# Patient Record
Sex: MALE | Race: WHITE | NOT HISPANIC OR LATINO | Employment: OTHER | URBAN - METROPOLITAN AREA
[De-identification: names, ages, dates, MRNs, and addresses within clinical notes are randomized per-mention and may not be internally consistent; named-entity substitution may affect disease eponyms.]

---

## 2017-04-06 ENCOUNTER — OFFICE VISIT - RIVER FALLS (OUTPATIENT)
Dept: FAMILY MEDICINE | Facility: CLINIC | Age: 67
End: 2017-04-06
Payer: COMMERCIAL

## 2017-04-12 ENCOUNTER — OFFICE VISIT - RIVER FALLS (OUTPATIENT)
Dept: FAMILY MEDICINE | Facility: CLINIC | Age: 67
End: 2017-04-12
Payer: COMMERCIAL

## 2017-04-12 ASSESSMENT — MIFFLIN-ST. JEOR: SCORE: 1705.19

## 2017-05-23 ENCOUNTER — OFFICE VISIT - RIVER FALLS (OUTPATIENT)
Dept: FAMILY MEDICINE | Facility: CLINIC | Age: 67
End: 2017-05-23
Payer: COMMERCIAL

## 2017-06-02 ENCOUNTER — TELEPHONE (OUTPATIENT)
Dept: SURGERY | Facility: CLINIC | Age: 67
End: 2017-06-02

## 2017-06-02 NOTE — TELEPHONE ENCOUNTER
Per task, called and spoke with patient.  Patient is scheduled 7/18/17 at 11:00 am.  Patient knows to check in at clinic 4K.

## 2017-06-02 NOTE — TELEPHONE ENCOUNTER
----- Message from Geetha Zaman RN sent at 7/7/2016  9:54 AM CDT -----  Regarding: RECALL  Needs flex sig  July 2017

## 2017-06-23 ENCOUNTER — AMBULATORY - RIVER FALLS (OUTPATIENT)
Dept: FAMILY MEDICINE | Facility: CLINIC | Age: 67
End: 2017-06-23
Payer: COMMERCIAL

## 2017-06-27 ENCOUNTER — OFFICE VISIT (OUTPATIENT)
Dept: PULMONOLOGY | Facility: CLINIC | Age: 67
End: 2017-06-27
Attending: INTERNAL MEDICINE
Payer: MEDICARE

## 2017-06-27 VITALS
OXYGEN SATURATION: 18 % | TEMPERATURE: 97.7 F | WEIGHT: 198.41 LBS | BODY MASS INDEX: 26.18 KG/M2 | DIASTOLIC BLOOD PRESSURE: 72 MMHG | HEART RATE: 67 BPM | SYSTOLIC BLOOD PRESSURE: 131 MMHG | RESPIRATION RATE: 18 BRPM

## 2017-06-27 DIAGNOSIS — Z87.891 HISTORY OF TOBACCO USE: Primary | ICD-10-CM

## 2017-06-27 PROCEDURE — 99212 OFFICE O/P EST SF 10 MIN: CPT | Mod: ZF

## 2017-06-27 ASSESSMENT — PAIN SCALES - GENERAL: PAINLEVEL: MODERATE PAIN (4)

## 2017-06-27 NOTE — PROGRESS NOTES
Pulmonary Nodule Clinic - follow-up    We have been asked by Dr. Soliz to evaluate this patient in regards to pulmonary nodules        HPI:     This is a 67-year-old male who was last seen in June/July 2016 secondary to multiple pulmonary nodules which at that time had been stable for about 1-1/2 years.  There is also an area of wall thickening in the right upper lobe.  It was recommended at that time that no further follow-up is needed.  It was recommended that he continue with lung cancer screening CT and he is here one year from his previous visit for lung cancer screening CT.  . He has at least a 30 pk year history. Family hx of lung cancer.        Review of Systems:   8 point ROS performed with pertinent +/- noted in the HPI.  The remainder of the ROS was otherwise negative.        Pertinent Medications     Current Outpatient Prescriptions   Medication Sig     atorvastatin (LIPITOR) 10 MG tablet Take 10 mg by mouth daily     aspirin 81 MG tablet Take 81 mg by mouth daily     WARFARIN SODIUM By mouth alternating 12.5mg and 10mg or as directed by coumadin clinic     No current facility-administered medications for this visit.           Allergies:    No Known Allergies       Past Medical Hx:       Arterial occlusive disease      PVD (peripheral vascular disease)      Colon polyps requiring colectomy      EtOH use with associated liver disease      Mild airflow obstruction      Pulmonary nodules - Noted June 2014       Family Hx: Reviewed, updated in EPIC reflect updates as needed.       Mother - Breast Cancer  Paternal Uncle - Stomach Cancer  Paternal Uncle - colon cancer  Paternal Aunt - lung cancer       Social Hx: Reviewed, updated in EPIC reflect updates as needed.       Social History     Social History Narrative    The patient has a 50 pk yr tobacco hx.  He has ongoing active use.  Alcohol use is 21 alcoholic drinks per week.  He has marijuana use.         He previously worked as .          The  "patient is .  Has 3 children.              Objective   Vitals:  /72 (BP Location: Left arm, Patient Position: Chair, Cuff Size: Adult Regular)  Pulse 67  Temp 97.7  F (36.5  C) (Oral)  Resp 18  Wt 90 kg (198 lb 6.6 oz)  SpO2 (!) 18%  BMI 26.18 kg/m2    General:  Adult male;appears stated age; no acute distress; the patient is a good historian  HEENT:  NCAT; EOMI; No icterus; no injection; MMM  Pulm: CTAB, normal to percussion  CV: RRR, no murmurs, rubs or gallops  Neuro: Alert and oriented x 3, moves all extremities no obvious weakness        Labs / Imaging/Studies     Imaging:   CT chest:   Impression:   1. ACR Assessment Category:  Lung-RADS Category 2. Benign appearance  or behavior. .      Recommendation:  Lung-RADS Category 2. Benign appearance or behavior.  Recommendation:  continue annual screening. .            2. Significant Incidental Finding(s):  Category S: Yes.  a.  coronary artery calcium moderate  b. Nonobstructing 4 mm right renal stone.  c. other interstitial lung disease (respiratory  bronchiolitis-interstitial lung disease)     3. Prior history of Lung cancer:  Category C: no.  4. Mild upper lobe predominant centrilobular emphysema.  5. Avoidance of tobacco smoke is strongly advised. Please consider  referral for smoking cessation to [facility] Medication Therapy Management  (MTM) if clinically appropriate.        Download the \"LungRADS Assessment Categories\" table at this site:   http://www.acr.org/Quality-Safety/Resources/LungRADS            Assessment and Plan:   Assessment: 67-year-old male with significant tobacco use history who is here with a 1 year follow-up low-dose screening CT scan of the chest demonstrate multiple pulmonary nodules which appear to be benign in nature.   I would recommend at this time continue yearly surveillance with a low-dose CT scan next year.    1. History of tobacco use  See above  - Prof fee: Shared Decisionmaking for Lung Cancer Screening  - CT " Chest Lung Cancer Scrn Low Dose wo; Future    I spent 25 minutes with direct face to face interaction with this patient and provided at least 50% of this time counseling and coordinating care lung cancer screening  as noted above in the assessment and plan.      Yemi Marino MD  Pulmonary and Critical Care  [facility]  Pager:  [phone]

## 2017-06-27 NOTE — MR AVS SNAPSHOT
After Visit Summary   6/27/2017    Kannan Wolf    MRN: [id number]           Patient Information     Date Of Birth          1950        Visit Information        Provider Department      6/27/2017 11:30 AM Yemi Marino MD [facility]        Today's Diagnoses     History of tobacco use    -  1      Care Instructions      Lung Cancer Screening   Frequently Asked Questions  If you are at high-risk for lung cancer, getting screened with low-dose computed tomography (LDCT) every year can help save your life. This handout offers answers to some of the most common questions about lung cancer screening. If you have other questions, please call [phone] ([phone]).     What is it?  Lung cancer screening uses special X-ray technology to create an image of your lung tissue. The exam is quick and easy and takes less than 10 seconds. We don t give you any medicine or use any needles. You can eat before and after the exam. You don t need to change your clothes as long as the clothing on your chest doesn t contain metal. But, you do need to be able to hold your breath for at least 6 seconds during the exam.    What is the goal of lung cancer screening?  The goal of lung cancer screening is to save lives. Many times, lung cancer is not found until a person starts having physical symptoms. Lung cancer screening can help detect lung cancer in the earliest stages when it may be easier to treat.    Who should be screened for lung cancer?  We suggest lung cancer screening for anyone who is at high-risk for lung cancer. You are in the high-risk group if you:      are between the ages of 55 and 79, and    have smoked at least 1 pack of cigarettes a day for 30 or more years, and    still smoke or have quit within the past 15 years.    However, if you have a new cough or shortness of breath, you should talk to your doctor before being screened.    Some national lung health  advocacy groups also recommend screening for people ages 50 to 79 who have smoked an average of 1 pack of cigarettes a day for 20 years. They must also have at least 1 other risk factor for lung cancer, not including exposure to secondhand smoke. Other risk factors are having had cancer in the past, emphysema, pulmonary fibrosis, COPD, a family history of lung cancer, or exposure to certain materials such as arsenic, asbestos, beryllium, cadmium, chromium, diesel fumes, nickel, radon or silica. Your care team can help you know if you have one of these risk factors.     Why does it matter if I have symptoms?  Certain symptoms can be a sign that you have a condition in your lungs that should be checked and treated by your doctor. These symptoms include fever, chest pain, a new or changing cough, shortness of breath that you have never felt before, coughing up blood or unexplained weight loss. Having any of these symptoms can greatly affect the results of lung cancer screening.       Should all smokers get an LDCT lung cancer screening exam?  It depends. Lung cancer screening is for a very specific group of men and women who have a history of heavy smoking over a long period of time (see  Who should be screened for lung cancer  above).  I am in the high-risk group, but have been diagnosed with cancer in the past. Is LDCT lung cancer screening right for me?  In some cases, you should not have LDCT lung screening, such as when your doctor is already following your cancer with CT scan studies. Your doctor will help you decide if LDCT lung screening is right for you.  Do I need to have a screening exam every year?  Yes. If you are in the high-risk group described earlier, you should get an LDCT lung cancer screening exam every year until you are 79, or are no longer willing or able to undergo screening and possible procedures to diagnose and treat lung cancer.  How effective is LDCT at preventing death from lung  cancer?  Studies have shown that LDCT lung cancer screening can lower the risk of death from lung cancer by 20 percent in people who are at high-risk.  What are the risks?  There are some risks and limitations of LDCT lung cancer screening. We want to make sure you understand the risks and benefits, so please let us know if you have any questions. Your doctor may want to talk with you more about these risks.    Radiation exposure: As with any exam that uses radiation, there is a very small increased risk of cancer. The amount of radiation in LDCT is small--about the same amount a person would get from a mammogram. Your doctor orders the exam when he or she feels the potential benefits outweigh the risks.    False negatives: No test is perfect, including LDCT. It is possible that you may have a medical condition, including lung cancer, that is not found during your exam. This is called a false negative result.    False positives and more testing: LDCT very often finds something in the lung that could be cancer, but in fact is not. This is called a false positive result. False positive tests often cause anxiety. To make sure these findings are not cancer, you may need to have more tests. These tests will be done only if you give us permission. Sometimes patients need a treatment that can have side effects, such as a biopsy. For more information on false positives, see  What can I expect from the results?     Findings not related to lung cancer: Your LDCT exam also takes pictures of areas of your body next to your lungs. In a very small number of cases, the CT scan will show an abnormal finding in one of these areas, such as your kidneys, adrenal glands, liver or thyroid. This finding may not be serious, but you may need more tests. Your doctor can help you decide what other tests you may need, if any.  What can I expect from the results?  About 1 out of 4 LDCT exams will find something that may need more tests. Most  of the time, these findings are lung nodules. Lung nodules are very small collections of tissue in the lung. These nodules are very common, and the vast majority--more than 97 percent--are not cancer (benign). Most are normal lymph nodes or small areas of scarring from past infections.  But, if a small lung nodule is found to be cancer, the cancer can be cured more than 90 percent of the time. To know if the nodule is cancer, we may need to get more images before your next yearly screening exam. If the nodule has suspicious features (for example, it is large, has an odd shape or grows over time), we will refer you to a specialist for further testing.  Will my doctor also get the results?  Yes. Your doctor will get a copy of your results.  Is it okay to keep smoking now that there s a cancer screening exam?  No. Tobacco is one of the strongest cancer-causing agents. It causes not only lung cancer, but other cancers and cardiovascular (heart) diseases as well. The damage caused by smoking builds over time. This means that the longer you smoke, the higher your risk of disease. While it is never too late to quit, the sooner you quit, the better.  Where can I find help to quit smoking?  The best way to prevent lung cancer is to stop smoking. If you have already quit smoking, congratulations and keep it up! For help on quitting smoking, please call [employer] at 5-832-816-TSZW (9484) or the American Cancer Society at 1-158.479.6196 to find local resources near you.  One-on-one health coaching:  If you d prefer to work individually with a health care provider on tobacco cessation, we offer:      Medication Therapy Management:  Our specially trained pharmacists work closely with you and your doctor to help you quit smoking.  Call [phone] or [phone] (toll free).     Can Do: Health coaching offered by [facility].  [email or web address]            Follow-ups after your visit        Follow-up notes  from your care team     Return in about 1 year (around 6/27/2018).      Your next 10 appointments already scheduled     Sep 01, 2017 12:00 PM CDT   (Arrive by 11:45 AM)   NEW WITH ROOM with Sana Ervin GC,  2 116 CONSULT RM   [facility] ([facility])    [address] [place] [address]  2nd [facility] [id number] [phone]            Sep 01, 2017  1:15 PM CDT   [facility] Lab Draw with [facility] LAB DRAW   [facility] Lab Draw ([facility])    [address] [id number] [phone]            Jun 26, 2018 12:00 PM CDT   (Arrive by 11:45 AM)   CT LUNG RESEARCH FLORES with CT2   [facility] CT ([facility])    [address] [id number] [phone]           Please bring any scans or X-rays taken at other hospitals, if similar tests were done. Also bring a list of your medicines, including vitamins, minerals and over-the-counter drugs. It is safest to leave personal items at home.  Be sure to tell your doctor:   If you have any allergies.   If there s any chance you are pregnant.   If you are breastfeeding.   If you have any special needs.  You do not need to do anything special to prepare.  Please wear loose clothing, such as a sweat suit or jogging clothes. Avoid snaps, zippers and other metal. We may ask you to undress and put on a hospital gown.            Jun 26, 2018  1:00 PM CDT   (Arrive by 12:45 PM)   Return Visit with Yemi Marino MD   [facility] ([facility])    [address] [address] [phone]              Who to contact     If you have questions or need follow up information about today's clinic visit or your schedule please contact [facility] directly at [phone].  Normal or non-critical lab and imaging  results will be communicated to you by Magtonhart, letter or phone within 4 business days after the clinic has received the results. If you do not hear from us within 7 days, please contact the clinic through GetFeedback or phone. If you have a critical or abnormal lab result, we will notify you by phone as soon as possible.  Submit refill requests through GetFeedback or call your pharmacy and they will forward the refill request to us. Please allow 3 business days for your refill to be completed.          Additional Information About Your Visit        GetFeedback Information     GetFeedback gives you secure access to your electronic health record. If you see a primary care provider, you can also send messages to your care team and make appointments. If you have questions, please call your primary care clinic.  If you do not have a primary care provider, please call [phone] and they will assist you.        Care EveryWhere ID     This is your Care EveryWhere ID. This could be used by other organizations to access your [facility] medical records  [id number]        Your Vitals Were     Pulse Temperature Respirations Pulse Oximetry BMI (Body Mass Index)       67 97.7  F (36.5  C) (Oral) 18 18% 26.18 kg/m2        Blood Pressure from Last 3 Encounters:   07/18/17 143/88   06/27/17 131/72   12/12/16 134/77    Weight from Last 3 Encounters:   07/18/17 88.5 kg (195 lb)   06/27/17 90 kg (198 lb 6.6 oz)   12/12/16 88 kg (194 lb)              We Performed the Following     Prof fee: Shared Decisionmaking for Lung Cancer Screening          Today's Medication Changes          These changes are accurate as of: 6/27/17 11:59 PM.  If you have any questions, ask your nurse or doctor.               Stop taking these medicines if you haven't already. Please contact your care team if you have questions.     pimecrolimus 1 % cream   Commonly known as:  ELIDEL   Stopped by:  Yemi Marino MD                    Primary Care Provider Office Phone  # Fax Tyrel Jenkins [phone]       [facility] [address] [facility] [address]        Equal Access to Services     ALONA STRICKLAND : Hadii aad ku hadlochely Ivan, waandida luphilchristinaha, qagirishta kaemily milner, moises kennedydeandra jettmarily johannystephenfranck muñiz. So [facility] [phone].    ATENCIÓN: Si habla español, tiene a puente disposición servicios gratuitos de asistencia lingüística. Llame al [phone].    We comply with applicable federal civil rights laws and [place] laws. We do not discriminate on the basis of race, color, national origin, age, disability sex, sexual orientation or gender identity.            Thank you!     Thank you for choosing [facility]  for your care. Our goal is always to provide you with excellent care. Hearing back from our patients is one way we can continue to improve our services. Please take a few minutes to complete the written survey that you may receive in the mail after your visit with us. Thank you!             Your Updated Medication List - Protect others around you: Learn how to safely use, store and throw away your medicines at www.disposemymeds.org.          This list is accurate as of: 6/27/17 11:59 PM.  Always use your most recent med list.                   Brand Name Dispense Instructions for use Diagnosis    aspirin 81 MG tablet      Take 81 mg by mouth daily        atorvastatin 10 MG tablet    LIPITOR     Take 10 mg by mouth daily    NAFLD (nonalcoholic fatty liver disease), Lung nodule       WARFARIN SODIUM      By mouth alternating 12.5mg and 10mg or as directed by coumadin clinic

## 2017-06-27 NOTE — LETTER
6/27/2017       RE: Kannan Wolf  [address] [place] [address]     Dear Colleague,    Thank you for referring your patient, Kannan Wolf, to the [facility] at [facility]. Please see a copy of my visit note below.    Pulmonary Nodule Clinic - follow-up    We have been asked by Dr. Soliz to evaluate this patient in regards to pulmonary nodules        HPI:     This is a 67-year-old male who was last seen in June/July 2016 secondary to multiple pulmonary nodules which at that time had been stable for about 1-1/2 years.  There is also an area of wall thickening in the right upper lobe.  It was recommended at that time that no further follow-up is needed.  It was recommended that he continue with lung cancer screening CT and he is here one year from his previous visit for lung cancer screening CT.  . He has at least a 30 pk year history. Family hx of lung cancer.        Review of Systems:   8 point ROS performed with pertinent +/- noted in the HPI.  The remainder of the ROS was otherwise negative.        Pertinent Medications     Current Outpatient Prescriptions   Medication Sig     atorvastatin (LIPITOR) 10 MG tablet Take 10 mg by mouth daily     aspirin 81 MG tablet Take 81 mg by mouth daily     WARFARIN SODIUM By mouth alternating 12.5mg and 10mg or as directed by coumadin clinic     No current facility-administered medications for this visit.           Allergies:    No Known Allergies       Past Medical Hx:       Arterial occlusive disease      PVD (peripheral vascular disease)      Colon polyps requiring colectomy      EtOH use with associated liver disease      Mild airflow obstruction      Pulmonary nodules - Noted June 2014       Family Hx: Reviewed, updated in EPIC reflect updates as needed.       Mother - Breast Cancer  Paternal Uncle - Stomach Cancer  Paternal Uncle - colon cancer  Paternal Aunt - lung cancer       Social  "Hx: Reviewed, updated in EPIC reflect updates as needed.       Social History     Social History Narrative    The patient has a 50 pk yr tobacco hx.  He has ongoing active use.  Alcohol use is 21 alcoholic drinks per week.  He has marijuana use.         He previously worked as .          The patient is .  Has 3 children.              Objective   Vitals:  /72 (BP Location: Left arm, Patient Position: Chair, Cuff Size: Adult Regular)  Pulse 67  Temp 97.7  F (36.5  C) (Oral)  Resp 18  Wt 90 kg (198 lb 6.6 oz)  SpO2 (!) 18%  BMI 26.18 kg/m2    General:  Adult male;appears stated age; no acute distress; the patient is a good historian  HEENT:  NCAT; EOMI; No icterus; no injection; MMM  Pulm: CTAB, normal to percussion  CV: RRR, no murmurs, rubs or gallops  Neuro: Alert and oriented x 3, moves all extremities no obvious weakness        Labs / Imaging/Studies     Imaging:   CT chest:   Impression:   1. ACR Assessment Category:  Lung-RADS Category 2. Benign appearance  or behavior. .      Recommendation:  Lung-RADS Category 2. Benign appearance or behavior.  Recommendation:  continue annual screening. .            2. Significant Incidental Finding(s):  Category S: Yes.  a.  coronary artery calcium moderate  b. Nonobstructing 4 mm right renal stone.  c. other interstitial lung disease (respiratory  bronchiolitis-interstitial lung disease)     3. Prior history of Lung cancer:  Category C: no.  4. Mild upper lobe predominant centrilobular emphysema.  5. Avoidance of tobacco smoke is strongly advised. Please consider  referral for smoking cessation to [facility] Medication Therapy Management  (MTM) if clinically appropriate.        Download the \"LungRADS Assessment Categories\" table at this site:   http://www.acr.org/Quality-Safety/Resources/LungRADS            Assessment and Plan:   Assessment: 67-year-old male with significant tobacco use history who is here with a 1 year follow-up low-dose screening " CT scan of the chest demonstrate multiple pulmonary nodules which appear to be benign in nature.   I would recommend at this time continue yearly surveillance with a low-dose CT scan next year.    1. History of tobacco use  See above  - Prof fee: Shared Decisionmaking for Lung Cancer Screening  - CT Chest Lung Cancer Scrn Low Dose wo; Future    I spent 25 minutes with direct face to face interaction with this patient and provided at least 50% of this time counseling and coordinating care lung cancer screening  as noted above in the assessment and plan.      Yemi Marino MD  Pulmonary and Critical Care  [facility]  Pager:  [phone]

## 2017-06-27 NOTE — PATIENT INSTRUCTIONS

## 2017-06-27 NOTE — NURSING NOTE
"Oncology Rooming Note    June 27, 2017 11:46 AM   Kannan Wolf is a 67 year old male who presents for:    Chief Complaint   Patient presents with     Oncology Clinic Visit     CT results , Lung Nodgules      Initial Vitals: /72 (BP Location: Left arm, Patient Position: Chair, Cuff Size: Adult Regular)  Pulse 67  Temp 97.7  F (36.5  C) (Oral)  Resp 18  Wt 90 kg (198 lb 6.6 oz)  SpO2 (!) 18%  BMI 26.18 kg/m2 Estimated body mass index is 26.18 kg/(m^2) as calculated from the following:    Height as of 12/12/16: 1.854 m (6' 0.99\").    Weight as of this encounter: 90 kg (198 lb 6.6 oz). Body surface area is 2.15 meters squared.  Moderate Pain (4) Comment: Data Unavailable   No LMP for male patient.  Allergies reviewed: Yes  Medications reviewed: Yes    Medications: MEDICATION REFILLS NEEDED TODAY. Provider was notified.  [facility] into [facility] [address] [place] [address] [address]  AT [facility] [address]    Clinical concerns: no concerns  Ned was notified.    7 minutes for nursing intake (face to face time)     Lo Acuña MA              "

## 2017-07-10 ENCOUNTER — AMBULATORY - RIVER FALLS (OUTPATIENT)
Dept: FAMILY MEDICINE | Facility: CLINIC | Age: 67
End: 2017-07-10
Payer: COMMERCIAL

## 2017-07-18 ENCOUNTER — OFFICE VISIT (OUTPATIENT)
Dept: SURGERY | Facility: CLINIC | Age: 67
End: 2017-07-18

## 2017-07-18 VITALS
HEART RATE: 72 BPM | BODY MASS INDEX: 25.84 KG/M2 | WEIGHT: 195 LBS | DIASTOLIC BLOOD PRESSURE: 88 MMHG | TEMPERATURE: 97.7 F | HEIGHT: 73 IN | SYSTOLIC BLOOD PRESSURE: 143 MMHG | OXYGEN SATURATION: 96 %

## 2017-07-18 DIAGNOSIS — D12.6 BENIGN NEOPLASM OF COLON, UNSPECIFIED PART OF COLON: Primary | ICD-10-CM

## 2017-07-18 ASSESSMENT — PAIN SCALES - GENERAL: PAINLEVEL: MILD PAIN (3)

## 2017-07-18 NOTE — PROGRESS NOTES
Colon and Rectal Surgery Clinic Note      RE: Kannan Cisneros Cudd  : 1950  CRISTHIAN: 2017      Иван returns to clinic in follow-up of his colonic polyposis, for which I performed a total abdominal colectomy in May 2012. Final pathology showed multiple tubular and sessile serrated adenomas. Genetic testing in 2012 was negative for APC and MutYH age mutations. Jerod bowel function has been stable, with roughly 10 loose bowel movements per 24 hours, 3 occurring at night.    Иван is enjoying his summer [place] his [place] on the [place]. We again discussed his friend at [employer], Patrick Serrano. And these son Royal is, I believe, a friend of my nephew Shade.    I obtained written informed consent and performed flexible sigmoidoscopy. There is a healthy-appearing ileorectal anastomosis. The terminal ileum is normal. There is a single 3 mm polyp in the distal rectum, which I removed with cold biopsy forceps. Retroflexion was normal.    I asked Иван to follow-up with me in 1 year for repeat surveillance sigmoidoscopy. I also advised a visit to the Cancer Risk Management Program, as Иван would probably benefit from gene panel testing. With respect to Иван's bowel function, I advised use of a fiber supplement such as Benefiber with a gradually escalating dosage to see if this is helpful. However, the Иван is not having any real limitations with his current bowel function.    Total time 15 minutes, plus 5-10 minutes for flexible sigmoidoscopy. Greater than half the 15 minutes was spent counseling.      For details of past medical history, surgical history, family history, medications, allergies, and review of systems, please see details below.    Medical history:  Past Medical History:   Diagnosis Date     ACL tear      Arthritis     osteoarthritis     Blood clot in the legs     +PE   also on vein from intestine     Blood clotting disorder (H)      Chronic diarrhea     result of total colectomy      Head injury 1960    multiple     Hearing problem 2000     IBS (irritable bowel syndrome)      Liver disease 2013     Migraines 1989    none for a few years     Personal history of colonic polyps 2009     Polyps, colonic      Pulmonary emboli (H) 2009     Skin disease 1970    eczema     Sleep apnea        Surgical history:  Past Surgical History:   Procedure Laterality Date     C REPAIR CRUCIATE LIGAMENT,KNEE       bilateral knees     LAPAROSCOPIC ASSISTED COLECTOMY  5/9/2012    Procedure:LAPAROSCOPIC ASSISTED COLECTOMY; Hand Assisted Laparoscopic Total Abdominal Colectomy Ileorectal anastomosis. ; Surgeon:COLLINS JAVIER; Location:[facility]     SIGMOIDOSCOPY FLEXIBLE  5/21/2012    Procedure:SIGMOIDOSCOPY FLEXIBLE; Surgeon:EMMANUEL LEDBETTER; [facility]     VASCULAR SURGERY      lower ext angiogram       Family history:  Family History   Problem Relation Age of Onset     Anesthesia Reaction       No history     Colon Polyps       no hx     Crohn Disease       no hx     Ulcerative Colitis       no hx     Cancer - colorectal       no hx     CANCER       no hx     CEREBROVASCULAR DISEASE Mother      multiple     Colon Cancer Other      uncle     Substance Abuse Sister      Substance Abuse Brother      Substance Abuse Sister        Medications:  Current Outpatient Prescriptions   Medication Sig Dispense Refill     atorvastatin (LIPITOR) 10 MG tablet Take 10 mg by mouth daily       WARFARIN SODIUM By mouth alternating 12.5mg and 10mg or as directed by coumadin clinic       aspirin 81 MG tablet Take 81 mg by mouth daily       Allergies:  The patienthas No Known Allergies.    Social history:  Social History   Substance Use Topics     Smoking status: Current Every Day Smoker     Packs/day: 1.00     Years: 50.00     Types: Cigarettes     Smokeless tobacco: Never Used     Alcohol use 0.0 oz/week     0 Standard drinks or equivalent per week      Comment: 3-4 light beers per day     Marital status: .    Review of  "Systems:  Nursing Notes:   Sherice Brambila LPN  7/18/2017 11:04 AM  Signed  No chief complaint on file.      Vitals:    07/18/17 1100   BP: 143/88   Pulse: 72   Temp: 97.7  F (36.5  C)   TempSrc: Oral   SpO2: 96%   Weight: 195 lb   Height: 6' 1\"       Body mass index is 25.73 kg/(m^2).    ANDRZEJ Chapman MD   Professor and Chief  Division of Colon and Rectal Surgery  [facility]      Referring Provider:  David Jenkins  [facility]  [address] [place] [address]     Primary Care Provider:  David Jenkins  "

## 2017-07-18 NOTE — NURSING NOTE
"No chief complaint on file.      Vitals:    07/18/17 1100   BP: 143/88   Pulse: 72   Temp: 97.7  F (36.5  C)   TempSrc: Oral   SpO2: 96%   Weight: 195 lb   Height: 6' 1\"       Body mass index is 25.73 kg/(m^2).    Sherice KAY LPN                          "

## 2017-07-18 NOTE — LETTER
2017       RE: Kannan Wolf  [address] [address]     Dear Colleague,    Thank you for referring your patient, Kannan Wolf, to the [facility] COLON AND RECTAL SURGERY at [facility]. Please see a copy of my visit note below.    Colon and Rectal Surgery Clinic Note      RE: Kannan Wolf  : 1950  CRISTHIAN: 2017      Иван returns to clinic in follow-up of his colonic polyposis, for which I performed a total abdominal colectomy in May 2012. Final pathology showed multiple tubular and sessile serrated adenomas. Genetic testing in 2012 was negative for APC and MutYH age mutations. Jerod bowel function has been stable, with roughly 10 loose bowel movements per 24 hours, 3 occurring at night.    Иван is enjoying his summer [place] his [place] on the [facility]. We again discussed his friend at [employer], Patrick Serrano. And these son Royal is, I believe, a friend of my nephew Shade.    I obtained written informed consent and performed flexible sigmoidoscopy. There is a healthy-appearing ileorectal anastomosis. The terminal ileum is normal. There is a single 3 mm polyp in the distal rectum, which I removed with cold biopsy forceps. Retroflexion was normal.    I asked Иван to follow-up with me in 1 year for repeat surveillance sigmoidoscopy. I also advised a visit to the Cancer Risk Management Program, as Иван would probably benefit from gene panel testing. With respect to Иван's bowel function, I advised use of a fiber supplement such as Benefiber with a gradually escalating dosage to see if this is helpful. However, the Иван is not having any real limitations with his current bowel function.    Total time 15 minutes, plus 5-10 minutes for flexible sigmoidoscopy. Greater than half the 15 minutes was spent counseling.      For details of past medical history, surgical history, family history, medications, allergies, and  review of systems, please see details below.    Medical history:  Past Medical History:   Diagnosis Date     ACL tear      Arthritis     osteoarthritis     Blood clot in the legs     +PE 2009  also on vein from intestine     Blood clotting disorder (H) 2009     Chronic diarrhea     result of total colectomy     Head injury 1960    multiple     Hearing problem 2000     IBS (irritable bowel syndrome)      Liver disease 2013     Migraines 1989    none for a few years     Personal history of colonic polyps 2009     Polyps, colonic      Pulmonary emboli (H) 2009     Skin disease 1970    eczema     Sleep apnea        Surgical history:  Past Surgical History:   Procedure Laterality Date     C REPAIR CRUCIATE LIGAMENT,KNEE       bilateral knees     LAPAROSCOPIC ASSISTED COLECTOMY  5/9/2012    Procedure:LAPAROSCOPIC ASSISTED COLECTOMY; Hand Assisted Laparoscopic Total Abdominal Colectomy Ileorectal anastomosis. ; Surgeon:COLLINS JAVIER; Location[facility]     SIGMOIDOSCOPY FLEXIBLE  5/21/2012    Procedure:SIGMOIDOSCOPY FLEXIBLE; Surgeon:EMMANUEL LEDBETTER; Location[facility]     VASCULAR SURGERY      lower ext angiogram       Family history:  Family History   Problem Relation Age of Onset     Anesthesia Reaction       No history     Colon Polyps       no hx     Crohn Disease       no hx     Ulcerative Colitis       no hx     Cancer - colorectal       no hx     CANCER       no hx     CEREBROVASCULAR DISEASE Mother      multiple     Colon Cancer Other      uncle     Substance Abuse Sister      Substance Abuse Brother      Substance Abuse Sister        Medications:  Current Outpatient Prescriptions   Medication Sig Dispense Refill     atorvastatin (LIPITOR) 10 MG tablet Take 10 mg by mouth daily       WARFARIN SODIUM By mouth alternating 12.5mg and 10mg or as directed by coumadin clinic       aspirin 81 MG tablet Take 81 mg by mouth daily       Allergies:  The patienthas No Known Allergies.    Social history:  Social History  "  Substance Use Topics     Smoking status: Current Every Day Smoker     Packs/day: 1.00     Years: 50.00     Types: Cigarettes     Smokeless tobacco: Never Used     Alcohol use 0.0 oz/week     0 Standard drinks or equivalent per week      Comment: 3-4 light beers per day     Marital status: .    Review of Systems:  Nursing Notes:   Sherice Brambila LPN  7/18/2017 11:04 AM  Signed  No chief complaint on file.      Vitals:    07/18/17 1100   BP: 143/88   Pulse: 72   Temp: 97.7  F (36.5  C)   TempSrc: Oral   SpO2: 96%   Weight: 195 lb   Height: 6' 1\"       Body mass index is 25.73 kg/(m^2).    Sherice KAY LPN    Referring Provider:  David Jenkins  [facility]  [address] [place] [address]     Primary Care Provider:  David Jenkins    Again, thank you for allowing me to participate in the care of your patient.      Sincerely,    Agapito Watson MD      "

## 2017-07-18 NOTE — MR AVS SNAPSHOT
After Visit Summary   7/18/2017    Kannan Wolf    MRN: [id number]           Patient Information     Date Of Birth          1950        Visit Information        Provider Department      7/18/2017 11:00 AM Agapito Watson MD [facility] Colon and Rectal Surgery        Today's Diagnoses     Benign neoplasm of colon, unspecified part of colon    -  1       Follow-ups after your visit        Additional Services     CANCER RISK MGMT/CANCER GENETIC COUNSELING REFERRAL       Your provider has referred you to the Cancer Risk Management Program - Cancer Genetic Counseling.    Reason for Referral: polyposis.  APC and MutYH (-) 2012.   ?gene panel testing?    We have a sent a notice to a staff member of the Cancer Risk Management Program to give you a call to assist with scheduling your appointment.  You may also call  [phone] ([phone]) to initiate scheduling.    Please be aware that coverage of these services is subject to the terms and limitations of your health insurance plan.  Call member services at your health plan with any benefit or coverage questions.      Please bring the completed family history sheet to your appointment in addition to any available outside medical records documenting your cancer diagnosis.                  Your next 10 appointments already scheduled     Sep 01, 2017 12:00 PM CDT   (Arrive by 11:45 AM)   NEW WITH ROOM with Sana Ervin GC,  2 116 CONSULT [facility] ([facility] [place])    [address] [place] [id number] [phone]            Sep 01, 2017  1:15 PM CDT   [facility]   [facility] Lab Draw ([facility] [place])    [address] [place] [address]   [phone]            Jun 26, 2018 12:00 PM CDT   (Arrive by 11:45 AM)   CT LUNG RESEARCH FLORES with [facility]   [facility] CT ([facility]  [facility] [place])    [address] [place] [id number] [phone]           Please bring any scans or X-rays taken at other hospitals, if similar tests were done. Also bring a list of your medicines, including vitamins, minerals and over-the-counter drugs. It is safest to leave personal items at home.  Be sure to tell your doctor:   If you have any allergies.   If there s any chance you are pregnant.   If you are breastfeeding.   If you have any special needs.  You do not need to do anything special to prepare.  Please wear loose clothing, such as a sweat suit or jogging clothes. Avoid snaps, zippers and other metal. We may ask you to undress and put on a hospital gown.            Jun 26, 2018  1:00 PM CDT   (Arrive by 12:45 PM)   Return Visit with Yemi Marino MD   [facility] ([facility] [place])    [address] [place] [address] [phone]              Who to contact     Please call your clinic at [phone] to:    Ask questions about your health    Make or cancel appointments    Discuss your medicines    Learn about your test results    Speak to your doctor   If you have compliments or concerns about an experience at your clinic, or if you wish to file a complaint, please contact [facility] Patient Relations at [phone] or email us at [email or web address]         Additional Information About Your Visit        [employer] Information     [facility] gives you secure access to your electronic health record. If you see a primary care provider, you can also send messages to your care team and make appointments. If you have questions, please call your primary care clinic.  If you do not have a primary care provider, please call [phone] and they will assist you.      [facility] is an electronic gateway that provides easy, online access to your medical records. With  "MyChart, you can request a clinic appointment, read your test results, renew a prescription or communicate with your care team.     To access your existing account, please contact your [facility] or call [phone] for assistance.        Care EveryWhere ID     This is your Care EveryWhere ID. This could be used by other organizations to access your [facility] medical records  [id number]        Your Vitals Were     Pulse Temperature Height Pulse Oximetry BMI (Body Mass Index)       72 97.7  F (36.5  C) (Oral) 1.854 m (6' 1\") 96% 25.73 kg/m2        Blood Pressure from Last 3 Encounters:   No data found for BP    Weight from Last 3 Encounters:   No data found for Wt              Today, you had the following     No orders found for display       Primary Care Provider Office Phone # Fax #    David Jenkins [phone]       [facility] [address] [facility] [address]        Equal Access to Services     ALONA STRICKLAND : Hadii eugenia ku hadasho Soomaali, waaxda luqadaha, qaybta kaalmada adeegyada, waxay joannein hayshayy nicholson . So [facility] [phone].    ATENCIÓN: Si habla español, tiene a puente disposición servicios gratuitos de asistencia lingüística. Anjelame al [phone].    We comply with applicable federal civil rights laws and [place] laws. We do not discriminate on the basis of race, color, national origin, age, disability sex, sexual orientation or gender identity.            Thank you!     Thank you for choosing [facility] COLON AND RECTAL SURGERY  for your care. Our goal is always to provide you with excellent care. Hearing back from our patients is one way we can continue to improve our services. Please take a few minutes to complete the written survey that you may receive in the mail after your visit with us. Thank you!             Your Updated Medication List - Protect others around you: Learn how to safely use, store and throw away " your medicines at www.disposemymeds.org.          This list is accurate as of: 7/18/17 11:59 PM.  Always use your most recent med list.                   Brand Name Dispense Instructions for use Diagnosis    aspirin 81 MG tablet      Take 81 mg by mouth daily        atorvastatin 10 MG tablet    LIPITOR     Take 10 mg by mouth daily    NAFLD (nonalcoholic fatty liver disease), Lung nodule       WARFARIN SODIUM      By mouth alternating 12.5mg and 10mg or as directed by coumadin clinic

## 2017-07-19 LAB — COPATH REPORT: NORMAL

## 2017-07-25 ENCOUNTER — AMBULATORY - RIVER FALLS (OUTPATIENT)
Dept: FAMILY MEDICINE | Facility: CLINIC | Age: 67
End: 2017-07-25
Payer: COMMERCIAL

## 2017-08-23 ENCOUNTER — AMBULATORY - RIVER FALLS (OUTPATIENT)
Dept: FAMILY MEDICINE | Facility: CLINIC | Age: 67
End: 2017-08-23
Payer: COMMERCIAL

## 2017-09-01 ENCOUNTER — OFFICE VISIT (OUTPATIENT)
Dept: ONCOLOGY | Facility: CLINIC | Age: 67
End: 2017-09-01
Attending: GENETIC COUNSELOR, MS
Payer: MEDICARE

## 2017-09-01 DIAGNOSIS — Z80.0 FAMILY HISTORY OF COLON CANCER: ICD-10-CM

## 2017-09-01 DIAGNOSIS — Z80.3 FAMILY HISTORY OF BREAST CANCER: ICD-10-CM

## 2017-09-01 DIAGNOSIS — D12.6 BENIGN NEOPLASM OF COLON, UNSPECIFIED PART OF COLON: ICD-10-CM

## 2017-09-01 DIAGNOSIS — D12.6 BENIGN NEOPLASM OF COLON, UNSPECIFIED PART OF COLON: Primary | ICD-10-CM

## 2017-09-01 PROCEDURE — 96040 ZZH GENETIC COUNSELING, EACH 30 MINUTES: CPT | Mod: ZF | Performed by: GENETIC COUNSELOR, MS

## 2017-09-01 PROCEDURE — 36415 COLL VENOUS BLD VENIPUNCTURE: CPT

## 2017-09-01 NOTE — PATIENT INSTRUCTIONS
Assessing Cancer Risk  Only about 5-10% of cancers are thought to be due to an inherited cancer susceptibility gene.    These families often have:  ? Several people with the same or related types of cancer  ? Cancers diagnosed at a young age (before age 50)  ? Individuals with more than one primary cancer  ? Multiple generations of the family affected with cancer    Comprehensive Colon Cancer Panel  We each inherit two copies of every gene in our bodies: one from our mother, and one from our father.  Each gene has a specific job to do.  When a gene has a mistake or  mutation  in it, it does not work like it should.      This handout will review common hereditary colon cancer syndromes, and other genes related to an increased risk for colon cancer.  The genes that will be discussed in this handout are: APC, BMPR1A, CDH1, CHEK2, EPCAM, GREM1, MLH1, MSH2, MSH6, MUTYH, PMS2, POLD1, POLE, PTEN, SMAD4, STK11, and TP53.  These genes are clinically actionable, meaning there are published guidelines for cancer screening and management for individuals who are found to carry mutations in these genes. Inheriting a mutation does not mean a person will develop cancer, but it does significantly increase his or her risk above the general population risk.      Familial Adenomatous Polyposis (FAP)  FAP is a hereditary cancer syndrome caused by mutations in the APC gene. The condition is known to cause hundreds to thousands of adenomatous polyps in the colon, creating a  carpet  of polyps. Some individuals have what is called attenuated FAP (AFAP), a milder form of FAP with fewer polyps and typically later onset. Individuals with an APC gene mutation are at an increased risk for colon, thyroid, and duodenal cancers, as well as several other types of cancer1.  Other features of this condition may include: osteomas, dental anomalies, benign skin lesions, CHRPE ( freckle  on the inside of the eye), and desmoid tumors.      Lifetime  Cancer Risks     Cancer Type General Population FAP   Colon  5% near 100%   Thyroid (papillary) 1% 1-12%   Duodenal <1% 5%   Liver  <1% 1-2% before age 5   Pancreas <1% 1%   Stomach <1% 1%       Juvenile Polyposis Syndrome (JPS)  JPS is characterized by hamartomatous polyps, called juvenile polyps, in the gastrointestinal tract.  Juvenile  refers to the type of polyps seen in this hereditary cancer condition, not the age of onset. Currently, mutations in two genes are known to cause JPS: BMPR1A and SMAD4. Of individuals clinically diagnosed with JPS, only 40% have an identifiable mutation in one of these genes, suggesting there are other genes that cause JPS that have not been discovered yet. Individuals with JPS are at an increased risk for colon cancer and stomach cancer 2,3,4. Pancreatic and small bowel cancers have also been reported in JPS, but the actual risks are unknown.         Lifetime Cancer Risks    Cancer Type General Population JPS   Colon 5% 40-50%   Gastric/Duodenal <1% 10-21%     Some individuals with SMAD4 mutations have a condition called JPS/HHT (Juvenile Polyposis/Hereditary Hemorrhagic Telangiectasia) where in addition to JPS, individuals may have nose bleeds and clotting issues.     Hereditary Diffuse Gastric Cancer (HDGC)  Currently, mutations in one gene are known to cause Hereditary Diffuse Gastric Cancer: CDH1.  Individuals with HDGC are at increased risk for diffuse gastric cancer and lobular breast cancer. Of people diagnosed with HDGC, 30-50% have a mutation in the CDH1 gene.  This suggests there are likely mutations in other genes that may cause HDGC that have not been identified yet. Individuals with HDGC may also be at increased risk for colon cancer.      Lifetime Cancer Risks    Cancer Type General Population HDGC   Diffuse Gastric <1% 67-83%   Breast 12% 39-52%     Kohler syndrome  Mutations in five different genes are known to cause Kohler syndrome: MLH1, MSH2, MSH6, PMS2, and  EPCAM. Individuals with Kohler syndrome have an increased risk for colon, uterine, ovarian, small bowel, stomach, urinary tract, and brain cancer, as well as several other types of cancer. The exact lifetime cancer risks are dependent upon the gene in which the mutation was identified.      Lifetime Cancer Risks    Cancer Type General Population Kohler syndrome   Colon 5% 10-80%   Uterine 2-3% 15-60%   Stomach <1% 6-13%   Ovarian 2% 4-24%   Urinary tract <1% 1-7%   Hepatobiliary tract <1% 1-4%   Small bowel <1% 3-6%   Brain/CNS <1% 1-3%   Pancreas <1% 1-6%       MUTYH-Associated Polyposis (MAP)  MAP is a hereditary cancer syndrome caused by mutations in the MUTYH gene. Unlike the other hereditary cancer genes discussed in this handout, two mutations in the MUTYH gene cause MAP and increase cancer risk. Those affected with MAP typically have between  adenomatous polyps. This syndrome also increases the risk for colon and duodenal cancer. Current research suggests that other cancers may be associated with MUTYH mutations, as well. The table below includes the risk that someone with two MUTYH gene mutations would develop cancer in their lifetime; of note, there is also an increased colon cancer risk for individuals who carry only one MUTYH gene mutation5,6,7.       Lifetime Cancer Risks    Cancer Type General Population MAP   Colon 5% %   Duodenal  <1% 5%       Cowden syndrome  Cowden syndrome is a hereditary condition that increases the risk for breast, thyroid, endometrial, colon, and kidney cancer.  A single mutation in the PTEN gene causes Cowden syndrome and increases cancer risk.  The table below shows the chance that someone with a PTEN mutation would develop cancer in their lifetime8,9.  Other benign features seen in some individuals with Cowden syndrome include benign skin lesions (facial papules, keratoses, lipomas), learning disabilities, autism, thyroid nodules, hamartomatous colon polyps, and  larger head size.      Lifetime Cancer Risks   Cancer Type General Population Cowden Syndrome   Breast 12% 25-50%*   Thyroid 1% 35%   Renal 1-2% 35%   Endometrial 2-3% 28%   Colon 5% 9%   Melanoma 2-3% >5%     *One recent study found breast cancer risk to be increased to 85%    Peutz-Jeghers syndrome (PJS)  PJS is a hereditary cancer syndrome caused by mutations in the STK11 gene. This condition can be distinguished from other hereditary syndromes by the presence of hamartomatous polyps in the gastrointestinal tract and freckles present in unusual places such as the hands, feet, neck, and lips. Individuals with Peutz-Jeghers syndrome have an increased risk for colon, breast, pancreatic, and other cancers3.  Men are at risk for testicular tumors which can affect hormones in the body. Women are at risk for sex cord tumors of the ovaries and a rare aggressive type of cervical cancer.     Lifetime Cancer Risks    Cancer Type General Population PJS   Breast 12% 45-50%   Colon 5% 39%   Stomach <1% 29%   Pancreas 1.5% 11-36%   Small Intestine <1% 13%     Ovarian  2%  18%   Lung 6-7% 15-17%     Additional Genes Associated with Increased Colon Cancer Risk  CHEK2  CHEK2 is a moderate-risk breast cancer gene.  Women who have a mutation in CHEK2 have around a 2-fold increased risk for breast cancer compared to the general population, and this risk may be higher depending upon family history.12,13,14.  Mutations in CHEK2 have also been shown to increase the risk of a number of other cancers, including colon and prostate, however these cancer risks are currently not well understood.     GREM1  GREM1 is a moderate-risk colon polyposis gene. Duplications of this gene are more commonly found in individuals with [employer] [id number]. Mutations in GREM1 are associated with colon polyps and therefore an increased risk of colon cancer; however the estimated cancer risk is not well [id number].     POLD1 and POLE  POLD1 and POLE  are moderate-risk colon cancer genes. Carriers of a mutation in one of these genes increases the lifetime risk of colorectal [id number],18,19,20. Mutations in these genes may also be associated with increased risk for other cancers including: endometrial cancer, duodenal adenomas and carcinomas, and brain tumors.    TP53  Li Fraumeni syndrome (LFS) is a hereditary cancer predisposition syndrome. LFS is caused by a mutation in the TP53 gene. A single mutation in one of the copies of TP53 increases the risk for multiple cancers. Individuals with LFS are at an increased risk for developing cancer at a young age. The general lifetime risk for development of cancer is 50% by age 30 and 90% by age 60.      Core Cancers: Sarcomas, Breast, Brain, Lung, Leukemias/Lymphomas, Adrenocortical carcinomas  Other Cancers: Gastrointestinal, Thyroid, Skin, Genitourinary    Genetic Testing  Genetic testing involves a simple blood test and will look at the genetic information in genes associated with an increased risk of colon cancer. The tests look for any harmful mutations that are associated with increased cancer risk.  If possible, it is recommended that the person(s) who has had cancer be tested before other family members.  That person will give us the most useful information about whether or not a specific gene mutation is associated with the cancer in the family.     Results  There are three possible results from genetic testing:  ? Positive--a harmful mutation was identified  ? Negative--no mutation was identified  ? Variant of unknown significance--a variation in one of the genes was identified, but it is unclear how this impacts cancer risk in the family  Advantages and Disadvantages  There are advantages and disadvantages to genetic testing of these genes.    Advantages  ? May clarify your cancer risk  ? Can help you make medical decisions  ? May explain the cancers in your family  ? May give useful information to your family  members (if you share your results)    Disadvantages  ? Possible negative emotional impact of learning about inherited cancer risk  ? Uncertainty in interpreting a negative test result in some situations  ? Possible genetic discrimination concerns (see below)    Inheritance   Most mutations in the genes outlined above are inherited in an autosomal dominant pattern.  This means that if a parent has a mutation, each of his or her children will have a 50% chance of inheriting that same mutation.  Therefore, each child--male or female--would have a 50% chance of being at increased risk for developing cancer.                                            Image obtained from [employer], 2013     In the case of MUTYH-Associated Polyposis (MAP) this hereditary cancer syndrome is inherited in an autosomal recessive pattern. This means that each parent of an individual with MAP is a carrier of MAP, meaning that they have only one mutation in MUTYH. They still have one functioning copy of their gene.  Carriers are at a slightly higher risk for colon cancer than the general population. If each parent is a carrier for MAP, they have a 25% of having a child who is affected with MAP, meaning the child inherited both gene mutations - one from each parent.       Image obtained from [employer], 2016    Genetic Information Nondiscrimination Act (SWETHA)  SWETHA is a federal law that protects individuals from health insurance or employment discrimination based on a genetic test result alone.  Although rare, there are currently no legal protections in terms of life insurance, long term care, or disability insurances.  Visit the National Human Genome Research [place] at Genome.gov/44624692 to learn more.    Reducing Cancer Risk  Each of the genes listed within this handout have nationally recognized cancer screening guidelines that would be recommended for individuals who test positive.  In addition to increased  cancer screening, surgeries may be offered or recommended to reduce cancer risk in certain cases.  Recommendations are based upon an individual s genetic test result as well as their personal and family history of cancer.    Questions to Think About Regarding Genetic Testing  ? What effect will the test result have on me and my relationship with my family members if I have an inherited gene mutation?  If I don t have a gene mutation?  ? Should I share my test results, and how will my family react to this news, which may also affect them?  ? Are my children ready to learn new information that may one day affect their own health?    Resources    PTEN World PTENworld.[email or web address]   No Stomach for Cancer, Inc. nostomachforcancer.[email or web address]   Stomach Cancer Relief Network scrnet.org   Collaborative Group of the Americas on Inherited Colorectal Cancer (CGA) cgaicc.com   Cancer Care cancercare.[email or web address]   American Cancer Society (ACS) cancer.org   National Cancer [place] (NCI) cancer.org   Kohler Syndrome International [email or web address]       Please call us if you have any questions or concerns.     Cancer Risk Management Program [phone]-CANCER ([phone])  ? Sherice Jayant, MS, [facility] [place]  [phone]  ? Breann Raf, MS, [facility] [place]  [phone]  ? Sana Ervin, MS, [facility] [place]  [phone]  ? Meghan España, MS, [facility] [place]  [phone]  ? Carleenon Evans, MS, [facility] [place]  [phone]    References    1. Mikael PHILIPPE, Eric J, Chavez G, Joe-Toni E, Ly J, et al. The Prevalence of thyroid cancer and benign thyroid disease in patients with familial adenomatous polyposis may be higher than previously recognized. Clin Colorectal Cancer. 2012;11:304-308.  2. Claudias L, Van Gonzalo A, Barrera L, Giselle C, Talisha K, et al. Risk of colorectal cancer in juvenile polyposis. Gut. 2007;56:965-967.  3. Eren FG, Laurence MN, Cj CA. Colorectal cancer risk in hamartomatous polyposis syndromes. World Journal of Gastrointestinal Surgery. 2015;27:25-32  4. Viraj SINGH.  Guidance on gastrointestinal surveillance for hereditary non-polyposis colorectal cancer, familial adenomatous polypolis, juvenile polyposis, and Peutz-Jeghers syndrome. Gut. 2002;51:21-27.  5. Javier AK, Milad MOUNA, Cierra JG et al. Risk of extracolonic cancers for people with biallelic and monoallelic mutations in MUTYH. Int J of Cancer. 2016;139:8083-7593.  6. Deepti S, Ynes S, Chelsea H, Bernard K, Wilson M, et al. MUTYH-associated polyposis: 70 of 71 patients with biallelic mutations present with an attenuated or atypical phenotype. Int J of Cancer. 2006;119:807-814.  7. Gia G, Caitie F, Erwin I, Matthew M, Gia H, et al. MUTYH mutation carriers have increased breast cancer risk. Cancer. 2012;2376-7835.  8. Ramirez MH, Jeff J, Ada J, Marie LA, Lorraine MS, Eng C. Lifetime cancer risks in individuals with germline PTEN mutations. Clin Cancer Res. 2012;18:400-7.  9. Ida R. Cowden Syndrome: A Critical Review of the Clinical Literature. J Yady . 2009:18:13-27.  10. National Comprehensive Cancer Network. Clinical practice guidelines in oncology, colorectal cancer screening. Available online (registration required). 2013.  11. National Cancer [place]. SEER Cancer Stat Fact Sheets.  December 2013.  12. CHEK2 Breast Cancer Case-Control Consortium. CHEK2*1100delC and susceptibility to breast cancer: A collaborative analysis involving 10,860 breast cancer cases and 9,065 controls from 10 studies. Am J Hum Yady, 74 (2004), pp. 2761-1427  13. Virginia T, Santiago S, Rich K, et al. Spectrum of Mutations in BRCA1, BRCA2, CHEK2, and TP53 in Families at High Risk of Breast Cancer. BRUNO. 2006;295(12):8818-0086.  14. Lulú NAPOLES, Liban D, Mark A, et al. Risk of breast cancer in women with a CHEK2 mutation with and without a family history of breast cancer. J Clin Oncol. 2011;29:3644-9178.  15. Alvarez ESTES, Tracie PHILIPPE, Jose Rafale J, Howard N, Abdullahi SALINAS et al. Defining the polyposis/colorectal cancer phenotype  associated with the GREM1 duplication: counseling and management guidelines. Yady .Res. 2016;98:1-5.  16. Sierra PHILIPPE, Chelo S, Kelby A, Anya Cortes, et al. Hereditary mixed polyposis syndrome is caused by a 40kb upstream duplication that leads to increased and ectopic expression of the BMP antagonist GREM1. Yeny Yady. 2015;44:699-703.  17. YESIKA Meyer. et al. Germline mutations affecting the proofreading domains of POLE and POLD1 predispose to colorectal adenomas and carcinomas. Yeny. Yady. 45, 136-44 (2013).  18. GEOFFREY Navarro. et al. POLE and POLD1 mutations in 529 emily with familial colorectal cancer and/or polyposis: review of reported cases and recommendations for genetic testing and surveillance. Yady. Med. (2015). doi:10.1038/gim.2015.75  19. SPRING Lange et al. New insights into POLE and POLD1 germline mutations in familial colorectal cancer and polyposis. Hum. Mol. Yady. 23, 1566-12 (2014).  20. KIMMY Hidalgo. et al. Frequency and phenotypic spectrum of germline mutations in POLE and seven other polymerase genes in 266 patients with colorectal adenomas and carcinomas. Int. J. Cancer 137, 320-31 (2015).     Assessing Cancer Risk  Only about 5-10% of cancers are thought to be due to an inherited cancer susceptibility gene.    These families often have:    Several people with the same or related types of cancer    Cancers diagnosed at a young age (before age 50)    Individuals with more than one primary cancer    Multiple generations of the family affected with cancer    Some people may be candidates for genetic testing of more than one gene.  For these families, genetic testing using a multi-gene cancer panel may be offered.  These panels may test genes known to increase the risk for breast (and other) cancers: CARLITO, BRCA1, BRCA2, CDH1, CHEK2, PALB2, PTEN, and TP53.  The purpose of this handout is to serve as a brief summary of the high/moderate-risk breast cancer genes which have published clinical  management guidelines for individuals who are found to carry a mutation.    Hereditary Breast and Ovarian Cancer Syndrome (HBOC)  A single mutation in one of the copies of BRCA1 or BRCA2 increases the risk for breast and ovarian cancer, among others.  The risk for pancreatic cancer and melanoma may also be slightly increased in some families.  The tables below list the chance that someone with a BRCA mutation would develop cancer in his or her lifetime1,2,3,4.          Women   Men     General Population BRCA+    General Population BRCA+   Breast  12% 40-80%  Breast <1% ~7%   Ovarian  1-2% 10-40%  Prostate 16% 20%     A person s ethnic background is also important to consider, as individuals of Ashkenazi [employer]ec[employer]ism ancestry have a higher chance of having a BRCA gene mutation.  There are three BRCA mutations that occur more frequently in this population.    Li-Fraumeni Syndrome (LFS)  LFS is a cancer predisposition syndrome. Individuals with LFS are at an increased risk for developing cancer at a young age. The general lifetime risk for development of cancer is 50% by age 30 and 90% by age 60.  LFS is caused by a mutation in the TP53 gene.  A single mutation in one of the copies of TP53 increases the risk for multiple cancers.     Core Cancers: Sarcomas, Breast, Brain, Lung, Leukemias/Lymphomas, Adrenocortical carcinomas  Other Cancers: Gastrointestinal, Thyroid, Skin, Genitourinary        Cowden Syndrome  Cowden syndrome is a hereditary condition that increases the risk for breast, thyroid, endometrial, and kidney cancer.  Cowden syndrome is caused by a mutation in the PTEN gene.  A single mutation in one of the copies of PTEN causes Cowden syndrome and increases cancer risk.  The table below shows the chance that someone with a PTEN mutation would develop cancer in their lifetime5,6.  Other benign features seen in some individuals with Cowden syndrome include benign skin lesions (facial papules, keratoses, lipomas),  learning disability, autism, thyroid nodules, colon polyps, and larger head size.      Lifetime Cancer Risk   Cancer Type General Population Cowden Syndrome   Breast  12% 25-50%*   Thyroid  1% 35%   Renal 1-2% 35%   Endometrial  2-3% 28%   Colon 5% 9%   Melanoma 2-3% >5%     *One recent study found breast cancer risk to be increased to 85%    Hereditary Diffuse Gastric Cancer (HDGC)  Currently, one gene is known to cause hereditary diffuse gastric cancer: CDH1.  Individuals with HDGC are at increased risk for diffuse gastric cancer and lobular breast cancer. Of people diagnosed with HDGC, 30-50% have a mutation in the CDH1 gene.  This suggests there are likely other genes that may cause HDGC that have not been identified yet.      Lifetime Cancer Risks    General Pop HDGC    Diffuse Gastric  <1% ~80%   Breast 12% 39-52%     Additional Genes Associated with Increased Breast Cancer Risk  PALB2  Mutations in PALB2 have been shown to increase the risk of breast cancer up to 33-58% in some families; where individuals fall within this risk range is dependent upon family history.9 PALB2 mutations have also been associated with increased risk for pancreatic cancer, although this risk has not been quantified yet.  Individuals who inherit two PALB2 mutations--one from their mother and one from their father--have a condition called Fanconi Anemia.  This rare autosomal recessive condition is associated with short stature, developmental delay, bone marrow failure, and increased risk for childhood cancers.    CARLITO  CARLITO is a moderate-risk breast cancer gene. Women who have a mutation in CARLITO can have between a 2-4 fold increased risk for breast cancer compared to the general population.10  CARLITO mutations have also been associated with increased risk for pancreatic cancer, however an estimate of this cancer risk is not well understood.11  Individuals who inherit two CARLITO mutations have a condition called ataxia-telangiectasia (AT).   This rare autosomal recessive condition affects the nervous system and immune system, and is associated with progressive cerebellar ataxia beginning in childhood.  Individuals with ataxia-telangiectasia often have a weakened immune system and have an increased risk for childhood cancers.           CHEK2   CHEK2 is a moderate-risk breast cancer gene.  Women who have a mutation in CHEK2 have around a 2-fold increased risk for breast cancer compared to the general population, and this risk may be higher depending upon family history.12,13,14  Mutations in CHEK2 have also been shown to increase the risk of a number of other cancers, including colon and prostate, however these cancer risks are currently not well understood.         Inheritance   All of the genes reviewed above are inherited in an autosomal dominant pattern.  This means that if a parent has a mutation, each of his or her children will have a 50% chance of inheriting that same mutation.  Therefore, each child--male or female--would have a 50% chance of being at increased risk for developing cancer.      Image obtained from [employer] [facility] [employer], 2013     Mutations in some genes can occur de tyrone, which means that a person s mutation occurred for the first time in them and was not inherited from a parent.  Now that they have the mutation, however, it can be passed on to future generations.    Genetic Testing  Genetic testing involves a blood test and will look for any harmful mutations within genes that are associated with increased cancer risk.  If possible, it is recommended that the person(s) who has had cancer be tested before other family members.  That person will give us the most useful information about whether or not a specific gene is associated with the cancer in the family.    Results  There are three possible results of genetic testing:    Positive--a harmful mutation was identified in one or more of the genes    Negative--no mutation was  identified in any of the genes on this panel    Variant of unknown significance--a variation in one of the genes was identified, but it is unclear how this impacts cancer risk in the family    Advantages and Disadvantages  There are advantages and disadvantages to genetic testing.  Advantages    May clarify your cancer risk    Can help you make medical decisions    May explain the cancers in your family    May give useful information to your family members (if you share your results)    Disadvantages    Possible negative emotional impact of learning about inherited cancer risk    Uncertainty in interpreting a negative test result in some situations    Possible genetic discrimination concerns (see below)    Genetic Information Nondiscrimination Act (SWETHA)  SWETHA is a federal law that protects individuals from health insurance or employment discrimination based on a genetic test result alone.  Although rare, there are currently no legal discrimination protections in terms of life insurance, long term care, or disability insurances.  Visit the National Human Genome Research [place] genome.gov/18294591 to learn more.    Reducing Cancer Risk  Each of the genes listed within this handout have nationally recognized cancer screening guidelines that would be recommended for individuals who test positive.  In addition to increased cancer screening, surgeries may be offered or recommended to reduce cancer risk.  Recommendations are based upon an individual s genetic test result as well as their personal and family history of cancer.    Questions to Think About Regarding Genetic Testing    What effect will the test result have on me and my relationship with my family members if I have an inherited gene mutation?  If I don t have a gene mutation?    Should I share my test results, and how will my family react to this news, which may also affect them?    Are my children ready to learn new information that may one day affect  their own health?      Resources  FORCE: Facing Our Risk of Cancer Empowered facingourrisk.org   Bright Pink bebrightpink.org   Li-Fraumeni Syndrome Association lfsassociation.org   PTEN World PTENworld.com   No stomach for cancer, Inc. nostomachforcancer.org   Stomach cancer relief network scrnet.org   Collaborative Group of the Americas on Inherited Colorectal Cancer (CGA) cgaicc.com    Cancer Care cancercare.org   American Cancer Society (ACS) cancer.org   National Cancer [place] (NCI) cancer.gov     Cancer Risk Management Program [phone]-[facility] ([phone])  ? Sherice Saba, MS, [facility]  [phone]  ? Breann Raf, MS, [facility]  [phone]  ? Sana Ervin, MS, [facility]  [phone]  ? Meghan España, MS, [facility]  [phone]   ? Carleenon Evans, MS, [facility]  [phone]    References  1. Nikhil Espinoza PDP, Scott S, Mckayla DRAKE, Peyton JE, Anisha JL, Dianna N, Flo H, Stella O, Lonny A, Basilioini B, Radishaquille P, Manoukicassie S, Yoli DM, Ye N, Darrick E, Dewey H, Brandon E, Theo J, Groclive J, Stef B, Tulinius H, Thorlacius S, Eerola H, Nevcassielinna H, Gaetano K, Tricia OP. Average risks of breast and ovarian cancer associated with BRCA1 or BRCA2 mutations detected in case series unselected for family history: a combined analysis of 222 studies. Am J Hum Yady. 2003;72:1117-30.  2. Asa N, Eva M, Ragland G.  BRCA1 and BRCA2 Hereditary Breast and Ovarian Cancer. Gene Reviews online. 2013.  3. Cornell YC, Aida S, Liza G, Jeffries S. Breast cancer risk among male BRCA1 and BRCA2 mutation carriers. J Natl Cancer Inst. 2007;99:1811-4.  4. Reagan MAY, Omari I, Dany J, Yasmine E, Mal ER, Nena F. Risk of breast cancer in male BRCA2 carriers. J Med Yady. 2010;47:710-1.  5. James MH, Jeff J, Ada J, Marie LA, Lorraine SIMS, Carlota C. Lifetime cancer risks in individuals with germline PTEN mutations. Clin Cancer Res. 2012;18:400-7.  6. Ida HERNANDES. Cowden Syndrome: A Critical Review of the Clinical Literature.  J Yady . 2009:18:13-27.  7. National Comprehensive Cancer Network. Clinical practice guidelines in oncology, colorectal cancer screening. Available online (registration required). 2013.  8. National Cancer [place]. SEER Cancer Stat Fact Sheets.  December 2013.  9. Nathaniel BERRY, et al. Breast-Cancer Risk in Families with Mutations in PALB2. NEJM. 2014; 371(6):497-506.  10. Rylie CORDERO, Ed D, Adam S, Yasmin P, Dru T, Alex M, Librado B, Anne Marie H, Mitch R, Chava K, Mario L, Reagan DG, Yoli D, Foreign DF, Jesus MR, The Breast Cancer Susceptibility Collaboration (UK) & De JOSEPH. CARLITO mutations that cause ataxia-telangiectasia are breast cancer susceptibility alleles. Nature Genetics. 2006;38:873-875  11. Rolando N , Ramu Y, Lulú J, Adeline L, Rosario GM , Terence ML, Sergio S, Abdul AG, Syngal S, Ayana ML, Rome J , Scott R, Kirti SZ, Te JR, Roseanne VE, Abe M, Vogelstein B, Grace N, Mary RH, Faisal KW, and Brody AP. CARLITO mutations in patients with hereditary pancreatic cancer. Cancer Discover. 2012;2:41-46  12. CHEK2 Breast Cancer Case-Control Consortium. CHEK2*1100delC and susceptibility to breast cancer: A collaborative analysis involving 10,860 breast cancer cases and 9,065 controls from 10 studies. Am J Hum Yady, 74 (2004), pp. 8646-0480  13. Virginia T, Santiago S, Rich K, et al. Spectrum of Mutations in BRCA1, BRCA2, CHEK2, and TP53 in Families at High Risk of Breast Cancer. BRUNO. 2006;295(12):5112-2423.   14. Lulú NAPOLES, Liban HERNANDEZ, Mark CORDERO, et al. Risk of breast cancer in women with a CHEK2 mutation with and without a family history of breast cancer. J Clin Oncol. 2011;29:5991-8581

## 2017-09-01 NOTE — LETTER
"9/1/2017       RE: Kannan Wolf  [address] [address]     Dear Colleague,    Thank you for referring your patient, Kannan Wolf, to the [facility]. Please see a copy of my visit note below.    9/1/2017    Referring Provider: Agapito Watson MD    Presenting Information:   I met with Kannan Wolf today for genetic counseling at the Cancer Risk Management Program at the [facility] to discuss his personal history of colon polyps and family history of colon and breast cancer. He is here today to review this history, cancer screening recommendations, and available genetic testing options.    Personal History:  Kannan is a 67 year old male. He does not have any personal history of cancer. Based on available colonoscopy records, he has had greater than 14 adenomatous polyps removed since 2005. Due to this history, he pursued a colectomy in May 2012 that identified \"multiple\" additional tubular and sessile serrated adenomatous polyps. His most recent flexible sigmoidoscopy in July 2017 identified a rectal tubulovillous adenoma and follow-up was recommended in one year.      Kannan reports having annual lung CT exams due to his personal and family history, which has identified stable lung nodules. He does not regularly do any other cancer screening at this time. Kannan reported current tobacco use and regular alcohol use.    Of note, Kannan met with Sherice Saba MS, [facility] on 7/25/2012, at which time APC sequencing and deletion/duplication analysis and MUTYH 2-site testing (Y179C and G396D) was ordered from [facility]. This testing was normal/negative and the report was available for review today.    Updated Family History: (Please see scanned pedigree for detailed family history information)    Kannan's mother is 86 and was diagnosed with breast cancer in her 30's; treatment included a mastectomy.    One maternal aunt was diagnosed with liver cancer " at age 41 and passed away at age 43; her smoking and alcohol history are unknown.     Kannan's maternal grandmother was diagnosed with bladder cancer at an unknown age and passed away at age 57; she did not have a history of smoking.    One paternal uncle was diagnosed with colon cancer in his 30's and passed away in his 60's.    Kannan's previously reported having a paternal uncle diagnosed with stomach cancer and a paternal aunt diagnosed with lung cancer, but now believes these relatives did not have a cancer diagnosis.    His maternal ethnicity is Thai, Z[place]ian, and Israeli. His paternal ethnicity is Latvian and Egyptian. There is no known Ashkenazi Mosque ancestry on either side of his family. There is no reported consanguinity.    Discussion:    Kannan's personal history of colon polyps and family history of early-onset colon and breast cancer is suggestive of a hereditary cancer syndrome.    We reviewed the features of sporadic, familial, and hereditary cancers. In looking at Kannan's family history, it is possible that a cancer susceptibility gene is present as Kannan has had more than 20 adenomatous colon polyps removed and he has two relatives diagnosed with potentially related early-onset cancers (breast and colon). That being said, Kannan also has several relatives that have never been diagnosed with cancer/polyps, including his siblings, father, and multiple aunt and uncles.    We discussed the natural history and genetics of several hereditary cancer syndromes, including Familial Adenomatous Polyposis (FAP), MUTYH-Associated Polyposis (MAP), and Hereditary Breast and Ovarian Cancer (HBOC) syndrome. A detailed handout regarding these syndromes and the information we discussed was provided to Kannan at the end of our appointment today and can be found in the after visit summary. Topics included: inheritance pattern, cancer risks, cancer screening recommendations, and also risks, benefits and  limitations of testing.    We briefly reviewed Kannan's prior APC and MUTYH genetic testing results. This testing addressed the majority of mutations in these genes that cause FAP and MAP. New technology is now available, though, that can identify mutations in the APC and MUTYH genes that were not identifiable in 2012 (i.e. APC promoter deletions/duplications, full sequencing and deletion/duplication analysis of MUTYH). As such, it would be reasonable to consider repeat genetic testing of the APC and MUTYH genes.    Based on his personal and family history, Kannan meets current National Comprehensive Cancer Network (NCCN) criteria for genetic testing of APC and MUTYH.    We then reviewed that his mother's breast cancer history may have a separate hereditary explanation. The most common cause of hereditary breast cancer is HBOC syndrome, which is caused by mutations in the BRCA1 and BRCA2 genes. Individuals with HBOC syndrome are at increased risk for several different cancers, including breast, ovarian, male breast, prostate, melanoma, and pancreatic cancer.      Based on his personal and family history, Kannan meets current National Comprehensive Cancer Network (NCCN) criteria for genetic testing of BRCA1 and BRCA2    We discussed that there are also other additional genes that could cause increased risk for colon polyps, colon cancer, and breast cancer. For example, mutations in the POLD1 and POLE genes are associated with colon polyps and significantly increased risk for colon cancer. Mutations in the CHEK2, CARLITO, and PALB2 genes are associated with moderate/high risk for breast cancer. As many of these genes present with overlapping features in a family, it would be reasonable for Kannan to consider panel genetic testing to analyze multiple genes at once.    Kannan explained that he would be interested in gathering as much information as possible from genetic testing. As such, we reviewed a 31 gene cancer  panel from [facility].    Genetic testing is available for 31 genes associated with hereditary breast and colon cancer: [facility] Breast and Colorectal Cancer Panel (APC, CARLITO, AXIN2, BARD1, BRCA1, BRCA2, BRIP1, BMPR1A, CDH1, CHEK2, EPCAM, GREM1, MLH1, MLH3, MRE11A, MSH2, MSH6, MUTYH, NBN, NF1, PALB2, PMS2, POLD1, POLE, PTEN, RAD50, RAD51C, RAD51D, SMAD4, STK11, and TP53).    We discussed that many of the genes in this panel are associated with specific syndromes and published management guidelines: HBOC syndrome (BRCA1, BRCA2), Kohler syndrome (MLH1, MSH2, MSH6, PMS2, EPCAM), Familial Adenomatous Polyposis (APC), Hereditary Diffuse Gastric Cancer (CDH1), Juvenile Polyposis syndrome (BMPR1A, SMAD4), Cowden syndrome (PTEN), Li Fraumeni syndrome (TP53), Peutz-Jeghers syndrome (STK11), MUTYH Associated Polyposis (MUTYH), and Neurofibromatosis type 1 (NF1).     The CARLITO, AXIN2, BRIP1, CHEK2, GREM1, NBN , PALB2, POLD1, POLE, RAD51C, and RAD51D genes are associated with increased cancer risk and have published management guidelines for certain cancers.      The remaining genes (BARD1, MLH3, MRE11A, RAD50) are associated with increased cancer risk and may allow us to make medical recommendations when mutations are identified.      Consent was obtained and genetic testing for the [facility] Breast and Colorectal Cancer Panels was sent to [facility]. Turn around time: 4-5 weeks.      Medical Management: The information from genetic testing may determine:     additional cancer screening recommendations (i.e. more frequent sigmoidoscopy and/or upper endoscopy, annual dermatologic exams, annual PSA, etc.),    options for risk reducing surgeries for his relatives (i.e. bilateral mastectomy, surgery to remove the ovaries and/or uterus, etc.),      and targeted chemotherapies for patients who have certain cancers now, or who develop certain cancers in the future.     These recommendations and possible targeted  chemotherapies will be discussed in detail once genetic testing is completed.     Plan:  1) Today Kannan elected to proceed with genetic testing using a 31 gene [facility] Breast and Colorectal Cancer Panel.  2) This information should be available in 4-5 weeks.  3) Kannan will return to clinic to discuss the results.    Face to face time: 40 minutes    Sana Ervin MS, [facility]  Certified Genetic Counselor  Office: [phone]  Pager: [phone]

## 2017-09-01 NOTE — MR AVS SNAPSHOT
After Visit Summary   9/1/2017    Kannan Wolf    MRN: [id number]           Patient Information     Date Of Birth          1950        Visit Information        Provider Department      9/1/2017 12:00 PM Sana Ervin GC;  [address] [facility]        Today's Diagnoses     Benign neoplasm of colon, unspecified part of colon    -  1    Family history of colon cancer        Family history of breast cancer          Care Instructions      Assessing Cancer Risk  Only about 5-10% of cancers are thought to be due to an inherited cancer susceptibility gene.    These families often have:  ? Several people with the same or related types of cancer  ? Cancers diagnosed at a young age (before age 50)  ? Individuals with more than one primary cancer  ? Multiple generations of the family affected with cancer    Comprehensive Colon Cancer Panel  We each inherit two copies of every gene in our bodies: one from our mother, and one from our father.  Each gene has a specific job to do.  When a gene has a mistake or  mutation  in it, it does not work like it should.      This handout will review common hereditary colon cancer syndromes, and other genes related to an increased risk for colon cancer.  The genes that will be discussed in this handout are: APC, BMPR1A, CDH1, CHEK2, EPCAM, GREM1, MLH1, MSH2, MSH6, MUTYH, PMS2, POLD1, POLE, PTEN, SMAD4, STK11, and TP53.  These genes are clinically actionable, meaning there are published guidelines for cancer screening and management for individuals who are found to carry mutations in these genes. Inheriting a mutation does not mean a person will develop cancer, but it does significantly increase his or her risk above the general population risk.      Familial Adenomatous Polyposis (FAP)  FAP is a hereditary cancer syndrome caused by mutations in the APC gene. The condition is known to cause hundreds to thousands of adenomatous polyps in the  colon, creating a  carpet  of polyps. Some individuals have what is called attenuated FAP (AFAP), a milder form of FAP with fewer polyps and typically later onset. Individuals with an APC gene mutation are at an increased risk for colon, thyroid, and duodenal cancers, as well as several other types of cancer1.  Other features of this condition may include: osteomas, dental anomalies, benign skin lesions, CHRPE ( freckle  on the inside of the eye), and desmoid tumors.      Lifetime Cancer Risks     Cancer Type General Population FAP   Colon  5% near 100%   Thyroid (papillary) 1% 1-12%   Duodenal <1% 5%   Liver  <1% 1-2% before age 5   Pancreas <1% 1%   Stomach <1% 1%       Juvenile Polyposis Syndrome (JPS)  JPS is characterized by hamartomatous polyps, called juvenile polyps, in the gastrointestinal tract.  Juvenile  refers to the type of polyps seen in this hereditary cancer condition, not the age of onset. Currently, mutations in two genes are known to cause JPS: BMPR1A and SMAD4. Of individuals clinically diagnosed with JPS, only 40% have an identifiable mutation in one of these genes, suggesting there are other genes that cause JPS that have not been discovered yet. Individuals with JPS are at an increased risk for colon cancer and stomach cancer 2,3,4. Pancreatic and small bowel cancers have also been reported in JPS, but the actual risks are unknown.         Lifetime Cancer Risks    Cancer Type General Population JPS   Colon 5% 40-50%   Gastric/Duodenal <1% 10-21%     Some individuals with SMAD4 mutations have a condition called JPS/HHT (Juvenile Polyposis/Hereditary Hemorrhagic Telangiectasia) where in addition to JPS, individuals may have nose bleeds and clotting issues.     Hereditary Diffuse Gastric Cancer (HDGC)  Currently, mutations in one gene are known to cause Hereditary Diffuse Gastric Cancer: CDH1.  Individuals with HDGC are at increased risk for diffuse gastric cancer and lobular breast cancer. Of  people diagnosed with HDGC, 30-50% have a mutation in the CDH1 gene.  This suggests there are likely mutations in other genes that may cause HDGC that have not been identified yet. Individuals with HDGC may also be at increased risk for colon cancer.      Lifetime Cancer Risks    Cancer Type General Population HDGC   Diffuse Gastric <1% 67-83%   Breast 12% 39-52%     Kohler syndrome  Mutations in five different genes are known to cause Kohler syndrome: MLH1, MSH2, MSH6, PMS2, and EPCAM. Individuals with Kohler syndrome have an increased risk for colon, uterine, ovarian, small bowel, stomach, urinary tract, and brain cancer, as well as several other types of cancer. The exact lifetime cancer risks are dependent upon the gene in which the mutation was identified.      Lifetime Cancer Risks    Cancer Type General Population Kohler syndrome   Colon 5% 10-80%   Uterine 2-3% 15-60%   Stomach <1% 6-13%   Ovarian 2% 4-24%   Urinary tract <1% 1-7%   Hepatobiliary tract <1% 1-4%   Small bowel <1% 3-6%   Brain/CNS <1% 1-3%   Pancreas <1% 1-6%       MUTYH-Associated Polyposis (MAP)  MAP is a hereditary cancer syndrome caused by mutations in the MUTYH gene. Unlike the other hereditary cancer genes discussed in this handout, two mutations in the MUTYH gene cause MAP and increase cancer risk. Those affected with MAP typically have between  adenomatous polyps. This syndrome also increases the risk for colon and duodenal cancer. Current research suggests that other cancers may be associated with MUTYH mutations, as well. The table below includes the risk that someone with two MUTYH gene mutations would develop cancer in their lifetime; of note, there is also an increased colon cancer risk for individuals who carry only one MUTYH gene mutation5,6,7.       Lifetime Cancer Risks    Cancer Type General Population MAP   Colon 5% %   Duodenal  <1% 5%       Cowden syndrome  Cowden syndrome is a hereditary condition that increases  the risk for breast, thyroid, endometrial, colon, and kidney cancer.  A single mutation in the PTEN gene causes Cowden syndrome and increases cancer risk.  The table below shows the chance that someone with a PTEN mutation would develop cancer in their lifetime8,9.  Other benign features seen in some individuals with Cowden syndrome include benign skin lesions (facial papules, keratoses, lipomas), learning disabilities, autism, thyroid nodules, hamartomatous colon polyps, and larger head size.      Lifetime Cancer Risks   Cancer Type General Population Cowden Syndrome   Breast 12% 25-50%*   Thyroid 1% 35%   Renal 1-2% 35%   Endometrial 2-3% 28%   Colon 5% 9%   Melanoma 2-3% >5%     *One recent study found breast cancer risk to be increased to 85%    Peutz-Jeghers syndrome (PJS)  PJS is a hereditary cancer syndrome caused by mutations in the STK11 gene. This condition can be distinguished from other hereditary syndromes by the presence of hamartomatous polyps in the gastrointestinal tract and freckles present in unusual places such as the hands, feet, neck, and lips. Individuals with Peutz-Jeghers syndrome have an increased risk for colon, breast, pancreatic, and other cancers3.  Men are at risk for testicular tumors which can affect hormones in the body. Women are at risk for sex cord tumors of the ovaries and a rare aggressive type of cervical cancer.     Lifetime Cancer Risks    Cancer Type General Population PJS   Breast 12% 45-50%   Colon 5% 39%   Stomach <1% 29%   Pancreas 1.5% 11-36%   Small Intestine <1% 13%     Ovarian  2%  18%   Lung 6-7% 15-17%     Additional Genes Associated with Increased Colon Cancer Risk  CHEK2  CHEK2 is a moderate-risk breast cancer gene.  Women who have a mutation in CHEK2 have around a 2-fold increased risk for breast cancer compared to the general population, and this risk may be higher depending upon family history.12,13,14.  Mutations in CHEK2 have also been shown to increase the  risk of a number of other cancers, including colon and prostate, however these cancer risks are currently not well understood.     GREM1  GREM1 is a moderate-risk colon polyposis gene. Duplications of this gene are more commonly found in individuals with Ashkenazi Restorationist [id number]. Mutations in GREM1 are associated with colon polyps and therefore an increased risk of colon cancer; however the estimated cancer risk is not well [id number].     POLD1 and POLE  POLD1 and POLE are moderate-risk colon cancer genes. Carriers of a mutation in one of these genes increases the lifetime risk of colorectal staotj18,18,19,20. Mutations in these genes may also be associated with increased risk for other cancers including: endometrial cancer, duodenal adenomas and carcinomas, and brain tumors.    TP53  Li Fraumeni syndrome (LFS) is a hereditary cancer predisposition syndrome. LFS is caused by a mutation in the TP53 gene. A single mutation in one of the copies of TP53 increases the risk for multiple cancers. Individuals with LFS are at an increased risk for developing cancer at a young age. The general lifetime risk for development of cancer is 50% by age 30 and 90% by age 60.      Core Cancers: Sarcomas, Breast, Brain, Lung, Leukemias/Lymphomas, Adrenocortical carcinomas  Other Cancers: Gastrointestinal, Thyroid, Skin, Genitourinary    Genetic Testing  Genetic testing involves a simple blood test and will look at the genetic information in genes associated with an increased risk of colon cancer. The tests look for any harmful mutations that are associated with increased cancer risk.  If possible, it is recommended that the person(s) who has had cancer be tested before other family members.  That person will give us the most useful information about whether or not a specific gene mutation is associated with the cancer in the family.     Results  There are three possible results from genetic testing:  ? Positive--a harmful  mutation was identified  ? Negative--no mutation was identified  ? Variant of unknown significance--a variation in one of the genes was identified, but it is unclear how this impacts cancer risk in the family  Advantages and Disadvantages  There are advantages and disadvantages to genetic testing of these genes.    Advantages  ? May clarify your cancer risk  ? Can help you make medical decisions  ? May explain the cancers in your family  ? May give useful information to your family members (if you share your results)    Disadvantages  ? Possible negative emotional impact of learning about inherited cancer risk  ? Uncertainty in interpreting a negative test result in some situations  ? Possible genetic discrimination concerns (see below)    Inheritance   Most mutations in the genes outlined above are inherited in an autosomal dominant pattern.  This means that if a parent has a mutation, each of his or her children will have a 50% chance of inheriting that same mutation.  Therefore, each child--male or female--would have a 50% chance of being at increased risk for developing cancer.                                            Image obtained from Note Reference, 2013     In the case of MUTYH-Associated Polyposis (MAP) this hereditary cancer syndrome is inherited in an autosomal recessive pattern. This means that each parent of an individual with MAP is a carrier of MAP, meaning that they have only one mutation in MUTYH. They still have one functioning copy of their gene.  Carriers are at a slightly higher risk for colon cancer than the general population. If each parent is a carrier for MAP, they have a 25% of having a child who is affected with MAP, meaning the child inherited both gene mutations - one from each parent.       Image obtained from Note Reference, 2016    Genetic Information Nondiscrimination Act (SWETHA)  SWETHA is a federal law that protects individuals from health insurance or employment  discrimination based on a genetic test result alone.  Although rare, there are currently no legal protections in terms of life insurance, long term care, or disability insurances.  Visit the National Human Genome Research [place] at Genome.gov/25868496 to learn more.    Reducing Cancer Risk  Each of the genes listed within this handout have nationally recognized cancer screening guidelines that would be recommended for individuals who test positive.  In addition to increased cancer screening, surgeries may be offered or recommended to reduce cancer risk in certain cases.  Recommendations are based upon an individual s genetic test result as well as their personal and family history of cancer.    Questions to Think About Regarding Genetic Testing  ? What effect will the test result have on me and my relationship with my family members if I have an inherited gene mutation?  If I don t have a gene mutation?  ? Should I share my test results, and how will my family react to this news, which may also affect them?  ? Are my children ready to learn new information that may one day affect their own health?    Resources    PTEN World PTENworld.Covermate Products   No Stomach for Cancer, Inc. nostomachforcancer.org   Stomach Cancer Relief Network scrnet.org   Collaborative Group of the Americas on Inherited Colorectal Cancer (CGA) cgaicc.com   Cancer Care cancercare.org   American Cancer Society (ACS) cancer.org   National Cancer [place] (NCI) cancer.org   Kohler Syndrome International [email or web address].[email or web address]       Please call us if you have any questions or concerns.     Cancer Risk Management Program [phone] ([phone])  ? Sherice Saba, MS, [facility] [place]  [phone]  ? Breann Carbone, MS, [facility] [place]  [phone]  ? Sana Ervin, MS, [facility] [place]  [phone]  ? Meghan España, MS, [facility] [place]  [phone]  ? Williams Krueger, MS, [facility] [place]  [phone]    References    1. Mikael PHILIPPE, Eric J, Scott G, Cecy E, Ly J, et al. The Prevalence of thyroid cancer and  benign thyroid disease in patients with familial adenomatous polyposis may be higher than previously recognized. Clin Colorectal Cancer. 2012;11:304-308.  2. Pa L, Ignacio Sánchez A, Barrera L, Giselle NAPOLES, Talisha ALFARO, et al. Risk of colorectal cancer in juvenile polyposis. Gut. 2007;56:965-967.  3. Eren FG, Laurence MN, Cj CA. Colorectal cancer risk in hamartomatous polyposis syndromes. World Journal of Gastrointestinal Surgery. 2015;27:25-32  4. Viraj MG. Guidance on gastrointestinal surveillance for hereditary non-polyposis colorectal cancer, familial adenomatous polypolis, juvenile polyposis, and Peutz-Jeghers syndrome. Gut. 2002;51:21-27.  5. Javier AK, Milad MOUNA, Cierra ESTESG et al. Risk of extracolonic cancers for people with biallelic and monoallelic mutations in MUTYH. Int J of Cancer. 2016;139:2247-2507.  6. Deepti S, Garrettlchepe S, Goiker H, Bernard K, Wilson M, et al. MUTYH-associated polyposis: 70 of 71 patients with biallelic mutations present with an attenuated or atypical phenotype. Int J of Cancer. 2006;119:807-814.  7. Gia G, Caitie F, Erwin I, Pingabrielle M, Gia H, et al. MUTYH mutation carriers have increased breast cancer risk. Cancer. 2012;3468-8734.  8. Ramirez MH, Jeff J, Ada J, Marie LA, Ordolores MS, Eng C. Lifetime cancer risks in individuals with germline PTEN mutations. Clin Cancer Res. 2012;18:400-7.  9. Zoltanki R. Cowden Syndrome: A Critical Review of the Clinical Literature. J Yady . 2009:18:13-27.  10. National Comprehensive Cancer Network. Clinical practice guidelines in oncology, colorectal cancer screening. Available online (registration required). 2013.  11. National Cancer [place]. SEER Cancer Stat Fact Sheets.  December 2013.  12. CHEK2 Breast Cancer Case-Control Consortium. CHEK2*1100delC and susceptibility to breast cancer: A collaborative analysis involving 10,860 breast cancer cases and 9,065 controls from 10 studies. Am J Hum Yady, 74 (2004), pp.  5815-3564  13. Virginia T, Santiago S, Rich K, et al. Spectrum of Mutations in BRCA1, BRCA2, CHEK2, and TP53 in Families at High Risk of Breast Cancer. BRUNO. 2006;295(12):5175-4861.  14. Lulú NAPOLES, Liban HERNANDEZ, Mark CORDERO, et al. Risk of breast cancer in women with a CHEK2 mutation with and without a family history of breast cancer. J Clin Oncol. 2011;29:0533-2895.  15. Alvarez ESTES, Tracie PHILIPPE, Jose Rafael J, Howard N, Abdullahi SALINAS et al. Defining the polyposis/colorectal cancer phenotype associated with the GREM1 duplication: counseling and management guidelines. Yady .Res. 2016;98:1-5.  16. Sierra PHILIPPE, Chelo S, Kelby CORDERO, Anya Cortes, et al. Hereditary mixed polyposis syndrome is caused by a 40kb upstream duplication that leads to increased and ectopic expression of the BMP antagonist GREM1. Yeny Yady. 2015;44:699-703.  17. YESIKA Meyer. et al. Germline mutations affecting the proofreading domains of POLE and POLD1 predispose to colorectal adenomas and carcinomas. Yeny. Yady. 45, 136-44 (2013).  18. GEOFFREY Navarro. et al. POLE and POLD1 mutations in 529 emily with familial colorectal cancer and/or polyposis: review of reported cases and recommendations for genetic testing and surveillance. Yady. Med. (2015). doi:10.1038/gim.2015.75  19. ROSA Lange. et al. New insights into POLE and POLD1 germline mutations in familial colorectal cancer and polyposis. Hum. Mol. Yady. 23, 6231-94 (2014).  20. Rocky I. et al. Frequency and phenotypic spectrum of germline mutations in POLE and seven other polymerase genes in 266 patients with colorectal adenomas and carcinomas. Int. J. Cancer 137, 320-31 (2015).     Assessing Cancer Risk  Only about 5-10% of cancers are thought to be due to an inherited cancer susceptibility gene.    These families often have:    Several people with the same or related types of cancer    Cancers diagnosed at a young age (before age 50)    Individuals with more than one primary cancer    Multiple generations of  the family affected with cancer    Some people may be candidates for genetic testing of more than one gene.  For these families, genetic testing using a multi-gene cancer panel may be offered.  These panels may test genes known to increase the risk for breast (and other) cancers: CARLITO, BRCA1, BRCA2, CDH1, CHEK2, PALB2, PTEN, and TP53.  The purpose of this handout is to serve as a brief summary of the high/moderate-risk breast cancer genes which have published clinical management guidelines for individuals who are found to carry a mutation.    Hereditary Breast and Ovarian Cancer Syndrome (HBOC)  A single mutation in one of the copies of BRCA1 or BRCA2 increases the risk for breast and ovarian cancer, among others.  The risk for pancreatic cancer and melanoma may also be slightly increased in some families.  The tables below list the chance that someone with a BRCA mutation would develop cancer in his or her lifetime1,2,3,4.          Women   Men     General Population BRCA+    General Population BRCA+   Breast  12% 40-80%  Breast <1% ~7%   Ovarian  1-2% 10-40%  Prostate 16% 20%     A person s ethnic background is also important to consider, as individuals of Ashkenazi Baptist ancestry have a higher chance of having a BRCA gene mutation.  There are three BRCA mutations that occur more frequently in this population.    Li-Fraumeni Syndrome (LFS)  LFS is a cancer predisposition syndrome. Individuals with LFS are at an increased risk for developing cancer at a young age. The general lifetime risk for development of cancer is 50% by age 30 and 90% by age 60.  LFS is caused by a mutation in the TP53 gene.  A single mutation in one of the copies of TP53 increases the risk for multiple cancers.     Core Cancers: Sarcomas, Breast, Brain, Lung, Leukemias/Lymphomas, Adrenocortical carcinomas  Other Cancers: Gastrointestinal, Thyroid, Skin, Genitourinary        Cowden Syndrome  Cowden syndrome is a hereditary condition that  increases the risk for breast, thyroid, endometrial, and kidney cancer.  Cowden syndrome is caused by a mutation in the PTEN gene.  A single mutation in one of the copies of PTEN causes Cowden syndrome and increases cancer risk.  The table below shows the chance that someone with a PTEN mutation would develop cancer in their lifetime5,6.  Other benign features seen in some individuals with Cowden syndrome include benign skin lesions (facial papules, keratoses, lipomas), learning disability, autism, thyroid nodules, colon polyps, and larger head size.      Lifetime Cancer Risk   Cancer Type General Population Cowden Syndrome   Breast  12% 25-50%*   Thyroid  1% 35%   Renal 1-2% 35%   Endometrial  2-3% 28%   Colon 5% 9%   Melanoma 2-3% >5%     *One recent study found breast cancer risk to be increased to 85%    Hereditary Diffuse Gastric Cancer (HDGC)  Currently, one gene is known to cause hereditary diffuse gastric cancer: CDH1.  Individuals with HDGC are at increased risk for diffuse gastric cancer and lobular breast cancer. Of people diagnosed with HDGC, 30-50% have a mutation in the CDH1 gene.  This suggests there are likely other genes that may cause HDGC that have not been identified yet.      Lifetime Cancer Risks    General Pop HDGC    Diffuse Gastric  <1% ~80%   Breast 12% 39-52%     Additional Genes Associated with Increased Breast Cancer Risk  PALB2  Mutations in PALB2 have been shown to increase the risk of breast cancer up to 33-58% in some families; where individuals fall within this risk range is dependent upon family history.9 PALB2 mutations have also been associated with increased risk for pancreatic cancer, although this risk has not been quantified yet.  Individuals who inherit two PALB2 mutations--one from their mother and one from their father--have a condition called Fanconi Anemia.  This rare autosomal recessive condition is associated with short stature, developmental delay, bone marrow  failure, and increased risk for childhood cancers.    CARLITO  CARLITO is a moderate-risk breast cancer gene. Women who have a mutation in CARLITO can have between a 2-4 fold increased risk for breast cancer compared to the general population.10  CARLITO mutations have also been associated with increased risk for pancreatic cancer, however an estimate of this cancer risk is not well understood.11  Individuals who inherit two CARLITO mutations have a condition called ataxia-telangiectasia (AT).  This rare autosomal recessive condition affects the nervous system and immune system, and is associated with progressive cerebellar ataxia beginning in childhood.  Individuals with ataxia-telangiectasia often have a weakened immune system and have an increased risk for childhood cancers.           CHEK2   CHEK2 is a moderate-risk breast cancer gene.  Women who have a mutation in CHEK2 have around a 2-fold increased risk for breast cancer compared to the general population, and this risk may be higher depending upon family history.12,13,14  Mutations in CHEK2 have also been shown to increase the risk of a number of other cancers, including colon and prostate, however these cancer risks are currently not well understood.         Inheritance   All of the genes reviewed above are inherited in an autosomal dominant pattern.  This means that if a parent has a mutation, each of his or her children will have a 50% chance of inheriting that same mutation.  Therefore, each child--male or female--would have a 50% chance of being at increased risk for developing cancer.      Image obtained from Genetics Home Reference, 2013     Mutations in some genes can occur de tyrone, which means that a person s mutation occurred for the first time in them and was not inherited from a parent.  Now that they have the mutation, however, it can be passed on to future generations.    Genetic Testing  Genetic testing involves a blood test and will look for any harmful  mutations within genes that are associated with increased cancer risk.  If possible, it is recommended that the person(s) who has had cancer be tested before other family members.  That person will give us the most useful information about whether or not a specific gene is associated with the cancer in the family.    Results  There are three possible results of genetic testing:    Positive--a harmful mutation was identified in one or more of the genes    Negative--no mutation was identified in any of the genes on this panel    Variant of unknown significance--a variation in one of the genes was identified, but it is unclear how this impacts cancer risk in the family    Advantages and Disadvantages  There are advantages and disadvantages to genetic testing.  Advantages    May clarify your cancer risk    Can help you make medical decisions    May explain the cancers in your family    May give useful information to your family members (if you share your results)    Disadvantages    Possible negative emotional impact of learning about inherited cancer risk    Uncertainty in interpreting a negative test result in some situations    Possible genetic discrimination concerns (see below)    Genetic Information Nondiscrimination Act (SWETHA)  SWETHA is a federal law that protects individuals from health insurance or employment discrimination based on a genetic test result alone.  Although rare, there are currently no legal discrimination protections in terms of life insurance, long term care, or disability insurances.  Visit the National Human Genome Research [place] genome.gov/76927624 to learn more.    Reducing Cancer Risk  Each of the genes listed within this handout have nationally recognized cancer screening guidelines that would be recommended for individuals who test positive.  In addition to increased cancer screening, surgeries may be offered or recommended to reduce cancer risk.  Recommendations are based upon an  individual s genetic test result as well as their personal and family history of cancer.    Questions to Think About Regarding Genetic Testing    What effect will the test result have on me and my relationship with my family members if I have an inherited gene mutation?  If I don t have a gene mutation?    Should I share my test results, and how will my family react to this news, which may also affect them?    Are my children ready to learn new information that may one day affect their own health?      Resources  FORCE: Facing Our Risk of Cancer Empowered facingourrisk.org   Bright Pink bebrightpink.org   Li-Fraumeni Syndrome Association lfsassociation.org   PTEN World PTENworld.com   No stomach for cancer, Inc. nostomachforcancer.org   Stomach cancer relief network scrnet.org   Collaborative Group of the Americas on Inherited Colorectal Cancer (CGA) cgaicc.com    Cancer Care cancercare.org   American Cancer Society (ACS) cancer.org   National Cancer State Line (NCI) cancer.gov     Cancer Risk Management Program [phone] ([phone])  ? Sherice Jayant, MS, [facility]  [phone]  ? Breann Raf, MS, [facility]  [phone]  ? Sana Ervin, MS, [facility]  [phone]  ? Meghan España, MS, [facility]  [phone]   ? Williams Krueger, MS, [facility]  [phone]    References  1. Nathaniel CORDERO, Nikhil PDP, Scott S, Mckayla DRAKE, Peyton JE, Anisha JL, Dianna N, Flo H, Stella O, Lonny A, Maya B, Merritt P, Manmackenzie S, Yoli DM, Ye N, Darrick E, Dewey H, Brandon E, Rohaninski J, Gronwald J, Stef B, Tanya H, Thorlaci S, Eerola H, Ren H, Gaetano K, Tricia OP. Average risks of breast and ovarian cancer associated with BRCA1 or BRCA2 mutations detected in case series unselected for family history: a combined analysis of 222 studies. Am J Hum Yady. 2003;72:1117-30.  2. Asa JOSEPH, Eva SALINAS, Obie MATTHEW.  BRCA1 and BRCA2 Hereditary Breast and Ovarian Cancer. Gene Reviews online. 2013.  3. Cornell DAVE, Aida S, Liza G, Jeffries  S. Breast cancer risk among male BRCA1 and BRCA2 mutation carriers. J Natl Cancer Inst. 2007;99:1811-4.  4. Reagan MAY, Omari I, Dany J, Yasmine E, Mal ER, Nena F. Risk of breast cancer in male BRCA2 carriers. J Med Yady. 2010;47:710-1.  5. James MH, Jeff J, Ada J, Marie LA, Lorraine MS, Eng C. Lifetime cancer risks in individuals with germline PTEN mutations. Clin Cancer Res. 2012;18:400-7.  6. Ida HERNANDES. Cowden Syndrome: A Critical Review of the Clinical Literature. J Yady . 2009:18:13-27.  7. National Comprehensive Cancer Network. Clinical practice guidelines in oncology, colorectal cancer screening. Available online (registration required). 2013.  8. National Cancer [place]. SEER Cancer Stat Fact Sheets.  December 2013.  9. Nathaniel BERRY, et al. Breast-Cancer Risk in Families with Mutations in PALB2. NEJM. 2014; 371(6):497-506.  10. Rylie A, Ed D, Adam S, Yasmin P, Dru T, Alex M, Librado B, Anne Marie H, Mitch R, Chava K, Mario L, Reagan MAY, Yoli D, Foreign DF, Jesus MR, The Breast Cancer Susceptibility Collaboration (UK) & De JOSEPH. CARLITO mutations that cause ataxia-telangiectasia are breast cancer susceptibility alleles. Nature Genetics. 2006;38:873-875  11. Rolando N , Ramu Y, Lulú J, Adeline L, Rosario GM , Terence ML, Gallinger S, Abdul AG, Syngal S, Ayana ML, Rome J , Scott R, Kirti SZ, Te JR, Roseanne VE, Abe M, Vogelstein B, Grace N, Mary RH, Faisal KW, and Mary Grace AP. CARLITO mutations in patients with hereditary pancreatic cancer. Cancer Discover. 2012;2:41-46  12. CHEK2 Breast Cancer Case-Control Consortium. CHEK2*1100delC and susceptibility to breast cancer: A collaborative analysis involving 10,860 breast cancer cases and 9,065 controls from 10 studies. Am J Hum Yady, 74 (2004), pp. 9076-7922  13. Virginia T, Santiago S, Rich K, et al. Spectrum of Mutations in BRCA1, BRCA2, CHEK2, and TP53 in Families at High Risk of Breast Cancer. BRUNO.  2006;295(12):5787-7502.   14. Lulú NAPOLES, Liban D, Mark A, et al. Risk of breast cancer in women with a CHEK2 mutation with and without a family history of breast cancer. J Clin Oncol. 2011;29:8024-1506          Follow-ups after your visit        Follow-up notes from your care team     Return in about 4 weeks (around 9/29/2017).      Your next 10 appointments already scheduled     Sep 01, 2017  1:15 PM CDT   [facility] Lab Draw with  Vapotherm LAB DRAW   [facility])    [address] [id number] [phone]            Jun 26, 2018 12:00 PM CDT   (Arrive by 11:45 AM)   CT LUNG RESEARCH FLORES with UCCT2   [facility] [place] [facility] ([facility])    [address] [place] [id number] [phone]           Please bring any scans or X-rays taken at other hospitals, if similar tests were done. Also bring a list of your medicines, including vitamins, minerals and over-the-counter drugs. It is safest to leave personal items at home.  Be sure to tell your doctor:   If you have any allergies.   If there s any chance you are pregnant.   If you are breastfeeding.   If you have any special needs.  You do not need to do anything special to prepare.  Please wear loose clothing, such as a sweat suit or jogging clothes. Avoid snaps, zippers and other metal. We may ask you to undress and put on a hospital gown.            Jun 26, 2018  1:00 PM CDT   (Arrive by 12:45 PM)   Return Visit with Yemi Marino MD   [facility] ([facility])    [address] [place] [id number] [phone]              Future tests that were ordered for you today     Open Future Orders        Priority Expected Expires Ordered    [facility] Colon and Breast Cancer Panel, [facility]: Laboratory Miscellaneous Order Routine  9/1/2018 9/1/2017             Who to contact     If you have questions or need follow up information about today's clinic visit or your schedule please contact [facility] directly at [phone].  Normal or non-critical lab and imaging results will be communicated to you by MyChart, letter or phone within 4 business days after the clinic has received the results. If you do not hear from us within 7 days, please contact the clinic through MyChart or phone. If you have a critical or abnormal lab result, we will notify you by phone as soon as possible.  Submit refill requests through [employer] or call your pharmacy and they will forward the refill request to us. Please allow 3 business days for your refill to be completed.          Additional Information About Your Visit        [employer] Information     [employer] gives you secure access to your electronic health record. If you see a primary care provider, you can also send messages to your care team and make appointments. If you have questions, please call your primary care clinic.  If you do not have a primary care provider, please call [phone] and they will assist you.        Care EveryWhere ID     This is your Care EveryWhere ID. This could be used by other organizations to access your [place] medical records  [id number]         Blood Pressure from Last 3 Encounters:   07/18/17 143/88   06/27/17 131/72   12/12/16 134/77    Weight from Last 3 Encounters:   07/18/17 88.5 kg (195 lb)   06/27/17 90 kg (198 lb 6.6 oz)   12/12/16 88 kg (194 lb)               Primary Care Provider Office Phone # Fax Tyrel Jenkins [phone]       [facility] [address] [facility] [address]        Equal Access to Services     ALONA STRICKLAND : sofie Woodard, moises whitlock. So [facility] [phone].    ATENCIÓN: Si habla español, tiene a puente disposición  servicios gratuitos de asistencia lingüística. Mamie payan [phone].    We comply with applicable federal civil rights laws and [place] laws. We do not discriminate on the basis of race, color, national origin, age, disability sex, sexual orientation or gender identity.            Thank you!     Thank you for choosing [facility]  for your care. Our goal is always to provide you with excellent care. Hearing back from our patients is one way we can continue to improve our services. Please take a few minutes to complete the written survey that you may receive in the mail after your visit with us. Thank you!             Your Updated Medication List - Protect others around you: Learn how to safely use, store and throw away your medicines at www.disposemymeds.org.          This list is accurate as of: 9/1/17 12:54 PM.  Always use your most recent med list.                   Brand Name Dispense Instructions for use Diagnosis    aspirin 81 MG tablet      Take 81 mg by mouth daily        atorvastatin 10 MG tablet    LIPITOR     Take 10 mg by mouth daily    NAFLD (nonalcoholic fatty liver disease), Lung nodule       WARFARIN SODIUM      By mouth alternating 12.5mg and 10mg or as directed by coumadin clinic

## 2017-09-01 NOTE — NURSING NOTE
Chief Complaint   Patient presents with     Blood Draw     VENIPUNCTURE RIGHT ARM      Lizette Yuan CMA

## 2017-09-01 NOTE — LETTER
"    Cancer Risk Management  Program Locations    [facility]  [facility]  Mailing Address  Cancer Risk Management Program  [facility]  [address]  [place] [address]    New patient appointments  [phone]  September 6, 2017    Kannan Cisneros Maryann  [address] [id number]      Dear Kannan,    It was a pleasure meeting with you at the [facility] on September 1, 2017. Here is a copy of the progress note from your recent genetic counseling visit to the Cancer Risk Management Program. If you have any additional questions, please feel free to call.    9/1/2017    Referring Provider: Agapito Watson MD    Presenting Information:   I met with Kannan Wolf today for genetic counseling at the Cancer Risk Management Program at the [facility] to discuss his personal history of colon polyps and family history of colon and breast cancer. He is here today to review this history, cancer screening recommendations, and available genetic testing options.    Personal History:  Kannan is a 67 year old male. He does not have any personal history of cancer. Based on available colonoscopy records, he has had greater than 14 adenomatous polyps removed since 2005. Due to this history, he pursued a colectomy in May 2012 that identified \"multiple\" additional tubular and sessile serrated adenomatous polyps. His most recent flexible sigmoidoscopy in July 2017 identified a rectal tubulovillous adenoma and follow-up was recommended in one year.      Kannan reports having annual lung CT exams due to his personal and family history, which has identified stable lung nodules. He does not regularly do any other cancer screening at this time. Kannan reported current tobacco use and regular alcohol " use.    Of note, Kannan met with Sherice Saba MS, [facility] on 7/25/2012, at which time APC sequencing and deletion/duplication analysis and MUTYH 2-site testing (Y179C and G396D) was ordered from [facility]. This testing was normal/negative and the report was available for review today.    Updated Family History: (Please see scanned pedigree for detailed family history information)    Kannan's mother is 86 and was diagnosed with breast cancer in her 30's; treatment included a mastectomy.    One maternal aunt was diagnosed with liver cancer at age 41 and passed away at age 43; her smoking and alcohol history are unknown.     Kannan's maternal grandmother was diagnosed with bladder cancer at an unknown age and passed away at age 57; she did not have a history of smoking.    One paternal uncle was diagnosed with colon cancer in his 30's and passed away in his 60's.    Jls previously reported having a paternal uncle diagnosed with stomach cancer and a paternal aunt diagnosed with lung cancer, but now believes these relatives did not have a cancer diagnosis.    His maternal ethnicity is Luxembourger, Thai, and [place]i. His paternal ethnicity is Kazakh and Austrian. There is no known Ashkenazi Hindu ancestry on either side of his family. There is no reported consanguinity.    Discussion:    Kannan's personal history of colon polyps and family history of early-onset colon and breast cancer is suggestive of a hereditary cancer syndrome.    We reviewed the features of sporadic, familial, and hereditary cancers. In looking at Kannan's family history, it is possible that a cancer susceptibility gene is present as Kannan has had more than 20 adenomatous colon polyps removed and he has two relatives diagnosed with potentially related early-onset cancers (breast and colon). That being said, Kannan also has several relatives that have never been diagnosed with cancer/polyps, including his siblings, father, and multiple  aunt and uncles.    We discussed the natural history and genetics of several hereditary cancer syndromes, including Familial Adenomatous Polyposis (FAP), MUTYH-Associated Polyposis (MAP), and Hereditary Breast and Ovarian Cancer (HBOC) syndrome. A detailed handout regarding these syndromes and the information we discussed was provided to Kannan at the end of our appointment today and can be found in the after visit summary. Topics included: inheritance pattern, cancer risks, cancer screening recommendations, and also risks, benefits and limitations of testing.    We briefly reviewed Kannan's prior APC and MUTYH genetic testing results. This testing addressed the majority of mutations in these genes that cause FAP and MAP. New technology is now available, though, that can identify mutations in the APC and MUTYH genes that were not identifiable in 2012 (i.e. APC promoter deletions/duplications, full sequencing and deletion/duplication analysis of MUTYH). As such, it would be reasonable to consider repeat genetic testing of the APC and MUTYH genes.    Based on his personal and family history, Kannan meets current National Comprehensive Cancer Network (NCCN) criteria for genetic testing of APC and MUTYH.    We then reviewed that his mother's breast cancer history may have a separate hereditary explanation. The most common cause of hereditary breast cancer is HBOC syndrome, which is caused by mutations in the BRCA1 and BRCA2 genes. Individuals with HBOC syndrome are at increased risk for several different cancers, including breast, ovarian, male breast, prostate, melanoma, and pancreatic cancer.      Based on his personal and family history, Kannan meets current National Comprehensive Cancer Network (NCCN) criteria for genetic testing of BRCA1 and BRCA2    We discussed that there are also other additional genes that could cause increased risk for colon polyps, colon cancer, and breast cancer. For example, mutations  in the POLD1 and POLE genes are associated with colon polyps and significantly increased risk for colon cancer. Mutations in the CHEK2, CARLITO, and PALB2 genes are associated with moderate/high risk for breast cancer. As many of these genes present with overlapping features in a family, it would be reasonable for Kannan to consider panel genetic testing to analyze multiple genes at once.    Kannan explained that he would be interested in gathering as much information as possible from genetic testing. As such, we reviewed a 31 gene cancer panel from [facility].    Genetic testing is available for 31 genes associated with hereditary breast and colon cancer: [facility] Breast and Colorectal Cancer Panel (APC, CARLITO, AXIN2, BARD1, BRCA1, BRCA2, BRIP1, BMPR1A, CDH1, CHEK2, EPCAM, GREM1, MLH1, MLH3, MRE11A, MSH2, MSH6, MUTYH, NBN, NF1, PALB2, PMS2, POLD1, POLE, PTEN, RAD50, RAD51C, RAD51D, SMAD4, STK11, and TP53).    We discussed that many of the genes in this panel are associated with specific syndromes and published management guidelines: HBOC syndrome (BRCA1, BRCA2), Kohler syndrome (MLH1, MSH2, MSH6, PMS2, EPCAM), Familial Adenomatous Polyposis (APC), Hereditary Diffuse Gastric Cancer (CDH1), Juvenile Polyposis syndrome (BMPR1A, SMAD4), Cowden syndrome (PTEN), Li Fraumeni syndrome (TP53), Peutz-Jeghers syndrome (STK11), MUTYH Associated Polyposis (MUTYH), and Neurofibromatosis type 1 (NF1).     The CARLITO, AXIN2, BRIP1, CHEK2, GREM1, NBN , PALB2, POLD1, POLE, RAD51C, and RAD51D genes are associated with increased cancer risk and have published management guidelines for certain cancers.      The remaining genes (BARD1, MLH3, MRE11A, RAD50) are associated with increased cancer risk and may allow us to make medical recommendations when mutations are identified.      Consent was obtained and genetic testing for the [facility] Breast and Colorectal Cancer Panels was sent to [facility]. Turn around time: 4-5 weeks.       Medical Management: The information from genetic testing may determine:     additional cancer screening recommendations (i.e. more frequent sigmoidoscopy and/or upper endoscopy, annual dermatologic exams, annual PSA, etc.),    options for risk reducing surgeries for his relatives (i.e. bilateral mastectomy, surgery to remove the ovaries and/or uterus, etc.),      and targeted chemotherapies for patients who have certain cancers now, or who develop certain cancers in the future.     These recommendations and possible targeted chemotherapies will be discussed in detail once genetic testing is completed.     Plan:  1) Today Kannan elected to proceed with genetic testing using a 31 gene [facility] Breast and Colorectal Cancer Panel.  2) This information should be available in 4-5 weeks.  3) Kannan will return to clinic to discuss the results.    Face to face time: 40 minutes    Sana Ervin MS, [facility]  Certified Genetic Counselor  Office: [phone]  Pager: [phone]

## 2017-09-01 NOTE — PROGRESS NOTES
"9/1/2017    Referring Provider: Agapito Watson MD    Presenting Information:   I met with Kannan Wolf today for genetic counseling at the Cancer Risk Management Program at the [facility] to discuss his personal history of colon polyps and family history of colon and breast cancer. He is here today to review this history, cancer screening recommendations, and available genetic testing options.    Personal History:  Kannan is a 67 year old male. He does not have any personal history of cancer. Based on available colonoscopy records, he has had greater than 14 adenomatous polyps removed since 2005. Due to this history, he pursued a colectomy in May 2012 that identified \"multiple\" additional tubular and sessile serrated adenomatous polyps. His most recent flexible sigmoidoscopy in July 2017 identified a rectal tubulovillous adenoma and follow-up was recommended in one year.      Kannan reports having annual lung CT exams due to his personal and family history, which has identified stable lung nodules. He does not regularly do any other cancer screening at this time. Kannan reported current tobacco use and regular alcohol use.    Of note, Kannan met with Sherice Saba MS, [facility] on 7/25/2012, at which time APC sequencing and deletion/duplication analysis and MUTYH 2-site testing (Y179C and G396D) was ordered from [facility]. This testing was normal/negative and the report was available for review today.    Updated Family History: (Please see scanned pedigree for detailed family history information)    Kannan's mother is 86 and was diagnosed with breast cancer in her 30's; treatment included a mastectomy.    One maternal aunt was diagnosed with liver cancer at age 41 and passed away at age 43; her smoking and alcohol history are unknown.     Kannan's maternal grandmother was diagnosed with bladder cancer at an unknown age and passed away at age 57; she did not have a history of smoking.    One paternal " uncle was diagnosed with colon cancer in his 30's and passed away in his 60's.    Kannan's previously reported having a paternal uncle diagnosed with stomach cancer and a paternal aunt diagnosed with lung cancer, but now believes these relatives did not have a cancer diagnosis.    His maternal ethnicity is [place], [place], and Syrian. His paternal ethnicity is [place] and [place]. There is no known Ashkenazi Congregation ancestry on either side of his family. There is no reported consanguinity.    Discussion:    Kannan's personal history of colon polyps and family history of early-onset colon and breast cancer is suggestive of a hereditary cancer syndrome.    We reviewed the features of sporadic, familial, and hereditary cancers. In looking at Kannan's family history, it is possible that a cancer susceptibility gene is present as Kannan has had more than 20 adenomatous colon polyps removed and he has two relatives diagnosed with potentially related early-onset cancers (breast and colon). That being said, Kannan also has several relatives that have never been diagnosed with cancer/polyps, including his siblings, father, and multiple aunt and uncles.    We discussed the natural history and genetics of several hereditary cancer syndromes, including Familial Adenomatous Polyposis (FAP), MUTYH-Associated Polyposis (MAP), and Hereditary Breast and Ovarian Cancer (HBOC) syndrome. A detailed handout regarding these syndromes and the information we discussed was provided to Kannan at the end of our appointment today and can be found in the after visit summary. Topics included: inheritance pattern, cancer risks, cancer screening recommendations, and also risks, benefits and limitations of testing.    We briefly reviewed Kannan's prior APC and MUTYH genetic testing results. This testing addressed the majority of mutations in these genes that cause FAP and MAP. New technology is now available, though, that can identify  mutations in the APC and MUTYH genes that were not identifiable in 2012 (i.e. APC promoter deletions/duplications, full sequencing and deletion/duplication analysis of MUTYH). As such, it would be reasonable to consider repeat genetic testing of the APC and MUTYH genes.    Based on his personal and family history, Kannan meets current National Comprehensive Cancer Network (NCCN) criteria for genetic testing of APC and MUTYH.    We then reviewed that his mother's breast cancer history may have a separate hereditary explanation. The most common cause of hereditary breast cancer is HBOC syndrome, which is caused by mutations in the BRCA1 and BRCA2 genes. Individuals with HBOC syndrome are at increased risk for several different cancers, including breast, ovarian, male breast, prostate, melanoma, and pancreatic cancer.      Based on his personal and family history, Kannan meets current National Comprehensive Cancer Network (NCCN) criteria for genetic testing of BRCA1 and BRCA2    We discussed that there are also other additional genes that could cause increased risk for colon polyps, colon cancer, and breast cancer. For example, mutations in the POLD1 and POLE genes are associated with colon polyps and significantly increased risk for colon cancer. Mutations in the CHEK2, CARLITO, and PALB2 genes are associated with moderate/high risk for breast cancer. As many of these genes present with overlapping features in a family, it would be reasonable for Kannan to consider panel genetic testing to analyze multiple genes at once.    Kannan explained that he would be interested in gathering as much information as possible from genetic testing. As such, we reviewed a 31 gene cancer panel from [facility].    Genetic testing is available for 31 genes associated with hereditary breast and colon cancer: [facility] Breast and Colorectal Cancer Panel (APC, CARLITO, AXIN2, BARD1, BRCA1, BRCA2, BRIP1, BMPR1A, CDH1, CHEK2, EPCAM, GREM1,  MLH1, MLH3, MRE11A, MSH2, MSH6, MUTYH, NBN, NF1, PALB2, PMS2, POLD1, POLE, PTEN, RAD50, RAD51C, RAD51D, SMAD4, STK11, and TP53).    We discussed that many of the genes in this panel are associated with specific syndromes and published management guidelines: HBOC syndrome (BRCA1, BRCA2), Kohler syndrome (MLH1, MSH2, MSH6, PMS2, EPCAM), Familial Adenomatous Polyposis (APC), Hereditary Diffuse Gastric Cancer (CDH1), Juvenile Polyposis syndrome (BMPR1A, SMAD4), Cowden syndrome (PTEN), Li Fraumeni syndrome (TP53), Peutz-Jeghers syndrome (STK11), MUTYH Associated Polyposis (MUTYH), and Neurofibromatosis type 1 (NF1).     The CARLITO, AXIN2, BRIP1, CHEK2, GREM1, NBN , PALB2, POLD1, POLE, RAD51C, and RAD51D genes are associated with increased cancer risk and have published management guidelines for certain cancers.      The remaining genes (BARD1, MLH3, MRE11A, RAD50) are associated with increased cancer risk and may allow us to make medical recommendations when mutations are identified.      Consent was obtained and genetic testing for the [facility] Breast and Colorectal Cancer Panels was sent to [facility]. Turn around time: 4-5 weeks.      Medical Management: The information from genetic testing may determine:     additional cancer screening recommendations (i.e. more frequent sigmoidoscopy and/or upper endoscopy, annual dermatologic exams, annual PSA, etc.),    options for risk reducing surgeries for his relatives (i.e. bilateral mastectomy, surgery to remove the ovaries and/or uterus, etc.),      and targeted chemotherapies for patients who have certain cancers now, or who develop certain cancers in the future.     These recommendations and possible targeted chemotherapies will be discussed in detail once genetic testing is completed.     Plan:  1) Today Kannan elected to proceed with genetic testing using a 31 gene [facility] Breast and Colorectal Cancer Panel.  2) This information should be available in 4-5  weeks.  3) Kannan will return to clinic to discuss the results.    Face to face time: 40 minutes    Sana Ervin MS, [facility]  Certified Genetic Counselor  Office: [phone]  Pager: [phone]

## 2017-09-03 LAB — MISCELLANEOUS TEST: NORMAL

## 2017-09-06 ENCOUNTER — AMBULATORY - RIVER FALLS (OUTPATIENT)
Dept: FAMILY MEDICINE | Facility: CLINIC | Age: 67
End: 2017-09-06
Payer: COMMERCIAL

## 2017-09-07 LAB
CHOLEST SERPL-MCNC: 153 MG/DL
CHOLEST/HDLC SERPL: 3.6 {RATIO}
HBA1C MFR BLD: 5.3 %
HDLC SERPL-MCNC: 42 MG/DL
LDLC SERPL CALC-MCNC: 90 MG/DL
NONHDLC SERPL-MCNC: 111 MG/DL
TRIGL SERPL-MCNC: 110 MG/DL

## 2017-09-11 ENCOUNTER — AMBULATORY - RIVER FALLS (OUTPATIENT)
Dept: FAMILY MEDICINE | Facility: CLINIC | Age: 67
End: 2017-09-11
Payer: COMMERCIAL

## 2017-09-26 LAB
LAB SCANNED RESULT: ABNORMAL
LAB SCANNED RESULT: ABNORMAL

## 2017-09-28 ENCOUNTER — TELEPHONE (OUTPATIENT)
Dept: ONCOLOGY | Facility: CLINIC | Age: 67
End: 2017-09-28

## 2017-09-28 NOTE — TELEPHONE ENCOUNTER
9/28/2017    I called Kannan today to discuss his genetic testing results, but was unable to reach him using the provided phone number and could not leave a voicemail message. As such, I will call again tomorrow morning to see if he can be reached at that time.    Sana Ervin MS, [facility]  Certified Genetic Counselor  Office: [phone]  Pager: [phone]

## 2017-09-29 ENCOUNTER — TELEPHONE (OUTPATIENT)
Dept: ONCOLOGY | Facility: CLINIC | Age: 67
End: 2017-09-29

## 2017-09-29 NOTE — TELEPHONE ENCOUNTER
9/29/2017    I called Kannan today to discuss his genetic testing results, but was unable to reach him. I was able to leave a non-detailed voicemail with my name and phone number.    Sana Ervin MS, [facility] [place]  Certified Genetic Counselor  Office: [phone]  Pager: [phone]

## 2017-10-06 ENCOUNTER — TELEPHONE (OUTPATIENT)
Dept: ONCOLOGY | Facility: CLINIC | Age: 67
End: 2017-10-06

## 2017-10-06 NOTE — LETTER
Cancer Risk Management  Program Locations    [facility]  [facility]  [facility]  Mailing Address  Cancer Risk Management Program  [address]  [place] [address]    New patient appointments  [phone]  October 13, 2017    Kannan Cisneros Cudd  [address] [facility] [address]      Dear Kannan,    It was a pleasure speaking with you over the phone recently regarding your genetic testing results. Here is a copy of the progress note summarizing our conversation. If you have any additional questions, please feel free to call.    10/6/2017    Referring Provider: Agapito Watson MD    Presenting Information:  I spoke to Kannan by phone today to discuss his genetic testing results. His blood was drawn on 9/1/2017. The [facility] Colorectal and Breast Cancer Panel (with preliminary-evidence genes) was ordered from [facility]. This testing was done because of Kannan's personal history of colon polyps and family history of colon and breast cancer.    Genetic Testing Result: Variant of Uncertain Significance (VUS)  Kannan was found to have a variant of uncertain significance (VUS) in the MRE11A (also known as MRE11) gene. This variant is called c.534A>G. No other variants or mutations were found in the MRE11A gene.      Kannan was also found to have a variant of uncertain significance (VUS) in the STK11 gene. This variant is called c.397G>A (also known as p.Cbi257Avf). No other mutations were found in the STK11 gene.      Of note, Kannan tested negative for mutations in the following genes by sequencing and deletion/duplication analysis: AKT1, APC, CARLITO, AXIN2, BARD1, BLM, BMPR1A, BRCA1, BRCA2, BRIP1, BUB1B, CDH1, CHEK2, ENG, EPCAM (deletion/duplication only), FCQ581L, FANCC, FLCN, GALNT12, GREM1  "(deletion/duplication only), MLH1, MLH3, MSH2, MSH6, MUTYH, NBN, NF1, PALB2, PIK3CA, PMS2, POLD1, POLE, PTEN, RAD50, RAD51C, RAD51D RINT1, SDHB, SDHD, SMAD4, TP53, and XRCC2.      We reviewed the autosomal dominant inheritance of these genes.      Kannan cannot pass on a mutation in any of these genes to his children based on this test result. Mutations in these genes do not skip generations.      Of note, the original test order was updated to include 13 additional preliminary-evidence genes associated with increased risk for colon polyps and cancer, as well as breast cancer.    A copy of the test report can be found in the Laboratory tab, dated 9/1/2017, and named \"[facility] [place]\". The report is scanned in as a linked document.    Interpretation:  We discussed several different interpretations of this inconclusive test result. It is not clear if these variants in the MRE11A and STK11 genes are associated with increased cancer risk.  1. One or both of these variants may be benign changes that do not increase cancer risk.  2. One or more of these variants may be harmful mutations that increase cancer risk.    MRE11A: Harmful mutations in the MRE11A gene are associated with a moderately increased risk for female breast cancer and potentially ovarian cancer. Cancer risks for men who carry one harmful MRE11A gene mutation are unknown at this time.    STK11: Harmful mutations in the STK11 gene are associated with a diagnosis of Peutz-Jeghers syndrome (PJS). Individuals with PJS are at increased risk for colon polyps (particularly PJS-type hamartomatous polyps), as well as gastrointestinal, breast, gynecologic, testicular, and lung cancer. Individuals with this condition also have dark-colored birthmarks, particularly around the mouth and fingertips. Of note, Kannan has not had a hamartomatous polyp identified and denies a history of dark-colored birthmarks.    The laboratory is working to determine if these " variants are harmful or benign, and they will contact me if they are reclassified. If these variants are determined to be benign changes, there may be a different gene or combination of genes and environment that are associated with Kannan's polyps and family history of cancer. If that is the case, additional genetic testing may be available in the future that can identify a genetic/hereditary explanation for Kannan's colon polyps. As such, he is encouraged to contact me annually to review any new genetic testing options that may be appropriate for him.      It is also important to consider that another relative, such as his paternal uncle with colon cancer and/or mother with breast cancer, may have had a mutation in one of the genes tested and Kannan did not inherit it. If that is the case, Kannan's colon polyps may be sporadic findings caused by random cellular changes.    Inheritance:  We reviewed the autosomal dominant inheritance of these variants in the MRE11A and STK11 genes. We discussed that Kannan has a 50% chance to pass each of these variants to his children. Likewise, his siblings also have a 50% risk of having the same variants. Because it is unclear what, if any, risk is associated with these variants, clinical genetic testing for these MRE11A and STK11 variants alone is not recommended for relatives.    Screening:  Based on this inconclusive test result, it is important for Kannan and his relatives to refer back to the family history for appropriate cancer screening.      Kannan should continue to follow all colonoscopy recommendations as made by his medical providers. Of note, it was recommended that he have a repeat exam one year after his last colonoscopy in July 2017 (due July 2018).  Per current National Comprehensive Cancer Network (NCCN) guidelines, individuals with a family history of  adenomas in a first degree relative should consider colonoscopy and polypectomy every 3-5  years, starting at the same age as the youngest diagnosis of polyposis in the family (if uncomplicated by cancer) or by age 40, whichever comes first. In this family, colonoscopies may begin at age 40. This recommendation would apply to his children and siblings.  Kannan s close female relatives remain at increased risk for breast cancer given their family history. Breast cancer screening is generally recommended to begin approximately 10 years younger than the earliest age of breast cancer diagnosis in the family, or at age 40, whichever comes first. In this family, screening may begin sometime in the early 30's. Breast screening options should be discussed with an individual's primary care provider and a genetic counselor, to determine at what age to begin screening, what screening is appropriate, or if additional screening (such as breast MRI) is necessary based on personal/family history factors.  Other population cancer screening options, such as those recommended by the American Cancer Society and NCCN, are also appropriate for Kannan and his family. These screening recommendations may change if there are changes to Kannan's personal and/or family history. Final screening recommendations should be made by each individual's managing physician.    Additional Testing Considerations:  Although Kannan's genetic testing result was negative, other relatives may still carry a gene mutation associated with hereditary breast and/or colon cancer. Genetic counseling is recommended for Kannan's mother, his siblings, and the children of his paternal uncle with colon cancer to discuss their genetic testing options. If these relatives do pursue genetic testing, Kannan is encouraged to contact me so that we may review the impact of their test results on Kannan.    Summary:  We do not have an explanation for his colon polyps or family history of cancer. Because of that, it is important that he continue with cancer  screening based on his personal and family history as discussed above.    Genetic testing is rapidly advancing, and new cancer susceptibility genes will most likely be identified in the future. Therefore, I encouraged Kannan to contact me annually or if there are changes in his personal or family history. This may change how we assess his cancer risk, screening, and the testing we would offer.    Plan:  1. Kannan will be mailed a copy of his test results.    2. He plans to follow-up with his medical providers, including Dr. Watson.  3. He should contact me annually, or sooner if his family history changes.  4. I will contact Kannan if the laboratory informs me that these MRE11A and STK11 variants have been reclassified. This may change screening and testing recommendations for relatives.    If Kannan has any further questions, I encouraged him to contact me at [phone].    Sana Ervin MS, [facility] [place]  Certified Genetic Counselor  Office: [phone]  Pager: [phone]

## 2017-10-06 NOTE — TELEPHONE ENCOUNTER
"10/6/2017    Referring Provider: Agapito Watson MD    Presenting Information:  I spoke to Kannan by phone today to discuss his genetic testing results. His blood was drawn on 9/1/2017. The [facility] Colorectal and Breast Cancer Panel (with preliminary-evidence genes) was ordered from [facility]. This testing was done because of Kannan's personal history of colon polyps and family history of colon and breast cancer.    Genetic Testing Result: Variant of Uncertain Significance (VUS)  Kannan was found to have a variant of uncertain significance (VUS) in the MRE11A (also known as MRE11) gene. This variant is called c.534A>G. No other variants or mutations were found in the MRE11A gene.      Kannan was also found to have a variant of uncertain significance (VUS) in the STK11 gene. This variant is called c.397G>A (also known as p.Dcn920Etm). No other mutations were found in the STK11 gene.      Of note, Kannan tested negative for mutations in the following genes by sequencing and deletion/duplication analysis: AKT1, APC, CARLITO, AXIN2, BARD1, BLM, BMPR1A, BRCA1, BRCA2, BRIP1, BUB1B, CDH1, CHEK2, ENG, EPCAM (deletion/duplication only), HXN862B, FANCC, FLCN, GALNT12, GREM1 (deletion/duplication only), MLH1, MLH3, MSH2, MSH6, MUTYH, NBN, NF1, PALB2, PIK3CA, PMS2, POLD1, POLE, PTEN, RAD50, RAD51C, RAD51D RINT1, SDHB, SDHD, SMAD4, TP53, and XRCC2.      We reviewed the autosomal dominant inheritance of these genes.      Kannan cannot pass on a mutation in any of these genes to his children based on this test result. Mutations in these genes do not skip generations.      Of note, the original test order was updated to include 13 additional preliminary-evidence genes associated with increased risk for colon polyps and cancer, as well as breast cancer.    A copy of the test report can be found in the Laboratory tab, dated 9/1/2017, and named \"SEND OUTS MISC TEST\". The report is scanned in as a linked " document.    Interpretation:  We discussed several different interpretations of this inconclusive test result. It is not clear if these variants in the MRE11A and STK11 genes are associated with increased cancer risk.  1. One or both of these variants may be benign changes that do not increase cancer risk.  2. One or more of these variants may be harmful mutations that increase cancer risk.    MRE11A: Harmful mutations in the MRE11A gene are associated with a moderately increased risk for female breast cancer and potentially ovarian cancer. Cancer risks for men who carry one harmful MRE11A gene mutation are unknown at this time.    STK11: Harmful mutations in the STK11 gene are associated with a diagnosis of Peutz-Jeghers syndrome (PJS). Individuals with PJS are at increased risk for colon polyps (particularly PJS-type hamartomatous polyps), as well as gastrointestinal, breast, gynecologic, testicular, and lung cancer. Individuals with this condition also have dark-colored birthmarks, particularly around the mouth and fingertips. Of note, Kannan has not had a hamartomatous polyp identified and denies a history of dark-colored birthmarks.    The laboratory is working to determine if these variants are harmful or benign, and they will contact me if they are reclassified. If these variants are determined to be benign changes, there may be a different gene or combination of genes and environment that are associated with Kannan's polyps and family history of cancer. If that is the case, additional genetic testing may be available in the future that can identify a genetic/hereditary explanation for Kannan's colon polyps. As such, he is encouraged to contact me annually to review any new genetic testing options that may be appropriate for him.      It is also important to consider that another relative, such as his paternal uncle with colon cancer and/or mother with breast cancer, may have had a mutation in one of the  genes tested and Kannan did not inherit it. If that is the case, Kannan's colon polyps may be sporadic findings caused by random cellular changes.    Inheritance:  We reviewed the autosomal dominant inheritance of these variants in the MRE11A and STK11 genes. We discussed that Kannan has a 50% chance to pass each of these variants to his children. Likewise, his siblings also have a 50% risk of having the same variants. Because it is unclear what, if any, risk is associated with these variants, clinical genetic testing for these MRE11A and STK11 variants alone is not recommended for relatives.    Screening:  Based on this inconclusive test result, it is important for Kannan and his relatives to refer back to the family history for appropriate cancer screening.      Kannan should continue to follow all colonoscopy recommendations as made by his medical providers. Of note, it was recommended that he have a repeat exam one year after his last colonoscopy in July 2017 (due July 2018).  Per current National Comprehensive Cancer Network (NCCN) guidelines, individuals with a family history of  adenomas in a first degree relative should consider colonoscopy and polypectomy every 3-5 years, starting at the same age as the youngest diagnosis of polyposis in the family (if uncomplicated by cancer) or by age 40, whichever comes first. In this family, colonoscopies may begin at age 40. This recommendation would apply to his children and siblings.  Kannan s close female relatives remain at increased risk for breast cancer given their family history. Breast cancer screening is generally recommended to begin approximately 10 years younger than the earliest age of breast cancer diagnosis in the family, or at age 40, whichever comes first. In this family, screening may begin sometime in the early 30's. Breast screening options should be discussed with an individual's primary care provider and a genetic counselor, to  determine at what age to begin screening, what screening is appropriate, or if additional screening (such as breast MRI) is necessary based on personal/family history factors.  Other population cancer screening options, such as those recommended by the American Cancer Society and NCCN, are also appropriate for Kannan and his family. These screening recommendations may change if there are changes to Kannan's personal and/or family history. Final screening recommendations should be made by each individual's managing physician.    Additional Testing Considerations:  Although Kannan's genetic testing result was negative, other relatives may still carry a gene mutation associated with hereditary breast and/or colon cancer. Genetic counseling is recommended for Kannan's mother, his siblings, and the children of his paternal uncle with colon cancer to discuss their genetic testing options. If these relatives do pursue genetic testing, Kannan is encouraged to contact me so that we may review the impact of their test results on Kannan.    Summary:  We do not have an explanation for his colon polyps or family history of cancer. Because of that, it is important that he continue with cancer screening based on his personal and family history as discussed above.    Genetic testing is rapidly advancing, and new cancer susceptibility genes will most likely be identified in the future. Therefore, I encouraged Kannan to contact me annually or if there are changes in his personal or family history. This may change how we assess his cancer risk, screening, and the testing we would offer.    Plan:  1. Kannan will be mailed a copy of his test results.    2. He plans to follow-up with his medical providers, including Dr. Watson.  3. He should contact me annually, or sooner if his family history changes.  4. I will contact Kannan if the laboratory informs me that these MRE11A and STK11 variants have been reclassified. This may  change screening and testing recommendations for relatives.    If Kannan has any further questions, I encouraged him to contact me at [phone].    Sana Ervin MS, [facility]  Certified Genetic Counselor  Office: [phone]  Pager: [phone]

## 2017-10-06 NOTE — Clinical Note
"Please print and send a copy of this letter and the patient's genetic testing report to the patient.    Please enclose test report: \"Send outs misc test [[id number]] (Order [id number])\"  "

## 2017-10-17 ENCOUNTER — OFFICE VISIT - RIVER FALLS (OUTPATIENT)
Dept: FAMILY MEDICINE | Facility: CLINIC | Age: 67
End: 2017-10-17
Payer: COMMERCIAL

## 2017-11-21 ENCOUNTER — OFFICE VISIT - RIVER FALLS (OUTPATIENT)
Dept: FAMILY MEDICINE | Facility: CLINIC | Age: 67
End: 2017-11-21
Payer: COMMERCIAL

## 2017-12-01 ENCOUNTER — OFFICE VISIT - RIVER FALLS (OUTPATIENT)
Dept: FAMILY MEDICINE | Facility: CLINIC | Age: 67
End: 2017-12-01
Payer: COMMERCIAL

## 2017-12-01 ASSESSMENT — MIFFLIN-ST. JEOR: SCORE: 1726.97

## 2017-12-07 ENCOUNTER — ALLIED HEALTH/NURSE VISIT (OUTPATIENT)
Dept: PHARMACY | Facility: CLINIC | Age: 67
End: 2017-12-07
Payer: COMMERCIAL

## 2017-12-07 DIAGNOSIS — Z71.6 ENCOUNTER FOR SMOKING CESSATION COUNSELING: Primary | ICD-10-CM

## 2017-12-07 PROCEDURE — 99605 MTMS BY PHARM NP 15 MIN: CPT | Performed by: PHARMACIST

## 2017-12-07 NOTE — PROGRESS NOTES
"SUBJECTIVE/OBJECTIVE:                           Kannan Wolf is a 67 year old male called for an initial visit for Smoking Cessation.  He was referred to me from the PLUTO study.       Chief Complaint: Smoking cessation    Allergies/ADRs: None  Tobacco: 0-1 pack per day - is not interested in quittingTobacco Cessation Action Plan: Information offered: Patient not interested at this time  Alcohol: 10 or more beverages /week    PMH: Reviewed in Epic    Medication Adherence/Access  No concerns    Smoking Cessation:  How much does he smoke:  1ppd  Why does he smoke: Enjoys it, habit, addiction  Triggers for smoking include:  \"Being awake\". Coffee, getting in a car, meals, stress, boredom, \"everything\"  How long has he been smokin years  What is the most he ever smoked:  3ppd  What is the least he ever smoked:  1ppd  Tried quitting in the past: Yes.  How many times:  Many.  For how long: Quit for 7 years. Had a cigar and was right back on.  What worked/didn t work in the past: Wilderness retreat for 10 days which was \"pure hell\". Used patch but it didn't adhere/would sweat it off, didn't feel the lozenges helped (4mg), has not tried Chantix or Wellbutrin  Why does he want to quit (motivators for quitting): Feels he has lost his motivation to quit- became depressed this summer with his lack of progress in quitting. Admits that he debated cancelling this call today because even talking about quitting smoking right now is just making him more depressed.  PHQ-2 is a 1 today.    Current labs include:  BP Readings from Last 3 Encounters:   17 143/88   17 131/72   16 134/77     Today's Vitals: There were no vitals taken for this visit.  No results found for: A1C.  Lab Results   Component Value Date    CHOL 166 2012     Lab Results   Component Value Date    TRIG 111 2012     Lab Results   Component Value Date    HDL 45 2012     Lab Results   Component Value Date    LDL 99 2012 "       Liver Function Studies -   Recent Labs   Lab Test  12/12/16   1028   PROTTOTAL  7.1   ALBUMIN  3.4   BILITOTAL  0.3   ALKPHOS  64   AST  23   ALT  30       No results found for: UCRR, MICROL, UMALCR    Last Basic Metabolic Panel:  Lab Results   Component Value Date     12/12/2016      Lab Results   Component Value Date    POTASSIUM 4.6 12/12/2016     Lab Results   Component Value Date    CHLORIDE 109 12/12/2016     Lab Results   Component Value Date    BUN 12 12/12/2016     Lab Results   Component Value Date    CR 0.94 12/12/2016     GFR Estimate   Date Value Ref Range Status   12/12/2016 80 >60 mL/min/1.7m2 Final     Comment:     Non  GFR Calc   12/16/2015 86 >60 mL/min/1.7m2 Final     Comment:     Non  GFR Calc   12/17/2014 77 >60 mL/min/1.7m2 Final     Comment:     Non  GFR Calc     GFR Estimate If Black   Date Value Ref Range Status   12/12/2016 >90   GFR Calc   >60 mL/min/1.7m2 Final   12/16/2015 >90   GFR Calc   >60 mL/min/1.7m2 Final   12/17/2014 >90   GFR Calc   >60 mL/min/1.7m2 Final     No results found for: TSH]    Most Recent Immunizations   Administered Date(s) Administered     Influenza (High Dose) 3 valent vaccine 12/12/2016       ASSESSMENT:                             Current medications were reviewed today.       Medication Adherence: no issues identified    Smoking cessation: Unimproved. Pt continues to use tobacco. Pt is not ready to quit using tobacco. We did not complete a full discussion today as patient is no longer wanting to go through with MTM support.    PLAN:                            1. No recommendations today, other than the patient reach out when he is ready to quit.    2. Note pended to [employer] coaching as an FYI    I spent 15 minutes with this patient today  . I offer these suggestions for consideration by the PCP. A copy of the visit note was provided to the patient's  primary care provider.    Will follow up upon patient request.    The patient was sent via [employer] a summary of these recommendations as an after visit summary.     Sarah Pham PharmD  Medication Therapy Management Provider  Phone:[phone]  Pager: [phone]

## 2017-12-07 NOTE — MR AVS SNAPSHOT
After Visit Summary   12/7/2017    Kannan Wolf    MRN: [id number]           Patient Information     Date Of Birth          1950        Visit Information        Provider Department      12/7/2017 9:00 AM Sarah Pham, [facility] MTM        Today's Diagnoses     Encounter for smoking cessation counseling    -  1      Care Instructions    Recommendations from today's MTM visit:                                                      1. Please call anytime if you decide you are ready to quit smoking again.    Next MTM visit: Call anytime    To schedule another MTM appointment, please call the clinic directly or you may call the MTM scheduling line at [phone] or toll-free at [phone].     My Clinical Pharmacist's contact information:                                                      It was a pleasure seeing you today!  Please feel free to contact me with any questions or concerns you have.      Sarah Pham PharmD  Medication Therapy Management Provider  Phone: [phone]  Pager: [phone]    You may receive a survey about the MT services you received.  I would appreciate your feedback to help me serve you better in the future. Please fill it out and return it when you can. Your comments will be anonymous.                    Follow-ups after your visit        Your next 10 appointments already scheduled     Jun 26, 2018 12:00 PM CDT   (Arrive by 11:45 AM)   CT LUNG RESEARCH FLORES with UCCT2   [facility] ([facility])    [address] [address] [phone]           Please bring any scans or X-rays taken at other hospitals, if similar tests were done. Also bring a list of your medicines, including vitamins, minerals and over-the-counter drugs. It is safest to leave personal items at home.  Be sure to tell your doctor:   If you have any allergies.   If there s any chance you  are pregnant.   If you are breastfeeding.   If you have any special needs.  You do not need to do anything special to prepare.  Please wear loose clothing, such as a sweat suit or jogging clothes. Avoid snaps, zippers and other metal. We may ask you to undress and put on a hospital gown.            Jun 26, 2018  1:00 PM CDT   (Arrive by 12:45 PM)   Return Visit with Yemi Marino MD   [facility] ([facility] [place])    [address]  [address] Floor  [facility] [address] [phone]              Who to contact     If you have questions or need follow up information about today's clinic visit or your schedule please contact [facility] MT directly at [phone].  Normal or non-critical lab and imaging results will be communicated to you by [employer], letter or phone within 4 business days after the clinic has received the results. If you do not hear from us within 7 days, please contact the clinic through [employer] or phone. If you have a critical or abnormal lab result, we will notify you by phone as soon as possible.  Submit refill requests through [employer] or call your pharmacy and they will forward the refill request to us. Please allow 3 business days for your refill to be completed.          Additional Information About Your Visit        [employer] Information     [employer] gives you secure access to your electronic health record. If you see a primary care provider, you can also send messages to your care team and make appointments. If you have questions, please call your primary care clinic.  If you do not have a primary care provider, please call [phone] and they will assist you.        Care EveryWhere ID     This is your Care EveryWhere ID. This could be used by other organizations to access your [place] medical records  [id number]         Blood Pressure from Last 3 Encounters:   07/18/17 143/88   06/27/17 131/72   12/12/16 134/77     Weight from Last 3 Encounters:   07/18/17 195 lb (88.5 kg)   06/27/17 198 lb 6.6 oz (90 kg)   12/12/16 194 lb (88 kg)              Today, you had the following     No orders found for display       Primary Care Provider Office Phone # Fax #    David Jenkins [phone]       [facility] [address] [facility] [address]        Equal Access to Services     ALONA STRICKLAND : Hadii aad ku hadasho Soomaali, waaxda luqadaha, qaybta kaalmada adeegyada, waxay idiin haycartern wilmer muñiz. So [facility] [phone].    ATENCIÓN: Si casie croft, tiene a puente disposición servicios gratuitos de asistencia lingüística. [facility] [phone].    We comply with applicable federal civil rights laws and [place] laws. We do not discriminate on the basis of race, color, national origin, age, disability, sex, sexual orientation, or gender identity.            Thank you!     Thank you for choosing [facility]  for your care. Our goal is always to provide you with excellent care. Hearing back from our patients is one way we can continue to improve our services. Please take a few minutes to complete the written survey that you may receive in the mail after your visit with us. Thank you!             Your Updated Medication List - Protect others around you: Learn how to safely use, store and throw away your medicines at www.disposemymeds.org.          This list is accurate as of: 12/7/17 11:59 PM.  Always use your most recent med list.                   Brand Name Dispense Instructions for use Diagnosis    aspirin 81 MG tablet      Take 81 mg by mouth daily        atorvastatin 10 MG tablet    LIPITOR     Take 10 mg by mouth daily    NAFLD (nonalcoholic fatty liver disease), Lung nodule       WARFARIN SODIUM      By mouth alternating 12.5mg and 10mg or as directed by coumadin clinic

## 2017-12-08 NOTE — PATIENT INSTRUCTIONS
Recommendations from today's MTM visit:                                                      1. Please call anytime if you decide you are ready to quit smoking again.    Next MTM visit: Call anytime    To schedule another MTM appointment, please call the clinic directly or you may call the MTM scheduling line at [phone] or toll-free at [phone].     My Clinical Pharmacist's contact information:                                                      It was a pleasure seeing you today!  Please feel free to contact me with any questions or concerns you have.      Sarah Pham PharmD  Medication Therapy Management Provider  Phone: [phone]  Pager: [phone]    You may receive a survey about the MTM services you received.  I would appreciate your feedback to help me serve you better in the future. Please fill it out and return it when you can. Your comments will be anonymous.

## 2017-12-26 ENCOUNTER — OFFICE VISIT - RIVER FALLS (OUTPATIENT)
Dept: FAMILY MEDICINE | Facility: CLINIC | Age: 67
End: 2017-12-26
Payer: COMMERCIAL

## 2018-01-30 ENCOUNTER — OFFICE VISIT - RIVER FALLS (OUTPATIENT)
Dept: FAMILY MEDICINE | Facility: CLINIC | Age: 68
End: 2018-01-30
Payer: COMMERCIAL

## 2018-03-08 ENCOUNTER — OFFICE VISIT - RIVER FALLS (OUTPATIENT)
Dept: FAMILY MEDICINE | Facility: CLINIC | Age: 68
End: 2018-03-08
Payer: COMMERCIAL

## 2018-04-24 ENCOUNTER — OFFICE VISIT - RIVER FALLS (OUTPATIENT)
Dept: FAMILY MEDICINE | Facility: CLINIC | Age: 68
End: 2018-04-24
Payer: COMMERCIAL

## 2018-05-09 ASSESSMENT — ENCOUNTER SYMPTOMS
MUSCLE WEAKNESS: 0
DYSPNEA ON EXERTION: 0
SWOLLEN GLANDS: 0
BRUISES/BLEEDS EASILY: 1
JOINT SWELLING: 1
WHEEZING: 1
ARTHRALGIAS: 1
COUGH: 1
COUGH DISTURBING SLEEP: 0
SPUTUM PRODUCTION: 1
SNORES LOUDLY: 1
STIFFNESS: 1
BACK PAIN: 0
NECK PAIN: 0
MUSCLE CRAMPS: 1
SHORTNESS OF BREATH: 0
POSTURAL DYSPNEA: 1
HEMOPTYSIS: 0
MYALGIAS: 1

## 2018-05-13 ENCOUNTER — PRE VISIT (OUTPATIENT)
Dept: RADIOLOGY | Facility: CLINIC | Age: 68
End: 2018-05-13

## 2018-05-13 NOTE — TELEPHONE ENCOUNTER
RECORDS RECEIVED FROM: Last seen 2012 by Dr David Charles   NOTES STATUS DETAILS   OFFICE NOTE from referring provider Internal 6/26/2012    OFFICE NOTE from other specialist Internal    DISCHARGE SUMMARY from hospital Internal 2012   OPERATIVE REPORT Internal 2012   MEDICATION LIST Internal    XRAYS (IMAGES & REPORTS) Internal    PATHOLOGY  Slides & report Received 2/2/2012 - Report

## 2018-05-15 ENCOUNTER — TELEPHONE (OUTPATIENT)
Dept: INTERVENTIONAL RADIOLOGY/VASCULAR | Facility: CLINIC | Age: 68
End: 2018-05-15

## 2018-05-15 DIAGNOSIS — I70.213 ATHEROSCLEROSIS OF NATIVE ARTERY OF BOTH LOWER EXTREMITIES WITH INTERMITTENT CLAUDICATION (H): Primary | ICD-10-CM

## 2018-05-15 NOTE — TELEPHONE ENCOUNTER
I wasn't able to reach maya, but did get a hold of Rossana his daughter.  She will text him about the imaging appointments needed tomorrow starting at 130 pm at the [facility].  She will call me if he can't make it to reschedule appointments.  AYLA Christensen RN, BSN  Interventional Radiology Care Coordinator   Phone:  [phone]

## 2018-05-16 ENCOUNTER — OFFICE VISIT (OUTPATIENT)
Dept: RADIOLOGY | Facility: CLINIC | Age: 68
End: 2018-05-16
Payer: COMMERCIAL

## 2018-05-16 ENCOUNTER — RADIANT APPOINTMENT (OUTPATIENT)
Dept: ULTRASOUND IMAGING | Facility: CLINIC | Age: 68
End: 2018-05-16
Attending: RADIOLOGY
Payer: COMMERCIAL

## 2018-05-16 VITALS — DIASTOLIC BLOOD PRESSURE: 94 MMHG | SYSTOLIC BLOOD PRESSURE: 142 MMHG | HEART RATE: 81 BPM

## 2018-05-16 DIAGNOSIS — I70.213 ATHEROSCLEROSIS OF NATIVE ARTERY OF BOTH LOWER EXTREMITIES WITH INTERMITTENT CLAUDICATION (H): ICD-10-CM

## 2018-05-16 DIAGNOSIS — I73.9 PVD (PERIPHERAL VASCULAR DISEASE) (H): Primary | ICD-10-CM

## 2018-05-16 ASSESSMENT — PAIN SCALES - GENERAL: PAINLEVEL: NO PAIN (0)

## 2018-05-16 NOTE — NURSING NOTE
Dermatology Rooming Note    Kannan Wolf's goals for this visit include:   Chief Complaint   Patient presents with     DIANA Cisneros is visiting to discuss possible knee replacement     Rebecca Myrick LPN

## 2018-05-23 NOTE — PROGRESS NOTES
Interventional Radiology Clinic Visit    Date of this visit: 5/23/2018    Kannan Wolf presents for consultation for evaluation of leg pain    Primary Physician: David Jenkins        History Of Present Illness:    Kannan Wolf is a 68 year old male with a diagnosis of peripheral arterial disease status post bilateral SFA stenting about 5 years ago. He is doing very well from a symptomatic standpoint, at this point, he does not have lifestyle limiting claudication. He has performed extremely well with personal conditioning and exercise and walks 3-4 miles a day with mild tolerable discomfort, worse in his right leg.     He is primarily visiting me because he was encouraged by his orthopedic surgeon to see a vascular specialist prior to planned left knee replacement.     He does not have any other chief complaints currently. He is doing well with no real functional limitations.     Review of Systems:    10 Point ROS is positive for what is described in the HPI, otherwise negative.    Past Medical/Surgical History:    Past Medical History:   Diagnosis Date     ACL tear      Arthritis     osteoarthritis     Blood clot in the legs     +PE 2009  also on vein from intestine     Blood clotting disorder (H) 2009     Chronic diarrhea     result of total colectomy     Head injury 1960    multiple     Hearing problem 2000     IBS (irritable bowel syndrome)      Liver disease 2013     Migraines 1989    none for a few years     Personal history of colonic polyps 2009     Polyps, colonic      Pulmonary emboli (H) 2009     Skin disease 1970    eczema     Sleep apnea      Past Surgical History:   Procedure Laterality Date     C REPAIR CRUCIATE LIGAMENT,KNEE       bilateral knees     LAPAROSCOPIC ASSISTED COLECTOMY  5/9/2012    Procedure:LAPAROSCOPIC ASSISTED COLECTOMY; Hand Assisted Laparoscopic Total Abdominal Colectomy Ileorectal anastomosis. ; Surgeon:COLLINS JAVIER; Location:[facility]     SIGMOIDOSCOPY FLEXIBLE   5/21/2012    Procedure:SIGMOIDOSCOPY FLEXIBLE; Surgeon:EMMANUEL LEDBETTER; Location: GI     VASCULAR SURGERY      lower ext angiogram     Current Medications:    Current Outpatient Prescriptions   Medication Sig Dispense Refill     aspirin 81 MG tablet Take 81 mg by mouth daily       atorvastatin (LIPITOR) 10 MG tablet Take 10 mg by mouth daily       WARFARIN SODIUM By mouth alternating 12.5mg and 10mg or as directed by coumadin clinic       Allergies:    Review of patient's allergies indicates no known allergies.    Family History:    Family History   Problem Relation Age of Onset     Anesthesia Reaction Other      No history     Colon Polyps Other      no hx     Crohn Disease Other      no hx     Ulcerative Colitis Other      no hx     Cancer - colorectal Other      no hx     CANCER Other      no hx     CEREBROVASCULAR DISEASE Mother      multiple     Colon Cancer Other      uncle     Substance Abuse Sister      Substance Abuse Brother      Substance Abuse Sister      Social History:    Social History     Social History     Marital status:      Spouse name: N/A     Number of children: N/A     Years of education: N/A     Social History Main Topics     Smoking status: Current Every Day Smoker     Packs/day: 1.00     Years: 50.00     Types: Cigarettes     Smokeless tobacco: Never Used     Alcohol use 0.0 oz/week     0 Standard drinks or equivalent per week      Comment: 3-4 light beers per day     Drug use: No     Sexual activity: Not Currently     Partners: Female     Birth control/ protection: Male Surgical     Other Topics Concern     None     Social History Narrative    The patient has a 50 pk yr tobacco hx.  He has ongoing active use.  Alcohol use is 21 alcoholic drinks per week.  He has marijuana use.         He previously worked as .          The patient is .  Has 3 children.         Physical Exam:    BP (!) 142/94  Pulse 81     GENERAL APPEARANCE: alert, nad  HEENT:  nc/at  RESP: cta  CARDIOVASCULAR: rrr  VASCULAR: +2 dpa and pta b/l. No ulcers. Normal cap refill.    Laboratory Studies:    Imaging:     I reviewed the bilateral resting ABIs and arterial duplex which reveals patent left SFA stents and occluded right SFA stent. Right NYDIA is 0.8 and Left is 1.19.     ASSESSMENT/PLAN:      Kannan Wolf is a 68 year old male with well compensated peripheral arterial disease, Clay category 1, mild claudication without lifestyle limitation, under great medical control and patient has great motivation and walks a few miles everyday. Non-smoker as per patient.     I see no restrictions to proceeding with his arthroplasty from a healing standpoint. I did discuss since I am unaware of how they position him during the surgery, that having stent grafts in the native arteries does have some mild risk of compression related ischemia, but the stents in place have memory and are unlikely to be deformed, they will bounce back open.     He did mention something about a history of DVT. As per the patient, he was worked up for a thrombophilia and was never found to have hypercoagulability traits, such as Factor V. Therefore he is average risk for DVT and in my opinion, does not warrant caval filtration for PE prophylaxis. If he were to develop PE in the post op setting, then this would be the appropriate indication for a filter at that time.     Plan:  No intervention necessary, clinical consultation completed. F/u prn if claudication worsens and he desires treatment.    A total of 30 minutes was spent in care for the patient, of which >50% was spent in counseling and co-ordination of care.     It was a pleasure seeing the patient.     Signed,    Timoteo Foster M.D.  Department of Interventional Radiology  [facility]    CC  Patient Care Team:  David Jenkins as PCP - General  Agapito Watson MD as MD (Colon and Rectal Surgery)  SELF, REFERRED    Answers for HPI/ROS  submitted by the patient on 5/9/2018   General Symptoms: No  Skin Symptoms: No  HENT Symptoms: No  EYE SYMPTOMS: No  HEART SYMPTOMS: No  LUNG SYMPTOMS: Yes  INTESTINAL SYMPTOMS: No  URINARY SYMPTOMS: No  REPRODUCTIVE SYMPTOMS: No  SKELETAL SYMPTOMS: Yes  BLOOD SYMPTOMS: Yes  NERVOUS SYSTEM SYMPTOMS: No  MENTAL HEALTH SYMPTOMS: No  Cough: Yes  Sputum or phlegm: Yes  Coughing up blood: No  Difficulty breating or shortness of breath: No  Snoring: Yes  Wheezing: Yes  Difficulty breathing on exertion: No  Nighttime Cough: No  Difficulty breathing when lying flat: Yes  Back pain: No  Muscle aches: Yes  Neck pain: No  Swollen joints: Yes  Joint pain: Yes  Bone pain: Yes  Muscle cramps: Yes  Muscle weakness: No  Joint stiffness: Yes  Bone fracture: No  Anemia: No  Swollen glands: No  Easy bleeding or bruising: Yes  Edema or swelling: No

## 2018-06-05 ENCOUNTER — OFFICE VISIT - RIVER FALLS (OUTPATIENT)
Dept: FAMILY MEDICINE | Facility: CLINIC | Age: 68
End: 2018-06-05
Payer: COMMERCIAL

## 2018-06-12 ENCOUNTER — OFFICE VISIT - RIVER FALLS (OUTPATIENT)
Dept: FAMILY MEDICINE | Facility: CLINIC | Age: 68
End: 2018-06-12
Payer: COMMERCIAL

## 2018-06-21 ASSESSMENT — ENCOUNTER SYMPTOMS
MYALGIAS: 1
NECK PAIN: 0
SPUTUM PRODUCTION: 1
SNORES LOUDLY: 1
POSTURAL DYSPNEA: 1
ARTHRALGIAS: 1
BACK PAIN: 0
DYSPNEA ON EXERTION: 0
STIFFNESS: 1
JOINT SWELLING: 1
HEMOPTYSIS: 0
SHORTNESS OF BREATH: 0
COUGH DISTURBING SLEEP: 1
COUGH: 1
MUSCLE WEAKNESS: 0
MUSCLE CRAMPS: 1
WHEEZING: 1

## 2018-06-25 NOTE — PROGRESS NOTES
Pulmonary Nodule Clinic        HPI:     Kannan Wolf is a 68 year old male  referred to the Lung Nodule Clinic for ongoing lung cancer surveillance scans, given his significant smoking history of >50 pack years and unfortunately continues to smoke.  He has a history of multiple pulmonary nodules that have continued to be stable.  He was last seen one year ago.  He denies any changes to his medical history in the past year. He denies and SOB, CP or HAMEED.            Review of Systems:    ROS performed with pertinent +/- noted in the HPI.  The remainder of the ROS was otherwise negative.        Pertinent Medications     Current Outpatient Prescriptions   Medication Sig     aspirin 81 MG tablet Take 81 mg by mouth daily     atorvastatin (LIPITOR) 10 MG tablet Take 10 mg by mouth daily     WARFARIN SODIUM By mouth alternating 12.5mg and 10mg or as directed by coumadin clinic     No current facility-administered medications for this visit.           Allergies:    No Known Allergies       Past Medical Hx:   Patient Active Problem List: Reviewed--listed are contributory to this visit.     Lung nodule  Hx of PE, DVT         Family Hx:     Family History   Problem Relation Age of Onset     Anesthesia Reaction Other      No history     Colon Polyps Other      no hx     Crohn Disease Other      no hx     Ulcerative Colitis Other      no hx     Cancer - colorectal Other      no hx     Cancer Other      no hx     Cerebrovascular Disease Mother      multiple     Colon Cancer Other      uncle     Substance Abuse Sister      Substance Abuse Brother      Substance Abuse Sister           Social Hx:     Social History   Substance Use Topics     Smoking status: Current Every Day Smoker     Packs/day: 1.00     Years: 50.00     Types: Cigarettes     Smokeless tobacco: Never Used     Alcohol use 0.0 oz/week     0 Standard drinks or equivalent per week      Comment: 3-4 light beers per day            Objective   Vitals::  Reviewed.  Unremarkable.     General:  Adult male;appears stated age; no acute distress; the patient is a good historian  HEENT:  NCAT  Pulm: diminished throughout  CV: RRR, no murmurs  Abdomen: Soft, NT, ND,  Extremities:  No cyanosis no edema  Neuro: Alert and oriented x 3, moves all extremities no obvious weakness        Labs / Imaging/Studies     Imaging:   CT chest 06/26/18: not formally read at time of note, however no enlarging or new nodules seen.               Assessment and Plan:   Assessment: (R91.1) Lung nodule  (primary encounter diagnosis)    1. Lung Nodules.  Multiple sub-centimeter bilateral lung nodules. The CT scan from today not formally read at time of note, however, I did not appreciate any new or enlarging pulmonary nodules.  I will await the final report and contact the patient with any concerning findings.  One year follow up with a CT prior is recommended for ongoing lung cancer screening.       I spent 20 minutes with direct face to face interaction with this patient and provided at least 50% of this time counseling and coordinating care for the patient as noted above in the assessment and plan.     Grace Valdez APRN, CNP  Thoracic and Forgut Surgery  [facility]

## 2018-06-26 ENCOUNTER — RADIANT APPOINTMENT (OUTPATIENT)
Dept: CT IMAGING | Facility: CLINIC | Age: 68
End: 2018-06-26
Attending: INTERNAL MEDICINE
Payer: COMMERCIAL

## 2018-06-26 ENCOUNTER — OFFICE VISIT (OUTPATIENT)
Dept: SURGERY | Facility: CLINIC | Age: 68
End: 2018-06-26
Attending: INTERNAL MEDICINE
Payer: MEDICARE

## 2018-06-26 VITALS
DIASTOLIC BLOOD PRESSURE: 86 MMHG | OXYGEN SATURATION: 97 % | SYSTOLIC BLOOD PRESSURE: 138 MMHG | RESPIRATION RATE: 18 BRPM | BODY MASS INDEX: 25.36 KG/M2 | TEMPERATURE: 97.5 F | WEIGHT: 192.24 LBS | HEART RATE: 88 BPM

## 2018-06-26 DIAGNOSIS — R91.1 LUNG NODULE: Primary | ICD-10-CM

## 2018-06-26 DIAGNOSIS — Z87.891 HISTORY OF TOBACCO USE: ICD-10-CM

## 2018-06-26 DIAGNOSIS — Z87.891 PERSONAL HISTORY OF NICOTINE DEPENDENCE: ICD-10-CM

## 2018-06-26 LAB — RADIOLOGIST FLAGS: NORMAL

## 2018-06-26 PROCEDURE — G0463 HOSPITAL OUTPT CLINIC VISIT: HCPCS | Mod: ZF

## 2018-06-26 ASSESSMENT — PAIN SCALES - GENERAL: PAINLEVEL: NO PAIN (0)

## 2018-06-26 NOTE — NURSING NOTE
"Oncology Rooming Note    June 26, 2018 12:20 PM   Kannan Wolf is a 68 year old male who presents for:    Chief Complaint   Patient presents with     Oncology Clinic Visit     Lung Nodgules 1 Yr f\u, CT      Initial Vitals: /86  Pulse 88  Temp 97.5  F (36.4  C) (Oral)  Resp 18  Wt 87.2 kg (192 lb 3.9 oz)  SpO2 97%  BMI 25.36 kg/m2 Estimated body mass index is 25.36 kg/(m^2) as calculated from the following:    Height as of 7/18/17: 1.854 m (6' 1\").    Weight as of this encounter: 87.2 kg (192 lb 3.9 oz). Body surface area is 2.12 meters squared.  No Pain (0) Comment: Data Unavailable   No LMP for male patient.  Allergies reviewed: Yes  Medications reviewed: Yes    Medications: Medication refills not needed today.  Pharmacy name entered into [facility]: [facility] [address] [place] [address] [address]  AT [facility] [address]    Clinical concerns:          José Miguel was notified.    8 minutes for nursing intake (face to face time)     Lo Acuña MA              "

## 2018-06-26 NOTE — MR AVS SNAPSHOT
After Visit Summary   6/26/2018    Kannan Wolf    MRN: [id number]           Patient Information     Date Of Birth          1950        Visit Information        Provider Department      6/26/2018 1:00 PM Lulu Valdez APRN CNP [facility]        Today's Diagnoses     Lung nodule    -  1       Follow-ups after your visit        Follow-up notes from your care team     Return in about 1 year (around 6/26/2019).      Your next 10 appointments already scheduled     Jun 26, 2018  1:00 PM CDT   (Arrive by 12:45 PM)   Return Visit with GENE Chen CNP   [facility]    [address]  [address]  [place] [address] [phone]              Future tests that were ordered for you today     Open Future Orders        Priority Expected Expires Ordered    CT Chest w/o contrast Routine  6/28/2019 6/26/2018            Who to contact     If you have questions or need follow up information about today's clinic visit or your schedule please contact [facility] directly at [phone].  Normal or non-critical lab and imaging results will be communicated to you by MyChart, letter or phone within 4 business days after the clinic has received the results. If you do not hear from us within 7 days, please contact the clinic through [facility] or phone. If you have a critical or abnormal lab result, we will notify you by phone as soon as possible.  Submit refill requests through [employer] or call your pharmacy and they will forward the refill request to us. Please allow 3 business days for your refill to be completed.          Additional Information About Your Visit        MyChart Information     [employer] gives you secure access to your electronic health record. If you see a primary care provider, you can also send messages to your care team and make appointments. If you have questions,  please call your primary care clinic.  If you do not have a primary care provider, please call [phone] and they will assist you.        Care EveryWhere ID     This is your Care EveryWhere ID. This could be used by other organizations to access your [facility] medical records  [id number]        Your Vitals Were     Pulse Temperature Respirations Pulse Oximetry BMI (Body Mass Index)       88 97.5  F (36.4  C) (Oral) 18 97% 25.36 kg/m2        Blood Pressure from Last 3 Encounters:   06/26/18 138/86   05/16/18 (!) 142/94   07/18/17 143/88    Weight from Last 3 Encounters:   06/26/18 87.2 kg (192 lb 3.9 oz)   07/18/17 88.5 kg (195 lb)   06/27/17 90 kg (198 lb 6.6 oz)               Primary Care Provider Office Phone # Fax #    David Jenkins [phone]       [facility] [address] [facility] [address]        Equal Access to Services     ALONA STRICKLAND : Hadii eugenia ku hadasho Soomaali, waaxda luqadaha, qaybta kaalmada adeegyada, moises nicholson . So [facility] [phone].    ATENCIÓN: Si habla español, tiene a puente disposición servicios gratuitos de asistencia lingüística. [facility] [phone].    We comply with applicable federal civil rights laws and [place] laws. We do not discriminate on the basis of race, color, national origin, age, disability, sex, sexual orientation, or gender identity.            Thank you!     Thank you for choosing [facility]  for your care. Our goal is always to provide you with excellent care. Hearing back from our patients is one way we can continue to improve our services. Please take a few minutes to complete the written survey that you may receive in the mail after your visit with us. Thank you!             Your Updated Medication List - Protect others around you: Learn how to safely use, store and throw away your medicines at www.disposemymeds.org.          This list is accurate as of 6/26/18 12:57 PM.   Always use your most recent med list.                   Brand Name Dispense Instructions for use Diagnosis    aspirin 81 MG tablet      Take 81 mg by mouth daily        atorvastatin 10 MG tablet    LIPITOR     Take 10 mg by mouth daily    NAFLD (nonalcoholic fatty liver disease), Lung nodule       WARFARIN SODIUM      By mouth alternating 12.5mg and 10mg or as directed by coumadin clinic

## 2018-06-27 DIAGNOSIS — R91.1 LUNG NODULE: Primary | ICD-10-CM

## 2018-06-27 DIAGNOSIS — Z87.891 PERSONAL HISTORY OF NICOTINE DEPENDENCE: ICD-10-CM

## 2018-07-11 ENCOUNTER — OFFICE VISIT - RIVER FALLS (OUTPATIENT)
Dept: FAMILY MEDICINE | Facility: CLINIC | Age: 68
End: 2018-07-11
Payer: COMMERCIAL

## 2018-07-19 ENCOUNTER — OFFICE VISIT - RIVER FALLS (OUTPATIENT)
Dept: FAMILY MEDICINE | Facility: CLINIC | Age: 68
End: 2018-07-19
Payer: COMMERCIAL

## 2018-08-15 ENCOUNTER — OFFICE VISIT - RIVER FALLS (OUTPATIENT)
Dept: FAMILY MEDICINE | Facility: CLINIC | Age: 68
End: 2018-08-15
Payer: COMMERCIAL

## 2018-08-22 ENCOUNTER — OFFICE VISIT - RIVER FALLS (OUTPATIENT)
Dept: FAMILY MEDICINE | Facility: CLINIC | Age: 68
End: 2018-08-22
Payer: COMMERCIAL

## 2018-09-19 ENCOUNTER — OFFICE VISIT - RIVER FALLS (OUTPATIENT)
Dept: FAMILY MEDICINE | Facility: CLINIC | Age: 68
End: 2018-09-19
Payer: COMMERCIAL

## 2018-10-09 ENCOUNTER — OFFICE VISIT - RIVER FALLS (OUTPATIENT)
Dept: FAMILY MEDICINE | Facility: CLINIC | Age: 68
End: 2018-10-09
Payer: COMMERCIAL

## 2018-10-18 ENCOUNTER — AMBULATORY - RIVER FALLS (OUTPATIENT)
Dept: FAMILY MEDICINE | Facility: CLINIC | Age: 68
End: 2018-10-18
Payer: COMMERCIAL

## 2018-10-18 ENCOUNTER — OFFICE VISIT - RIVER FALLS (OUTPATIENT)
Dept: FAMILY MEDICINE | Facility: CLINIC | Age: 68
End: 2018-10-18
Payer: COMMERCIAL

## 2018-10-19 LAB
CHOLEST SERPL-MCNC: 160 MG/DL
CHOLEST/HDLC SERPL: 3.8 {RATIO}
GLUCOSE BLD-MCNC: 88 MG/DL (ref 65–99)
HBA1C MFR BLD: 5.3 %
HDLC SERPL-MCNC: 42 MG/DL
LDLC SERPL CALC-MCNC: 79 MG/DL
NONHDLC SERPL-MCNC: 118 MG/DL
TRIGL SERPL-MCNC: 326 MG/DL

## 2018-11-07 ENCOUNTER — OFFICE VISIT - RIVER FALLS (OUTPATIENT)
Dept: FAMILY MEDICINE | Facility: CLINIC | Age: 68
End: 2018-11-07
Payer: COMMERCIAL

## 2018-11-30 ENCOUNTER — AMBULATORY - RIVER FALLS (OUTPATIENT)
Dept: FAMILY MEDICINE | Facility: CLINIC | Age: 68
End: 2018-11-30
Payer: COMMERCIAL

## 2018-12-06 ENCOUNTER — AMBULATORY - RIVER FALLS (OUTPATIENT)
Dept: FAMILY MEDICINE | Facility: CLINIC | Age: 68
End: 2018-12-06
Payer: COMMERCIAL

## 2018-12-27 ENCOUNTER — AMBULATORY - RIVER FALLS (OUTPATIENT)
Dept: FAMILY MEDICINE | Facility: CLINIC | Age: 68
End: 2018-12-27
Payer: COMMERCIAL

## 2019-01-16 ENCOUNTER — OFFICE VISIT - RIVER FALLS (OUTPATIENT)
Dept: FAMILY MEDICINE | Facility: CLINIC | Age: 69
End: 2019-01-16
Payer: COMMERCIAL

## 2019-01-16 LAB — INR PPP: 3.2

## 2019-01-30 LAB — INR PPP: 2.3

## 2019-02-20 ENCOUNTER — OFFICE VISIT - RIVER FALLS (OUTPATIENT)
Dept: FAMILY MEDICINE | Facility: CLINIC | Age: 69
End: 2019-02-20
Payer: COMMERCIAL

## 2019-02-20 LAB — INR PPP: 2.3

## 2019-03-14 ENCOUNTER — OFFICE VISIT - RIVER FALLS (OUTPATIENT)
Dept: FAMILY MEDICINE | Facility: CLINIC | Age: 69
End: 2019-03-14
Payer: COMMERCIAL

## 2019-03-14 LAB — INR PPP: 2.6

## 2019-04-05 LAB — INR PPP: 6.2

## 2019-04-11 ENCOUNTER — OFFICE VISIT - RIVER FALLS (OUTPATIENT)
Dept: FAMILY MEDICINE | Facility: CLINIC | Age: 69
End: 2019-04-11
Payer: COMMERCIAL

## 2019-04-11 LAB — INR PPP: 1.2

## 2019-04-22 LAB — INR PPP: 3.2

## 2019-05-07 ENCOUNTER — COMMUNICATION - RIVER FALLS (OUTPATIENT)
Dept: FAMILY MEDICINE | Facility: CLINIC | Age: 69
End: 2019-05-07
Payer: COMMERCIAL

## 2019-05-07 LAB — INR PPP: 2.7

## 2019-05-21 ENCOUNTER — COMMUNICATION - RIVER FALLS (OUTPATIENT)
Dept: FAMILY MEDICINE | Facility: CLINIC | Age: 69
End: 2019-05-21
Payer: COMMERCIAL

## 2019-05-21 LAB — INR PPP: 2.7

## 2019-05-31 ENCOUNTER — OFFICE VISIT - RIVER FALLS (OUTPATIENT)
Dept: FAMILY MEDICINE | Facility: CLINIC | Age: 69
End: 2019-05-31
Payer: COMMERCIAL

## 2019-05-31 ASSESSMENT — MIFFLIN-ST. JEOR: SCORE: 1658.02

## 2019-06-03 ENCOUNTER — COMMUNICATION - RIVER FALLS (OUTPATIENT)
Dept: FAMILY MEDICINE | Facility: CLINIC | Age: 69
End: 2019-06-03
Payer: COMMERCIAL

## 2019-06-04 ENCOUNTER — COMMUNICATION - RIVER FALLS (OUTPATIENT)
Dept: FAMILY MEDICINE | Facility: CLINIC | Age: 69
End: 2019-06-04
Payer: COMMERCIAL

## 2019-06-04 ENCOUNTER — OFFICE VISIT - RIVER FALLS (OUTPATIENT)
Dept: FAMILY MEDICINE | Facility: CLINIC | Age: 69
End: 2019-06-04
Payer: COMMERCIAL

## 2019-06-04 LAB — INR PPP: 2.2

## 2019-06-21 ENCOUNTER — OFFICE VISIT - RIVER FALLS (OUTPATIENT)
Dept: FAMILY MEDICINE | Facility: CLINIC | Age: 69
End: 2019-06-21
Payer: COMMERCIAL

## 2019-06-27 ENCOUNTER — OFFICE VISIT - RIVER FALLS (OUTPATIENT)
Dept: FAMILY MEDICINE | Facility: CLINIC | Age: 69
End: 2019-06-27
Payer: COMMERCIAL

## 2019-06-28 ENCOUNTER — COMMUNICATION - RIVER FALLS (OUTPATIENT)
Dept: FAMILY MEDICINE | Facility: CLINIC | Age: 69
End: 2019-06-28
Payer: COMMERCIAL

## 2019-07-05 ENCOUNTER — COMMUNICATION - RIVER FALLS (OUTPATIENT)
Dept: FAMILY MEDICINE | Facility: CLINIC | Age: 69
End: 2019-07-05
Payer: COMMERCIAL

## 2019-07-07 ENCOUNTER — OFFICE VISIT - RIVER FALLS (OUTPATIENT)
Dept: FAMILY MEDICINE | Facility: CLINIC | Age: 69
End: 2019-07-07
Payer: COMMERCIAL

## 2019-07-08 ENCOUNTER — COMMUNICATION - RIVER FALLS (OUTPATIENT)
Dept: FAMILY MEDICINE | Facility: CLINIC | Age: 69
End: 2019-07-08
Payer: COMMERCIAL

## 2019-07-08 LAB — INR PPP: 2.1

## 2019-07-09 ENCOUNTER — COMMUNICATION - RIVER FALLS (OUTPATIENT)
Dept: FAMILY MEDICINE | Facility: CLINIC | Age: 69
End: 2019-07-09
Payer: COMMERCIAL

## 2019-07-11 ENCOUNTER — COMMUNICATION - RIVER FALLS (OUTPATIENT)
Dept: FAMILY MEDICINE | Facility: CLINIC | Age: 69
End: 2019-07-11
Payer: COMMERCIAL

## 2019-07-15 ENCOUNTER — AMBULATORY - RIVER FALLS (OUTPATIENT)
Dept: FAMILY MEDICINE | Facility: CLINIC | Age: 69
End: 2019-07-15
Payer: COMMERCIAL

## 2019-07-15 ENCOUNTER — COMMUNICATION - RIVER FALLS (OUTPATIENT)
Dept: FAMILY MEDICINE | Facility: CLINIC | Age: 69
End: 2019-07-15
Payer: COMMERCIAL

## 2019-07-15 LAB — INR PPP: 3.9

## 2019-07-25 ENCOUNTER — COMMUNICATION - RIVER FALLS (OUTPATIENT)
Dept: FAMILY MEDICINE | Facility: CLINIC | Age: 69
End: 2019-07-25
Payer: COMMERCIAL

## 2019-07-26 LAB — INR PPP: 2.9

## 2019-08-09 ENCOUNTER — COMMUNICATION - RIVER FALLS (OUTPATIENT)
Dept: FAMILY MEDICINE | Facility: CLINIC | Age: 69
End: 2019-08-09
Payer: COMMERCIAL

## 2019-08-09 ENCOUNTER — OFFICE VISIT - RIVER FALLS (OUTPATIENT)
Dept: FAMILY MEDICINE | Facility: CLINIC | Age: 69
End: 2019-08-09
Payer: COMMERCIAL

## 2019-08-09 LAB — INR PPP: 3.6

## 2019-08-19 ENCOUNTER — COMMUNICATION - RIVER FALLS (OUTPATIENT)
Dept: FAMILY MEDICINE | Facility: CLINIC | Age: 69
End: 2019-08-19
Payer: COMMERCIAL

## 2019-09-04 ENCOUNTER — OFFICE VISIT - RIVER FALLS (OUTPATIENT)
Dept: FAMILY MEDICINE | Facility: CLINIC | Age: 69
End: 2019-09-04
Payer: COMMERCIAL

## 2019-09-12 ENCOUNTER — COMMUNICATION - RIVER FALLS (OUTPATIENT)
Dept: FAMILY MEDICINE | Facility: CLINIC | Age: 69
End: 2019-09-12
Payer: COMMERCIAL

## 2019-09-20 ENCOUNTER — COMMUNICATION - RIVER FALLS (OUTPATIENT)
Dept: FAMILY MEDICINE | Facility: CLINIC | Age: 69
End: 2019-09-20
Payer: COMMERCIAL

## 2019-09-20 LAB — INR PPP: 2.9

## 2019-09-24 ENCOUNTER — COMMUNICATION - RIVER FALLS (OUTPATIENT)
Dept: FAMILY MEDICINE | Facility: CLINIC | Age: 69
End: 2019-09-24
Payer: COMMERCIAL

## 2019-10-03 ENCOUNTER — OFFICE VISIT - RIVER FALLS (OUTPATIENT)
Dept: FAMILY MEDICINE | Facility: CLINIC | Age: 69
End: 2019-10-03
Payer: COMMERCIAL

## 2019-10-04 ENCOUNTER — HEALTH MAINTENANCE LETTER (OUTPATIENT)
Age: 69
End: 2019-10-04

## 2019-10-10 LAB — INR PPP: 3.5

## 2019-10-23 ENCOUNTER — ANCILLARY PROCEDURE (OUTPATIENT)
Dept: CT IMAGING | Facility: CLINIC | Age: 69
End: 2019-10-23
Attending: NURSE PRACTITIONER
Payer: COMMERCIAL

## 2019-10-23 ENCOUNTER — OFFICE VISIT (OUTPATIENT)
Dept: SURGERY | Facility: CLINIC | Age: 69
End: 2019-10-23
Attending: CLINICAL NURSE SPECIALIST
Payer: MEDICARE

## 2019-10-23 VITALS
HEART RATE: 83 BPM | DIASTOLIC BLOOD PRESSURE: 94 MMHG | BODY MASS INDEX: 25.63 KG/M2 | RESPIRATION RATE: 16 BRPM | WEIGHT: 193.4 LBS | SYSTOLIC BLOOD PRESSURE: 149 MMHG | HEIGHT: 73 IN | TEMPERATURE: 98.2 F | OXYGEN SATURATION: 96 %

## 2019-10-23 DIAGNOSIS — Z87.891 PERSONAL HISTORY OF NICOTINE DEPENDENCE: ICD-10-CM

## 2019-10-23 DIAGNOSIS — R91.1 LUNG NODULE: Primary | ICD-10-CM

## 2019-10-23 DIAGNOSIS — R91.1 LUNG NODULE: ICD-10-CM

## 2019-10-23 PROCEDURE — G0463 HOSPITAL OUTPT CLINIC VISIT: HCPCS | Mod: ZF

## 2019-10-23 ASSESSMENT — ENCOUNTER SYMPTOMS
COUGH DISTURBING SLEEP: 1
VOMITING: 0
TREMORS: 1
DIZZINESS: 0
WEIGHT GAIN: 0
WEAKNESS: 0
POLYDIPSIA: 0
BLOATING: 0
DIARRHEA: 1
ABDOMINAL PAIN: 0
BRUISES/BLEEDS EASILY: 1
ALTERED TEMPERATURE REGULATION: 0
JAUNDICE: 0
SEIZURES: 0
POLYPHAGIA: 0
COUGH: 1
NUMBNESS: 1
WHEEZING: 1
SPEECH CHANGE: 0
FEVER: 0
CHILLS: 0
DECREASED APPETITE: 0
LOSS OF CONSCIOUSNESS: 0
DISTURBANCES IN COORDINATION: 0
POSTURAL DYSPNEA: 1
FATIGUE: 0
BOWEL INCONTINENCE: 0
SNORES LOUDLY: 1
BLOOD IN STOOL: 0
CONSTIPATION: 0
NAUSEA: 0
HALLUCINATIONS: 0
SWOLLEN GLANDS: 0
SPUTUM PRODUCTION: 1
NIGHT SWEATS: 0
DYSPNEA ON EXERTION: 1
HEARTBURN: 0
RECTAL PAIN: 0
TINGLING: 1
HEMOPTYSIS: 0
HEADACHES: 0
SHORTNESS OF BREATH: 0
INCREASED ENERGY: 0
WEIGHT LOSS: 0
MEMORY LOSS: 0
PARALYSIS: 0

## 2019-10-23 ASSESSMENT — MIFFLIN-ST. JEOR: SCORE: 1701.01

## 2019-10-23 NOTE — PROGRESS NOTES
Pulmonary Nodule Clinic        HPI:     Kannan Wolf is a 69 year old male  referred to the Lung Nodule Clinic for an annual lung cancer screening review.   He reports having good pulmonary health over the past year but did have a knee replacement with complications of blood clot.             He continues to smoke 1 ppd.          Review of Systems:   14 point ROS performed with pertinent +/- noted in the HPI.  The remainder of the ROS was otherwise negative.        Pertinent Medications     Current Outpatient Medications   Medication Sig     aspirin 81 MG tablet Take 81 mg by mouth daily     atorvastatin (LIPITOR) 10 MG tablet Take 10 mg by mouth daily     WARFARIN SODIUM By mouth alternating 12.5mg and 10mg or as directed by coumadin clinic     No current facility-administered medications for this visit.           Allergies:    No Known Allergies       Past Medical Hx:   Patient Active Problem List    NAFLD (nonalcoholic fatty liver disease)         Priority: Medium [2]         Date Noted: 11/16/2016      Lung nodule         Priority: Medium [2]         Date Noted: 12/29/2015      Arterial occlusive disease         Priority: Medium [2]         Date Noted: 07/12/2012      PVD (peripheral vascular disease) (H)         Priority: Medium [2]         Date Noted: 07/12/2012      Colon polyps         Priority: Medium [2]         Date Noted: 02/29/2012           Family Hx:     Family History   Problem Relation Age of Onset     Anesthesia Reaction Other         No history     Colon Polyps Other         no hx     Crohn's Disease Other         no hx     Ulcerative Colitis Other         no hx     Cancer - colorectal Other         no hx     Cancer Other         no hx     Cerebrovascular Disease Mother         multiple     Colon Cancer Other         uncle     Substance Abuse Sister      Substance Abuse Brother      Substance Abuse Sister           Social Hx:     Social History     Tobacco Use     Smoking status: Current  "Every Day Smoker     Packs/day: 1.00     Years: 50.00     Pack years: 50.00     Types: Cigarettes     Smokeless tobacco: Never Used   Substance Use Topics     Alcohol use: Yes     Alcohol/week: 0.0 standard drinks     Comment: 3-4 light beers per day            Objective   Vitals:  BP (!) 149/94 (BP Location: Right arm, Patient Position: Chair, Cuff Size: Adult Regular)   Pulse 83   Temp 98.2  F (36.8  C) (Oral)   Resp 16   Ht 1.854 m (6' 0.99\")   Wt 87.7 kg (193 lb 6.4 oz)   SpO2 96%   BMI 25.52 kg/m      General:  Adult male;appears stated age; no acute distress; the patient is a good historian.          Labs / Imaging/Studies     CBC RESULTS:   Recent Labs   Lab Test 12/12/16  1028 12/16/15  1005   WBC 9.6 11.7*   RBC 4.80 4.63   HGB 15.4 15.1   HCT 45.3 43.8   MCV 94 95   MCH 32.1 32.6   MCHC 34.0 34.5   RDW 13.4 14.1    185          Lab Results   Component Value Date     12/12/2016     12/16/2015     12/17/2014    Lab Results   Component Value Date    CHLORIDE 109 12/12/2016    CHLORIDE 110 12/16/2015    CHLORIDE 110 12/17/2014    Lab Results   Component Value Date    BUN 12 12/12/2016    BUN 13 12/16/2015    BUN 13 12/17/2014      Lab Results   Component Value Date    POTASSIUM 4.6 12/12/2016    POTASSIUM 4.0 12/16/2015    POTASSIUM 4.6 12/17/2014    Lab Results   Component Value Date    CO2 25 12/12/2016    CO2 22 12/16/2015    CO2 25 12/17/2014    Lab Results   Component Value Date    CR 0.94 12/12/2016    CR 0.89 12/16/2015    CR 0.98 12/17/2014        INR   Date Value Ref Range Status   12/12/2016 3.08 (H) 0.86 - 1.14 Final   12/16/2015 1.65 (H) 0.86 - 1.14 Final       Pertinent Cultures / Microbiology:   N/A    Imaging:   Impression:   1. ACR Assessment Category (v1.1): Lung-RADS Category 2. Benign  appearance or behavior. Recommendation: Continue annual screening with  Lung cancer screening CT (please order exam code [id number]).   2. Significant Incidental Finding(s): " "Category S: No.  3. Mild upper lobe predominant centrilobular groundglass nodularity,  compatible with respiratory bronchiolitis.  4. Avoidance of tobacco smoke is strongly advised. Please consider  referral for smoking cessation to [facility] Medication Therapy Management  (MTM) if clinically appropriate.       Pulmonary Function Tests:  N/A         Assessment and Plan:   Assessment: Иван is here alone.   He reports doing well this year \"from a lung perspective\" although he reports a frightening episode of blood clots and nearly needing a leg amputation after a knee replacement.    He plans to winter in [facility] [facility], going in early November.         Diagnoses       Codes Comments    Lung nodule    -  Primary R91.1     Personal history of nicotine dependence     Z87.891           I spent 30 minutes with direct face to face interaction with this patient and provided at least 50% of this time counseling and coordinating care for Иван as noted above in the assessment and plan.      GENE Meza, CNS    Answers for HPI/ROS submitted by the patient on 10/23/2019   General Symptoms: Yes  Skin Symptoms: No  HENT Symptoms: No  EYE SYMPTOMS: No  HEART SYMPTOMS: No  LUNG SYMPTOMS: Yes  INTESTINAL SYMPTOMS: Yes  URINARY SYMPTOMS: No  REPRODUCTIVE SYMPTOMS: No  SKELETAL SYMPTOMS: No  BLOOD SYMPTOMS: Yes  NERVOUS SYSTEM SYMPTOMS: Yes  MENTAL HEALTH SYMPTOMS: No  Fever: No  Loss of appetite: No  Weight loss: No  Weight gain: No  Fatigue: No  Night sweats: No  Chills: No  Increased stress: Yes  Excessive hunger: No  Excessive thirst: No  Feeling hot or cold when others believe the temperature is normal: No  Loss of height: No  Post-operative complications: Yes  Surgical site pain: Yes  Hallucinations: No  Change in or Loss of Energy: No  Hyperactivity: No  Confusion: No  Cough: Yes  Sputum or phlegm: Yes  Coughing up blood: No  Difficulty breating or shortness of breath: No  Snoring: Yes  Wheezing: Yes  Difficulty breathing on " exertion: Yes  Nighttime Cough: Yes  Difficulty breathing when lying flat: Yes  Anemia: No  Swollen glands: No  Easy bleeding or bruising: Yes  Edema or swelling: No  Trouble with coordination: No  Dizziness or trouble with balance: No  Fainting or black-out spells: No  Memory loss: No  Headache: No  Seizures: No  Speech problems: No  Tingling: Yes  Tremor: Yes  Weakness: No  Difficulty walking: Yes  Paralysis: No  Numbness: Yes  Heart burn or indigestion: No  Nausea: No  Vomiting: No  Abdominal pain: No  Bloating: No  Constipation: No  Diarrhea: Yes  Blood in stool: No  Black stools: No  Rectal or Anal pain: No  Fecal incontinence: No  Yellowing of skin or eyes: No  Vomit with blood: No  Change in stools: No

## 2019-10-23 NOTE — LETTER
10/23/2019       RE: Kannan Wolf  [address] [facility] [address]     Dear Colleague,    Thank you for referring your patient, Kannan Wolf, to the [facility]. Please see a copy of my visit note below.    Pulmonary Nodule Clinic        HPI:     Kannan Wolf is a 69 year old male  referred to the Lung Nodule Clinic for an annual lung cancer screening review.   He reports having good pulmonary health over the past year but did have a knee replacement with complications of blood clot.             He continues to smoke 1 ppd.          Review of Systems:   14 point ROS performed with pertinent +/- noted in the HPI.  The remainder of the ROS was otherwise negative.        Pertinent Medications     Current Outpatient Medications   Medication Sig     aspirin 81 MG tablet Take 81 mg by mouth daily     atorvastatin (LIPITOR) 10 MG tablet Take 10 mg by mouth daily     WARFARIN SODIUM By mouth alternating 12.5mg and 10mg or as directed by coumadin clinic     No current facility-administered medications for this visit.           Allergies:    No Known Allergies       Past Medical Hx:   Patient Active Problem List    NAFLD (nonalcoholic fatty liver disease)         Priority: Medium [2]         Date Noted: 11/16/2016      Lung nodule         Priority: Medium [2]         Date Noted: 12/29/2015      Arterial occlusive disease         Priority: Medium [2]         Date Noted: 07/12/2012      PVD (peripheral vascular disease) (H)         Priority: Medium [2]         Date Noted: 07/12/2012      Colon polyps         Priority: Medium [2]         Date Noted: 02/29/2012           Family Hx:     Family History   Problem Relation Age of Onset     Anesthesia Reaction Other         No history     Colon Polyps Other         no hx     Crohn's Disease Other         no hx     Ulcerative Colitis Other         no hx     Cancer - colorectal Other         no hx     Cancer Other         no hx      "Cerebrovascular Disease Mother         multiple     Colon Cancer Other         uncle     Substance Abuse Sister      Substance Abuse Brother      Substance Abuse Sister           Social Hx:     Social History     Tobacco Use     Smoking status: Current Every Day Smoker     Packs/day: 1.00     Years: 50.00     Pack years: 50.00     Types: Cigarettes     Smokeless tobacco: Never Used   Substance Use Topics     Alcohol use: Yes     Alcohol/week: 0.0 standard drinks     Comment: 3-4 light beers per day            Objective   Vitals:  BP (!) 149/94 (BP Location: Right arm, Patient Position: Chair, Cuff Size: Adult Regular)   Pulse 83   Temp 98.2  F (36.8  C) (Oral)   Resp 16   Ht 1.854 m (6' 0.99\")   Wt 87.7 kg (193 lb 6.4 oz)   SpO2 96%   BMI 25.52 kg/m       General:  Adult male;appears stated age; no acute distress; the patient is a good historian.          Labs / Imaging/Studies     CBC RESULTS:   Recent Labs   Lab Test 12/12/16  1028 12/16/15  1005   WBC 9.6 11.7*   RBC 4.80 4.63   HGB 15.4 15.1   HCT 45.3 43.8   MCV 94 95   MCH 32.1 32.6   MCHC 34.0 34.5   RDW 13.4 14.1    185          Lab Results   Component Value Date     12/12/2016     12/16/2015     12/17/2014    Lab Results   Component Value Date    CHLORIDE 109 12/12/2016    CHLORIDE 110 12/16/2015    CHLORIDE 110 12/17/2014    Lab Results   Component Value Date    BUN 12 12/12/2016    BUN 13 12/16/2015    BUN 13 12/17/2014      Lab Results   Component Value Date    POTASSIUM 4.6 12/12/2016    POTASSIUM 4.0 12/16/2015    POTASSIUM 4.6 12/17/2014    Lab Results   Component Value Date    CO2 25 12/12/2016    CO2 22 12/16/2015    CO2 25 12/17/2014    Lab Results   Component Value Date    CR 0.94 12/12/2016    CR 0.89 12/16/2015    CR 0.98 12/17/2014        INR   Date Value Ref Range Status   12/12/2016 3.08 (H) 0.86 - 1.14 Final   12/16/2015 1.65 (H) 0.86 - 1.14 Final       Pertinent Cultures / Microbiology:   N/A    Imaging: " "  Impression:   1. ACR Assessment Category (v1.1): Lung-RADS Category 2. Benign  appearance or behavior. Recommendation: Continue annual screening with  Lung cancer screening CT (please order exam code [id number]).   2. Significant Incidental Finding(s): Category S: No.  3. Mild upper lobe predominant centrilobular groundglass nodularity,  compatible with respiratory bronchiolitis.  4. Avoidance of tobacco smoke is strongly advised. Please consider  referral for smoking cessation to [facility] Therapy [facility]  (MTM) if clinically appropriate.       Pulmonary Function Tests:  N/A         Assessment and Plan:   Assessment: Иван is here alone.   He reports doing well this year \"from a lung perspective\" although he reports a frightening episode of blood clots and nearly needing a leg amputation after a knee replacement.    He plans to winter in [place], going in early November.         Diagnoses       Codes Comments    Lung nodule    -  Primary R91.1     Personal history of nicotine dependence     Z87.891           I spent 30 minutes with direct face to face interaction with this patient and provided at least 50% of this time counseling and coordinating care for Иван as noted above in the assessment and plan.      GENE Meza, CNS      "

## 2019-10-23 NOTE — NURSING NOTE
"Oncology Rooming Note    October 23, 2019 2:26 PM   Kannan Wolf is a 69 year old male who presents for:    Chief Complaint   Patient presents with     Oncology Clinic Visit     [facility] RETURN- LUNG NODULES     Initial Vitals: BP (!) 149/94 (BP Location: Right arm, Patient Position: Chair, Cuff Size: Adult Regular)   Pulse 83   Temp 98.2  F (36.8  C) (Oral)   Resp 16   Ht 1.854 m (6' 0.99\")   Wt 87.7 kg (193 lb 6.4 oz)   SpO2 96%   BMI 25.52 kg/m   Estimated body mass index is 25.52 kg/m  as calculated from the following:    Height as of this encounter: 1.854 m (6' 0.99\").    Weight as of this encounter: 87.7 kg (193 lb 6.4 oz). Body surface area is 2.13 meters squared.  Data Unavailable Comment: Data Unavailable   No LMP for male patient.  Allergies reviewed: Yes  Medications reviewed: Yes    Medications: Medication refills not needed today.  Pharmacy name entered into mechatronic systemtechnik: [id number] - [place] [id number]  AT [facility] [address]    Clinical concerns: No new concerns. [place] was notified.      Morgan Dennison LPN            "

## 2019-11-01 ENCOUNTER — OFFICE VISIT - RIVER FALLS (OUTPATIENT)
Dept: FAMILY MEDICINE | Facility: CLINIC | Age: 69
End: 2019-11-01
Payer: COMMERCIAL

## 2019-11-11 LAB — INR PPP: 3.4

## 2019-12-03 ENCOUNTER — OFFICE VISIT - RIVER FALLS (OUTPATIENT)
Dept: FAMILY MEDICINE | Facility: CLINIC | Age: 69
End: 2019-12-03
Payer: COMMERCIAL

## 2019-12-03 LAB — INR PPP: 2.3

## 2019-12-16 LAB — INR PPP: 2.1

## 2019-12-24 ENCOUNTER — COMMUNICATION - RIVER FALLS (OUTPATIENT)
Dept: FAMILY MEDICINE | Facility: CLINIC | Age: 69
End: 2019-12-24
Payer: COMMERCIAL

## 2019-12-24 LAB — INR PPP: 1.8

## 2020-01-08 ENCOUNTER — OFFICE VISIT - RIVER FALLS (OUTPATIENT)
Dept: FAMILY MEDICINE | Facility: CLINIC | Age: 70
End: 2020-01-08
Payer: COMMERCIAL

## 2020-01-08 LAB — INR PPP: 4.9

## 2020-01-12 ENCOUNTER — COMMUNICATION - RIVER FALLS (OUTPATIENT)
Dept: FAMILY MEDICINE | Facility: CLINIC | Age: 70
End: 2020-01-12
Payer: COMMERCIAL

## 2020-01-20 ENCOUNTER — COMMUNICATION - RIVER FALLS (OUTPATIENT)
Dept: FAMILY MEDICINE | Facility: CLINIC | Age: 70
End: 2020-01-20
Payer: COMMERCIAL

## 2020-01-20 LAB — INR PPP: 1.7

## 2020-01-24 LAB — INR PPP: 2

## 2020-02-02 ENCOUNTER — OFFICE VISIT - RIVER FALLS (OUTPATIENT)
Dept: FAMILY MEDICINE | Facility: CLINIC | Age: 70
End: 2020-02-02
Payer: COMMERCIAL

## 2020-02-03 ENCOUNTER — COMMUNICATION - RIVER FALLS (OUTPATIENT)
Dept: FAMILY MEDICINE | Facility: CLINIC | Age: 70
End: 2020-02-03
Payer: COMMERCIAL

## 2020-02-03 LAB — INR PPP: 4.7

## 2020-02-07 ENCOUNTER — COMMUNICATION - RIVER FALLS (OUTPATIENT)
Dept: FAMILY MEDICINE | Facility: CLINIC | Age: 70
End: 2020-02-07
Payer: COMMERCIAL

## 2020-02-07 LAB — INR PPP: 5.9

## 2020-02-08 ENCOUNTER — HEALTH MAINTENANCE LETTER (OUTPATIENT)
Age: 70
End: 2020-02-08

## 2020-02-10 ENCOUNTER — COMMUNICATION - RIVER FALLS (OUTPATIENT)
Dept: FAMILY MEDICINE | Facility: CLINIC | Age: 70
End: 2020-02-10
Payer: COMMERCIAL

## 2020-02-10 LAB — INR PPP: 1.8

## 2020-02-11 ENCOUNTER — COMMUNICATION - RIVER FALLS (OUTPATIENT)
Dept: FAMILY MEDICINE | Facility: CLINIC | Age: 70
End: 2020-02-11
Payer: COMMERCIAL

## 2020-02-13 ENCOUNTER — COMMUNICATION - RIVER FALLS (OUTPATIENT)
Dept: FAMILY MEDICINE | Facility: CLINIC | Age: 70
End: 2020-02-13
Payer: COMMERCIAL

## 2020-02-14 ENCOUNTER — COMMUNICATION - RIVER FALLS (OUTPATIENT)
Dept: FAMILY MEDICINE | Facility: CLINIC | Age: 70
End: 2020-02-14
Payer: COMMERCIAL

## 2020-02-14 LAB — INR PPP: 1.9

## 2020-03-08 ENCOUNTER — OFFICE VISIT - RIVER FALLS (OUTPATIENT)
Dept: FAMILY MEDICINE | Facility: CLINIC | Age: 70
End: 2020-03-08
Payer: COMMERCIAL

## 2020-04-14 ENCOUNTER — COMMUNICATION - RIVER FALLS (OUTPATIENT)
Dept: FAMILY MEDICINE | Facility: CLINIC | Age: 70
End: 2020-04-14
Payer: COMMERCIAL

## 2020-04-14 LAB — INR PPP: 3

## 2020-04-24 LAB — INR PPP: 2.9

## 2020-05-01 ENCOUNTER — OFFICE VISIT - RIVER FALLS (OUTPATIENT)
Dept: FAMILY MEDICINE | Facility: CLINIC | Age: 70
End: 2020-05-01
Payer: COMMERCIAL

## 2020-05-01 LAB — INR PPP: 2.4

## 2020-05-07 ENCOUNTER — OFFICE VISIT - RIVER FALLS (OUTPATIENT)
Dept: FAMILY MEDICINE | Facility: CLINIC | Age: 70
End: 2020-05-07
Payer: COMMERCIAL

## 2020-06-06 ENCOUNTER — OFFICE VISIT - RIVER FALLS (OUTPATIENT)
Dept: FAMILY MEDICINE | Facility: CLINIC | Age: 70
End: 2020-06-06
Payer: COMMERCIAL

## 2020-06-08 ENCOUNTER — COMMUNICATION - RIVER FALLS (OUTPATIENT)
Dept: FAMILY MEDICINE | Facility: CLINIC | Age: 70
End: 2020-06-08
Payer: COMMERCIAL

## 2020-06-12 ENCOUNTER — COMMUNICATION - RIVER FALLS (OUTPATIENT)
Dept: FAMILY MEDICINE | Facility: CLINIC | Age: 70
End: 2020-06-12
Payer: COMMERCIAL

## 2020-06-12 ENCOUNTER — OFFICE VISIT - RIVER FALLS (OUTPATIENT)
Dept: FAMILY MEDICINE | Facility: CLINIC | Age: 70
End: 2020-06-12
Payer: COMMERCIAL

## 2020-06-12 ASSESSMENT — MIFFLIN-ST. JEOR: SCORE: 1676.16

## 2020-06-13 LAB
BUN SERPL-MCNC: 15 MG/DL (ref 7–25)
BUN/CREAT RATIO - HISTORICAL: ABNORMAL (ref 6–22)
CALCIUM SERPL-MCNC: 9.2 MG/DL (ref 8.6–10.3)
CHLORIDE BLD-SCNC: 107 MMOL/L (ref 98–110)
CHOLEST SERPL-MCNC: 160 MG/DL
CHOLEST/HDLC SERPL: 3.7 {RATIO}
CO2 SERPL-SCNC: 26 MMOL/L (ref 20–32)
CREAT SERPL-MCNC: 0.97 MG/DL (ref 0.7–1.25)
EGFRCR SERPLBLD CKD-EPI 2021: 79 ML/MIN/1.73M2
GLUCOSE BLD-MCNC: 101 MG/DL (ref 65–99)
HBA1C MFR BLD: 5.6 %
HDLC SERPL-MCNC: 43 MG/DL
LDLC SERPL CALC-MCNC: 90 MG/DL
NONHDLC SERPL-MCNC: 117 MG/DL
POTASSIUM BLD-SCNC: 4.2 MMOL/L (ref 3.5–5.3)
SARS-COV-2 AB PNL SERPL IA: NEGATIVE
SODIUM SERPL-SCNC: 138 MMOL/L (ref 135–146)
TRIGL SERPL-MCNC: 175 MG/DL

## 2020-06-14 ENCOUNTER — COMMUNICATION - RIVER FALLS (OUTPATIENT)
Dept: FAMILY MEDICINE | Facility: CLINIC | Age: 70
End: 2020-06-14
Payer: COMMERCIAL

## 2020-06-15 ENCOUNTER — COMMUNICATION - RIVER FALLS (OUTPATIENT)
Dept: FAMILY MEDICINE | Facility: CLINIC | Age: 70
End: 2020-06-15
Payer: COMMERCIAL

## 2020-06-17 ENCOUNTER — COMMUNICATION - RIVER FALLS (OUTPATIENT)
Dept: FAMILY MEDICINE | Facility: CLINIC | Age: 70
End: 2020-06-17
Payer: COMMERCIAL

## 2020-06-29 ENCOUNTER — COMMUNICATION - RIVER FALLS (OUTPATIENT)
Dept: FAMILY MEDICINE | Facility: CLINIC | Age: 70
End: 2020-06-29
Payer: COMMERCIAL

## 2020-07-04 ENCOUNTER — OFFICE VISIT - RIVER FALLS (OUTPATIENT)
Dept: FAMILY MEDICINE | Facility: CLINIC | Age: 70
End: 2020-07-04
Payer: COMMERCIAL

## 2020-07-04 ENCOUNTER — NURSE TRIAGE (OUTPATIENT)
Dept: NURSING | Facility: CLINIC | Age: 70
End: 2020-07-04

## 2020-07-04 NOTE — TELEPHONE ENCOUNTER
Critical INR of 5.2 this morning.  FNA paged on call provider, Dr. Crouch, via page  at 10AM to contact FNA at [phone] and spoke with Dr. Crouch at 10:24AM.

## 2020-07-06 ENCOUNTER — COMMUNICATION - RIVER FALLS (OUTPATIENT)
Dept: FAMILY MEDICINE | Facility: CLINIC | Age: 70
End: 2020-07-06
Payer: COMMERCIAL

## 2020-07-06 LAB
INR PPP: 3.4
INR PPP: 5.2

## 2020-07-17 ENCOUNTER — AMBULATORY - RIVER FALLS (OUTPATIENT)
Dept: FAMILY MEDICINE | Facility: CLINIC | Age: 70
End: 2020-07-17
Payer: COMMERCIAL

## 2020-07-17 ENCOUNTER — COMMUNICATION - RIVER FALLS (OUTPATIENT)
Dept: FAMILY MEDICINE | Facility: CLINIC | Age: 70
End: 2020-07-17
Payer: COMMERCIAL

## 2020-07-24 ENCOUNTER — TRANSFERRED RECORDS (OUTPATIENT)
Dept: HEALTH INFORMATION MANAGEMENT | Facility: CLINIC | Age: 70
End: 2020-07-24

## 2020-07-24 ENCOUNTER — COMMUNICATION - RIVER FALLS (OUTPATIENT)
Dept: FAMILY MEDICINE | Facility: CLINIC | Age: 70
End: 2020-07-24
Payer: COMMERCIAL

## 2020-07-27 ENCOUNTER — COMMUNICATION - RIVER FALLS (OUTPATIENT)
Dept: FAMILY MEDICINE | Facility: CLINIC | Age: 70
End: 2020-07-27
Payer: COMMERCIAL

## 2020-07-27 LAB — INR PPP: 1.4

## 2020-08-10 ENCOUNTER — COMMUNICATION - RIVER FALLS (OUTPATIENT)
Dept: FAMILY MEDICINE | Facility: CLINIC | Age: 70
End: 2020-08-10
Payer: COMMERCIAL

## 2020-08-10 ENCOUNTER — AMBULATORY - RIVER FALLS (OUTPATIENT)
Dept: FAMILY MEDICINE | Facility: CLINIC | Age: 70
End: 2020-08-10
Payer: COMMERCIAL

## 2020-08-11 ENCOUNTER — COMMUNICATION - RIVER FALLS (OUTPATIENT)
Dept: FAMILY MEDICINE | Facility: CLINIC | Age: 70
End: 2020-08-11
Payer: COMMERCIAL

## 2020-08-20 ENCOUNTER — TRANSFERRED RECORDS (OUTPATIENT)
Dept: HEALTH INFORMATION MANAGEMENT | Facility: CLINIC | Age: 70
End: 2020-08-20

## 2020-08-21 ENCOUNTER — COMMUNICATION - RIVER FALLS (OUTPATIENT)
Dept: FAMILY MEDICINE | Facility: CLINIC | Age: 70
End: 2020-08-21
Payer: COMMERCIAL

## 2020-08-21 ENCOUNTER — OFFICE VISIT - RIVER FALLS (OUTPATIENT)
Dept: FAMILY MEDICINE | Facility: CLINIC | Age: 70
End: 2020-08-21
Payer: COMMERCIAL

## 2020-08-24 ENCOUNTER — TELEPHONE (OUTPATIENT)
Dept: ORTHOPEDICS | Facility: CLINIC | Age: 70
End: 2020-08-24

## 2020-08-24 NOTE — TELEPHONE ENCOUNTER
Patient requested a consultation with Dr Godo.  He reports he has had constant right shoulder pain.  A MRI was done at the [facility] ([facility] 07/24/20.  No recent x-rays have been done.  He states he was seen by Dr Junior at [facility] and told he has a cyst on the nerve in the shoulder and arthritis in the collarbone joint.  Appointment will be scheduled 09/14/20 at the [facility].

## 2020-08-25 NOTE — TELEPHONE ENCOUNTER
DIAGNOSIS: Right shoulder pain   APPOINTMENT DATE: 9.14.20   NOTES STATUS DETAILS   OFFICE NOTE from referring provider N/A    OFFICE NOTE from other specialist In process [facility]   DISCHARGE SUMMARY from hospital N/A    DISCHARGE REPORT from the ER Care Everywhere 7.31.20 [facility]   OPERATIVE REPORT N/A    MEDICATION LIST Care Everywhere    EMG (for Spine) N/A    IMPLANT RECORD/STICKER N/A    LABS     CBC/DIFF Care Everywhere    CULTURES N/A    INJECTIONS DONE IN RADIOLOGY N/A    MRI In process 7.24.20 R shoulder, [facility]   CT SCAN N/A    XRAYS (IMAGES & REPORTS) N/A    TUMOR     PATHOLOGY  Slides & report N/A      Action MJ 8.25.20   Action Taken Sent request to PORFIRIO and Suleiman   09/11/20   2:33 PM   Faxed request to [facility] and I also just called and LVM to please call me to let me know if they actually have and R shoulder images  Zohreh Vidal CMA    09/14/20   8:10 AM   [facility] records scanned to chart  Images resolved in PACS  Complete  Zohreh Vidal CMA

## 2020-08-30 ENCOUNTER — COMMUNICATION - RIVER FALLS (OUTPATIENT)
Dept: FAMILY MEDICINE | Facility: CLINIC | Age: 70
End: 2020-08-30
Payer: COMMERCIAL

## 2020-08-31 ENCOUNTER — COMMUNICATION - RIVER FALLS (OUTPATIENT)
Dept: FAMILY MEDICINE | Facility: CLINIC | Age: 70
End: 2020-08-31
Payer: COMMERCIAL

## 2020-08-31 LAB — INR PPP: 2.4

## 2020-08-31 ASSESSMENT — ENCOUNTER SYMPTOMS: BRUISES/BLEEDS EASILY: 1

## 2020-09-11 DIAGNOSIS — M25.511 RIGHT SHOULDER PAIN, UNSPECIFIED CHRONICITY: Primary | ICD-10-CM

## 2020-09-14 ENCOUNTER — PRE VISIT (OUTPATIENT)
Dept: ORTHOPEDICS | Facility: CLINIC | Age: 70
End: 2020-09-14

## 2020-09-14 ENCOUNTER — ANCILLARY PROCEDURE (OUTPATIENT)
Dept: GENERAL RADIOLOGY | Facility: CLINIC | Age: 70
End: 2020-09-14
Attending: ORTHOPAEDIC SURGERY
Payer: COMMERCIAL

## 2020-09-14 ENCOUNTER — OFFICE VISIT (OUTPATIENT)
Dept: ORTHOPEDICS | Facility: CLINIC | Age: 70
End: 2020-09-14
Payer: COMMERCIAL

## 2020-09-14 ENCOUNTER — PREP FOR PROCEDURE (OUTPATIENT)
Dept: ORTHOPEDICS | Facility: CLINIC | Age: 70
End: 2020-09-14

## 2020-09-14 VITALS — HEIGHT: 73 IN | WEIGHT: 200 LBS | BODY MASS INDEX: 26.51 KG/M2

## 2020-09-14 DIAGNOSIS — M25.511 RIGHT SHOULDER PAIN, UNSPECIFIED CHRONICITY: Primary | ICD-10-CM

## 2020-09-14 DIAGNOSIS — M25.511 RIGHT SHOULDER PAIN, UNSPECIFIED CHRONICITY: ICD-10-CM

## 2020-09-14 RX ORDER — GABAPENTIN 100 MG/1
100 CAPSULE ORAL AT BEDTIME
COMMUNITY
Start: 2020-08-11 | End: 2022-05-31

## 2020-09-14 ASSESSMENT — MIFFLIN-ST. JEOR: SCORE: 1726.07

## 2020-09-14 NOTE — NURSING NOTE
Teaching Flowsheet   Relevant Diagnosis: Right rotator cuff tear  Teaching Topic: Pre-op for Right ARCR, ASD, biceps tenotomy - surgery coordinator will call to schedule procedure at [facility] or [facility]     Person(s) involved in teaching:   Patient     Motivation Level:  Asks Questions: Yes  Eager to Learn: Yes  Cooperative: Yes  Receptive (willing/able to accept information): Yes  Any cultural factors/[employer] beliefs that may influence understanding or compliance? No     Patient demonstrates understanding of the following:  Reason for the appointment, diagnosis and treatment plan: Yes  Knowledge of proper use of medications and conditions for which they are ordered (with special attention to potential side effects or drug interactions): Yes  Which situations necessitate calling provider and whom to contact: Yes     Teaching Concerns Addressed:   PAC for pre-op H&P - hx blood clots (LE and PE, Leiden V).  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  States a friend will provide transportation.  His sister may stay with him post-op  Aware of post-op limitations     Proper use and care of sling x 8 weeks (medical equip, care aids, etc.): Yes  Nutritional needs and diet plan: Yes  Pain management techniques: Yes  Wound Care: Yes  How and/when to access community resources: Yes     Instructional Materials Used/Given: Pre-op packet, surgical soap, written guidelines for care after arthroscopic shoulder surgery

## 2020-09-14 NOTE — LETTER
9/14/2020         RE: Kannan Wolf  [address] [address]        Dear Colleague,    Thank you for referring your patient, Kannan Wolf, to the [facility]. Please see a copy of my visit note below.    I saw the patient with the resident or fellow.  I agree with the resident or fellow note and plan of care.      Ryan Good MD    CHIEF CONCERN:   Chief Complaint   Patient presents with     Consult     Right shoulder pain         HISTORY:   Kannan Wolf is a 70 year old male who presents to clinic today for evaluation of right shoulder pain. Pt states that 3 years ago was using a pull start motor and experienced sudden sharp shooting pain in his shoulder. Reports he has not been the same since. Continued shoulder pain since that time. It does bother him at night and while sleeping. States he has modified his activity without much improvement. Has had 4 cortisone injections which do help him, but last one only helped him for about a month. Last injection about 3 months ago. He is retired, but still enjoys being active outdoors, and spends his gregory in [place]. Is taking tylenol which is only mildly helping his pain. Of note he is a pack per day smoker. He also has an extensive blood clot history including a clot following his knee replacement which threatened amputation of his leg. He is currently on warfarin for this. Previously his care had been at [facility], but he would like to transition here going forward.    PAST MEDICAL HISTORY: (Reviewed with the patient and in the [facility] medical record)  1. 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 L TKA    MEDICATIONS: (Reviewed with the patient and in the [facility] medical record)    Notable medications include:  Current Outpatient Medications   Medication Sig Dispense Refill     aspirin 81 MG tablet Take 81 mg by  "mouth daily       atorvastatin (LIPITOR) 10 MG tablet Take 10 mg by mouth daily       gabapentin (NEURONTIN) 100 MG capsule TK 1 C PO TID       WARFARIN SODIUM By mouth alternating 12.5mg and 10mg or as directed by coumadin clinic          ALLERGIES: (Reviewed with the patient and in the [facility] medical record)  No Known Allergies      SOCIAL HISTORY: (Reviewed with the patient and in the medical record)  --Tobacco: yes, pack per day smoker  --Occupation: Retired  --Avocation/Sport: outdoor activity, cutting wood, fishing, gregory fishing in [facility]    FAMILY HISTORY: (Reviewed with the patient and in the medical record)  -- No family history of bleeding, clotting, or difficulty with anesthesia    REVIEW OF SYSTEMS: (Reviewed with the patient and on the health intake form)  -- A comprehensive 10 point review of systems was conducted and is negative except as noted in the HPI    EXAM:   General: Awake, Alert and Oriented, No acute Distress. Articulate and Interactive    Ht 1.854 m (6' 1\")   Wt 90.7 kg (200 lb)   BMI 26.39 kg/m       Right upper extremity and shoulder exam:    Skin is Warm and Well perfused, no suggestion of infection, no previous incisions    No effusion. Tenderness to palpation at bicipital groove. No AC joint tenderness    Active ROM forward elevation 180, external rotation 40, internal rotation upper lumbar; passive ROM forward elevation 180, external rotation 50, internal rotation upper lumbar    Strength testing: forward elevation 4/5, external rotation 4/5, internal rotation 5/5    Neer neg, Oakes pos, Cara pos, O'Briens neg, ER lag neg, belly press neg, lift off neg, hornblowers neg, cross body adduction neg    Speeds positive, Yergasons negative    Sensation and motor intact to axillary/radial/ulnar/median distributions; no scapular winging or dyskinesia     Brisk distal capillary refill    IMAGING:  Plain Radiographs: AP, grayshey, scapular Y, and axillary views of right shoulder reviewed " and demonstrate no acute osseous abnormality and no significant degenerative changes of the glenohumeral joint; there are arthritic changes in the AC joint and distal clavicle    MRI: Right shoulder demonstrate full thickness supraspinatus tear with moderate retraction; partial infraspinatus tear; tendinopathy of biceps long head; subscap intact; mild degenerative changes in the glenohumeral joint; significant degenerative changes in the AC joint    ASSESSMENT:  1. 70 year old male with right shoulder large cuff tear, biceps tendinopathy, asymptomatic AC joint arthritis    PLAN:  1. Diagnosis discussed at length with patient  2. Will proceed with right shoulder arthroscopy with rotator cuff repair, subacromial decompression, biceps tenotomy; will not address the AC joint as he is currently not symptomatic  3. Risks, benefits, alternatives discussed at length  4. Will require extensive eval by pre anesthesia clinic due to multiple blood clots in his past following surgery  5. Encouraged to stop smoking  6. Schedule at his convenience    Ariel Alejandra MD  Fellow, Sports Medicine & Shoulder  [facility]

## 2020-09-14 NOTE — PROGRESS NOTES
I saw the patient with the resident or fellow.  I agree with the resident or fellow note and plan of care.      Ryan Good MD    CHIEF CONCERN:   Chief Complaint   Patient presents with     Consult     Right shoulder pain         HISTORY:   Kannan Wolf is a 70 year old male who presents to clinic today for evaluation of right shoulder pain. Pt states that 3 years ago was using a pull start motor and experienced sudden sharp shooting pain in his shoulder. Reports he has not been the same since. Continued shoulder pain since that time. It does bother him at night and while sleeping. States he has modified his activity without much improvement. Has had 4 cortisone injections which do help him, but last one only helped him for about a month. Last injection about 3 months ago. He is retired, but still enjoys being active outdoors, and spends his gregory in [place]. Is taking tylenol which is only mildly helping his pain. Of note he is a pack per day smoker. He also has an extensive blood clot history including a clot following his knee replacement which threatened amputation of his leg. He is currently on warfarin for this. Previously his care had been at [facility], but he would like to transition here going forward.    PAST MEDICAL HISTORY: (Reviewed with the patient and in the [facility] medical record)  1. 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 L TKA    MEDICATIONS: (Reviewed with the patient and in the [facility] medical record)    Notable medications include:  Current Outpatient Medications   Medication Sig Dispense Refill     aspirin 81 MG tablet Take 81 mg by mouth daily       atorvastatin (LIPITOR) 10 MG tablet Take 10 mg by mouth daily       gabapentin (NEURONTIN) 100 MG capsule TK 1 C PO TID       WARFARIN SODIUM By mouth alternating 12.5mg and 10mg or as directed by coumadin clinic          ALLERGIES: (Reviewed  "with the patient and in the [facility] medical record)  No Known Allergies      SOCIAL HISTORY: (Reviewed with the patient and in the medical record)  --Tobacco: yes, pack per day smoker  --Occupation: Retired  --Avocation/Sport: outdoor activity, cutting wood, fishing, gregory fishing in [facility]    FAMILY HISTORY: (Reviewed with the patient and in the medical record)  -- No family history of bleeding, clotting, or difficulty with anesthesia    REVIEW OF SYSTEMS: (Reviewed with the patient and on the health intake form)  -- A comprehensive 10 point review of systems was conducted and is negative except as noted in the HPI    EXAM:   General: Awake, Alert and Oriented, No acute Distress. Articulate and Interactive    Ht 1.854 m (6' 1\")   Wt 90.7 kg (200 lb)   BMI 26.39 kg/m       Right upper extremity and shoulder exam:    Skin is Warm and Well perfused, no suggestion of infection, no previous incisions    No effusion. Tenderness to palpation at bicipital groove. No AC joint tenderness    Active ROM forward elevation 180, external rotation 40, internal rotation upper lumbar; passive ROM forward elevation 180, external rotation 50, internal rotation upper lumbar    Strength testing: forward elevation 4/5, external rotation 4/5, internal rotation 5/5    Neer neg, Oakes pos, Cara pos, O'Briens neg, ER lag neg, belly press neg, lift off neg, hornblowers neg, cross body adduction neg    Speeds positive, Yergasons negative    Sensation and motor intact to axillary/radial/ulnar/median distributions; no scapular winging or dyskinesia     Brisk distal capillary refill    IMAGING:  Plain Radiographs: AP, grayshey, scapular Y, and axillary views of right shoulder reviewed and demonstrate no acute osseous abnormality and no significant degenerative changes of the glenohumeral joint; there are arthritic changes in the AC joint and distal clavicle    MRI: Right shoulder demonstrate full thickness supraspinatus tear with moderate " retraction; partial infraspinatus tear; tendinopathy of biceps long head; subscap intact; mild degenerative changes in the glenohumeral joint; significant degenerative changes in the AC joint    ASSESSMENT:  1. 70 year old male with right shoulder large cuff tear, biceps tendinopathy, asymptomatic AC joint arthritis    PLAN:  1. Diagnosis discussed at length with patient  2. Will proceed with right shoulder arthroscopy with rotator cuff repair, subacromial decompression, biceps tenotomy; will not address the AC joint as he is currently not symptomatic  3. Risks, benefits, alternatives discussed at length  4. Will require extensive eval by pre anesthesia clinic due to multiple blood clots in his past following surgery  5. Encouraged to stop smoking  6. Schedule at his convenience    Ariel Alejandra MD  Fellow, Sports Medicine & Shoulder  [facility]

## 2020-09-14 NOTE — NURSING NOTE
"Reason For Visit:   Chief Complaint   Patient presents with     Consult     Right shoulder pain        PCP: David Jenkins      ? No  Occupation Retired  Date of injury: years ago   Type of injury: Broken bone .  Date of surgery: None  Smoker: Yes    Right hand dominant    SANE score  Affected shoulder: Right   Right shoulder SANE: 10  Left shoulder SANE: 100    Ht 1.854 m (6' 1\")   Wt 90.7 kg (200 lb)   BMI 26.39 kg/m        Pain Assessment  Patient Currently in Pain: Yes  0-10 Pain Scale: 3  Primary Pain Location: Shoulder  Pain Descriptors: Discomfort    Barbara Mata LPN        "

## 2020-09-15 ENCOUNTER — TELEPHONE (OUTPATIENT)
Dept: ORTHOPEDICS | Facility: CLINIC | Age: 70
End: 2020-09-15

## 2020-09-15 DIAGNOSIS — Z11.59 ENCOUNTER FOR SCREENING FOR OTHER VIRAL DISEASES: Primary | ICD-10-CM

## 2020-09-15 PROBLEM — M25.511 RIGHT SHOULDER PAIN, UNSPECIFIED CHRONICITY: Status: ACTIVE | Noted: 2020-09-15

## 2020-09-15 NOTE — TELEPHONE ENCOUNTER
Patient is scheduled for surgery with Dr. Good      Spoke or left message with: [place] with Иван    Date of Surgery: 10/8/20    Location: [facility]    Informed patient they will need an adult  Yes    Pre-op with surgeon (if applicable): complete    H&P: Scheduled with PAC    Additional imaging/appointments: patient will await call from covid scheduling team to schedule a covid test 4 days prior to surgery    Surgery packet: given in clinic     Additional comments: Patient will receive arrival time at PAC

## 2020-09-16 NOTE — TELEPHONE ENCOUNTER
FUTURE VISIT INFORMATION      SURGERY INFORMATION:    Date: 10/8/20    [facility]    Surgeon:  Ryan Good MD     Anesthesia Type:  Choice    Procedure: Arthroscopic rotator cuff repair, arthroscopic subacromial decompression, biceps tenotomy     Consult: ov     RECORDS REQUESTED FROM:       Primary Care Provider: David Jenkins MD- [facility]    Pertinent Medical History: Lung nodule, PVD, Arterial occlusive disease    Most recent EKG+ Tracin19- [facility]    Most recent ECHO: 13- [facility]    Most recent PFT's: 14    Most recent Sleep Study:  2/19/10- [facility]

## 2020-09-18 LAB — INR BLD: 3.6

## 2020-09-25 ENCOUNTER — COMMUNICATION - RIVER FALLS (OUTPATIENT)
Dept: FAMILY MEDICINE | Facility: CLINIC | Age: 70
End: 2020-09-25
Payer: COMMERCIAL

## 2020-09-25 ENCOUNTER — AMBULATORY - RIVER FALLS (OUTPATIENT)
Dept: FAMILY MEDICINE | Facility: CLINIC | Age: 70
End: 2020-09-25
Payer: COMMERCIAL

## 2020-09-25 LAB — INR BLD: 5

## 2020-09-28 ENCOUNTER — COMMUNICATION - RIVER FALLS (OUTPATIENT)
Dept: FAMILY MEDICINE | Facility: CLINIC | Age: 70
End: 2020-09-28
Payer: COMMERCIAL

## 2020-09-28 LAB — INR PPP: 1.7

## 2020-09-29 PROBLEM — Z96.652 STATUS POST TOTAL LEFT KNEE REPLACEMENT: Status: ACTIVE | Noted: 2019-06-13

## 2020-09-29 PROBLEM — Z86.718 H/O DEEP VENOUS THROMBOSIS: Status: ACTIVE | Noted: 2019-06-13

## 2020-09-29 PROBLEM — D68.2 FACTOR V DEFICIENCY (H): Status: ACTIVE | Noted: 2019-06-13

## 2020-09-29 PROBLEM — Z86.711 HISTORY OF PULMONARY EMBOLISM: Status: ACTIVE | Noted: 2019-06-13

## 2020-09-29 PROBLEM — T85.818A BLOOD CLOT DUE TO DEVICE, IMPLANT, OR GRAFT: Status: ACTIVE | Noted: 2019-06-12

## 2020-09-29 PROBLEM — R00.0 SINUS TACHYCARDIA: Status: ACTIVE | Noted: 2019-06-13

## 2020-09-29 PROBLEM — F17.200 TOBACCO DEPENDENCE: Status: ACTIVE | Noted: 2019-06-13

## 2020-09-29 PROBLEM — I99.8 LOWER LIMB ISCHEMIA: Status: ACTIVE | Noted: 2019-06-20

## 2020-09-29 RX ORDER — FLUTICASONE PROPIONATE 50 MCG
1 SPRAY, SUSPENSION (ML) NASAL DAILY PRN
COMMUNITY
End: 2022-05-31

## 2020-09-29 NOTE — PHARMACY - PREOPERATIVE ASSESSMENT CENTER
Anticoagulation Note - Preoperative Assessment Center (PAC) Pharmacist     Patient was interviewed via phone call on September 29, 2020 prior to PAC clinic appointment. The purpose of this note is to document the perioperative anticoagulation plan outlined by the providers caring for Kannan Wolf.     Current Regimen  Anticoagulation Regimen as of September 29, 2020: warfarin 12.5 mg on Monday and 10 mg on all other days of the week. Takes in the evening.   Indication: factor V leiden, multiple previous DVT (after previous surgeries), most recent July 2019.   Prescriber:  Dr. Jenkins  Expected Duration of therapy: indefinite  Current medications that may interact with this include: aspirin    Most recent SCr:   Creatinine   Date Value Ref Range Status   09/30/2020 0.98 0.66 - 1.25 mg/dL Final        Perioperative plan  Kannan Wolf is scheduled for Arthroscopic rotator cuff repair, arthroscopic subacromial decompression, biceps tenotomy on 10/8/20 with Dr. Good and the perioperative anticoagulation plan per PAC and pts PCP Dr. Jenkins (via message from NEGRITA Hernandez) is to hold warfarin x5 days (last dose on 10/2/20) and bridge with lovenox 100 mg subcutaneous q12 hr starting 4 days before surgery and taking last dose 24 hr prior to surgery.  Resume lovenox and warfarin day after surgery and continue lovenox 100 mg subcutaneous q12 hr scheudled until INR >2.   Resumption of anticoagulation after procedure will be based on surgery team assessment of bleeding risks and complications.  This plan may require re-assessment and modification by his surgeon in the perioperative setting depending on patients clinical situation.        Albin Palmer RPH  September 29, 2020  1:35 PM

## 2020-09-29 NOTE — PROGRESS NOTES
Preoperative Assessment Center Medication History Note    Medication history completed via phone call on September 29, 2020 by this writer. See Epic admission navigator for prior to admission medications. Operating room staff will still need to confirm medications and last dose information on day of surgery.     Medication history interview sources:  patient, payor information    Changes made to PTA medication list (reason)  Added: flonase nasal spray  Deleted: none  Changed: gabapentin dose/sig, warfarin dose/sig    Additional medication history information (including reliability of information, actions taken by pharmacist):    -- No recent (within 30 days) course of antibiotics  -- No recent (within 30 days) course of systemic steroids  -- Patient declines being on any other prescription or over-the-counter medications    Prior to Admission medications    Medication Sig Last Dose Taking? Auth Provider   aspirin 81 MG tablet Take 81 mg by mouth every evening  Taking Yes Reported, Patient   atorvastatin (LIPITOR) 10 MG tablet Take 10 mg by mouth every evening  Taking Yes Reported, Patient   fluticasone (FLONASE) 50 MCG/ACT nasal spray Spray 1 spray into both nostrils daily as needed for rhinitis or allergies Taking Yes Unknown, Entered By History   gabapentin (NEURONTIN) 100 MG capsule Take 100 mg by mouth At Bedtime  Taking Yes Reported, Patient   WARFARIN SODIUM September 29, 2020 - Take 12.5 mg on Mondays and 10 mg on all other days of the week.  Takes in the evening. Taking Yes Reported, Patient     Medication history completed by: Albin Palmer [facility]

## 2020-09-30 ENCOUNTER — PRE VISIT (OUTPATIENT)
Dept: SURGERY | Facility: CLINIC | Age: 70
End: 2020-09-30

## 2020-09-30 ENCOUNTER — OFFICE VISIT (OUTPATIENT)
Dept: SURGERY | Facility: CLINIC | Age: 70
End: 2020-09-30
Payer: COMMERCIAL

## 2020-09-30 ENCOUNTER — ANESTHESIA EVENT (OUTPATIENT)
Dept: SURGERY | Facility: AMBULATORY SURGERY CENTER | Age: 70
End: 2020-09-30

## 2020-09-30 VITALS
SYSTOLIC BLOOD PRESSURE: 149 MMHG | HEIGHT: 73 IN | TEMPERATURE: 97.7 F | BODY MASS INDEX: 27.04 KG/M2 | RESPIRATION RATE: 16 BRPM | WEIGHT: 204 LBS | HEART RATE: 71 BPM | OXYGEN SATURATION: 98 % | DIASTOLIC BLOOD PRESSURE: 92 MMHG

## 2020-09-30 DIAGNOSIS — Z01.818 PREOP EXAMINATION: ICD-10-CM

## 2020-09-30 DIAGNOSIS — Z01.818 PREOP EXAMINATION: Primary | ICD-10-CM

## 2020-09-30 DIAGNOSIS — M25.511 RIGHT SHOULDER PAIN, UNSPECIFIED CHRONICITY: ICD-10-CM

## 2020-09-30 LAB
ALBUMIN SERPL-MCNC: 3.7 G/DL (ref 3.4–5)
ALP SERPL-CCNC: 74 U/L (ref 40–150)
ALT SERPL W P-5'-P-CCNC: 27 U/L (ref 0–70)
ANION GAP SERPL CALCULATED.3IONS-SCNC: 4 MMOL/L (ref 3–14)
AST SERPL W P-5'-P-CCNC: 24 U/L (ref 0–45)
BILIRUB SERPL-MCNC: 0.2 MG/DL (ref 0.2–1.3)
BUN SERPL-MCNC: 11 MG/DL (ref 7–30)
CALCIUM SERPL-MCNC: 9.1 MG/DL (ref 8.5–10.1)
CHLORIDE SERPL-SCNC: 108 MMOL/L (ref 94–109)
CO2 SERPL-SCNC: 26 MMOL/L (ref 20–32)
CREAT SERPL-MCNC: 0.98 MG/DL (ref 0.66–1.25)
GFR SERPL CREATININE-BSD FRML MDRD: 78 ML/MIN/{1.73_M2}
GLUCOSE SERPL-MCNC: 101 MG/DL (ref 70–99)
POTASSIUM SERPL-SCNC: 4.7 MMOL/L (ref 3.4–5.3)
PROT SERPL-MCNC: 7.6 G/DL (ref 6.8–8.8)
SODIUM SERPL-SCNC: 138 MMOL/L (ref 133–144)

## 2020-09-30 RX ORDER — IPRATROPIUM BROMIDE AND ALBUTEROL SULFATE 2.5; .5 MG/3ML; MG/3ML
3 SOLUTION RESPIRATORY (INHALATION) ONCE
Status: CANCELLED | OUTPATIENT
Start: 2020-09-30 | End: 2020-09-30

## 2020-09-30 ASSESSMENT — PAIN SCALES - GENERAL: PAINLEVEL: MODERATE PAIN (4)

## 2020-09-30 ASSESSMENT — MIFFLIN-ST. JEOR: SCORE: 1744.22

## 2020-09-30 ASSESSMENT — ENCOUNTER SYMPTOMS: SEIZURES: 0

## 2020-09-30 ASSESSMENT — LIFESTYLE VARIABLES: TOBACCO_USE: 1

## 2020-09-30 NOTE — H&P
Pre-Operative H & P     CC:  Preoperative exam to assess for increased cardiopulmonary risk while undergoing surgery and anesthesia.    Date of Encounter: 9/30/2020  Primary Care Physician:  David Jenkins  Reason for Visit: Right shoulder pain, unspecified chronicity    HPI  Kannan Wolf is a 69 y/o male who presents for pre-operative H&P in preparation for Arthroscopic rotator cuff repair, arthroscopic subacromial decompression, biceps tenotomy with Ryan Good MD on 10/8/20 at [facility] for treatment of Right shoulder pain, unspecified chronicity.    Mr. Wolf has a history of right shoulder pain. He states that 3 years ago he was using a pull start motor and experienced sudden sharp shooting pain in his shoulder. Reports he has not been the same since. Continued shoulder pain since that time. It does bother him at night and while sleeping. States he has modified his activity without much improvement. He has had 4 cortisone injections which do help him, but last one only helped him for about a month. Is taking tylenol which is only mildly helping his pain.     Of note he is a pack per day smoker with >50 pk yr hx. He also has an extensive blood clot history (+Factor V Leiden) including a clot following his knee replacement in 6/2019 which threatened amputation of his leg. He underwent thrombectomy @ [facility]. He is currently on warfarin for this. He is s/p B/L SFA stents.    PMH is also significant for Factor V Leiden, GINGER (has CPAP, not using), HTN, lung nodules, mild obstructive pulmonary disease, anxiety, daily alcohol use, hx advanced colonic polyps s/p total colectomy in 2012, fatty liver, s/p left knee arthroplasty 6/2019.    History was obtained from patient & chart review.     Past Medical History  Past Medical History:   Diagnosis Date     ACL tear      Arthritis     osteoarthritis     Blood clot in the legs     +PE 2009  also on vein from intestine     Blood clotting  disorder (H) 2009     Chronic diarrhea     result of total colectomy     Factor V Leiden (H)      Head injury 1960    multiple     Hearing problem 2000     IBS (irritable bowel syndrome)      Liver disease 2013    Fatty liver     Migraines 1989    none for a few years     Personal history of colonic polyps 2009     Polyps, colonic      Pulmonary emboli (H) 2009     Skin disease 1970    eczema     Sleep apnea        Past Surgical History  Past Surgical History:   Procedure Laterality Date     AS TOTAL KNEE ARTHROPLASTY Left 06/2019    @ [facility]     C REPAIR CRUCIATE LIGAMENT,KNEE       bilateral knees     LAPAROSCOPIC ASSISTED COLECTOMY  5/9/2012    Procedure:LAPAROSCOPIC ASSISTED COLECTOMY; Hand Assisted Laparoscopic Total Abdominal Colectomy Ileorectal anastomosis. ; Surgeon:COLLINS JAVIER; Location:[facility] OR     SIGMOIDOSCOPY FLEXIBLE  5/21/2012    Procedure:SIGMOIDOSCOPY FLEXIBLE; Surgeon:EMMANUEL LEDBETTER; Location: GI     THROMBECTOMY LOWER EXTREMITY Left 06/2019    @ [facility] following knee arthroplasty @ [facility]     VASCULAR SURGERY      Has multiple femoral arterial stents       Hx of Blood transfusions/reactions: denies     Hx of abnormal bleeding or anti-platelet use: on ASA 81 mg & warfarin    Menstrual history: No LMP for male patient.:      Steroid use in the last year: yes, cortisone injection in shoulder    Personal or FH with difficulty with Anesthesia:  denies    Prior to Admission Medications  Current Outpatient Medications   Medication Sig Dispense Refill     aspirin 81 MG tablet Take 81 mg by mouth every evening        atorvastatin (LIPITOR) 10 MG tablet Take 10 mg by mouth every evening        fluticasone (FLONASE) 50 MCG/ACT nasal spray Spray 1 spray into both nostrils daily as needed for rhinitis or allergies       gabapentin (NEURONTIN) 100 MG capsule Take 100 mg by mouth At Bedtime        WARFARIN SODIUM September 29, 2020 - Take 12.5 mg on Mondays and 10 mg on all other days of the week.  Takes  in the evening.         Allergies  No Known Allergies    Social History  Social History     Socioeconomic History     Marital status:      Spouse name: Not on file     Number of children: Not on file     Years of education: Not on file     Highest education level: Not on file   Occupational History     Not on file   Social Needs     Financial resource strain: Not on file     Food insecurity     Worry: Not on file     Inability: Not on file     Transportation needs     Medical: Not on file     Non-medical: Not on file   Tobacco Use     Smoking status: Current Every Day Smoker     Packs/day: 1.00     Years: 50.00     Pack years: 50.00     Types: Cigarettes     Smokeless tobacco: Never Used   Substance and Sexual Activity     Alcohol use: Yes     Alcohol/week: 0.0 standard drinks     Comment: 3-4 light beers per day     Drug use: No     Sexual activity: Not Currently     Partners: Female     Birth control/protection: Male Surgical   Lifestyle     Physical activity     Days per week: Not on file     Minutes per session: Not on file     Stress: Not on file   Relationships     Social connections     Talks on phone: Not on file     Gets together: Not on file     Attends Gnosticist service: Not on file     Active member of club or organization: Not on file     Attends meetings of clubs or organizations: Not on file     Relationship status: Not on file     Intimate partner violence     Fear of current or ex partner: Not on file     Emotionally abused: Not on file     Physically abused: Not on file     Forced sexual activity: Not on file   Other Topics Concern     Parent/sibling w/ CABG, MI or angioplasty before 65F 55M? Not Asked   Social History Narrative    The patient has a 50 pk yr tobacco hx.  He has ongoing active use.  Alcohol use is 21 alcoholic drinks per week.  He has marijuana use.         He previously worked as .          The patient is .  Has 3 children.       Family History  Family  "History   Problem Relation Age of Onset     Colon Polyps Other         no hx     Crohn's Disease Other         no hx     Ulcerative Colitis Other         no hx     Cancer - colorectal Other         no hx     Cancer Other         no hx     Cerebrovascular Disease Mother         multiple     Colon Cancer Other         uncle     Substance Abuse Sister      Substance Abuse Brother      Substance Abuse Sister      Myocardial Infarction Maternal Uncle      Anesthesia Reaction No family hx of      Deep Vein Thrombosis No family hx of          Preop Vitals    BP Readings from Last 3 Encounters:   09/30/20 (!) 149/92   10/23/19 (!) 149/94   06/26/18 138/86    Pulse Readings from Last 3 Encounters:   09/30/20 71   10/23/19 83   06/26/18 88      Resp Readings from Last 3 Encounters:   09/30/20 16   10/23/19 16   06/26/18 18    SpO2 Readings from Last 3 Encounters:   09/30/20 98%   10/23/19 96%   06/26/18 97%      Temp Readings from Last 1 Encounters:   09/30/20 97.7  F (36.5  C) (Oral)    Ht Readings from Last 1 Encounters:   09/30/20 1.854 m (6' 1\")      Wt Readings from Last 1 Encounters:   09/30/20 92.5 kg (204 lb)    Estimated body mass index is 26.91 kg/m  as calculated from the following:    Height as of this encounter: 1.854 m (6' 1\").    Weight as of this encounter: 92.5 kg (204 lb).        ROS/MED HX  The complete review of systems is negative other than noted in the HPI or here.  Patient denies recent illness, fever and respiratory infection during past month.    ENT/Pulmonary: Comment: >50 pk yr hx    Has CPAP, but doesn't tolerate mask    (+)sleep apnea, tobacco use, Current use 1 packs/day  doesn't use CPAP , . .   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 : . . . :. . Previous cardiac testing Echodate:2013results:date: results: date: results: date: results:          METS/Exercise " "Tolerance: Comment: Activity significantly limited due to shoulder pain. Otherwise physically active w/ yard work, fishing, outdoor activities 4 - Raking leaves, gardening   Hematologic: Comments: On coumadin, managed by PCP    Factor V deficiency, extensive blood clot history, s/p B/L LE stents.     DVT following his knee replacement in 6/2019 requiring thrombectomy @ [facility].    (+) History of blood clots pt is anticoagulated, -     (-) History of Transfusion   Musculoskeletal: Comment: Severe right shoulder pain, rotator injury    S/P left knee arthroplasty 7/2019  S/P right knee arthroscopy          GI/Hepatic: Comment: Fatty liver, non-alcoholic    Drinks 3-4 beers/day    Advanced colonic polyps, s/p total colectomy 2012       (-) GERD   Renal/Genitourinary:  - ROS Renal section negative       Endo: Comment: Had cortisone injection in shoulder     (-) Type II DM   Psychiatric:     (+) psychiatric history anxiety      Infectious Disease:  - neg infectious disease ROS       Malignancy:      - no malignancy   Other:    (+) H/O Chronic Pain,               PHYSICAL EXAM:   Mental Status/Neuro: A/A/O; Age Appropriate   Airway: Facies: Feasible  Mallampati: I  Mouth/Opening: Full  TM distance: > 6 cm  Neck ROM: Full   Respiratory: Auscultation: CTAB (Single expiratory wheeze LLL noted)     Resp. Rate: Normal     Resp. Effort: Normal      CV: Rhythm: Regular  Rate: Age appropriate  Heart: Normal Sounds  Edema: None   Comments:      Dental: Dentures  Dentures: Upper; Complete            Temp: 97.7  F (36.5  C) Temp src: Oral BP: (!) 149/92 Pulse: 71   Resp: 16 SpO2: 98 %         204 lbs 0 oz  6' 1\"[pt reported[   Body mass index is 26.91 kg/m .    Physical Exam  Constitutional: Awake, alert, cooperative, no apparent distress, and appears stated age.  Eyes: Pupils equal, round and reactive to light, extra ocular muscles intact, sclera clear, conjunctiva normal.  HENT: Normocephalic, oral pharynx with moist mucus " membranes, good dentition. No goiter appreciated. Upper denture in place.  Respiratory: Clear to auscultation bilaterally, Single expiratory wheeze LLL noted. No SOB when supine.  Cardiovascular: Regular rate and rhythm, normal S1 and S2, and no murmur noted.  Carotids +2, no bruits. No edema. Palpable pulses to radial arteries; trace palpable pulses to DP arteries.   GI: Normal bowel sounds, soft, non-distended, non-tender, no masses palpated.    Lymph/Hematologic: No cervical lymphadenopathy and no supraclavicular lymphadenopathy.  Genitourinary:  deferred  Skin: Warm and dry.  No rashes.   Musculoskeletal: Mildly limited extension ROM of neck. There is no redness, warmth, or swelling of the joints. Gross motor strength is normal.    Neurologic: Awake, alert, oriented to name, place and time. Cranial nerves II-XII are grossly intact. Gait is normal. Ambulates from chair to exam table, seats self, lies supine and sits back up w/o assistance.  Neuropsychiatric: Calm, cooperative. Normal affect. Pleasant. Answers questions appropriately, follows commands w/o difficulty.    PRIOR LABS/DIAGNOSTIC STUDIES:  All labs and imaging personally reviewed    MR SHOULDER RIGHT WO 7/24/2020  IMPRESSION:  1.  Moderate-sized full-thickness tear of the myotendinous junction of the  supraspinatus tendon with moderate proximal tendon retraction and marked  supraspinatus muscle atrophy.    2.  Marked infraspinatus tendinopathy with a small full-thickness tear of the  posterior portion of the distal tendon.    3.  Tendinopathy in the proximal intra-articular portion long head biceps  tendon, which is slightly medially subluxed but not frankly dislocated.    4.  Moderate subacromial subdeltoid bursitis.    5.  Moderate subscapularis tendinopathy without tearing.      Bilateral lower extremity resting ankle brachial indices 5/16/2018  Impression:    Right leg: Resting NYDIA is 0.80. Abnormal, 0.41-0.90 (Increased  cardiovascular risk along  with mild to moderate PVD). PVR analysis  suggests femoropopliteal disease.     Left leg: Resting NYDIA is 1.19, which is normal. However, based on PVR  waveform analysis cannot exclude possibility of mild inflow disease.    Duplex ultrasound of both lower extremity arteries 5/16/2018  Impression:   1. Right leg: No flow visualized within the stent in the distal right  SFA and popliteal artery, with collaterals visualized above and below  the stent, with poststenotic waveforms in the lower leg.  2. Left leg: Stent in the left SFA and proximal popliteal artery is  patent.    ECHO LIMITED WO CONTRAST 08/12/2013  Final Conclusion   1)  Normal global left ventricular systolic function without regional wall motion abnormality.    Estimated ejection fraction ~ 55-60%     2)  No significant trans-valvular flow abnormality demonstrated   Estimated EF: 55-60%   FINDINGS   Left Ventricle Normal left ventricular size. Normal left ventricular systolic function. No   regional wall motion abnormalities.   Normal left ventricular wall thickness. The ejection fraction is visually estimated at 55-60%.   Right Ventricle The right ventricle is normal in size and function.   Left Atrium The left atrium is normal in size.   Right Atrium The right atrium is normal in size.   Aortic Valve Mildly sclerotic stenosis or regurgitation.   Mitral Valve Structurally normal mitral valve without significant mitral regurgitation.   Tricuspid Valve Structurally normal tricuspid valve without significant tricuspid   regurgitation.   Pulmonic Valve Not well seen   Aorta Aortic root and proximal ascending aorta are normal in size.   Pericardium No pericardial effusion      Labs today: (personally reviewed)  CMP  COVID19 testing scheduled for 10/5/20    Sodium  133 - 144 mmol/L  138        Potassium  3.4 - 5.3 mmol/L  4.7       Chloride  94 - 109 mmol/L  108       Carbon Dioxide  20 - 32 mmol/L  26       Anion Gap  3 - 14 mmol/L  4       Glucose  70 - 99  mg/dL  101High         Urea Nitrogen  7 - 30 mg/dL  11       Creatinine  0.66 - 1.25 mg/dL  0.98       GFR Estimate  >60 mL/min/  78     Comment: Non  GFR Calc   Starting 12/18/2018, serum creatinine based estimated GFR (eGFR) will be   calculated using the Chronic Kidney Disease Epidemiology Collaboration   (CKD-EPI) equation.      GFR Estimate If Black  >60 mL/min/  >90     Comment:  GFR Calc   Starting 12/18/2018, serum creatinine based estimated GFR (eGFR) will be   calculated using the Chronic Kidney Disease Epidemiology Collaboration   (CKD-EPI) equation.      Calcium  8.5 - 10.1 mg/dL  9.1       Bilirubin Total  0.2 - 1.3 mg/dL  0.2       Albumin  3.4 - 5.0 g/dL  3.7       Protein Total  6.8 - 8.8 g/dL  7.6       Alkaline Phosphatase  40 - 150 U/L  74       ALT  0 - 70 U/L  27       AST  0 - 45 U/L  24          Outside records reviewed from: Care Everywhere    ASSESSMENT and PLAN  Kannan Wolf is a 70 year old male scheduled to undergo Arthroscopic rotator cuff repair, arthroscopic subacromial decompression, biceps tenotomy with Ryan Good MD on 10/8/20 at [facility] for treatment of Right shoulder pain, unspecified chronicity.     Pt has had prior anesthetic.     No history of anesthetic complications    He has the following specific operative considerations:   # VTE risk: 3%  # Risk of PONV score = 1.  If > 2, anti-emetic intervention recommended.  # Anesthesia considerations:  Refer to PAC assessment in anesthesia records      CARDIAC: METS 4,  Activity significantly limited due to shoulder pain. Otherwise physically active w/ yard work, fishing, outdoor activities, hunting     # RCRI : No serious cardiac risks.  0.4% risk of major adverse cardiac event.     #  HTN, untreated. BP today 149/92.      #  PVD, s/p multiple stents B/L LEs      PULMONARY:     # GINGER, prescribed CPAP but doesn't use    # Current smoker, >50 pk yr hx, smokes 1  pk/day    # Denies asthma or inhaler use    # Single LLL expiratory wheeze on exam. Will order Duoneb for DOS    # PFT 2014: mild obstruction    # Scheduled for routine chest CT on 10/7/20 for lung CA screening due to smoking hx    GI:     # Denies GERD     # Hx advanced colonic polyps, s/p total colectomy 2012    # Non-ETOH fatty liver, LFTs WNL today    # Drinks 3-4 beers/day     ENDO: BMI 26    # No DM    HEME:   # On ASA 81 mg, will continue  # On warfarin, will hold 5d prior and bridge w/ Lovenox. To be managed through PCP.  # Factor V Leiden, extensive hx DVTs, s/p multiple stents in LEs.  Had DVT 6/2019 following knee surgery @ [facility], transferred to [facility] for thrombectomy (had bridged w/ Lovenox, but wasn't on ASA then). +Hx PE 2009.  # Will check INR on DOS    ORTHO:     # Limited extension ROM of neck, no TMJ    # Severe right shoulder pain, rotator injury    # S/P left knee arthroplasty 7/2019    # S/P right knee arthroscopy       Patient was discussed with Dr Bates. Also discussed w/ Dr. Ramos. Patient is optimized and is acceptable candidate for the proposed procedure @ [facility]. No further diagnostic evaluation is needed.      Arrival time, NPO, shower and medication instructions provided by nursing staff today.  Preparing For Your Surgery handout given.      Ermelinda Doty PA-C  Preoperative Assessment Center  [facility]  Clinic and Surgery Center  Phone: [phone]  Fax: [phone]

## 2020-09-30 NOTE — ANESTHESIA PREPROCEDURE EVALUATION
Anesthesia Pre-Procedure Evaluation    Patient: Kannan Cisneros Cudd   MRN:     [id number] Gender:   male   Age:    70 year old :      1950        Preoperative Diagnosis: Right shoulder pain, unspecified chronicity [M25.511]   Procedure(s):  Arthroscopic rotator cuff repair, arthroscopic subacromial decompression, biceps tenotomy     LABS:  CBC:   Lab Results   Component Value Date    WBC 9.6 2016    WBC 11.7 (H) 2015    HGB 15.4 2016    HGB 15.1 2015    HCT 45.3 2016    HCT 43.8 2015     2016     2015     BMP:   Lab Results   Component Value Date     2016     2015    POTASSIUM 4.6 2016    POTASSIUM 4.0 2015    CHLORIDE 109 2016    CHLORIDE 110 (H) 2015    CO2 25 2016    CO2 22 2015    BUN 12 2016    BUN 13 2015    CR 0.94 2016    CR 0.89 2015     (H) 2016    GLC 93 2015     COAGS:   Lab Results   Component Value Date    PTT 37 07/15/2012    INR 3.08 (H) 2016    FIBR 499 (H) 07/15/2012     POC:   Lab Results   Component Value Date     (H) 2012     OTHER:   Lab Results   Component Value Date    SUDARSHAN 8.8 2016    PHOS 3.7 2012    MAG 2.0 2012    ALBUMIN 3.4 2016    PROTTOTAL 7.1 2016    ALT 30 2016    AST 23 2016    ALKPHOS 64 2016    BILITOTAL 0.3 2016    LIPASE 99 2012    AMYLASE 59 2012        Preop Vitals    BP Readings from Last 3 Encounters:   20 (!) 149/92   10/23/19 (!) 149/94   18 138/86    Pulse Readings from Last 3 Encounters:   20 71   10/23/19 83   18 88      Resp Readings from Last 3 Encounters:   20 16   10/23/19 16   18 18    SpO2 Readings from Last 3 Encounters:   20 98%   10/23/19 96%   18 97%      Temp Readings from Last 1 Encounters:   20 97.7  F (36.5  C) (Oral)    Ht Readings from Last 1  "Encounters:   09/30/20 1.854 m (6' 1\")      Wt Readings from Last 1 Encounters:   09/30/20 92.5 kg (204 lb)    Estimated body mass index is 26.91 kg/m  as calculated from the following:    Height as of this encounter: 1.854 m (6' 1\").    Weight as of this encounter: 92.5 kg (204 lb).     LDA:  Peripheral IV 07/12/12 Right Upper forearm (Active)   Number of days: 3002       Peripheral IV 07/05/16 Right Upper forearm (Active)   Number of days: 1548       Arterial Sheath  (Active)   Number of days: 3002        Past Medical History:   Diagnosis Date     ACL tear      Arthritis     osteoarthritis     Blood clot in the legs     +PE 2009  also on vein from intestine     Blood clotting disorder (H) 2009     Chronic diarrhea     result of total colectomy     Factor V Leiden (H)      Head injury 1960    multiple     Hearing problem 2000     IBS (irritable bowel syndrome)      Liver disease 2013    Fatty liver     Migraines 1989    none for a few years     Personal history of colonic polyps 2009     Polyps, colonic      Pulmonary emboli (H) 2009     Skin disease 1970    eczema     Sleep apnea       Past Surgical History:   Procedure Laterality Date     AS TOTAL KNEE ARTHROPLASTY Left 06/2019    @ [facility]     C REPAIR CRUCIATE LIGAMENT,KNEE       bilateral knees     LAPAROSCOPIC ASSISTED COLECTOMY  5/9/2012    Procedure:LAPAROSCOPIC ASSISTED COLECTOMY; Hand Assisted Laparoscopic Total Abdominal Colectomy Ileorectal anastomosis. ; Surgeon:COLLINS JAVIER; Location:[facility] OR     SIGMOIDOSCOPY FLEXIBLE  5/21/2012    Procedure:SIGMOIDOSCOPY FLEXIBLE; Surgeon:EMMANUEL LEDBETTER; Location:[facility]     THROMBECTOMY LOWER EXTREMITY Left 06/2019    @ [facility] following knee arthroplasty @ [facility]     VASCULAR SURGERY      Has multiple femoral arterial stents      No Known Allergies     Anesthesia Evaluation     . Pt has had prior anesthetic.     No history of anesthetic complications          ROS/MED HX    ENT/Pulmonary: Comment: >50 pk yr hx    Has " CPAP, but doesn't tolerate mask    (+)sleep apnea, tobacco use, Current use 1 packs/day  doesn't use CPAP , . .   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 : . . . :. . Previous cardiac testing Echodate:2013results:date: results: date: results: date: results:          METS/Exercise Tolerance: Comment: Activity significantly limited due to shoulder pain. Otherwise physically active w/ yard work, fishing, outdoor activities 4 - Raking leaves, gardening   Hematologic: Comments: On coumadin, managed by PCP    Factor V deficiency, extensive blood clot history, s/p B/L LE stents.     DVT following his knee replacement in 6/2019 requiring thrombectomy @ [facility].    (+) History of blood clots pt is anticoagulated, -     (-) History of Transfusion   Musculoskeletal: Comment: Severe right shoulder pain, rotator injury    S/P left knee arthroplasty 7/2019  S/P right knee arthroscopy          GI/Hepatic: Comment: Fatty liver, non-alcoholic    Drinks 3-4 beers/day    Advanced colonic polyps, s/p total colectomy 2012       (-) GERD   Renal/Genitourinary:  - ROS Renal section negative       Endo: Comment: Had cortisone injection in shoulder     (-) Type II DM   Psychiatric:     (+) psychiatric history anxiety      Infectious Disease:  - neg infectious disease ROS       Malignancy:      - no malignancy   Other:    (+) H/O Chronic Pain,                       PHYSICAL EXAM:   Mental Status/Neuro: A/A/O; Age Appropriate   Airway: Facies: Feasible  Mallampati: I  Mouth/Opening: Full  TM distance: > 6 cm  Neck ROM: Full   Respiratory: Auscultation: CTAB (Single expiratory wheeze LLL noted)     Resp. Rate: Normal     Resp. Effort: Normal      CV: Rhythm: Regular  Rate: Age appropriate  Heart: Normal Sounds  Edema: None   Comments:      Dental: Dentures  Dentures: Upper; Complete                Assessment:    ASA SCORE: 3    H&P: History and physical reviewed and following examination; no interval change.     Smoking Status:  Active Smoker       - patient smoked on day of surgery       - instructed to abstain from smoking on day of procedure       - Patient was counseled regarding increased Infection Risk with same day smoking.   NPO Status: NPO Appropriate     Plan:   Anes. Type:  MAC; Peripheral Nerve Block; For Post-op pain in coordination with surgeon     Block Details: Single Shot; Interscalene   Pre-Medication: None   Induction:  N/a   Airway: Native Airway   Access/Monitoring: PIV   Maintenance: N/a     Postop Plan:   Postop Pain: None  Postop Sedation/Airway: Not planned  Disposition: Outpatient     PONV Management:    Prevention: Ondansetron, Dexamethasone     CONSENT: Direct conversation   Plan and risks discussed with: Patient          Comments for Plan/Consent:  Patient was informed of my disclosures, the potential for being prescribed a medication for a company that I consult with and earn income from, and that this complies with [facility] policies.                 PAC Discussion and Assessment    ASA Classification: 3  Case is suitable for: ASC  Anesthetic techniques and relevant risks discussed: MAC with GA as backup and [facility]  Invasive monitoring and risk discussed: No  Types:   Possibility and Risk of blood transfusion discussed: No  NPO instructions given:   Additional anesthetic preparation and risks discussed:   Needs early admission to pre-op area:   Other:     PAC Resident/NP Anesthesia Assessment:  Kannan Wolf is a 70 year old male scheduled to undergo Arthroscopic rotator cuff repair, arthroscopic subacromial decompression, biceps tenotomy with Ryan Good MD on 10/8/20 at [facility] for treatment of Right shoulder pain, unspecified chronicity.     Pt has had prior anesthetic.     No history of anesthetic complications    He has the following  specific operative considerations:   # VTE risk: 3%  # Risk of PONV score = 1.  If > 2, anti-emetic intervention recommended.  # Anesthesia considerations:  Refer to PAC assessment in anesthesia records      CARDIAC: METS 4,  Activity significantly limited due to shoulder pain. Otherwise physically active w/ yard work, fishing, outdoor activities, hunting     # RCRI : No serious cardiac risks.  0.4% risk of major adverse cardiac event.     #  HTN, untreated. BP today 149/92.      #  PVD, s/p multiple stents B/L LEs      PULMONARY:     # GINGER, prescribed CPAP but doesn't use    # Current smoker, >50 pk yr hx, smokes 1 pk/day    # Denies asthma or inhaler use    # Single LLL expiratory wheeze on exam. Will order Duoneb for DOS    # PFT 2014: mild obstruction    # Scheduled for routine chest CT on 10/7/20 for lung CA screening due to smoking hx    GI:     # Denies GERD     # Hx advanced colonic polyps, s/p total colectomy 2012    # Non-ETOH fatty liver, LFTs WNL today    # Drinks 3-4 beers/day     ENDO: BMI 26    # No DM    HEME:   # On ASA 81 mg, will continue  # On warfarin, will hold 5d prior and bridge w/ Lovenox. To be managed through PCP.  # Factor V Leiden, extensive hx DVTs, s/p multiple stents in LEs.  Had DVT 6/2019 following knee surgery @ [facility], transferred to [facility] for thrombectomy (had bridged w/ Lovenox, but wasn't on ASA then). +Hx PE 2009.  # Will check INR on DOS    ORTHO:     # Limited extension ROM of neck, no TMJ    # Severe right shoulder pain, rotator injury    # S/P left knee arthroplasty 7/2019    # S/P right knee arthroscopy       Patient was discussed with Dr Bates. Also discussed w/ Dr. Ramos. Patient is optimized and is acceptable candidate [place] [facility] No further diagnostic evaluation is needed.        Reviewed and Signed by PAC Mid-Level Provider/Resident  Mid-Level Provider/Resident: Ermelinda Doty PA-C  Date: 9/30/20  Time: 7261    Attending Anesthesiologist  Anesthesia Assessment:        Anesthesiologist:   Date:   Time:   Pass/Fail:   Disposition:     PAC Pharmacist Assessment:        Pharmacist:   Date:   Time:    Ermelinda Doty PA-C

## 2020-09-30 NOTE — PATIENT INSTRUCTIONS
Preparing for Your Surgery      Name:  Kannan Wolf   MRN:  [id number]   :  1950   Today's Date:  2020       Arriving for surgery:  Surgery date:  10/8/20  Arrival time:  08:30 AM    Restrictions due to COVID 19:  Patients are allowed one visitor in the pre-op period  All visitors must wear a mask  No visitors under 18  No ill visitors   parking is not available     Please come to:   [facility]  [address] [place] [id number]    -  Proceed to the 5th floor to check into the Ambulatory Surgery Center.              >> There will be patient concierges on the 1st and 5th floor, for assistance or an escort, if you would like.              >> Please call [phone] with any questions.    What can I eat or drink?  -  You may eat and drink normally for up to 8 hours before your surgery.   -  You may have clear liquids until 4 hours before surgery.   Examples of clear liquids:  Water  Clear broth  Juices (apple, white grape, white cranberry  and cider) without pulp  Noncarbonated, powder based beverages  (lemonade and Maurizio-Aid)  Sodas (Sprite, 7-Up, ginger ale and seltzer)  Coffee or tea (without milk or cream)  Gatorade    -  No Alcohol for at least 24 hours before surgery     Which medicines can I take?      Hold Multivitamins for 7 days before surgery.  Hold Supplements for 7 days before surgery.  Hold Ibuprofen (Advil, Motrin) for 1 day before surgery--unless otherwise directed by surgeon.  Hold Naproxen (Aleve) for 4 days before surgery.  FOLLOW INSTRUCTIONS FOR HOLDING COUMADIN (WARFARIN) BEFORE SURGERY.  -  PLEASE TAKE these medications the day of surgery:  Tylenol if needed; take all other scheduled medications.    How do I prepare myself?  - Please take 2 showers before surgery using Scrubcare or Hibiclens soap.    Use this soap only from the neck to your toes.     Leave the soap on your skin for one minute--then rinse thoroughly.      You may use your  own shampoo and conditioner; no other hair products.   - Please remove all jewelry and body piercings.  - No lotions, deodorants or fragrance.  - No makeup or fingernail polish.   - Bring your ID and insurance card.    - All patients are required to have a Covid-19 test within 4 days of surgery/procedure.      -Patients will be contacted by the [facility] scheduling team within 1 week of surgery to make an appointment.      - Patients may call the Scheduling team at [phone] if they have not been scheduled within 4 days of  surgery.      ALL PATIENTS GOING HOME THE SAME DAY OF SURGERY ARE REQUIRED TO HAVE A RESPONSIBLE ADULT TO DRIVE AND BE IN ATTENDANCE WITH THEM FOR 24 HOURS FOLLOWING SURGERY     Questions or Concerns:    - For any questions regarding the day of surgery or your hospital stay, please contact the Pre Admission Nursing Office at [phone].       - If you have health changes between today and your surgery please call your surgeon.       For questions after surgery please call your surgeons office.

## 2020-10-01 ENCOUNTER — COMMUNICATION - RIVER FALLS (OUTPATIENT)
Dept: FAMILY MEDICINE | Facility: CLINIC | Age: 70
End: 2020-10-01
Payer: COMMERCIAL

## 2020-10-05 ENCOUNTER — AMBULATORY - HEALTHEAST (OUTPATIENT)
Dept: FAMILY MEDICINE | Facility: CLINIC | Age: 70
End: 2020-10-05

## 2020-10-05 DIAGNOSIS — Z11.59 ENCOUNTER FOR SCREENING FOR OTHER VIRAL DISEASES: ICD-10-CM

## 2020-10-07 ENCOUNTER — ANCILLARY PROCEDURE (OUTPATIENT)
Dept: CT IMAGING | Facility: CLINIC | Age: 70
End: 2020-10-07
Attending: CLINICAL NURSE SPECIALIST
Payer: COMMERCIAL

## 2020-10-07 ENCOUNTER — VIRTUAL VISIT (OUTPATIENT)
Dept: SURGERY | Facility: CLINIC | Age: 70
End: 2020-10-07
Attending: CLINICAL NURSE SPECIALIST
Payer: COMMERCIAL

## 2020-10-07 DIAGNOSIS — R91.1 LUNG NODULE: Primary | ICD-10-CM

## 2020-10-07 DIAGNOSIS — Z87.891 PERSONAL HISTORY OF NICOTINE DEPENDENCE: ICD-10-CM

## 2020-10-07 DIAGNOSIS — Z87.891 PERSONAL HISTORY OF TOBACCO USE: ICD-10-CM

## 2020-10-07 DIAGNOSIS — R91.1 LUNG NODULE: ICD-10-CM

## 2020-10-07 PROCEDURE — 999N001193 HC VIDEO/TELEPHONE VISIT; NO CHARGE

## 2020-10-07 PROCEDURE — 99212 OFFICE O/P EST SF 10 MIN: CPT | Mod: TEL | Performed by: CLINICAL NURSE SPECIALIST

## 2020-10-07 PROCEDURE — G0297 LDCT FOR LUNG CA SCREEN: HCPCS | Mod: GC | Performed by: RADIOLOGY

## 2020-10-07 NOTE — LETTER
"    10/7/2020         RE: Kannan Wolf  [address] [id number]        Dear Colleague,    Thank you for referring your patient, Kannan Wolf, to the [facility]. Please see a copy of my visit note below.    Kannan Wolf is a 70 year old male who is being evaluated via a billable telephone visit.      The patient has been notified of following:     \"This telephone visit will be conducted via a call between you and your physician/provider. We have found that certain health care needs can be provided without the need for a physical exam.  This service lets us provide the care you need with a short phone conversation.  If a prescription is necessary we can send it directly to your pharmacy.  If lab work is needed we can place an order for that and you can then stop by our lab to have the test done at a later time.    Telephone visits are billed at different rates depending on your insurance coverage. During this emergency period, for some insurers they may be billed the same as an in-person visit.  Please reach out to your insurance provider with any questions.    If during the course of the call the physician/provider feels a telephone visit is not appropriate, you will not be charged for this service.\"    Patient has given verbal consent for Telephone visit?  Yes    What phone number would you like to be contacted at? [phone]    How would you like to obtain your AVS? [place]      I have reviewed and updated the patient's allergies and medication list. Patient was asked if they had any patient reported vital signs to present, if yes, please see documented vitals.  Patient was also asked for their current weight and height, if presented, documented in vitals.      Concerns: No concerns    Refills: No refills       Lexie Phillips CMA ([facility])    THORACIC SURGERY FOLLOW UP VISIT      I conducted a telephone visit with Mr. Kannan Wolf in follow-up " today. The clinical summary follows:     PRIMARY COMPLAINT   Lung nodules    INTERVAL STUDIES  CT chest-final report pending at the time of our phone visit however, I reviewed the images and it appears stable compared to 10/23/2019.    ETOH 3-4 light beers per day  TOB current smoker, 1 PPD, 50 pack years    SUBJECTIVE  Иван is doing well. He has no respiratory complaints. He is scheduled for rotator cuff surgery tomorrow at the [facility] [place] [place].    IMPRESSION (R91.1) Lung nodule  (primary encounter diagnosis)  Comment: Lung Cancer Screening  Plan: annual LCSCT 10/2021    70 year-old male with lung nodules and is a current 1 PPD smoker. His visit today is for annual lung cancer screening.     His CT appears stable compared to 10/2019 however, the final report is not yet available. If there are noted changes on the final radiology report, I will call him back with this updated information, otherwise I will release his CT results to his My Chart.    PLAN  I spent a total of 10 minutes speaking with Mr. Kannan Wolf, more than 50% of which were spent in counseling and coordination of care. I reviewed the plan as follows:  Annual lung cancer screening CT  Recommend smoking cessation  Necessary Tests & Appointments: lung cancer screening CT in 1 year  Pain Control Plan: NA  Anticoagulation Plan: Per PCP  Smoking Cessation: not interested at this time    All questions were answered and the patient is in agreement with the plan.  I appreciate the opportunity to participate in the care of your patient and will keep you updated.  Sincerely,    Lung Cancer Screening Shared Decision Making Visit     Kannan Wolf is eligible for lung cancer screening on the basis of the information provided in my signed lung cancer screening order.     I have discussed with patient the risks and benefits of screening for lung cancer with low-dose CT.     The risks include:  radiation exposure: one low dose chest CT has as much ionizing radiation  as about 15 chest x-rays or 6 months of background radiation living in [place]    false positives: 96% of positive findings/nodules are NOT cancer, but some might still require additional diagnostic evaluation, including biopsy  over-diagnosis: some slow growing cancers that might never have been clinically significant will be detected and treated unnecessarily     The benefit of early detection of lung cancer is contingent upon adherence to annual screening or more frequent follow up if indicated.     Furthermore, reaping the benefits of screening requires Kannan Cisneros Maryann to be willing and physically able to undergo diagnostic procedures, if indicated. Although no specific guide is available for determining severity of comorbidities, it is reasonable to withhold screening in patients who have greater mortality risk from other diseases.     We did discuss that the only way to prevent lung cancer is to not smoke. Smoking cessation counseling was not given.  Patient not interested in cessation at this time.    I did not offer risk estimation using a calculator such as this one:    ShouldIScreen      Again, thank you for allowing me to participate in the care of your patient.        Sincerely,        GENE Jones CNS

## 2020-10-07 NOTE — PATIENT INSTRUCTIONS

## 2020-10-08 ENCOUNTER — COMMUNICATION - HEALTHEAST (OUTPATIENT)
Dept: SCHEDULING | Facility: CLINIC | Age: 70
End: 2020-10-08

## 2020-10-08 ENCOUNTER — ANESTHESIA (OUTPATIENT)
Dept: SURGERY | Facility: AMBULATORY SURGERY CENTER | Age: 70
End: 2020-10-08

## 2020-10-08 ENCOUNTER — HOSPITAL ENCOUNTER (OUTPATIENT)
Facility: AMBULATORY SURGERY CENTER | Age: 70
Discharge: HOME | End: 2020-10-08
Attending: ORTHOPAEDIC SURGERY | Admitting: ORTHOPAEDIC SURGERY
Payer: COMMERCIAL

## 2020-10-08 VITALS
RESPIRATION RATE: 18 BRPM | HEART RATE: 76 BPM | BODY MASS INDEX: 27.04 KG/M2 | SYSTOLIC BLOOD PRESSURE: 101 MMHG | WEIGHT: 204 LBS | TEMPERATURE: 97.9 F | HEIGHT: 73 IN | DIASTOLIC BLOOD PRESSURE: 69 MMHG | OXYGEN SATURATION: 96 %

## 2020-10-08 DIAGNOSIS — M25.511 RIGHT SHOULDER PAIN, UNSPECIFIED CHRONICITY: ICD-10-CM

## 2020-10-08 LAB — INR PPP: 0.99 (ref 0.86–1.14)

## 2020-10-08 PROCEDURE — 29823 SHO ARTHRS SRG XTNSV DBRDMT: CPT | Mod: RT | Performed by: ORTHOPAEDIC SURGERY

## 2020-10-08 PROCEDURE — 29827 SHO ARTHRS SRG RT8TR CUF RPR: CPT | Mod: RT

## 2020-10-08 PROCEDURE — 29827 SHO ARTHRS SRG RT8TR CUF RPR: CPT | Mod: RT | Performed by: ORTHOPAEDIC SURGERY

## 2020-10-08 PROCEDURE — 29826 SHO ARTHRS SRG DECOMPRESSION: CPT | Mod: RT | Performed by: ORTHOPAEDIC SURGERY

## 2020-10-08 PROCEDURE — 29826 SHO ARTHRS SRG DECOMPRESSION: CPT | Mod: RT

## 2020-10-08 PROCEDURE — 29823 SHO ARTHRS SRG XTNSV DBRDMT: CPT | Mod: RT

## 2020-10-08 DEVICE — IMP ANCHOR ARTHREX BIO-SWIVELOCK 4.75X22MM AR-2324BCC-2: Type: IMPLANTABLE DEVICE | Site: SHOULDER | Status: FUNCTIONAL

## 2020-10-08 RX ORDER — ONDANSETRON 2 MG/ML
INJECTION INTRAMUSCULAR; INTRAVENOUS PRN
Status: DISCONTINUED | OUTPATIENT
Start: 2020-10-08 | End: 2020-10-08

## 2020-10-08 RX ORDER — IBUPROFEN 800 MG/1
800 TABLET, FILM COATED ORAL
Qty: 42 TABLET | Refills: 0 | Status: CANCELLED | OUTPATIENT
Start: 2020-10-08

## 2020-10-08 RX ORDER — DEXAMETHASONE SODIUM PHOSPHATE 4 MG/ML
INJECTION, SOLUTION INTRA-ARTICULAR; INTRALESIONAL; INTRAMUSCULAR; INTRAVENOUS; SOFT TISSUE PRN
Status: DISCONTINUED | OUTPATIENT
Start: 2020-10-08 | End: 2020-10-08

## 2020-10-08 RX ORDER — MEPERIDINE HYDROCHLORIDE 25 MG/ML
12.5 INJECTION INTRAMUSCULAR; INTRAVENOUS; SUBCUTANEOUS
Status: DISCONTINUED | OUTPATIENT
Start: 2020-10-08 | End: 2020-10-09 | Stop reason: HOSPADM

## 2020-10-08 RX ORDER — ALBUTEROL SULFATE 90 UG/1
2 AEROSOL, METERED RESPIRATORY (INHALATION) EVERY 6 HOURS PRN
COMMUNITY
End: 2022-05-31

## 2020-10-08 RX ORDER — OXYCODONE HYDROCHLORIDE 5 MG/1
5 TABLET ORAL EVERY 6 HOURS PRN
Qty: 40 TABLET | Refills: 0 | Status: CANCELLED | OUTPATIENT
Start: 2020-10-08

## 2020-10-08 RX ORDER — BUPIVACAINE HYDROCHLORIDE 2.5 MG/ML
INJECTION, SOLUTION INFILTRATION; PERINEURAL PRN
Status: DISCONTINUED | OUTPATIENT
Start: 2020-10-08 | End: 2020-10-08 | Stop reason: HOSPADM

## 2020-10-08 RX ORDER — OMEGA-3 FATTY ACIDS/FISH OIL 300-1000MG
CAPSULE ORAL
Qty: 45 CAPSULE | Refills: 0 | Status: SHIPPED | OUTPATIENT
Start: 2020-10-08 | End: 2022-05-31

## 2020-10-08 RX ORDER — FENTANYL CITRATE 50 UG/ML
25-50 INJECTION, SOLUTION INTRAMUSCULAR; INTRAVENOUS
Status: DISCONTINUED | OUTPATIENT
Start: 2020-10-08 | End: 2020-10-09 | Stop reason: HOSPADM

## 2020-10-08 RX ORDER — SODIUM CHLORIDE, SODIUM LACTATE, POTASSIUM CHLORIDE, CALCIUM CHLORIDE 600; 310; 30; 20 MG/100ML; MG/100ML; MG/100ML; MG/100ML
INJECTION, SOLUTION INTRAVENOUS CONTINUOUS
Status: DISCONTINUED | OUTPATIENT
Start: 2020-10-08 | End: 2020-10-09 | Stop reason: HOSPADM

## 2020-10-08 RX ORDER — ONDANSETRON 4 MG/1
4 TABLET, ORALLY DISINTEGRATING ORAL EVERY 30 MIN PRN
Status: DISCONTINUED | OUTPATIENT
Start: 2020-10-08 | End: 2020-10-09 | Stop reason: HOSPADM

## 2020-10-08 RX ORDER — IPRATROPIUM BROMIDE AND ALBUTEROL SULFATE 2.5; .5 MG/3ML; MG/3ML
3 SOLUTION RESPIRATORY (INHALATION) ONCE
Status: DISCONTINUED | OUTPATIENT
Start: 2020-10-08 | End: 2020-10-09 | Stop reason: HOSPADM

## 2020-10-08 RX ORDER — NALOXONE HYDROCHLORIDE 0.4 MG/ML
.1-.4 INJECTION, SOLUTION INTRAMUSCULAR; INTRAVENOUS; SUBCUTANEOUS
Status: DISCONTINUED | OUTPATIENT
Start: 2020-10-08 | End: 2020-10-09 | Stop reason: HOSPADM

## 2020-10-08 RX ORDER — GABAPENTIN 300 MG/1
300 CAPSULE ORAL ONCE
Status: COMPLETED | OUTPATIENT
Start: 2020-10-08 | End: 2020-10-08

## 2020-10-08 RX ORDER — GLYCOPYRROLATE 0.2 MG/ML
INJECTION, SOLUTION INTRAMUSCULAR; INTRAVENOUS PRN
Status: DISCONTINUED | OUTPATIENT
Start: 2020-10-08 | End: 2020-10-08

## 2020-10-08 RX ORDER — PROPOFOL 10 MG/ML
INJECTION, EMULSION INTRAVENOUS CONTINUOUS PRN
Status: DISCONTINUED | OUTPATIENT
Start: 2020-10-08 | End: 2020-10-08

## 2020-10-08 RX ORDER — CEFAZOLIN SODIUM 1 G/50ML
1 SOLUTION INTRAVENOUS SEE ADMIN INSTRUCTIONS
Status: DISCONTINUED | OUTPATIENT
Start: 2020-10-08 | End: 2020-10-09 | Stop reason: HOSPADM

## 2020-10-08 RX ORDER — ONDANSETRON 2 MG/ML
4 INJECTION INTRAMUSCULAR; INTRAVENOUS EVERY 30 MIN PRN
Status: DISCONTINUED | OUTPATIENT
Start: 2020-10-08 | End: 2020-10-09 | Stop reason: HOSPADM

## 2020-10-08 RX ORDER — CEFAZOLIN SODIUM 2 G/50ML
2 SOLUTION INTRAVENOUS
Status: COMPLETED | OUTPATIENT
Start: 2020-10-08 | End: 2020-10-08

## 2020-10-08 RX ORDER — BUPIVACAINE HYDROCHLORIDE 5 MG/ML
INJECTION, SOLUTION EPIDURAL; INTRACAUDAL PRN
Status: DISCONTINUED | OUTPATIENT
Start: 2020-10-08 | End: 2020-10-08

## 2020-10-08 RX ORDER — ACETAMINOPHEN 325 MG/1
975 TABLET ORAL ONCE
Status: COMPLETED | OUTPATIENT
Start: 2020-10-08 | End: 2020-10-08

## 2020-10-08 RX ORDER — KETAMINE HYDROCHLORIDE 10 MG/ML
INJECTION INTRAMUSCULAR; INTRAVENOUS PRN
Status: DISCONTINUED | OUTPATIENT
Start: 2020-10-08 | End: 2020-10-08

## 2020-10-08 RX ORDER — OXYCODONE HYDROCHLORIDE 5 MG/1
5-10 TABLET ORAL EVERY 4 HOURS PRN
Qty: 60 TABLET | Refills: 0 | Status: SHIPPED | OUTPATIENT
Start: 2020-10-08 | End: 2022-05-31

## 2020-10-08 RX ORDER — GABAPENTIN 300 MG/1
300 CAPSULE ORAL AT BEDTIME
Qty: 30 CAPSULE | Refills: 0 | Status: SHIPPED | OUTPATIENT
Start: 2020-10-08 | End: 2022-05-31

## 2020-10-08 RX ORDER — OXYCODONE HYDROCHLORIDE 5 MG/1
5 TABLET ORAL EVERY 4 HOURS PRN
Status: DISCONTINUED | OUTPATIENT
Start: 2020-10-08 | End: 2020-10-09 | Stop reason: HOSPADM

## 2020-10-08 RX ORDER — FLUMAZENIL 0.1 MG/ML
0.2 INJECTION, SOLUTION INTRAVENOUS
Status: DISCONTINUED | OUTPATIENT
Start: 2020-10-08 | End: 2020-10-09 | Stop reason: HOSPADM

## 2020-10-08 RX ORDER — LIDOCAINE 40 MG/G
CREAM TOPICAL
Status: DISCONTINUED | OUTPATIENT
Start: 2020-10-08 | End: 2020-10-09 | Stop reason: HOSPADM

## 2020-10-08 RX ORDER — HYDROXYZINE PAMOATE 25 MG/1
25 CAPSULE ORAL 3 TIMES DAILY PRN
Qty: 30 CAPSULE | Refills: 0 | Status: CANCELLED | OUTPATIENT
Start: 2020-10-08

## 2020-10-08 RX ORDER — LIDOCAINE HYDROCHLORIDE 20 MG/ML
INJECTION, SOLUTION INFILTRATION; PERINEURAL PRN
Status: DISCONTINUED | OUTPATIENT
Start: 2020-10-08 | End: 2020-10-08

## 2020-10-08 RX ADMIN — PROPOFOL: 10 INJECTION, EMULSION INTRAVENOUS at 11:53

## 2020-10-08 RX ADMIN — GABAPENTIN 300 MG: 300 CAPSULE ORAL at 08:06

## 2020-10-08 RX ADMIN — KETAMINE HYDROCHLORIDE 10 MG: 10 INJECTION INTRAMUSCULAR; INTRAVENOUS at 10:50

## 2020-10-08 RX ADMIN — KETAMINE HYDROCHLORIDE 3 MG: 10 INJECTION INTRAMUSCULAR; INTRAVENOUS at 11:01

## 2020-10-08 RX ADMIN — DEXAMETHASONE SODIUM PHOSPHATE 10 MG: 4 INJECTION, SOLUTION INTRA-ARTICULAR; INTRALESIONAL; INTRAMUSCULAR; INTRAVENOUS; SOFT TISSUE at 10:30

## 2020-10-08 RX ADMIN — PROPOFOL 200 MCG/KG/MIN: 10 INJECTION, EMULSION INTRAVENOUS at 10:33

## 2020-10-08 RX ADMIN — CEFAZOLIN SODIUM 2 G: 2 SOLUTION INTRAVENOUS at 10:45

## 2020-10-08 RX ADMIN — ACETAMINOPHEN 975 MG: 325 TABLET ORAL at 08:06

## 2020-10-08 RX ADMIN — LIDOCAINE HYDROCHLORIDE 100 MG: 20 INJECTION, SOLUTION INFILTRATION; PERINEURAL at 10:30

## 2020-10-08 RX ADMIN — ONDANSETRON 4 MG: 2 INJECTION INTRAMUSCULAR; INTRAVENOUS at 10:30

## 2020-10-08 RX ADMIN — BUPIVACAINE HYDROCHLORIDE 20 ML: 5 INJECTION, SOLUTION EPIDURAL; INTRACAUDAL at 09:33

## 2020-10-08 RX ADMIN — GLYCOPYRROLATE 0.2 MG: 0.2 INJECTION, SOLUTION INTRAMUSCULAR; INTRAVENOUS at 10:30

## 2020-10-08 RX ADMIN — KETAMINE HYDROCHLORIDE 10 MG: 10 INJECTION INTRAMUSCULAR; INTRAVENOUS at 10:43

## 2020-10-08 RX ADMIN — SODIUM CHLORIDE, SODIUM LACTATE, POTASSIUM CHLORIDE, CALCIUM CHLORIDE: 600; 310; 30; 20 INJECTION, SOLUTION INTRAVENOUS at 08:27

## 2020-10-08 ASSESSMENT — MIFFLIN-ST. JEOR: SCORE: 1744.09

## 2020-10-08 NOTE — ANESTHESIA POSTPROCEDURE EVALUATION
Anesthesia POST Procedure Evaluation    Patient: Kannan Cisneros Cudd   MRN:     [id number] Gender:   male   Age:    70 year old :      1950        Preoperative Diagnosis: Right shoulder pain, unspecified chronicity [M25.511]   Procedure(s):  right shoulder arthroscopic rotator cuff repair, arthroscopic subacromial decompression, biceps tenotomy   Postop Comments: No value filed.     Anesthesia Type: MAC, Peripheral Nerve Block, For Post-op pain in coordination with surgeon       Disposition: Outpatient   Postop Pain Control: Uneventful            Sign Out: Well controlled pain   PONV: No   Neuro/Psych: Uneventful            Sign Out: Acceptable/Baseline neuro status   Airway/Respiratory: Uneventful            Sign Out: Acceptable/Baseline resp. status   CV/Hemodynamics: Uneventful            Sign Out: Acceptable CV status   Other NRE: NONE   DID A NON-ROUTINE EVENT OCCUR? No         Last Anesthesia Record Vitals:  CRNA VITALS  10/8/2020 1151 - 10/8/2020 1251      10/8/2020             Pulse:  70    SpO2:  97 %    Resp Rate (set):  10          Last PACU Vitals:  Vitals Value Taken Time   /71 10/08/20 1223   Temp 36.6  C (97.9  F) 10/08/20 1223   Pulse 75 10/08/20 1223   Resp 16 10/08/20 1223   SpO2 95 % 10/08/20 1223   Temp src     NIBP     Pulse     SpO2     Resp     Temp     Ht Rate     Temp 2           Electronically Signed By: Torey Ramos MD, MD, 2020, 1:55 PM

## 2020-10-08 NOTE — BRIEF OP NOTE
[facility] Brief Operative Note    Pre-operative diagnosis: Right shoulder pain, unspecified chronicity [M25.511]   Post-operative diagnosis * No post-op diagnosis entered *  same   Procedure: Procedure(s):  right shoulder arthroscopic rotator cuff repair, arthroscopic subacromial decompression, biceps tenotomy   Surgeon(s): Surgeon(s) and Role:     * Ryan Good MD - Primary   Estimated blood loss: * No values recorded between 10/8/2020 10:50 AM and 10/8/2020 12:06 PM *    Specimens: * No specimens in log *   Findings: rct

## 2020-10-08 NOTE — OP NOTE
"Procedure Date: 10/08/2020      PREOPERATIVE DIAGNOSES:   1.  Right shoulder rotator cuff tear.   2.  Right shoulder biceps disease.   3.  Right shoulder subacromial bursitis.      POSTOPERATIVE DIAGNOSES:   1.  Right shoulder posterior interval-based entirety of the infraspinatus, posterior half of the supraspinatus full thickness retracted rotator cuff tear.   2.  Right shoulder complex labral tearing with biceps tendon involvement.   3.  Right shoulder subacromial bursitis with bony impingement.      SURGICAL PROCEDURES:   1.  Right shoulder arthroscopic rotator cuff repair.   2.  Right shoulder arthroscopic biceps tenotomy.   3.  Right shoulder arthroscopic subacromial decompression with bony acromioplasty.      ANESTHESIA:  Interscalene blockade plus sedation.      ESTIMATED BLOOD LOSS:  Less than 25 mL.      IMPLANTS:  Arthrex 4.75 mm double-loaded BioComposite SwiveLock anchor x2.   1.       INDICATIONS:  Mr. Wolf is a very pleasant 70-year-old male with a history of pain and disability in his shoulder.  He had a preoperative evaluation that was consistent with the above.  After discussion of risks and benefits of surgical versus nonsurgical management, he elected to proceed with surgical remediation.      DESCRIPTION OF PROCEDURE:  The patient was positively identified in the Pre-Anesthesia Care area, surgical site was initialed by me, and his consent was reviewed with him.  He was then taken to the operating theater where he was placed in a well-padded beachchair position, prepped and draped in the usual sterile fashion for right upper extremity surgery.      Prior to surgical initiation, a \"timeout\" was held in accordance with hospital policy.  Verbal verification of delivery of prophylactic intravenous antibiotics was performed.      After establishment of a posterior viewing portal, an internal portal was made under direct visualization.  Diagnostic arthroscopy was then possible with findings as follows: "  The upper portion of the subscapularis was intact.  The biceps was abnormal at its origin and frayed as it entered the bicipital groove.  The pouch was synovitic, but devoid of loose bodies.  Posterior cuff was torn and medialized.  The anterior half of the supraspinatus was still intact.  The articular surface of the humerus and the glenoid had grade 2 changes, but no evidence of full-thickness cartilage loss.      I tenotomized the biceps tendon and debrided it back to a stable rim.  I debrided the anterior, superior and posterior moshe.  I used an arthroscopic shaver to denude the contents of the rotator interval and obtained and maintained a meticulous hemostasis with Bovie electrocautery.      I turned my attention to the subacromial space.      Upon entering the subacromial space, massive and significant bursitis was encountered.  I preserved the CA ligament throughout but denuded the rest of the undersurface of the acromion of all soft tissues.  I viewed from an anterolateral portal and made a posterolateral portal under direct visualization.  I was unable to prepare the footprint with an arthroscopic curette and a shaver.  There was a full-thickness posterior interval-based rotator cuff tear involving the entirety of the infraspinatus and the posterior half of the supraspinatus.  There was a superficial and a deep leaflet.  The superficial wound was superiorly and anteriorly and the deep one was posteriorly.  I placed an inverted mattress and 2 luggage tag stitches into the remnant of the infraspinatus tendon and then passed 3 additional sutures into the superficial anterior leaflet, 1 luggage tag and 2 inverted mattresses.  I took the 6 strands from the deep leaflet into an inferolateral SwiveLock and then brought the superior leaflet into an anterior superior SwiveLock.  This effected an excellent reapproximation of the entirety of the rotator cuff back to its prepared bed.      Viewing from posteriorly,  I used a motorized bur to remove some of the bone from the undersurface of the acromion until a type 1 acromial morphology was created.  We verified from orthogonal views that this was the case.      All instruments were removed.  Closure was with 3-0 interrupted Monocryl sutures.  Steri-Strips and a sterile nonadherent dressing were applied.  A sling was placed.      POSTOPERATIVE PLAN:   1.  The patient will be discharged home today on oral analgesics.  He should have no active or passive range of motion at this time.   2.  At 2 weeks, he does not need to come as there is a pandemic and we will not be changing his postoperative plan at that time.   3.  At 6 weeks, he will begin gentle progressive 4-quadrant stretching (avoid sleeper stretch and cross-body adduction).   4.  At 8 weeks, his sling will be discontinued.   5.  At 3 months, he will have radiographs and begin unrestricted 4-quadrant stretching, periscapular stabilization and strengthening.         LAUREEN OCHOA MD             D: 10/08/2020   T: 10/08/2020   MT: al      Name:     WENDI BOWSER   MRN:      [id number]        Account:        [id number]   :      1950           Procedure Date: 10/08/2020      Document: [id number]

## 2020-10-08 NOTE — ANESTHESIA CARE TRANSFER NOTE
Patient: Kannan Wolf    Procedure(s):  right shoulder arthroscopic rotator cuff repair, arthroscopic subacromial decompression, biceps tenotomy    Diagnosis: Right shoulder pain, unspecified chronicity [M25.511]  Diagnosis Additional Information: No value filed.    Anesthesia Type:   MAC, Peripheral Nerve Block, For Post-op pain in coordination with surgeon     Note:  Airway :Room Air  Patient transferred to:Phase II  Handoff Report: Identifed the Patient, Identified the Reponsible Provider, Reviewed the pertinent medical history, Discussed the surgical course, Reviewed Intra-OP anesthesia mangement and issues during anesthesia, Set expectations for post-procedure period and Allowed opportunity for questions and acknowledgement of understanding      Vitals: (Last set prior to Anesthesia Care Transfer)    CRNA VITALS  10/8/2020 1151 - 10/8/2020 1224      10/8/2020             Pulse:  70    SpO2:  97 %    Resp Rate (set):  10                Electronically Signed By: GENE Fraga CRNA  October 8, 2020  12:24 PM

## 2020-10-08 NOTE — DISCHARGE INSTRUCTIONS
"  Today you received a block to numb the nerves near your surgery site.  This is a block using local anesthetic or \"numbing\" medication injected around the nerves to anesthetize or \"numb\" the area supplied by those nerves.  This block is injected into the muscle layer near your surgical site.  This medication may numb the location where you had surgery up to 72 hours.  If your surgical site is an arm or leg you should be careful with your affected limb, since it is possible to injure your limb without being aware of it due to the numbing.  Until full feeling returns, you should guard against bumping or hitting your limb, and avoid extreme hot or cold temperatures on the skin.  As the block wears off, the feeling will return as a tingling or prickly sensation near your surgical site.  You will experince more discomfort from your incision as the feeling returns.  You may want to take a pain pill (a narcotic or Tylenol if this was prescribed by your surgeon) when you start to experience mild pain before the pain beomes more severe.  If your pain medications do not control your pain, you should notify your surgeon.    Pain Management:      1. Take pain medication (if prescribed) for pain as directed by your physician.        2. WARNING: If the pain medication you have been prescribed contains Tylenol    (acetaminophen), DO NOT take additional doses of Tylenol (acetaminophen).    Call your doctor for any of the followin.   Signs of infection (fever, growing tenderness at the surgery site, severe pain, a large amount of drainage or bleeding, foul-smelling drainage, redness, swelling).    2.   It has been 8 hours since surgery and your child is still not able to urinate (pee) or is complaining about not being able to urinate (pee).     Your doctor is:  Dr. Dino Good, Orthopaedics: [phone]                 Or dial [phone] and ask for the resident on call for:  Orthopaedics  For emergency care, call the " "[facility]: [phone]        [facility]  Home Care Following Anesthesia  For 24 hours after surgery:  1. Get plenty of rest.  A responsible adult must stay with you for at least 24 hours after you leave the surgery center.  2. Do not drive or use heavy equipment.  If you have weakness or tingling, don't drive or use heavy equipment until this feeling goes away.   3. Do not drink alcohol.   4. Avoid strenuous or risky activities.  Ask for help when climbing stairs.  5. You may feel lightheaded.  IF so, sit for a few minutes before standing.  Have someone help you get up.   6. If you have nausea (feel sick to your stomach): Drink only clear liquids such as apple juice, ginger ale, broth or 7-Up.  Rest may also help.  Be sure to drink enough fluids.  Move to a regular diet as you feel able.   7. You may have a slight fever.  Call the doctor if your fever is over 100 F (37.7 C) (taken under the tongue) or lasts longer than 24 hours.  8. You may have a dry mouth, a sore throat, muscle aches or trouble sleeping. These should go away after 24 hours.  9. Do not make important or legal decisions.    Today you received a block to numb the nerves near your surgery site.  This is a block using local anesthetic or \"numbing\" medication injected around the nerves to anesthetize or \"numb\" the area supplied by those nerves.  This block is injected into the muscle layer near your surgical site.  This medication may numb the location where you had surgery up to 72 hours.  If your surgical site is an arm or leg you should be careful with your affected limb, since it is possible to injure your limb without being aware of it due to the numbing.  Until full feeling returns, you should guard against bumping or hitting your limb, and avoid extreme hot or cold temperatures on the skin.  As the block wears off, the feeling will return as a tingling or " prickly sensation near your surgical site.  You will experince more discomfort from your incision as the feeling returns.  You may want to take a pain pill (a narcotic or Tylenol if this was prescribed by your surgeon) when you start to experience mild pain before the pain beomes more severe.  If your pain medications do not control your pain, you should notify your surgeon.        Tips for taking pain medications  To get the best pain relief possible, remember these points:    Take pain medications as directed, before pain becomes severe.    Pain medication can upset your stomach: taking it with food may help.    Constipation is a common side effect of pain medication. Drink plenty of  fluids.    Eat foods high in fiber. Take a stool softener if recommended by your doctor or pharmacist.    Do not drink alcohol, drive or operate machinery while taking pain medications.    Ask about other ways to control pain, such as with heat, ice or relaxation.    Tylenol/Acetaminophen Consumption  To help encourage the safe use of acetaminophen, the makers of TYLENOL  have lowered the maximum daily dose for single-ingredient Extra Strength TYLENOL  (acetaminophen) products sold in the [place] from 8 pills per day (4,000 mg) to 6 pills per day (3,000 mg). The dosing interval has also changed from 2 pills every 4-6 hours to 2 pills every 6 hours.    If you feel your pain relief is insufficient, you may take Tylenol/Acetaminophen in addition to your narcotic pain medication.     Be careful not to exceed 3,000 mg of Tylenol/Acetaminophen in a 24 hour period from all sources.    If you are taking extra strength Tylenol/acetaminophen (500 mg), the maximum dose is 6 tablets in 24 hours.    If you are taking regular strength acetaminophen (325 mg), the maximum dose is 9 tablets in 24 hours.    Call a doctor for any of the followin. Signs of infection (fever, growing tenderness at the surgery site, a large amount of drainage or  bleeding, severe pain, foul-smelling drainage, redness, swelling).  2. It has been over 8 to 10 hours since surgery and you are still not able to urinate (pass water).  3. Headache for over 24 hours.  4. Numbness, tingling or weakness the day after surgery (if you had spinal anesthesia).  5. Signs of Covid-19 infection (temperature over 100 degrees, shortness of breath, cough, loss of taste/smell, generalized body aches, persistent headache, chills, sore throat, nausea/vomiting/diarrhea)  Your doctor is:  Dr. Dino Good, Orthopaedics: [phone]                 Or dial [phone] and ask for the resident on call for:  Orthopaedics  For emergency care, call the:  [facility] Emergency Department: [phone] (TTY for hearing impaired: [phone])    975mg tylenol given today at 0800 AM - ok to take again at 2:10 PM

## 2020-10-08 NOTE — ANESTHESIA PROCEDURE NOTES
Peripheral Nerve Block Procedure Note      Staff -   Anesthesiologist:  Torey Ramos MD  Performed By: anesthesiologist  Location: Pre-op  Procedure Start/Stop TImes:      10/8/2020 9:31 AM     10/8/2020 9:40 AM    patient identified, IV checked, site marked, risks and benefits discussed, informed consent, monitors and equipment checked, pre-op evaluation, at physician/surgeon's request and post-op pain management      Correct Patient: Yes      Correct Position: Yes      Correct Site: Yes      Correct Procedure: Yes      Correct Laterality:  Yes    Site Marked:  Yes  Procedure details:     Procedure:  Interscalene    ASA:  2    Diagnosis:  Postoperative pain relief    Laterality:  Right    Position:  Supine    Sterile Prep: chloraprep, mask and sterile gloves      Local skin infiltration:  None    Needle:  Insulated    Needle gauge:  21    Needle length (mm):  110    Ultrasound: Yes      Ultrasound used to identify targeted nerve, plexus, or vascular structure and placed a needle adjacent to it      Permanent Image entered into patiient's record      Abnormal pain on injection: No      Blood Aspirated: No      Paresthesias:  No    Bleeding at site: No      Bolus via:  Needle    Infusion Method:  Single Shot    Complications:  None  Assessment/Narrative:     Injection made incrementally with aspirations every (mL):  5

## 2020-10-08 NOTE — OR NURSING
Right interscalene block.  Exparel not used. Versed 1 mg given.  VSS. Pt tolerated well.  Research patient.  Do NOT  inform patient of which numbing agent used for block.  PACU charge informed about not putting block information in discontinue instructions.

## 2020-10-09 ENCOUNTER — OFFICE VISIT - RIVER FALLS (OUTPATIENT)
Dept: FAMILY MEDICINE | Facility: CLINIC | Age: 70
End: 2020-10-09
Payer: COMMERCIAL

## 2020-10-09 ENCOUNTER — TELEPHONE (OUTPATIENT)
Dept: SURGERY | Facility: CLINIC | Age: 70
End: 2020-10-09

## 2020-10-09 NOTE — TELEPHONE ENCOUNTER
DATE:  10/9/2020   TIME OF RECEIPT FROM LAB:  1250  LAB TEST:  CT Chest  LAB VALUE:  Significant Incidental Finding(s):   a.  coronary artery calcium moderate or severe   b. Centrilobular groundglass micronodules compatible with respiratory bronchiolitis/tobacco use.

## 2020-10-12 ENCOUNTER — COMMUNICATION - RIVER FALLS (OUTPATIENT)
Dept: FAMILY MEDICINE | Facility: CLINIC | Age: 70
End: 2020-10-12
Payer: COMMERCIAL

## 2020-10-12 LAB — INR PPP: 1.5

## 2020-10-15 ENCOUNTER — COMMUNICATION - RIVER FALLS (OUTPATIENT)
Dept: FAMILY MEDICINE | Facility: CLINIC | Age: 70
End: 2020-10-15
Payer: COMMERCIAL

## 2020-10-16 ENCOUNTER — TELEPHONE (OUTPATIENT)
Dept: ORTHOPEDICS | Facility: CLINIC | Age: 70
End: 2020-10-16

## 2020-10-16 NOTE — TELEPHONE ENCOUNTER
Patient is status post right arthroscopic rotator cuff repair 10/08/20.  He reports he is recovering well.  His only concern is swelling above the sling.  He was encouraged to do hand, wrist, and elbow exercises 3-4 times a day, an activity he has not be doing.  He was told his post-op appointment 10/19/20 is optional.  He requests the appointment be cancelled.  He will call if swelling does not subside or for any other concerns.

## 2020-10-19 ENCOUNTER — TELEPHONE (OUTPATIENT)
Dept: ORTHOPEDICS | Facility: CLINIC | Age: 70
End: 2020-10-19

## 2020-10-19 NOTE — TELEPHONE ENCOUNTER
[facility] Call Center    Phone Message    May a detailed message be left on voicemail: yes     Reason for Call: Other: Pt called in to return Ekta's call, I did let pt know she will be in this afternoon.  Pt was offered to come in today at 1:20 with Dr. Good, pt declined.  Pt says I feel ok, but is requesting to speak with Ekta when she gets in please     Action Taken: Message routed to:  [facility] ([facility] Ortho    Travel Screening: Not Applicable

## 2020-10-19 NOTE — TELEPHONE ENCOUNTER
Caller reporting the following red-flag symptom(s): blood pooling    Per the system red-flag symptom policy, patient was instructed to:  Patient left vm 10-18 @ 6:18pm. blood pooling in arm    Action:  Message sent to the clinic

## 2020-10-19 NOTE — TELEPHONE ENCOUNTER
"Patient is status post right arthroscopic rotator cuff repair 10/08/20.  He states he noticed \"blood pooling in the elbow\" when he initially took off his sling.  He reports it is golf ball sized.  He denies pain and fever or chills.  Patient was instructed to do hand, wrist, and elbow exercises at least 3-4 times a day.  He was encouraged to call the clinic if the swelling does not decrease, redness or warmth is present, or  pain is present.  He is agreeable with this plan..  "

## 2020-10-23 LAB — INR BLD: 2.3

## 2020-10-30 ENCOUNTER — COMMUNICATION - RIVER FALLS (OUTPATIENT)
Dept: FAMILY MEDICINE | Facility: CLINIC | Age: 70
End: 2020-10-30
Payer: COMMERCIAL

## 2020-10-30 ENCOUNTER — AMBULATORY - RIVER FALLS (OUTPATIENT)
Dept: FAMILY MEDICINE | Facility: CLINIC | Age: 70
End: 2020-10-30
Payer: COMMERCIAL

## 2020-11-05 DIAGNOSIS — Z98.890 S/P SHOULDER SURGERY: Primary | ICD-10-CM

## 2020-11-06 ENCOUNTER — COMMUNICATION - RIVER FALLS (OUTPATIENT)
Dept: FAMILY MEDICINE | Facility: CLINIC | Age: 70
End: 2020-11-06
Payer: COMMERCIAL

## 2020-11-06 ENCOUNTER — OFFICE VISIT - RIVER FALLS (OUTPATIENT)
Dept: FAMILY MEDICINE | Facility: CLINIC | Age: 70
End: 2020-11-06
Payer: COMMERCIAL

## 2020-11-08 ENCOUNTER — HEALTH MAINTENANCE LETTER (OUTPATIENT)
Age: 70
End: 2020-11-08

## 2020-11-13 ENCOUNTER — COMMUNICATION - RIVER FALLS (OUTPATIENT)
Dept: FAMILY MEDICINE | Facility: CLINIC | Age: 70
End: 2020-11-13
Payer: COMMERCIAL

## 2020-11-16 ENCOUNTER — OFFICE VISIT (OUTPATIENT)
Dept: ORTHOPEDICS | Facility: CLINIC | Age: 70
End: 2020-11-16
Payer: COMMERCIAL

## 2020-11-16 DIAGNOSIS — Z98.890 S/P SHOULDER SURGERY: Primary | ICD-10-CM

## 2020-11-16 PROCEDURE — 99024 POSTOP FOLLOW-UP VISIT: CPT | Performed by: ORTHOPAEDIC SURGERY

## 2020-11-16 NOTE — NURSING NOTE
Reason For Visit:   Chief Complaint   Patient presents with     Surgical Followup     DOS 10/8/20 Arthroscopic rotator cuff repair, arthroscopic subacromial decompression, biceps tenotomy     PCP: David Jenkins        ? No  Occupation Retired  Date of injury: years ago   Type of injury: Broken bone .  Date of surgery: None  Smoker: Yes     Right hand dominant      SANE score  Affected shoulder: Left   Right shoulder SANE: 0  Left shoulder SANE: 100    There were no vitals taken for this visit.      Pain Assessment  Patient Currently in Pain: Opal Mata LPN

## 2020-11-16 NOTE — PROGRESS NOTES
S:  Doing well.    O:  Incision clean, dry, and intact  Sets Deltoid  Wiggles fingers  Warm and well-perfused hand with palpable pulse    A/P:  Doing well  Advance per op report to PT. Would like to do virtually.  Heading to [place] but will be back in 4/21.

## 2020-11-16 NOTE — LETTER
11/16/2020         RE: Kannan Cisneros Maryann  [address] [place] [address]        Dear Colleague,    Thank you for referring your patient, Kannan Wolf, to the [facility] [place]. Please see a copy of my visit note below.    S:  Doing well.    O:  Incision clean, dry, and intact  Sets Deltoid  Wiggles fingers  Warm and well-perfused hand with palpable pulse    A/P:  Doing well  Advance per op report to PT. Would like to do virtually.  Heading to [place] but will be back in 4/21.    Sincerely,        Ryan Good MD

## 2020-11-18 ENCOUNTER — THERAPY VISIT (OUTPATIENT)
Dept: PHYSICAL THERAPY | Facility: CLINIC | Age: 70
End: 2020-11-18
Attending: ORTHOPAEDIC SURGERY
Payer: COMMERCIAL

## 2020-11-18 DIAGNOSIS — Z98.890 S/P SHOULDER SURGERY: ICD-10-CM

## 2020-11-18 DIAGNOSIS — M25.511 RIGHT SHOULDER PAIN: Primary | ICD-10-CM

## 2020-11-18 PROCEDURE — 97161 PT EVAL LOW COMPLEX 20 MIN: CPT | Mod: GT | Performed by: PHYSICAL THERAPIST

## 2020-11-18 PROCEDURE — 97110 THERAPEUTIC EXERCISES: CPT | Mod: GT | Performed by: PHYSICAL THERAPIST

## 2020-11-18 NOTE — PROGRESS NOTES
[facility] Initial Evaluation    Subjective:  The history is provided by the patient.   Patient Health History  Kannan Wolf being seen for s/p R shoulder RCR, SAD, biceps tenotomy.     Problem began: 10/8/2020.      Pain is reported as 1/10 on pain scale.  General health as reported by patient is good.           Other surgery history details: has had a TKR.        Current occupation is Retired.   Primary job tasks include:  Repetitive tasks and prolonged sitting.   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                Therapist Generated HPI Evaluation         Type of problem:  Right shoulder.    This is a new condition.  Condition occurred with:  Other.  Where condition occurred: other.  Patient reports pain:  Upper arm and anterior.  Pain is described as aching and is intermittent.  Pain radiates to:  Upper arm and shoulder. Pain is worse in the P.M..  Since onset symptoms are gradually improving.  Associated symptoms:  Loss of strength and loss of motion/stiffness. Symptoms are exacerbated by other (using)  and relieved by rest and ice.                              Objective:  Standing Alignment:      Shoulder/upper extremity deviations alignment: pt wearing abd sling.                                       Shoulder Evaluation:  ROM:    PROM:    Flexion:  Left:  Wnl    Right: > 90 deg on wall slide with early shoulder elevation            External Rotation:  Right:  Per dowel stretch, wnl per post op protocol            Extension/Internal Rotation:  Left:  T12    Right:  L3        Strength:  not assessed                                                               General     ROS    Assessment/Plan:    Patient is a 70 year old male with right side shoulder complaints.    Patient has the following significant findings with corresponding treatment plan.                S/p R shoulder RCR, SAD, biceps tenotomy    Pain -  hot/cold therapy, manual therapy, self management,  education and home program  Decreased ROM/flexibility - manual therapy, therapeutic exercise and home program  Decreased joint mobility - manual therapy, therapeutic exercise and home program  Decreased strength - therapeutic exercise, therapeutic activities and home program  Decreased proprioception - neuro re-education, therapeutic activities and home program  Impaired posture - neuro re-education, therapeutic activities and home program    Therapy Evaluation Codes:     Cumulative Therapy Evaluation is: Low complexity.    Previous and current functional limitations:  (See Goal Flow Sheet for this information)    Short term and Long term goals: (See Goal Flow Sheet for this information)     Communication ability:  Patient appears to be able to clearly communicate and understand verbal and written communication and follow directions correctly.  Treatment Explanation - The following has been discussed with the patient:   RX ordered/plan of care  Anticipated outcomes  Possible risks and side effects  This patient would benefit from PT intervention to resume normal activities.   Rehab potential is good.    Frequency:  1 X week, once daily  Duration:  for 8 weeks tapering to 2 X a month over 8 weeks  Discharge Plan:  Achieve all LTG.  Independent in home treatment program.  Reach maximal therapeutic benefit.    Please refer to the daily flowsheet for treatment today, total treatment time and time spent performing 1:1 timed codes.

## 2020-11-19 PROBLEM — Z98.890 S/P SHOULDER SURGERY: Status: ACTIVE | Noted: 2020-11-19

## 2020-11-24 ENCOUNTER — THERAPY VISIT (OUTPATIENT)
Dept: PHYSICAL THERAPY | Facility: CLINIC | Age: 70
End: 2020-11-24
Payer: COMMERCIAL

## 2020-11-24 DIAGNOSIS — Z98.890 S/P SHOULDER SURGERY: Primary | ICD-10-CM

## 2020-11-24 DIAGNOSIS — M25.511 RIGHT SHOULDER PAIN: ICD-10-CM

## 2020-11-24 PROCEDURE — 97110 THERAPEUTIC EXERCISES: CPT | Mod: 95 | Performed by: PHYSICAL THERAPIST

## 2020-11-30 ENCOUNTER — COMMUNICATION - RIVER FALLS (OUTPATIENT)
Dept: FAMILY MEDICINE | Facility: CLINIC | Age: 70
End: 2020-11-30
Payer: COMMERCIAL

## 2020-11-30 LAB — INR PPP: 2.8

## 2020-12-04 ENCOUNTER — OFFICE VISIT - RIVER FALLS (OUTPATIENT)
Dept: FAMILY MEDICINE | Facility: CLINIC | Age: 70
End: 2020-12-04
Payer: COMMERCIAL

## 2020-12-09 ENCOUNTER — THERAPY VISIT (OUTPATIENT)
Dept: PHYSICAL THERAPY | Facility: CLINIC | Age: 70
End: 2020-12-09
Payer: COMMERCIAL

## 2020-12-09 DIAGNOSIS — Z98.890 S/P SHOULDER SURGERY: Primary | ICD-10-CM

## 2020-12-09 DIAGNOSIS — M25.511 RIGHT SHOULDER PAIN: ICD-10-CM

## 2020-12-09 PROCEDURE — 97110 THERAPEUTIC EXERCISES: CPT | Mod: 95 | Performed by: PHYSICAL THERAPIST

## 2020-12-11 ENCOUNTER — COMMUNICATION - RIVER FALLS (OUTPATIENT)
Dept: FAMILY MEDICINE | Facility: CLINIC | Age: 70
End: 2020-12-11
Payer: COMMERCIAL

## 2020-12-16 ENCOUNTER — COMMUNICATION - RIVER FALLS (OUTPATIENT)
Dept: FAMILY MEDICINE | Facility: CLINIC | Age: 70
End: 2020-12-16
Payer: COMMERCIAL

## 2020-12-23 ENCOUNTER — THERAPY VISIT (OUTPATIENT)
Dept: PHYSICAL THERAPY | Facility: CLINIC | Age: 70
End: 2020-12-23
Payer: COMMERCIAL

## 2020-12-23 DIAGNOSIS — M25.511 RIGHT SHOULDER PAIN: ICD-10-CM

## 2020-12-23 DIAGNOSIS — Z98.890 S/P SHOULDER SURGERY: Primary | ICD-10-CM

## 2020-12-23 PROCEDURE — 97110 THERAPEUTIC EXERCISES: CPT | Mod: 95 | Performed by: PHYSICAL THERAPIST

## 2020-12-31 ENCOUNTER — THERAPY VISIT (OUTPATIENT)
Dept: PHYSICAL THERAPY | Facility: CLINIC | Age: 70
End: 2020-12-31
Payer: COMMERCIAL

## 2020-12-31 DIAGNOSIS — Z98.890 S/P SHOULDER SURGERY: Primary | ICD-10-CM

## 2020-12-31 DIAGNOSIS — M25.511 RIGHT SHOULDER PAIN: ICD-10-CM

## 2020-12-31 PROCEDURE — 97110 THERAPEUTIC EXERCISES: CPT | Mod: 95 | Performed by: PHYSICAL THERAPIST

## 2020-12-31 PROCEDURE — 97112 NEUROMUSCULAR REEDUCATION: CPT | Mod: 95 | Performed by: PHYSICAL THERAPIST

## 2021-01-02 ENCOUNTER — OFFICE VISIT - RIVER FALLS (OUTPATIENT)
Dept: FAMILY MEDICINE | Facility: CLINIC | Age: 71
End: 2021-01-02
Payer: COMMERCIAL

## 2021-01-04 ENCOUNTER — COMMUNICATION - RIVER FALLS (OUTPATIENT)
Dept: FAMILY MEDICINE | Facility: CLINIC | Age: 71
End: 2021-01-04
Payer: COMMERCIAL

## 2021-01-04 LAB — INR PPP: 3.9

## 2021-01-08 ENCOUNTER — COMMUNICATION - RIVER FALLS (OUTPATIENT)
Dept: FAMILY MEDICINE | Facility: CLINIC | Age: 71
End: 2021-01-08
Payer: COMMERCIAL

## 2021-01-08 LAB — INR PPP: 2

## 2021-01-15 ENCOUNTER — COMMUNICATION - RIVER FALLS (OUTPATIENT)
Dept: FAMILY MEDICINE | Facility: CLINIC | Age: 71
End: 2021-01-15
Payer: COMMERCIAL

## 2021-01-15 LAB — INR PPP: 4.1

## 2021-01-22 LAB — INR PPP: 2.4

## 2021-01-28 ENCOUNTER — THERAPY VISIT (OUTPATIENT)
Dept: PHYSICAL THERAPY | Facility: CLINIC | Age: 71
End: 2021-01-28
Payer: COMMERCIAL

## 2021-01-28 DIAGNOSIS — Z98.890 S/P SHOULDER SURGERY: Primary | ICD-10-CM

## 2021-01-28 DIAGNOSIS — M25.511 RIGHT SHOULDER PAIN: ICD-10-CM

## 2021-01-28 PROCEDURE — 97110 THERAPEUTIC EXERCISES: CPT | Mod: 95 | Performed by: PHYSICAL THERAPIST

## 2021-01-29 ENCOUNTER — COMMUNICATION - RIVER FALLS (OUTPATIENT)
Dept: FAMILY MEDICINE | Facility: CLINIC | Age: 71
End: 2021-01-29
Payer: COMMERCIAL

## 2021-02-08 ENCOUNTER — COMMUNICATION - RIVER FALLS (OUTPATIENT)
Dept: FAMILY MEDICINE | Facility: CLINIC | Age: 71
End: 2021-02-08
Payer: COMMERCIAL

## 2021-02-08 LAB — INR PPP: 2.2

## 2021-02-12 ENCOUNTER — OFFICE VISIT - RIVER FALLS (OUTPATIENT)
Dept: FAMILY MEDICINE | Facility: CLINIC | Age: 71
End: 2021-02-12
Payer: COMMERCIAL

## 2021-02-24 ENCOUNTER — THERAPY VISIT (OUTPATIENT)
Dept: PHYSICAL THERAPY | Facility: CLINIC | Age: 71
End: 2021-02-24
Payer: COMMERCIAL

## 2021-02-24 DIAGNOSIS — M25.511 RIGHT SHOULDER PAIN: ICD-10-CM

## 2021-02-24 DIAGNOSIS — Z98.890 S/P SHOULDER SURGERY: Primary | ICD-10-CM

## 2021-02-24 PROCEDURE — 97110 THERAPEUTIC EXERCISES: CPT | Mod: 95 | Performed by: PHYSICAL THERAPIST

## 2021-02-24 PROCEDURE — 97112 NEUROMUSCULAR REEDUCATION: CPT | Mod: 95 | Performed by: PHYSICAL THERAPIST

## 2021-03-01 ENCOUNTER — COMMUNICATION - RIVER FALLS (OUTPATIENT)
Dept: FAMILY MEDICINE | Facility: CLINIC | Age: 71
End: 2021-03-01
Payer: COMMERCIAL

## 2021-03-05 ENCOUNTER — COMMUNICATION - RIVER FALLS (OUTPATIENT)
Dept: FAMILY MEDICINE | Facility: CLINIC | Age: 71
End: 2021-03-05
Payer: COMMERCIAL

## 2021-03-05 ENCOUNTER — OFFICE VISIT - RIVER FALLS (OUTPATIENT)
Dept: FAMILY MEDICINE | Facility: CLINIC | Age: 71
End: 2021-03-05
Payer: COMMERCIAL

## 2021-03-05 LAB — INR PPP: 4

## 2021-03-12 ENCOUNTER — COMMUNICATION - RIVER FALLS (OUTPATIENT)
Dept: FAMILY MEDICINE | Facility: CLINIC | Age: 71
End: 2021-03-12
Payer: COMMERCIAL

## 2021-03-12 LAB — INR PPP: 2.2

## 2021-03-19 ENCOUNTER — COMMUNICATION - RIVER FALLS (OUTPATIENT)
Dept: FAMILY MEDICINE | Facility: CLINIC | Age: 71
End: 2021-03-19
Payer: COMMERCIAL

## 2021-03-24 ENCOUNTER — THERAPY VISIT (OUTPATIENT)
Dept: PHYSICAL THERAPY | Facility: CLINIC | Age: 71
End: 2021-03-24
Payer: COMMERCIAL

## 2021-03-24 DIAGNOSIS — Z98.890 S/P SHOULDER SURGERY: Primary | ICD-10-CM

## 2021-03-24 DIAGNOSIS — M25.511 RIGHT SHOULDER PAIN: ICD-10-CM

## 2021-03-24 PROCEDURE — 97110 THERAPEUTIC EXERCISES: CPT | Mod: 95 | Performed by: PHYSICAL THERAPIST

## 2021-03-24 PROCEDURE — 97112 NEUROMUSCULAR REEDUCATION: CPT | Mod: 95 | Performed by: PHYSICAL THERAPIST

## 2021-03-26 ENCOUNTER — COMMUNICATION - RIVER FALLS (OUTPATIENT)
Dept: FAMILY MEDICINE | Facility: CLINIC | Age: 71
End: 2021-03-26
Payer: COMMERCIAL

## 2021-03-26 LAB — INR PPP: 2.4

## 2021-03-28 ENCOUNTER — HEALTH MAINTENANCE LETTER (OUTPATIENT)
Age: 71
End: 2021-03-28

## 2021-04-05 LAB — INR PPP: 2.4

## 2021-04-12 ENCOUNTER — COMMUNICATION - RIVER FALLS (OUTPATIENT)
Dept: FAMILY MEDICINE | Facility: CLINIC | Age: 71
End: 2021-04-12
Payer: COMMERCIAL

## 2021-04-12 LAB — INR PPP: 2.4

## 2021-04-21 ENCOUNTER — THERAPY VISIT (OUTPATIENT)
Dept: PHYSICAL THERAPY | Facility: CLINIC | Age: 71
End: 2021-04-21
Payer: COMMERCIAL

## 2021-04-21 DIAGNOSIS — M25.511 RIGHT SHOULDER PAIN: ICD-10-CM

## 2021-04-21 DIAGNOSIS — Z98.890 S/P SHOULDER SURGERY: Primary | ICD-10-CM

## 2021-04-21 PROCEDURE — 97110 THERAPEUTIC EXERCISES: CPT | Mod: 95 | Performed by: PHYSICAL THERAPIST

## 2021-04-23 ENCOUNTER — COMMUNICATION - RIVER FALLS (OUTPATIENT)
Dept: FAMILY MEDICINE | Facility: CLINIC | Age: 71
End: 2021-04-23
Payer: COMMERCIAL

## 2021-04-27 ENCOUNTER — TRANSFERRED RECORDS (OUTPATIENT)
Dept: MULTI SPECIALTY CLINIC | Facility: CLINIC | Age: 71
End: 2021-04-27
Payer: COMMERCIAL

## 2021-04-27 ENCOUNTER — OFFICE VISIT - RIVER FALLS (OUTPATIENT)
Dept: FAMILY MEDICINE | Facility: CLINIC | Age: 71
End: 2021-04-27
Payer: COMMERCIAL

## 2021-04-27 ASSESSMENT — MIFFLIN-ST. JEOR: SCORE: 1686.6

## 2021-04-28 ENCOUNTER — COMMUNICATION - RIVER FALLS (OUTPATIENT)
Dept: FAMILY MEDICINE | Facility: CLINIC | Age: 71
End: 2021-04-28
Payer: COMMERCIAL

## 2021-04-28 LAB
CHOLEST SERPL-MCNC: 140 MG/DL
CHOLEST/HDLC SERPL: 3.3 {RATIO}
GLUCOSE BLD-MCNC: 90 MG/DL (ref 65–99)
HBA1C MFR BLD: 5.3 %
HDLC SERPL-MCNC: 43 MG/DL
LDLC SERPL CALC-MCNC: 71 MG/DL
NONHDLC SERPL-MCNC: 97 MG/DL
TRIGL SERPL-MCNC: 180 MG/DL

## 2021-04-30 ENCOUNTER — COMMUNICATION - RIVER FALLS (OUTPATIENT)
Dept: FAMILY MEDICINE | Facility: CLINIC | Age: 71
End: 2021-04-30
Payer: COMMERCIAL

## 2021-04-30 LAB — INR PPP: 2.7

## 2021-05-07 ENCOUNTER — COMMUNICATION - RIVER FALLS (OUTPATIENT)
Dept: FAMILY MEDICINE | Facility: CLINIC | Age: 71
End: 2021-05-07
Payer: COMMERCIAL

## 2021-05-07 ENCOUNTER — OFFICE VISIT - RIVER FALLS (OUTPATIENT)
Dept: FAMILY MEDICINE | Facility: CLINIC | Age: 71
End: 2021-05-07
Payer: COMMERCIAL

## 2021-05-14 ENCOUNTER — COMMUNICATION - RIVER FALLS (OUTPATIENT)
Dept: FAMILY MEDICINE | Facility: CLINIC | Age: 71
End: 2021-05-14
Payer: COMMERCIAL

## 2021-05-21 ENCOUNTER — COMMUNICATION - RIVER FALLS (OUTPATIENT)
Dept: FAMILY MEDICINE | Facility: CLINIC | Age: 71
End: 2021-05-21
Payer: COMMERCIAL

## 2021-06-03 ENCOUNTER — COMMUNICATION - RIVER FALLS (OUTPATIENT)
Dept: FAMILY MEDICINE | Facility: CLINIC | Age: 71
End: 2021-06-03
Payer: COMMERCIAL

## 2021-06-11 ENCOUNTER — COMMUNICATION - RIVER FALLS (OUTPATIENT)
Dept: FAMILY MEDICINE | Facility: CLINIC | Age: 71
End: 2021-06-11
Payer: COMMERCIAL

## 2021-06-11 LAB — INR PPP: 5

## 2021-06-15 ENCOUNTER — COMMUNICATION - RIVER FALLS (OUTPATIENT)
Dept: FAMILY MEDICINE | Facility: CLINIC | Age: 71
End: 2021-06-15
Payer: COMMERCIAL

## 2021-06-15 LAB — INR PPP: 1.3

## 2021-06-18 ENCOUNTER — COMMUNICATION - RIVER FALLS (OUTPATIENT)
Dept: FAMILY MEDICINE | Facility: CLINIC | Age: 71
End: 2021-06-18
Payer: COMMERCIAL

## 2021-06-24 ENCOUNTER — OFFICE VISIT - RIVER FALLS (OUTPATIENT)
Dept: FAMILY MEDICINE | Facility: CLINIC | Age: 71
End: 2021-06-24
Payer: COMMERCIAL

## 2021-06-24 ASSESSMENT — MIFFLIN-ST. JEOR: SCORE: 1657.11

## 2021-06-25 ENCOUNTER — COMMUNICATION - RIVER FALLS (OUTPATIENT)
Dept: FAMILY MEDICINE | Facility: CLINIC | Age: 71
End: 2021-06-25
Payer: COMMERCIAL

## 2021-06-28 ENCOUNTER — COMMUNICATION - RIVER FALLS (OUTPATIENT)
Dept: FAMILY MEDICINE | Facility: CLINIC | Age: 71
End: 2021-06-28
Payer: COMMERCIAL

## 2021-07-02 ENCOUNTER — OFFICE VISIT - RIVER FALLS (OUTPATIENT)
Dept: FAMILY MEDICINE | Facility: CLINIC | Age: 71
End: 2021-07-02
Payer: COMMERCIAL

## 2021-07-02 ENCOUNTER — COMMUNICATION - RIVER FALLS (OUTPATIENT)
Dept: FAMILY MEDICINE | Facility: CLINIC | Age: 71
End: 2021-07-02
Payer: COMMERCIAL

## 2021-07-02 LAB — INR PPP: 4.1

## 2021-07-12 ENCOUNTER — COMMUNICATION - RIVER FALLS (OUTPATIENT)
Dept: FAMILY MEDICINE | Facility: CLINIC | Age: 71
End: 2021-07-12
Payer: COMMERCIAL

## 2021-07-12 LAB — INR PPP: 3.8

## 2021-07-16 ENCOUNTER — COMMUNICATION - RIVER FALLS (OUTPATIENT)
Dept: FAMILY MEDICINE | Facility: CLINIC | Age: 71
End: 2021-07-16
Payer: COMMERCIAL

## 2021-07-16 ENCOUNTER — OFFICE VISIT - RIVER FALLS (OUTPATIENT)
Dept: FAMILY MEDICINE | Facility: CLINIC | Age: 71
End: 2021-07-16
Payer: COMMERCIAL

## 2021-07-16 ENCOUNTER — AMBULATORY - RIVER FALLS (OUTPATIENT)
Dept: FAMILY MEDICINE | Facility: CLINIC | Age: 71
End: 2021-07-16
Payer: COMMERCIAL

## 2021-07-16 LAB — INR BLD: 4.5

## 2021-07-23 ENCOUNTER — COMMUNICATION - RIVER FALLS (OUTPATIENT)
Dept: FAMILY MEDICINE | Facility: CLINIC | Age: 71
End: 2021-07-23
Payer: COMMERCIAL

## 2021-07-30 ENCOUNTER — COMMUNICATION - RIVER FALLS (OUTPATIENT)
Dept: FAMILY MEDICINE | Facility: CLINIC | Age: 71
End: 2021-07-30
Payer: COMMERCIAL

## 2021-08-06 ENCOUNTER — COMMUNICATION - RIVER FALLS (OUTPATIENT)
Dept: FAMILY MEDICINE | Facility: CLINIC | Age: 71
End: 2021-08-06
Payer: COMMERCIAL

## 2021-08-06 LAB — INR PPP: 4.3

## 2021-08-13 ENCOUNTER — COMMUNICATION - RIVER FALLS (OUTPATIENT)
Dept: FAMILY MEDICINE | Facility: CLINIC | Age: 71
End: 2021-08-13
Payer: COMMERCIAL

## 2021-08-20 ENCOUNTER — COMMUNICATION - RIVER FALLS (OUTPATIENT)
Dept: FAMILY MEDICINE | Facility: CLINIC | Age: 71
End: 2021-08-20
Payer: COMMERCIAL

## 2021-08-30 ENCOUNTER — COMMUNICATION - RIVER FALLS (OUTPATIENT)
Dept: FAMILY MEDICINE | Facility: CLINIC | Age: 71
End: 2021-08-30
Payer: COMMERCIAL

## 2021-09-03 ENCOUNTER — COMMUNICATION - RIVER FALLS (OUTPATIENT)
Dept: FAMILY MEDICINE | Facility: CLINIC | Age: 71
End: 2021-09-03
Payer: COMMERCIAL

## 2021-09-11 ENCOUNTER — HEALTH MAINTENANCE LETTER (OUTPATIENT)
Age: 71
End: 2021-09-11

## 2021-09-13 ENCOUNTER — COMMUNICATION - RIVER FALLS (OUTPATIENT)
Dept: FAMILY MEDICINE | Facility: CLINIC | Age: 71
End: 2021-09-13
Payer: COMMERCIAL

## 2021-09-17 ENCOUNTER — COMMUNICATION - RIVER FALLS (OUTPATIENT)
Dept: FAMILY MEDICINE | Facility: CLINIC | Age: 71
End: 2021-09-17
Payer: COMMERCIAL

## 2021-09-17 LAB — INR PPP: 3.5

## 2021-09-24 ENCOUNTER — COMMUNICATION - RIVER FALLS (OUTPATIENT)
Dept: FAMILY MEDICINE | Facility: CLINIC | Age: 71
End: 2021-09-24
Payer: COMMERCIAL

## 2021-09-24 LAB — INR PPP: 4

## 2021-10-01 ENCOUNTER — COMMUNICATION - RIVER FALLS (OUTPATIENT)
Dept: FAMILY MEDICINE | Facility: CLINIC | Age: 71
End: 2021-10-01
Payer: COMMERCIAL

## 2021-10-08 ENCOUNTER — COMMUNICATION - RIVER FALLS (OUTPATIENT)
Dept: FAMILY MEDICINE | Facility: CLINIC | Age: 71
End: 2021-10-08
Payer: COMMERCIAL

## 2021-10-08 LAB — INR PPP: 4.4

## 2021-10-11 ENCOUNTER — OFFICE VISIT - RIVER FALLS (OUTPATIENT)
Dept: FAMILY MEDICINE | Facility: CLINIC | Age: 71
End: 2021-10-11
Payer: COMMERCIAL

## 2021-10-11 ASSESSMENT — MIFFLIN-ST. JEOR: SCORE: 1680.25

## 2021-10-15 ENCOUNTER — COMMUNICATION - RIVER FALLS (OUTPATIENT)
Dept: FAMILY MEDICINE | Facility: CLINIC | Age: 71
End: 2021-10-15
Payer: COMMERCIAL

## 2021-10-22 ENCOUNTER — COMMUNICATION - RIVER FALLS (OUTPATIENT)
Dept: FAMILY MEDICINE | Facility: CLINIC | Age: 71
End: 2021-10-22
Payer: COMMERCIAL

## 2021-10-22 LAB — INR PPP: 5.1

## 2021-10-29 ENCOUNTER — COMMUNICATION - RIVER FALLS (OUTPATIENT)
Dept: FAMILY MEDICINE | Facility: CLINIC | Age: 71
End: 2021-10-29
Payer: COMMERCIAL

## 2021-11-05 ENCOUNTER — COMMUNICATION - RIVER FALLS (OUTPATIENT)
Dept: FAMILY MEDICINE | Facility: CLINIC | Age: 71
End: 2021-11-05
Payer: COMMERCIAL

## 2021-11-15 ENCOUNTER — COMMUNICATION - RIVER FALLS (OUTPATIENT)
Dept: FAMILY MEDICINE | Facility: CLINIC | Age: 71
End: 2021-11-15
Payer: COMMERCIAL

## 2021-11-19 ENCOUNTER — COMMUNICATION - RIVER FALLS (OUTPATIENT)
Dept: FAMILY MEDICINE | Facility: CLINIC | Age: 71
End: 2021-11-19
Payer: COMMERCIAL

## 2021-11-22 ENCOUNTER — OFFICE VISIT - RIVER FALLS (OUTPATIENT)
Dept: FAMILY MEDICINE | Facility: CLINIC | Age: 71
End: 2021-11-22
Payer: COMMERCIAL

## 2021-11-22 ASSESSMENT — MIFFLIN-ST. JEOR: SCORE: 1702.47

## 2021-11-22 NOTE — PROGRESS NOTES
DISCHARGE REPORT    Kannan did not return for further treatment after the last visit on 4/21/21.   Current status is unknown.  Please see information below for last known relevant information.  Patient seen for 9 visits.    SUBJECTIVE  Subjective changes noted by patient:  frustrated with progress. Still having pain with reaching and lifting. His arm will fall asleep at night if he sleeps on that side. Last night woke up 3 times with his arm asleep. Mostly mid range pain (reaching for radio in car, microwave, etc).  .  Current pain level is 5/10.     Previous pain level was   .   Changes in function: (See Goal flowsheet attached for changes in current functional level)  Adverse reaction to treatment or activity: None    OBJECTIVE  Changes noted in objective findings:   Early elevation scap with ff, ant shoulder pain with return from end range flexion. Poor periscap control with OH abduction, excursion comparable B. Ext IR comparable B. Following RPT Flex and Abd, shoulder mechanics improve, painful at approx 80 deg flex, painful arc until >100 deg flex     ASSESSMENT/PLAN  Diagnosis: s/p R shoulder RCR, SAD, Biceps tenotomy   Updated problem list and treatment plan:  Patient will continue to utilize HEP for any remaining deficits.   STG/LTGs have been met or progress has been made towards goals:  Please see goal flowsheet for most current information  Assessment of Progress: current status is unknown.    Last current status: Pt is progressing slower than anticipated   Self Management Plans:  HEP  I have re-evaluated this patient and find that the nature, scope, duration and intensity of the therapy is appropriate for the medical condition of the patient.  Kannan continues to require the following intervention to meet STG and LTG's:  HEP.    Recommendations:  Discharge with current home program.  Patient to follow up with MD as needed.    Please refer to the daily flowsheet for treatment today, total treatment time  and time spent performing 1:1 timed codes.

## 2021-11-23 ENCOUNTER — ANCILLARY PROCEDURE (OUTPATIENT)
Dept: CT IMAGING | Facility: CLINIC | Age: 71
End: 2021-11-23
Payer: COMMERCIAL

## 2021-11-23 DIAGNOSIS — R91.1 LUNG NODULE: ICD-10-CM

## 2021-11-23 DIAGNOSIS — Z87.891 PERSONAL HISTORY OF TOBACCO USE: ICD-10-CM

## 2021-11-23 PROCEDURE — 71271 CT THORAX LUNG CANCER SCR C-: CPT | Mod: GC | Performed by: RADIOLOGY

## 2021-11-26 ENCOUNTER — COMMUNICATION - RIVER FALLS (OUTPATIENT)
Dept: FAMILY MEDICINE | Facility: CLINIC | Age: 71
End: 2021-11-26
Payer: COMMERCIAL

## 2021-11-26 LAB — INR PPP: 6.5

## 2021-12-03 ENCOUNTER — COMMUNICATION - RIVER FALLS (OUTPATIENT)
Dept: FAMILY MEDICINE | Facility: CLINIC | Age: 71
End: 2021-12-03
Payer: COMMERCIAL

## 2021-12-03 LAB — INR PPP: 2.3

## 2021-12-10 ENCOUNTER — COMMUNICATION - RIVER FALLS (OUTPATIENT)
Dept: FAMILY MEDICINE | Facility: CLINIC | Age: 71
End: 2021-12-10
Payer: COMMERCIAL

## 2021-12-17 ENCOUNTER — COMMUNICATION - RIVER FALLS (OUTPATIENT)
Dept: FAMILY MEDICINE | Facility: CLINIC | Age: 71
End: 2021-12-17
Payer: COMMERCIAL

## 2021-12-24 ENCOUNTER — COMMUNICATION - RIVER FALLS (OUTPATIENT)
Dept: FAMILY MEDICINE | Facility: CLINIC | Age: 71
End: 2021-12-24
Payer: COMMERCIAL

## 2021-12-31 ENCOUNTER — AMBULATORY - RIVER FALLS (OUTPATIENT)
Dept: FAMILY MEDICINE | Facility: CLINIC | Age: 71
End: 2021-12-31
Payer: COMMERCIAL

## 2021-12-31 ENCOUNTER — OFFICE VISIT - RIVER FALLS (OUTPATIENT)
Dept: FAMILY MEDICINE | Facility: CLINIC | Age: 71
End: 2021-12-31
Payer: COMMERCIAL

## 2021-12-31 ENCOUNTER — COMMUNICATION - RIVER FALLS (OUTPATIENT)
Dept: FAMILY MEDICINE | Facility: CLINIC | Age: 71
End: 2021-12-31
Payer: COMMERCIAL

## 2022-01-03 ENCOUNTER — COMMUNICATION - RIVER FALLS (OUTPATIENT)
Dept: FAMILY MEDICINE | Facility: CLINIC | Age: 72
End: 2022-01-03
Payer: COMMERCIAL

## 2022-01-07 ENCOUNTER — COMMUNICATION - RIVER FALLS (OUTPATIENT)
Dept: FAMILY MEDICINE | Facility: CLINIC | Age: 72
End: 2022-01-07
Payer: COMMERCIAL

## 2022-01-12 ENCOUNTER — COMMUNICATION - RIVER FALLS (OUTPATIENT)
Dept: FAMILY MEDICINE | Facility: CLINIC | Age: 72
End: 2022-01-12
Payer: COMMERCIAL

## 2022-01-17 ENCOUNTER — COMMUNICATION - RIVER FALLS (OUTPATIENT)
Dept: FAMILY MEDICINE | Facility: CLINIC | Age: 72
End: 2022-01-17
Payer: COMMERCIAL

## 2022-01-21 ENCOUNTER — COMMUNICATION - RIVER FALLS (OUTPATIENT)
Dept: FAMILY MEDICINE | Facility: CLINIC | Age: 72
End: 2022-01-21
Payer: COMMERCIAL

## 2022-01-28 ENCOUNTER — COMMUNICATION - RIVER FALLS (OUTPATIENT)
Dept: FAMILY MEDICINE | Facility: CLINIC | Age: 72
End: 2022-01-28
Payer: COMMERCIAL

## 2022-02-04 ENCOUNTER — COMMUNICATION - RIVER FALLS (OUTPATIENT)
Dept: FAMILY MEDICINE | Facility: CLINIC | Age: 72
End: 2022-02-04
Payer: COMMERCIAL

## 2022-02-04 LAB — INR (EXTERNAL): 2.9 (ref 0.9–1.1)

## 2022-02-08 ENCOUNTER — DOCUMENTATION ONLY (OUTPATIENT)
Dept: ANTICOAGULATION | Facility: CLINIC | Age: 72
End: 2022-02-08
Payer: COMMERCIAL

## 2022-02-08 DIAGNOSIS — D68.51 FACTOR V LEIDEN MUTATION (H): Primary | ICD-10-CM

## 2022-02-08 DIAGNOSIS — Z86.718 PERSONAL HISTORY OF DVT (DEEP VEIN THROMBOSIS): ICD-10-CM

## 2022-02-08 DIAGNOSIS — Z86.718 PERSONAL HISTORY OF VENOUS THROMBOSIS AND EMBOLISM: ICD-10-CM

## 2022-02-11 ENCOUNTER — COMMUNICATION - RIVER FALLS (OUTPATIENT)
Dept: FAMILY MEDICINE | Facility: CLINIC | Age: 72
End: 2022-02-11
Payer: COMMERCIAL

## 2022-02-11 LAB — INR (EXTERNAL): 3.5 (ref 0.9–1.1)

## 2022-02-12 VITALS
HEIGHT: 72 IN | HEART RATE: 82 BPM | TEMPERATURE: 98.3 F | SYSTOLIC BLOOD PRESSURE: 112 MMHG | DIASTOLIC BLOOD PRESSURE: 71 MMHG | OXYGEN SATURATION: 95 % | WEIGHT: 189.4 LBS | BODY MASS INDEX: 25.65 KG/M2

## 2022-02-12 VITALS
HEIGHT: 72 IN | HEART RATE: 81 BPM | SYSTOLIC BLOOD PRESSURE: 124 MMHG | TEMPERATURE: 97.9 F | WEIGHT: 195.9 LBS | OXYGEN SATURATION: 94 % | DIASTOLIC BLOOD PRESSURE: 72 MMHG | BODY MASS INDEX: 26.53 KG/M2

## 2022-02-12 VITALS
DIASTOLIC BLOOD PRESSURE: 68 MMHG | BODY MASS INDEX: 27.74 KG/M2 | SYSTOLIC BLOOD PRESSURE: 100 MMHG | WEIGHT: 204.8 LBS | HEART RATE: 80 BPM | HEIGHT: 72 IN | TEMPERATURE: 97.2 F

## 2022-02-12 VITALS
SYSTOLIC BLOOD PRESSURE: 122 MMHG | OXYGEN SATURATION: 98 % | HEART RATE: 90 BPM | BODY MASS INDEX: 26 KG/M2 | DIASTOLIC BLOOD PRESSURE: 70 MMHG | TEMPERATURE: 98.4 F | WEIGHT: 197 LBS

## 2022-02-12 VITALS
HEIGHT: 72 IN | HEART RATE: 94 BPM | TEMPERATURE: 98.4 F | SYSTOLIC BLOOD PRESSURE: 109 MMHG | BODY MASS INDEX: 26.22 KG/M2 | DIASTOLIC BLOOD PRESSURE: 72 MMHG | WEIGHT: 193.6 LBS

## 2022-02-12 VITALS
TEMPERATURE: 97.5 F | DIASTOLIC BLOOD PRESSURE: 62 MMHG | HEART RATE: 118 BPM | HEART RATE: 105 BPM | SYSTOLIC BLOOD PRESSURE: 95 MMHG | WEIGHT: 190 LBS | BODY MASS INDEX: 25.77 KG/M2 | TEMPERATURE: 97.1 F

## 2022-02-12 VITALS
WEIGHT: 194.5 LBS | HEART RATE: 84 BPM | SYSTOLIC BLOOD PRESSURE: 118 MMHG | HEIGHT: 72 IN | BODY MASS INDEX: 26.34 KG/M2 | TEMPERATURE: 98.4 F | DIASTOLIC BLOOD PRESSURE: 64 MMHG

## 2022-02-12 VITALS
DIASTOLIC BLOOD PRESSURE: 76 MMHG | SYSTOLIC BLOOD PRESSURE: 119 MMHG | WEIGHT: 194.6 LBS | TEMPERATURE: 97.9 F | HEART RATE: 93 BPM | BODY MASS INDEX: 25.67 KG/M2

## 2022-02-12 VITALS
HEIGHT: 72 IN | BODY MASS INDEX: 25.68 KG/M2 | HEART RATE: 94 BPM | SYSTOLIC BLOOD PRESSURE: 116 MMHG | TEMPERATURE: 98.9 F | WEIGHT: 189.6 LBS | DIASTOLIC BLOOD PRESSURE: 69 MMHG

## 2022-02-12 VITALS
DIASTOLIC BLOOD PRESSURE: 80 MMHG | SYSTOLIC BLOOD PRESSURE: 127 MMHG | HEIGHT: 72 IN | BODY MASS INDEX: 27.01 KG/M2 | WEIGHT: 199.4 LBS | HEART RATE: 83 BPM | TEMPERATURE: 96 F

## 2022-02-12 VITALS — HEART RATE: 72 BPM | SYSTOLIC BLOOD PRESSURE: 138 MMHG | DIASTOLIC BLOOD PRESSURE: 72 MMHG

## 2022-02-12 VITALS
BODY MASS INDEX: 27.09 KG/M2 | TEMPERATURE: 97.5 F | WEIGHT: 200 LBS | DIASTOLIC BLOOD PRESSURE: 67 MMHG | HEART RATE: 78 BPM | SYSTOLIC BLOOD PRESSURE: 95 MMHG | HEIGHT: 72 IN

## 2022-02-14 ENCOUNTER — COMMUNICATION - RIVER FALLS (OUTPATIENT)
Dept: FAMILY MEDICINE | Facility: CLINIC | Age: 72
End: 2022-02-14
Payer: COMMERCIAL

## 2022-02-14 LAB — INR (EXTERNAL): 4.3 (ref 0.9–1.1)

## 2022-02-16 NOTE — TELEPHONE ENCOUNTER
---------------------  From: Mary Michelle RN (INR Pool ( [address]))   To: WENDI BOWSER    Sent: 11/26/2021 3:11:04 PM CST  Subject: RE: INR 6.5     I saw that visit note from seeing the provider.    If you start to bleed from your ear again or anywhere else, please get help immediately.    Mary Michelle RN      ---------------------  From: WENDI BOWSER  To: INR Pool ( [address] [place] [address])  Sent: 11/26/2021 02:57 p.m. CST  Subject: RE: INR 6.5  I had bleeding from my ear, Dr gave me antibiotics drops       Addendum by Mary Michelle RN on November 26, 2021 2:44:25 PM CST  ---------------------  From: Mary Michelle RN (INR Pool ( [address]))  To: WENDI BOWSER  Sent: 11/26/2021 2:44:25 PM CST  Subject: RE: INR 6.5       Benny Иван,       Please do not take any warfarin for 2 days then take a half dose on the third day.       Please check your INR on Monday, November 29th.       Do you have any bleeding, bruising, dizziness, extreme headache, or other symptoms that could be related to bleeding?       Please go to the Emergency Room if you develop any symptoms.       Mary Michelle RN  ---------------------  From: Samira Adler RN (Phone Messages Pool ([address] [place] [address]))  To: INR Pool ( [address] [place] [address]);  Sent: 11/26/2021 2:37:05 PM CST  Subject: FW: INR 6.5                      ---------------------  From: WENDI BOWSER  To: [facility]  Sent: 11/26/2021 02:35 p.m. CST  Subject: INR 6.5  Same dosage 10 daily, I checked it twice and came out at 6.5 both [id number] Call from William with [facility].  Urgent lab results but is unable to share the lab/values with me. Asks that a provider contacts her at [phone].  As seen above, this has already been addressed and confirmed with INR nurse. INR nurse Stacy Hernandez suggests just adding an addendum to current documentation.

## 2022-02-16 NOTE — NURSING NOTE
INR = 5.1 per MDINR today. Consulted with [facility] for confirmation of dosing directions per protocol. Patient denies symptoms of bleeding and bruising. Hold x 1 day and then decrease dose14% per week. Recheck INR on 10/25/21 per protocol. BRM agrees and patient is advised of dose directions via call.

## 2022-02-16 NOTE — PROGRESS NOTES
Patient:   WENDI BOWSER            MRN: [id number]            FIN: [id number]               Age:   67 years     Sex:  Male     :  1950   Associated Diagnoses:   Cataract   Author:   David Jenkins MD      Preoperative Information   Indication for surgery:  cataract.    Accompanied by:  No one.    Source of history:  Self.           Chief Complaint   2017 11:03 AM CST   Patient presents for preop DOS 12.5.17 and 12..17 Associated Eye cataract surgery        Review of Systems   Constitutional:  Negative.    Eye:  Negative.    Ear/Nose/Mouth/Throat:  Negative.    Respiratory:  Negative.    Cardiovascular:  Negative.    Gastrointestinal:  Negative.    Genitourinary:  Negative.    Hematology/Lymphatics:  Negative.    Endocrine:  Negative.    Immunologic:  Negative.    Musculoskeletal:  Negative.    Integumentary:  Negative.    Neurologic:  Negative.       Health Status   Allergies:    Allergic Reactions (Selected)  No Known Medication Allergies   Medications:  (Selected)   Prescriptions  Prescribed  Alere INRation 2 (PT INR) Test Strip: Alere INRation 2 (PT INR) Test Strip, See Instructions, Instructions: Test INR monthly as directed, Supply, # 1 box(es), 11 Refill(s), Type: Maintenance  Augmentin 875 mg oral tablet: 1 tab(s), PO, q12hr, # 28 tab(s), 0 Refill(s), Type: Maintenance, Pharmacy: [facility] [id number], 1 tab(s) po q12 hrs,x14 day(s)  Viagra 100 mg oral tablet: See Instructions, Instructions: TAKE AS DIRECTED, # 8 tab(s), 11 Refill(s), Pharmacy: [facility] [id number], TAKE AS DIRECTED  atorvastatin 10 mg oral tablet: 1 tab(s) ( 10 mg ), po, daily, # 90 tab(s), 3 Refill(s), Type: Maintenance, Pharmacy: [facility] [address]  fluticasone 50 mcg/inh nasal spray: 2 spray(s), nasal, daily, # 3 EA, 1 Refill(s), Type: Maintenance, Pharmacy: [facility] [address]  warfarin 5 mg oral tablet: See Instructions, Instructions: TAKE TWO AND ONE-HALF TABLETS BY MOUTH EVERY DAY, # 225  tab(s), 3 Refill(s), Type: Maintenance, Pharmacy: [facility] [id number]  Documented Medications  Documented  aspirin 81 mg oral tablet: 1 tab(s) ( 81 mg ), PO, Daily, # 30 tab(s), 0 Refill(s), Type: Maintenance   Problem list:    All Problems  Adenomatous colon polyp / SNOMED CT 8120619428 / Confirmed  Anticoagulated / SNOMED CT 98497277 / Confirmed  Factor V Leiden / SNOMED CT 3532485074 / Confirmed  History of DVT of lower extremity / SNOMED CT 7390187350 / Confirmed  History of pulmonary embolism / SNOMED CT 723892746 / Confirmed  Lipids abnormal / SNOMED CT 248790625 / Confirmed  Obesity / SNOMED CT 4932169827 / Probable  PVD (peripheral vascular disease) / SNOMED CT 6138086149 / Confirmed  Prediabetes / SNOMED CT 9040789265 / Confirmed  Seasonal allergies / SNOMED CT 499177992 / Confirmed  Tobacco user / SNOMED CT 633065852 / Probable  Resolved: *Hospitalized@[facility] - Left superficial femoral artery chronic occlusion  Resolved: Rupture of anterior cruciate ligament / SNOMED CT 407381116  Resolved: Lyme disease / SNOMED CT 6409719898  Resolved: Tobacco user / ICD-9-.1  Canceled: DVT (Deep Venous Thrombosis) / ICD-9-.40  Canceled: History of pulmonary embolism / ICD-9-CM V12.51  Canceled: PE (Pulmonary Embolism) / ICD-9-.19      Histories   Past Medical History:    Active  Prediabetes (9387855920): Onset on 4/19/2013 at 63 years.  Lipids abnormal (925385585): Onset on 10/26/2012 at 62 years.  Adenomatous colon polyp (8289844814): Onset on 4/27/2012 at 62 years.  History of DVT of lower extremity (6129696563): Onset on 4/27/2012 at 62 years.  PVD (peripheral vascular disease) (1199166318)  Anticoagulated (86504757)  Obesity (1008447620)  Factor V Leiden (0482655277)  History of pulmonary embolism (650801142)  Seasonal allergies (477329373)  Resolved  *Hospitalized@[facility] - Left superficial femoral artery chronic occlusion: Onset on 7/12/2012 at 62 years.  Resolved.  Rupture of anterior  cruciate ligament (319027595): Onset in  at 55 years.  Resolved.  Lyme disease (5745610661): Onset in the month of 1999 at 49 years  Resolved.  Tobacco user (305.1): Onset on 1968 at 17 years.  Resolved on 2012 at 61 years.   Family History:    Heart disease  Uncle (M)  Father (Blayne, )  Comments:  2010 9:16 AM - Samira Larson MA     Procedure history:    Flexible sigmoidoscopy (SNOMED CT 42859384) on 2017 at 67 Years.  Insertion of arterial stent (SNOMED CT 943708548) on 2012 at 62 Years.  Insertion of arterial stent (SNOMED CT 237204305) on 2012 at 62 Years.  Comments:  2012 11:54 PM - Missy Rubi  Left superficial femoral artery chronic occlusion.  Colectomy on 2012 at 62 Years.  Comments:  2012 9:43 PM - Missy Rubi  Hand-assisted laparoscopic total abdominal colectomy.    2012 3:17 PM - Mir Toribio MD  Numerous adenomatous polyps and one large cecal polyp  Colonoscopy (SNOMED CT 040599761) performed by Mir Toribio MD on 2012 at 61 Years.  Colonoscopy (SNOMED CT 461664579) on 10/17/2005 at 55 Years.  Flexible sigmoidoscopy (SNOMED CT 47985769) on 9/15/2005 at 55 Years.  Arthroscopy of knee (SNOMED CT 432368306) in  at 55 Years.  ACL R Knee in  at 55 Years.  Flexible sigmoidoscopy (SNOMED CT 37050756) on 2001 at 51 Years.  Flexible sigmoidoscopy (SNOMED CT 68151325) on 2001 at 51 Years.  Vasectomy (SNOMED CT 29140127).   Social History:        Tobacco Assessment            Current every day smoker, Cigarettes, 20 per day.      Substance Abuse Assessment            Past, Marijuana      Employment and Education Assessment            Retired      Home and Environment Assessment            Marital status: .  Spouse/Partner name: Sherice Cancino.  Lives with Significant other.  Risks in               environment: sometimes wears bike helmet, owns secured gun.      Nutrition and Health Assessment            Type of  diet: warfarin.      Exercise and Physical Activity Assessment            Exercise frequency: Never.      Sexual Assessment            Sexually active: No.  Sexual orientation: Heterosexual.       Has no history of anemia.  Has a  history of DVT or pulmonary embolism.  Has  no personal history of bleeding problems.   Has  no personal or family history of anesthesia reactions.  Patient  does not have active tuberculosis.    S/he has not taken aspirin or aspirin containing products in the last week.     S/he has not taken Plavix (Clopidogrel) in the last 2 weeks.    S/he has  taken warfarin in the past week.    S/he  has not been on corticosteroids for more than 2 weeks recently.      S/he  is not DNR before, during or after surgery.    Chest pain / SOB walking up 2 flights of steps? no  Pain in neck or jaw?no  CAD MI?  no  Afib? no  Heart Failure?no  Asthma  or Bronchitis? no  Diabetes? no       Insulin/Orals?   Seizure Disorder? no  CKD?no  Thyroid Disease?no  Liver Diseaseno  CVA?no         Physical Examination   Vital Signs   12/1/2017 11:03 AM CST Temperature Tympanic 97.2 DegF  LOW    Peripheral Pulse Rate 80 bpm    Pulse Site Radial artery    HR Method Manual    Systolic Blood Pressure 100 mmHg    Diastolic Blood Pressure 68 mmHg    Mean Arterial Pressure 79 mmHg    BP Site Right arm    BP Method Manual      Measurements from flowsheet : Measurements   12/1/2017 11:03 AM CST Height Measured - Standard 72 in    Weight Measured - Standard 204.8 lb    BSA 2.17 m2    Body Mass Index 27.77 kg/m2      General:  Alert and oriented, No acute distress.    Eye:  Pupils are equal, round and reactive to light, Extraocular movements are intact.    HENT:  Normocephalic, Tympanic membranes are clear, Normal hearing, Oral mucosa is moist.    Neck:  Supple, Non-tender, No carotid bruit, No jugular venous distention, No lymphadenopathy, No thyromegaly.    Respiratory:  Lungs are clear to auscultation, Respirations are  non-labored, Breath sounds are equal.    Cardiovascular:  Normal rate, Regular rhythm, No murmur, Good pulses equal in all extremities, No edema.    Gastrointestinal:  Soft, Non-tender, Non-distended, Normal bowel sounds, No organomegaly.    Musculoskeletal:  Normal range of motion, Normal strength, No tenderness, No swelling, No deformity.    Integumentary:  Warm, Dry.    Neurologic:  Alert, Oriented, Normal sensory, Normal motor function, No focal deficits, Cranial Nerves II-XII are grossly intact, Normal deep tendon reflexes.    Psychiatric:  Cooperative, Appropriate mood & affect, Normal judgment.       Review / Management            Impression and Plan   Diagnosis     Cataract ([id number] H26.9).     Condition:  ok for surgery asa2.

## 2022-02-16 NOTE — TELEPHONE ENCOUNTER
---------------------  From: Uma Kay (TFS Message Pool ([address] [place] [address]))   To: INR Pool ( [address] [place] [address]);     Sent: 2/13/2020 2:27:36 PM CST  Subject: FW: FW: INR     please pull card and forward to [facility] to review tomorrow.       ---------------------  From: WENDI BOWSER  To: TFS Message Pool ([address] [place] [address])  Sent: 02/13/2020 02:25 p.m. CST  Subject: RE: FW: INR  I checked today, 1.9       Addendum by Uma Kay on February 13, 2020 1:45:57 PM CST  ---------------------  From: Uma Kay (TFS Message Pool ([address] [place] [address]))  To: WENDI BOWSER  Sent: 2/13/2020 1:45:57 PM CST  Subject: FW: INR       Wendi-  Dr. Jenkins wants you to check your INR today and will decide on dose based on results.  Thank you.       Addendum by David Jenkins on February 13, 2020  6:01:39 AM CST        ---------------------  From: David Jenkins  To: Uma Kay (TFS Message Pool ([address] [place] [address]))  Sent: February 13, 2020 6:01 AM CST  Subject: RE: INR       Check into today and let s decide dose        Addendum by Uma Kay on February 12, 2020 12:57:47 PM CST  ---------------------  From: Uma Kay (TFS Message Pool ([address] [place] [address]))  To: David Jenkins MD;  Sent: 2/12/2020 12:57:47 PM CST  Subject: FW: INR       Addendum by Samira Adler RN on February 11, 2020 4:01:47 PM CST  ---------------------  From: Samira Adler RN (INR Pool ( [address]))  To: TFS Message Pool ([address]);  Sent: 2/11/2020 4:01:47 PM CST  Subject: FW: INR  ---------------------  From: Liss Carrero CMA (Phone Messages Pool ([address]_[place] [facility] [place]))  To: INR Pool ( [address]);  Sent: 2/11/2020 2:01:07 PM CST  Subject: FW: INR                      ---------------------  From: WENDI BOWSER  To: [facility]  Sent: 02/11/2020 01:53 p.m. CST  Subject: FW: INR  I have never been  below 10 daily and I was at 1.8, I think 7.5 is too low  ---------------------  From: Samira Adler RN  To: WENDI BOWSER  Sent: 2/10/2020 3:31:41 PM CST  Subject: INR       Benny Oates,       Please take 7.5mg warfarin daily and recheck your INR in 3 days.       Thank you,       Samira MARIN RN---------------------  From: Shazia Lawrence RN (INR Pool ( [address] [place] [address]))   To: [facility] Message Pool ([address] [place] [address]);     Sent: 2/13/2020 2:33:10 PM CST  Subject: RE: FW: INR     TFS already addressed. Message with instructions sent to patient via portal.---------------------  From: Uma Kay (TFS Message Pool ([address] [place] [address]))   To: INR Pool ( [address] [place] [address]);     Sent: 2/14/2020 7:10:11 AM CST  Subject: RE: FW: INR     Patient disagreed with instructions from 2/10, so [facility] requested he check INR again (yesterday) which he did (1.9).  Please pull card and forward to TFS to review today---------------------  From: Mary Michelle RN (INR Pool ( [address] [place] [address]))   To: TFS Message Pool ([address] [place] [address]);     Sent: 2/14/2020 8:19:01 AM CST  Subject: RE: FW: INR     INR card in TFS box---------------------  From: Mary Micehlle RN (INR Pool ( [address] [place] [address]))   To: INR Pool ( [address] [place] [address]);     Sent: 2/14/2020 8:33:56 AM CST  Subject: FW: FW: INR     No new directions for warfarin dosing today.

## 2022-02-16 NOTE — NURSING NOTE
Anticoagulation Therapy Entered On:  4/14/2020 4:44 PM CDT    Performed On:  4/14/2020 4:42 PM CDT by Samira Adler RN               Warfarin Management   Week 1 Sunday Dose :   10    Week 1 Monday Dose :   12.5    Week 1 Tuesday Dose :   10    Week 1 Wednesday Dose :   10    Week 1 Thursday Dose :   12.5    Week 1 Friday Dose :   10    Week 1 Saturday Dose :   10    Week 1 Dose Total :   75    Week 2 Sunday Dose :   10    Week 2 Monday Dose :   12.5    Week 2 Tuesday Dose :   10    Week 2 Wednesday Dose :   10    Week 2 Thursday Dose :   12.5    Week 2 Friday Dose :   10    Week 2 Saturday Dose :   10    Week 2 Dose Total :   75    Planned Duration :   Indefinite   Indication :   DVT, Other: Factor V   International Normalization Ratio :   3.0    INR Range :   2.5 - 3.5   INR Therapeutic Range :   Yes   Anticoagulation Recheck :   1 week   Warfarin Physician :   David Jenkins MD Special Instructions :   INR = 3.0 per MDINR on 4/14/20 Patient is to stay on 12.5mg warfarin on Mon, and Thurs and 10 mg ROW Recheck INR in 1 week per protocol. Patient advised via portal.   Samira Adler RN - 4/14/2020 4:42 PM CDT

## 2022-02-16 NOTE — TELEPHONE ENCOUNTER
---------------------  From: D1GFrieda darden LPN (Phone Messages Pool ([address]_[facility]))   To: WENDI BOWSER    Sent: 6/15/2020 8:17:49 AM CDT  Subject: FW: Patient Message - Results Notification       Wendi,   The antibody test is at the bottom. It was negative.   Frieda Baker LPN    ---------------------  From: WENDI BOWSER  To: [facility]  Sent: 06/14/2020 04:20 p.m. CDT  Subject: FW: Patient Message - Results Notification  Yes i replied previously, I m still waiting for antibodies test  ---------------------  From: David Jenkins MD  To: WENDI BOWSER  Sent: 6/14/2020 11:00:27 AM CDT  Subject: Patient Message - Results Notification       All labs acceptable.  Please let us know you received this message by either selecting Forward or Reply at the top.  Thank you       Results:  Date Result Name Ind Value Ref Range   6/12/2020 1:20 PM Sodium Level     138 mmol/L (135 - 146)   6/12/2020 1:20 PM Potassium Level     4.2 mmol/L (3.5 - 5.3)   6/12/2020 1:20 PM Glucose Level ((H)) 101 mg/dL (65 - 99)   6/12/2020 1:20 PM Creatinine Level     0.97 mg/dL (0.70 - 1.25)   6/12/2020 1:20 PM AST/SGOT     18 unit/L (10 - 35)   6/12/2020 1:20 PM ALT/SGPT     15 unit/L (9 - 46)   6/12/2020 1:20 PM Hgb A1c     5.6 ( - <5.7)   6/12/2020 1:20 PM Cholesterol     160 mg/dL ( - <200)   6/12/2020 1:20 PM HDL     43 mg/dL (> OR = 40 - )   6/12/2020 1:20 PM LDL     90       6/12/2020 1:20 PM Triglyceride ((H)) 175 mg/dL ( - <150)   6/12/2020 1:22 PM Coronavirus SARS-CoV-2 (COVID-19) Ab IgG     NEGATIVE

## 2022-02-16 NOTE — TELEPHONE ENCOUNTER
---------------------  From: Viviana REES, Mary HERNANDEZ (INR Pool ( [address]_[facility]))   To: WENDI BOWSER    Sent: 9/13/2021 8:01:15 AM CDT  Subject: RE: INR 3.4     Tez Oates,  Your INR is therapeutic. Please continue your warfarin at 10 mg daily and recheck your INR before 4pm on Friday, September 17th so we get the result before the weekend.  Have a great day,  Mary Michelle RN      ---------------------  From: Sidra/Nevaeh RAHMAN (Phone Messages Pool ([address]_[facility]))   To: INR Pool ( [address]);     Sent: 9/13/2021 7:53:08 AM CDT  Subject: FW: INR 3.4           ---------------------  From: WENDI BOWSER  To: [facility]  Sent: 09/11/2021 01:17 p.m. CDT  Subject: INR 3.4  Same dosage, 10 daily. INR 3.4

## 2022-02-16 NOTE — TELEPHONE ENCOUNTER
---------------------  From: Shazia Lawrence RN (INR Pool ( [address]))   To: WENDI BOWSER    Sent: 1/4/2021 3:27:43 PM CST  Subject: 1/2/21 INR     Hi Иван -     I received your INR results from 1/2/21 of 3.9.  I will have you take 10mg Warfarin today then resume 12.5mg Mondays and 10mg rest of the days.     Recheck your INR on your normal day.     Let me know if you have any questions or concerns.    Thanks,  Shazia HUBER RN

## 2022-02-16 NOTE — NURSING NOTE
Anticoagulation Therapy Management Entered On:  6/3/2021 4:57 PM CDT    Performed On:  6/3/2021 4:55 PM CDT by Samira Adler RN               Anticoagulation Visit Assessment   Anticoagulation Indication :   Deep vein thrombosis, Other: Factor V   Anticoagulant Start :   8/1/2012 CDT   Anticoagulant Duration :   Undetermined   Anticoagulation Medication Verified :   Yes   Patient Preferred Contact Method :   Cell   Samira Adler RN - 6/3/2021 4:55 PM CDT   Anticoagulation Patient Assessment Grid   Change in Medications :   Yes   (Comment: single missed dose [Samira Adler RN - 6/3/2021 4:55 PM CDT] )   Samira Adler RN - 6/3/2021 4:55 PM CDT   Patient on Warfarin :   Yes   Samira Adler RN - 6/3/2021 4:55 PM CDT   Warfarin   Anticoagulant INR Goal Lower :   2.5    Anticoagulant INR Goal Upper :   3.5    Sunday :   10 mg   Monday :   12.5 mg   Tuesday :   10 mg   Wednesday :   12.5 mg   Thursday :   10 mg   Friday :   12.5 mg   Saturday :   10 mg   Total Dose :   77.5 mg   Warfarin Pt Reported Previous Week Dose :    Sun Mon Tues Wed Thurs Fri Sat Weekly Total Dose   Week 1 10 mg 12.5 mg 10 mg 12.5 mg 10 mg 12.5 mg 10 mg 77.5 mg   Week 2 10 mg 12.5 mg 10 mg 10 mg 10 mg 10 mg 10 mg 72.5 mg   Week 3  mg  mg  mg  mg  mg  mg  mg  mg   Week 4  mg  mg  mg  mg  mg  mg  mg  mg         Patient is taking single or multiple strength tablet(s) :   Multiple strength tab(s)   Multiple Tab Strengths :   5 mg tab, 7.5 mg tab   Sunday :   10 mg   Monday :   12.5 mg   Tuesday :   10 mg   Wednesday :   12.5 mg   Thursday :   10 mg   Friday :   12.5 mg   Saturday :   10 mg   Week 1 Total Dose :   77.5 mg   Warfarin Dosing Acknowledgement :   I have reviewed the patient's warfarin dosing schedule and confirmed its accuracy for today's visit.   Patient Instructions :   INR = 3.2 per MDINR today. Patient is to stay on 12.5mg warfarin on Mon, Wed, Fri and 10 mg the ROW. Recheck INR in 1 week per protocol. Advised via  domingo.     Vitor REES, Samira - 6/3/2021 4:55 PM CDT

## 2022-02-16 NOTE — NURSING NOTE
Anticoagulation Therapy Management Entered On:  8/21/2020 4:01 PM CDT    Performed On:  8/21/2020 3:58 PM CDT by Samira Adler RN               Anticoagulation Visit Assessment   Anticoagulation Indication :   Deep vein thrombosis, Other: Factor V   Anticoagulant Start :   8/1/2012 CDT   Anticoagulant Duration :   Undetermined   Anticoagulation Medication Verified :   Yes   Patient Preferred Contact Method :   Cell   Samira Adler RN - 8/21/2020 3:58 PM CDT   Anticoagulation Patient Assessment Grid   Change in Alcohol Consumption :   No   Change in Diet :   No   Change in Medications :   No   Diarrhea :   No   Rectal Bleeding :   No   Signs of Clotting :   No   Signs of Warfarin Intolerance :   No   Unusual Bleeding, Bruising :   No   Upcoming Procedures :   No   Vomiting :   No   Samira Adler RN - 8/21/2020 3:58 PM CDT   Patient on Warfarin :   Yes   Samira Adler RN - 8/21/2020 3:58 PM CDT   Warfarin   Anticoagulant INR Goal Lower :   2.5    Anticoagulant INR Goal Upper :   3.5    Information Given by :   Patient   Sunday :   10 mg   Monday :   12.5 mg   Tuesday :   10 mg   Wednesday :   10 mg   Thursday :   12.5 mg   Friday :   10 mg   Saturday :   10 mg   Total Dose :   75 mg   Warfarin Pt Reported Previous Week Dose :    Sun Mon Tues Wed Thurs Fri Sat Weekly Total Dose   Week 1 10 mg 0 mg 10 mg 10 mg 12.5 mg 10 mg 10 mg 62.5 mg   Week 2  mg  mg  mg  mg  mg  mg  mg  mg   Week 3  mg  mg  mg  mg  mg  mg  mg  mg   Week 4  mg  mg  mg  mg  mg  mg  mg  mg         Patient is taking single or multiple strength tablet(s) :   Multiple strength tab(s)   Multiple Tab Strengths :   5 mg tab, 7.5 mg tab   Sunday :   10 mg   Monday :   12.5 mg   Tuesday :   10 mg   Wednesday :   10 mg   Thursday :   10 mg   Friday :   10 mg   Saturday :   10 mg   Week 1 Total Dose :   72.5 mg   Patient Instructions :   INR = 3.7 per MDINR today. Patient is to decrease warfarin to 12.5mg on Mon and 10 mg the rest of the days of  the week. Recheck INR in 1 week per protocol. Patient advised via portal.     Vitor REES, Samira - 8/21/2020 3:58 PM CDT

## 2022-02-16 NOTE — TELEPHONE ENCOUNTER
---------------------  From: Shazia Lawrence RN   To: WENDI BOWSER    Sent: 4/5/2021 10:06:24 AM CDT  Subject: General Message     HI Иван -    I received your INR results from Saturday of 2.4.  Please continue your 12.5mg Mondays and Fridays and 10mg the rest of the week.  Recheck your INR in 1 week.     Please let us know if you have any questions or concerns.    Have a great day!    Shazia HUBER RN

## 2022-02-16 NOTE — NURSING NOTE
Comprehensive Intake Entered On:  5/31/2019 3:08 PM CDT    Performed On:  5/31/2019 3:06 PM CDT by Uma Kay               Summary   Chief Complaint :   Preop.  Left knee replacement. 6/13/19  [facility].  Dr. Junior   Weight Measured :   189.6 lb(Converted to: 189 lb 10 oz, 86.00 kg)    Height Measured :   72 in(Converted to: 6 ft 0 in, 182.88 cm)    Body Mass Index :   25.71 kg/m2 (HI)    Body Surface Area :   2.09 m2   Systolic Blood Pressure :   116 mmHg   Diastolic Blood Pressure :   69 mmHg   Mean Arterial Pressure :   85 mmHg   Peripheral Pulse Rate :   94 bpm   BP Method :   Electronic   HR Method :   Electronic   Temperature Tympanic :   98.9 DegF(Converted to: 37.2 DegC)    Uma Kay - 5/31/2019 3:06 PM CDT   Health Status   Allergies Verified? :   Yes   Medication History Verified? :   Yes   Pre-Visit Planning Status :   Completed   Tobacco Use? :   Current every day smoker   Uma Kay - 5/31/2019 3:06 PM CDT   Meds / Allergies   (As Of: 5/31/2019 3:08:54 PM CDT)   Allergies (Active)   No Known Medication Allergies  Estimated Onset Date:   Unspecified ; Created By:   Laisha Murillo CMA; Reaction Status:   Active ; Category:   Drug ; Substance:   No Known Medication Allergies ; Type:   Allergy ; Updated By:   Laisha Murillo CMA; Reviewed Date:   12/1/2017 11:04 AM CST        Medication List   (As Of: 5/31/2019 3:08:54 PM CDT)   Prescription/Discharge Order    warfarin  :   warfarin ; Status:   Prescribed ; Ordered As Mnemonic:   warfarin 5 mg oral tablet ; Simple Display Line:   10 mg, 2 tab(s), Oral, daily, 180 tab(s), 3 Refill(s) ; Ordering Provider:   David Jenkins MD; Catalog Code:   warfarin ; Order Dt/Tm:   10/18/2018 9:06:06 AM          atorvastatin  :   atorvastatin ; Status:   Prescribed ; Ordered As Mnemonic:   atorvastatin 10 mg oral tablet ; Simple Display Line:   10 mg, 1 tab(s), Oral, daily, 90 tab(s), 3 Refill(s) ; Ordering Provider:   Alice GAMBLE  David; Catalog Code:   atorvastatin ; Order Dt/Tm:   10/18/2018 9:06:05 AM          fluticasone nasal  :   fluticasone nasal ; Status:   Prescribed ; Ordered As Mnemonic:   fluticasone 50 mcg/inh nasal spray ; Simple Display Line:   2 spray(s), nasal, daily, for 90 day(s), 3 EA, 3 Refill(s) ; Ordering Provider:   David Jenkins MD; Catalog Code:   fluticasone nasal ; Order Dt/Tm:   10/18/2018 9:06:03 AM          sildenafil  :   sildenafil ; Status:   Prescribed ; Ordered As Mnemonic:   Viagra 100 mg oral tablet ; Simple Display Line:   See Instructions, TAKE AS DIRECTED, 8 tab(s), 11 Refill(s) ; Ordering Provider:   David Jenkins MD; Catalog Code:   sildenafil ; Order Dt/Tm:   4/20/2016 12:38:56 PM

## 2022-02-16 NOTE — TELEPHONE ENCOUNTER
---------------------  From: Viviana REES, Mary HERNANDEZ   To: WENDI BOWSER    Sent: 10/29/2021 2:08:00 PM CDT  Subject: Warfarn dosing     Hi Иван,    Please take warfarin boost dose of 15 mg today then resume 10 mg daily. Recheck INR in 1 week.     Let me know if you have any questions.      Mary Michelle RN

## 2022-02-16 NOTE — TELEPHONE ENCOUNTER
---------------------  From: Mary Michelle RN (Phone Messages Pool ([id number]_[facility]))   To: INR Pool ( [address]_[facility]);     Sent: 6/29/2020 10:28:04 AM CDT  Subject: FW: Warfarin dosing           ---------------------  From: WENDI BOWSER  To: [facility]  Sent: 06/29/2020 09:57 a.m. CDT  Subject: FW: Warfarin dosing  No changes, I thought it was going to be high because I have had nosebleeds  ---------------------  From: Mary Michelle RN  To: WENDI BOWSER  Sent: 6/29/2020 9:54:13 AM CDT  Subject: Warfarin dosing       Good morning Иван,       I got your INR result from 6/27/20. 1.8 is quite low.       Have you been taking your usual warfarin dose of 12.5 mg on Mondays and Thursdays and 10 mg all other days of the week? Have there been any diet changes, missed doses, medication changes?       Please let me know either way so we can get your warfarin dose straightened out.       Talk to you soon,       Mary Michelle RN---------------------  From: Frieda Baker LPN (INR Pool ( [address]_[facility]))   To: WENDI BOWSER    Sent: 6/29/2020 3:43:33 PM CDT  Subject: FW: Warfarin dosing     Good afternoon Иван,  I would like you to continue your current dose which I have as 12.5mg M,Th and 10mg the ROW. Please recheck in one week. If your nose bleeds become more frequent or last more than 45 min, you need to be seen in the ER immediately. If you have any other questions or concerns please feel free to reach out to us.   Frieda Baker RN

## 2022-02-16 NOTE — TELEPHONE ENCOUNTER
---------------------  From: Flori Smlals CMA (Phone Messages Pool ([id number]_[facility]))   To: INR Pool ( [address]_[facility]);     Sent: 8/31/2020 11:42:20 AM CDT  Subject: FW: INR 2.4           ---------------------  From: WENDI BOWSER  To: [facility]  Sent: 08/31/2020 11:32 a.m. CDT  Subject: FW: INR 2.4  I skipped Saturday and Sunday, today my INR is 2.3  ---------------------  From: Shazia Lawrence RN  To: WENDI BOWSER  Sent: 8/31/2020 10:03:49 AM CDT  Subject: INR       Good morning Иван,       Just checking in to see if you have checked your INR this morning. I see that you reported an INR of 5.5 on Saturday.  If you are still over 4.5 today, I would like to have you come in for a venous draw.  Also wanting to make sure you do not have any concerns with bruising or bleeding.       Thanks Иван!       Shazia HUBER RN---------------------  From: Shazia Lawrence RN (INR Pool ( [address]_[facility]))   To: WENDI BOWSER    Sent: 8/31/2020 11:53:45 AM CDT  Subject: RE: INR 2.4     Tez Oates,    That's great to hear your INR has come down! Please resume your Warfarin 12.5mg Mondays and 10mg the rest of the days of the week.  We will have you recheck your INR in 1 week as usual. Please let us know if you have any further questions or concerns.    Have a great rest of the day!    Shazia HUBER RN

## 2022-02-16 NOTE — NURSING NOTE
Anticoagulation Therapy Management Entered On:  7/16/2021 12:36 PM CDT    Performed On:  7/16/2021 12:31 PM CDT by Samira Adler RN               Anticoagulation Visit Assessment   Type of Visit - Anticoagulation :   Telephone   Anticoagulation Indication :   Deep vein thrombosis, Other: Factor V   Anticoagulant Start :   8/1/2012 CDT   Anticoagulant Duration :   Undetermined   Patient Preferred Contact Method :   Cell   Samira Adler RN - 7/16/2021 12:31 PM CDT   Anticoagulation Patient Assessment Grid   Change in Alcohol Consumption :   No   Change in Diet :   No   Change in Medications :   No   Diarrhea :   No   Rectal Bleeding :   No   Signs of Clotting :   No   Signs of Warfarin Intolerance :   No   Unusual Bleeding, Bruising :   No   Upcoming Procedures :   No   Vomiting :   No   Samira Adler RN - 7/16/2021 12:31 PM CDT   Contributing Factors :   Other: lost 15 pounds   Patient on Warfarin :   Yes   Samira Adler RN - 7/16/2021 12:31 PM CDT   Warfarin   Anticoagulant INR Goal Lower :   2.5    Anticoagulant INR Goal Upper :   3.5    Information Given by :   Patient   Sunday :   10 mg   Monday :   10 mg   Tuesday :   10 mg   Wednesday :   12.5 mg   Thursday :   10 mg   Friday :   12.5 mg   Saturday :   10 mg   Total Dose :   75 mg   Warfarin Pt Reported Previous Week Dose :    Sun Mon Tues Wed Thurs Fri Sat Weekly Total Dose   Week 1 10 mg 10 mg 10 mg 12.5 mg 10 mg 12.5 mg 10 mg 75 mg   Week 2 10 mg 10 mg 10 mg 12.5 mg 10 mg 12.5 mg 10 mg 75 mg   Week 3  mg  mg  mg  mg  mg  mg  mg  mg   Week 4  mg  mg  mg  mg  mg  mg  mg  mg         Patient is taking single or multiple strength tablet(s) :   Multiple strength tab(s)   Multiple Tab Strengths :   5 mg tab, 7.5 mg tab   Sunday :   10 mg   Monday :   10 mg   Tuesday :   10 mg   Wednesday :   10 mg   Thursday :   10 mg   Friday :   10 mg   Saturday :   10 mg   Week 1 Total Dose :   70 mg   Sunday :   10 mg   Monday :   10 mg   Tuesday :   10 mg    Wednesday :   12.5 mg   Thursday :   10 mg   Friday :   12.5 mg   Saturday :   10 mg   Week 2 Total Dose :   75 mg   Warfarin Dosing Acknowledgement :   I have reviewed the patient's warfarin dosing schedule and confirmed its accuracy for today's visit.   Patient Instructions :   INR = 4.5 per POC today. Patient is to hold warfarin today then decrease warfarin to 10mg daily and and recheck INR in 1 week per protocol. Patient advised via call and portal. Decreased warfarin 6.6% per week today. Has lost 15 pounds since he returned home for winter travel.     Vitor REES, [place] - 7/16/2021 12:31 PM CDT

## 2022-02-16 NOTE — TELEPHONE ENCOUNTER
---------------------  From: Vitor REES, Samira   To: WENDI BOWSER    Sent: 6/15/2020 2:56:48 PM CDT  Subject: Warfarin     Иван,    Have you stopped the warfarin and picked up the Eliquis? I see you talked about it with Dr. Jenkins on 6/12/20. He did want you to stop the warfarin and start the Eliquis when your INR is at 2. Is this still your plan to switch to Eliquis? If so disregard previous message and let us know.     Thank you,    Samira MARIN RN

## 2022-02-16 NOTE — TELEPHONE ENCOUNTER
---------------------  From: David Jenkins MD   To: WENDI BOWSER    Sent: 4/28/2021 8:24:46 AM CDT  Subject: General Message     All labs acceptable.  Please let us know you received this message by either selecting Forward or Reply at the top.  Thank you      Results:  Date Result Name Ind Value Ref Range   4/27/2021 1:37 PM Glucose Level  90 mg/dL (65 - 99)   4/27/2021 1:37 PM Hgb A1c  5.3 ( - <5.7)   4/27/2021 1:37 PM Cholesterol  140 mg/dL ( - <200)   4/27/2021 1:37 PM HDL  43 mg/dL (> OR = 40 - )   4/27/2021 1:37 PM LDL  71    4/27/2021 1:37 PM Triglyceride ((H)) 180 mg/dL ( - <150)

## 2022-02-16 NOTE — TELEPHONE ENCOUNTER
---------------------  From: Mary Michelle RN (Phone Messages Pool ([id number]_[facility]))   To: INR Pool ( [address]_[facility]);     Sent: 11/15/2021 8:13:03 AM CST  Subject: FW: INR 3.9           ---------------------  From: WENDI BOWSER  To: [facility]  Sent: 11/13/2021 09:56 a.m. CST  Subject: INR 3.9  10 daily  INR 3.9---------------------  From: Mary Michelle RN (INR Pool ( [id number]_[facility]))   To: WENDI BOWSER    Sent: 11/15/2021 8:15:25 AM CST  Subject: RE: INR 3.9     Good Morning Иван,    Can you let me know what you did with your warfarin dosing after doing your INR on Saturday?    If you can check your INR before 4 pm on Fridays we will be able to give you directions before the weekend.    Thank you,    Mary Michelle RN

## 2022-02-16 NOTE — NURSING NOTE
Anticoagulation Therapy Management Entered On:  8/31/2020 11:49 AM CDT    Performed On:  8/29/2020 11:48 AM CDT by Shazia Lawrence RN               Anticoagulation Visit Assessment   Type of Visit - Anticoagulation :   Telephone   Anticoagulation Indication :   Deep vein thrombosis, Other: Factor V   Anticoagulant Start :   8/1/2012 CDT   Anticoagulant Duration :   Undetermined   Anticoagulation Medication Verified :   Yes   Patient Preferred Contact Method :   Cell   Patient on Warfarin :   Yes   Shazia Lawrence RN - 8/31/2020 11:48 AM CDT   Warfarin   Anticoagulant INR Goal Lower :   2.5    Anticoagulant INR Goal Upper :   3.5    Sunday :   10 mg   Monday :   12.5 mg   Tuesday :   10 mg   Wednesday :   10 mg   Thursday :   10 mg   Friday :   10 mg   Saturday :   10 mg   Total Dose :   72.5 mg   Warfarin Pt Reported Previous Week Dose :    Sun Mon Tues Wed Thurs Fri Sat Weekly Total Dose   Week 1 10 mg 12.5 mg 10 mg 10 mg 10 mg 10 mg 10 mg 72.5 mg   Week 2  mg  mg  mg  mg  mg  mg  mg  mg   Week 3  mg  mg  mg  mg  mg  mg  mg  mg   Week 4  mg  mg  mg  mg  mg  mg  mg  mg         Patient is taking single or multiple strength tablet(s) :   Multiple strength tab(s)   Multiple Tab Strengths :   5 mg tab, 7.5 mg tab   Sunday :   0 mg   Monday :   12.5 mg   Tuesday :   10 mg   Wednesday :   10 mg   Thursday :   10 mg   Friday :   10 mg   Saturday :   0 mg   Week 1 Total Dose :   52.5 mg   Patient Instructions :   INR = 5.5 per mdINR. Hold Warfarin x2 doses then recheck INR 8-31-20. Pt self dosed.      Shazia Lawrence RN - 8/31/2020 11:48 AM CDT

## 2022-02-16 NOTE — TELEPHONE ENCOUNTER
---------------------  From: Vitor REES, Samira   To: WENDI BOWSER    Sent: 8/20/2019 9:27:23 AM CDT  Subject: INR     Benny Oates,    Please stay on the 10mg warfarin daily and recheck INR in 1 week. Good result on 8/19/19 of 3.3. Received your message and a fax this morning. Thank you. Have a great day!    Samira MARIN RN

## 2022-02-16 NOTE — TELEPHONE ENCOUNTER
---------------------  From: Vitor REES, Samira   To: WENDI BOWSER    Sent: 8/17/2020 10:00:17 AM CDT  Subject: INR     Benny Oates,    I received your INR from 8/14/20. It is back to therapeutic. Please continue your current dose of 12.5mg warfarin on Monday and Thursday and 10 mg the rest of the days of the week Recheck INR on Friday. Call us if you have any concerns. Have a nice week!    Thank you,    Samira MARIN RN

## 2022-02-16 NOTE — TELEPHONE ENCOUNTER
---------------------  From: Vitor REES, Samira   To: WENDI BOWSER    Sent: 1/24/2020 3:15:49 PM CST  Subject: INR     Benny Oates,    I have your INR result from 1/24/20 at 2.0. Dr Jenkins would like you to stay on 12.5mg warfarin on Monday and Thursday and 10 mg the rest of the days of the week. Recheck INR in 1 week. I hope the fish are biting.    Thank you,    Samira MARIN RN

## 2022-02-16 NOTE — TELEPHONE ENCOUNTER
---------------------  From: WENDI BOWSER  To: INR Pool ( [id number])  Sent: 03/01/2021 11:34 a.m. CST  Subject: RE: INR 2.6  I thought that might be the case because I have not heard from you, that?s why I sent it direct, the results of all been two six  ---------------------  From: Shazia Lawrence RN (INR Pool ( [facility]24[facility]))  To: WENDI BOWSER  Sent: 3/1/2021 11:32:16 AM CST  Subject: RE: INR 2.6  ???  Иван -  ???  We have not been receiving results from them, so we appreciate you keeping us informed.  I have to reach out to them later today regarding other things so I will ask them again about why this may be.  ???  Thanks,  Shazia HUBER RN  ???  ???  ---------------------  From: WENDI BOWSER  To: INR Pool ( [address])  Sent: 03/01/2021 11:29 a.m. CST  Subject: RE: INR 2.6  Have you been receiving notice from mdINR?  ???  Addendum by Shazia Lawrence RN on March 01, 2021 8:17:54 AM CST  ---------------------  From: Shazia Lawrence RN (INR Pool ( [facility]))  To: WENDI BOWSER  Sent: 3/1/2021 8:17:54 AM CST  Subject: RE: INR 2.6  ???  Hi Иван -  ???  Thank you for sending in your results!  Continue your Warfarin 12.5mg Mondays and 10mg the rest of days - recheck your INR again next week.  ???  Let us know if you have any questions or concerns.  ???  Have a great day!  ???  Shazia HUBER RN  ---------------------  From: Samira Adler RN (Phone Messages Pool ([id number]_[facility] [facility] [place]))  To: INR Pool ( [id number]_[facility]);  Sent: 3/1/2021 8:08:51 AM CST  Subject: FW: INR 2.6  ???  ???  ???  ???  ---------------------  From: WENDI BOWSER  To: [facility]  Sent: 02/28/2021 11:30 a.m. CST  Subject: INR 2.6  Same dosage 10 daily 12.5 Monday

## 2022-02-16 NOTE — TELEPHONE ENCOUNTER
---------------------  From: Samira Adler RN (Phone Messages Pool ([id number]_[facility]))   To: INR [place] ( [address]_[facility]);     Sent: 7/27/2020 12:13:07 PM CDT  Subject: FW: Inr 1.4           ---------------------  From: WENDI BOWSER  To: [facility]  Sent: 07/27/2020 11:19 a.m. CDT  Subject: Inr 1.4  I did not take Friday and Saturday night dosage as directed I took the 10 mg last night and my INR is 1.4

## 2022-02-16 NOTE — TELEPHONE ENCOUNTER
---------------------  From: Juliane Obrien RN (Phone Messages Pool ([address]_[facility]))   To: INR Pool ( [address]_[facility]);     Sent: 8/6/2021 11:37:33 AM CDT  Subject: FW: INT 4.3           ---------------------  From: WENDI BOWSER  To: [facility]  Sent: 08/06/2021 11:23 a.m. CDT  Subject: INT 4.3  10 daily INR 4.3[facility] address

## 2022-02-16 NOTE — TELEPHONE ENCOUNTER
---------------------  From: Mary Michelle RN (INR Pool ( [facility]24[facility]))   To: [place] Message Pool ([address] [place] [address]);     Sent: 7/24/2020 2:22:38 PM CDT  Subject: FYI: INR 5.2     I spoke to patient and he denies any issues/concerns with bruising or bleeding. No changes in diet or meds. He is unable to come in for venous INR today so he will hold warfarin x 2 days, take 10 mg warfarin on Sunday, and then check his INR at home Monday.    Pt was informed to go to ER if any issues with bleeding. He will call with any questions.      ---------------------  From: Nevaeh Pan (Phone Messages Pool ([address] [place] [address]))   To: INR Pool ( [address] [place] [address]);     Sent: 7/24/2020 2:03:49 PM CDT  Subject: FW: INR 5.2           ---------------------  From: WENDI BOWSER  [facility]  Sent: 07/24/2020 01:59 p.m. CDT  Subject: INR 5.2  Same dosage, 10 daily 12 1/2 on Mondays and Thursdays. I did the test twice the first one with the bad batch of strips that I have that was 5.9 I used a new batch of strips and it was 5.2MD INR called at 1421 reporting the out of range INR at 5.2---------------------  From: Christel Best CMA (TFS Message Pool ([address] [place] [address]))   To: David Jenkins MD;     Sent: 7/24/2020 3:08:42 PM CDT  Subject: FW: FYI: INR 5.2

## 2022-02-16 NOTE — TELEPHONE ENCOUNTER
---------------------  From: Vitor REES, Samira (INR Pool ( [address]_[facility]))   To: WENDI BOWSER    Sent: 6/15/2020 4:33:46 PM CDT  Subject: INR     Иван,    Thanks for getting back to me. I hope they can get you an alternative. For now though do stay on the same dose of warfarin and recheck INR in 1 week. Keep us updated. thanks again.    Samira MARIN RN   The patient is a 41y Female complaining of lacerations.

## 2022-02-16 NOTE — TELEPHONE ENCOUNTER
---------------------  From: Samira Adler RN   Sent: 8/20/2019 9:25:13 AM CDT  Subject: INR     Benny Oates,    Please stay on the 10mg warfarin daily and recheck INR in 1 week. Good result on 8/19/19 of 3.3. Received your message and a fax this morning. Thank you. Have a great day!    Samira MARIN RN

## 2022-02-16 NOTE — TELEPHONE ENCOUNTER
---------------------  From: Flori Smalls CMA (Phone Messages Pool ([address]_[facility]))   To: TFS Message Pool ([address]_[facility] [facility] [place]);     Sent: 12/16/2020 12:26:14 PM CST  Subject: FW: COVID test           ---------------------  From: WENDI BOWSER  To: [facility]  Sent: 12/16/2020 12:18 p.m. CST  Subject: COVID test  I need a negative COVID test within 72 hours of arrival in [facility]. Can I schedule a test for Saturday January 2---------------------  From: Uma Kay (TFS Message Pool ([id number]_[facility]))   To: Appointment Pool ([address]_[place]);     Sent: 12/16/2020 12:49:15 PM CST  Subject: FW: COVID test     please contact patient to set up testing for Saturday 1/2/20. (I will do)---------------------  From: Radha Rodriguez (Appointment Pool ([address]_[place]))   To: TFS Message Pool ([address]_[facility] [place]);     Sent: 12/16/2020 1:07:34 PM CST  Subject: RE: COVID test     Pt is wondering if he will have results before he leaves on Monday - pt needs it in writing as well before he leaves - told him I would check with his provider     please adviseSpoke with patient.  I advised him that due to the timeframe, he should check into other clinics.  Our swabs are not sent out on the weekends, so if swabbed on Saturday, the results wouldn't be back until Tuesday at the earliest.  If he is swabbed on Friday, we may have the results on Sunday, but cannot guarantee it.  He will look into other clinics.

## 2022-02-16 NOTE — NURSING NOTE
Anticoagulation Therapy Entered On:  3/9/2020 10:14 AM CDT    Performed On:  3/9/2020 10:11 AM CDT by Samira Adler RN               Warfarin Management   Week 1 [place] Dose :   10    Week 1 Monday Dose :   12.5    Week 1 Tuesday Dose :   10    Week 1 Wednesday Dose :   10    Week 1 Thursday Dose :   12.5    Week 1 Friday Dose :   10    Week 1 Saturday Dose :   10    Week 1 Dose Total :   75    Week 2 Sunday Dose :   10    Week 2 Monday Dose :   12.5    Week 2 Tuesday Dose :   10    Week 2 Wednesday Dose :   10    Week 2 Thursday Dose :   12.5    Week 2 Friday Dose :   10    Week 2 Saturday Dose :   10    Week 2 Dose Total :   75    Planned Duration :   Indefinite   Indication :   DVT, Other: Factor V   INR Range :   2.5 - 3.5   INR Therapeutic Range :   Yes   Warfarin Abnormal Findings :   In [facility]   Warfarin Physician :   David Jenkins MD Special Instructions :   INR = 2.7 per MDINR on 3/8/20 Patient is to stay on 12.5mg warfarin on Mon and Thurs and 10 mg the rest of the days of the week recheck  INR in 1 week per protocol Patient advised via portal.   Samira Adler RN - 3/9/2020 10:11 AM CDT

## 2022-02-16 NOTE — TELEPHONE ENCOUNTER
---------------------  From: Astrid Estrada LPN (Phone Messages Pool ([id number]_[facility]))   To: INR Pool ( [address]);     Sent: 7/30/2021 1:23:24 PM CDT  Subject: CONSUMER MESSAGE FW: INR 3.1           ---------------------  From: WENDI BOWSER  To: [facility]  Sent: 07/30/2021 01:19 p.m. CDT  Subject: INR 3.1  10 daily  3.1inr---------------------  From: Mary Michelle RN (INR Pool ( [id number]_[facility]))   To: WENDI BOWSER    Sent: 7/30/2021 2:06:04 PM CDT  Subject: RE: CONSUMER MESSAGE FW: INR 3.1     Tez Oates    Thanks for the update on your INR.    Keep up on the Warfarin 10 mg daily and let me know what your INR is in 1 week.    Have a great day,    Mary Michelle RN

## 2022-02-16 NOTE — NURSING NOTE
Anticoagulation Therapy Management Entered On:  2/8/2021 11:48 AM CST    Performed On:  2/8/2021 11:47 AM CST by Shazia Lawrence RN               Anticoagulation Visit Assessment   Type of Visit - Anticoagulation :   Telephone   Anticoagulation Indication :   Deep vein thrombosis, Other: Factor V   Anticoagulant Start :   8/1/2012 CDT   Anticoagulant Duration :   Undetermined   Anticoagulation Medication Verified :   Yes   Patient Preferred Contact Method :   Cell   Shazia Lawrence RN - 2/8/2021 11:47 AM CST   Anticoagulation Patient Assessment Grid   Change in Alcohol Consumption :   No   Change in Diet :   No   Change in Medications :   No   Diarrhea :   No   Rectal Bleeding :   No   Signs of Clotting :   No   Signs of Warfarin Intolerance :   No   Unusual Bleeding, Bruising :   No   Upcoming Procedures :   No   Vomiting :   No   Shazia Lawrence RN - 2/8/2021 11:47 AM CST   Patient on Warfarin :   Yes   Shazia Lawrence RN - 2/8/2021 11:47 AM CST   Warfarin   International Normalization Ratio TR :   2.2    Anticoagulant INR Goal Lower :   2.5    Anticoagulant INR Goal Upper :   3.5    Sunday :   10 mg   Monday :   12.5 mg   Tuesday :   10 mg   Wednesday :   10 mg   Thursday :   10 mg   Friday :   10 mg   Saturday :   10 mg   Total Dose :   72.5 mg   Warfarin Pt Reported Previous Week Dose :    Sun Mon Tues Wed Thurs Fri Sat Weekly Total Dose   Week 1 10 mg 12.5 mg 10 mg 10 mg 10 mg 10 mg 10 mg 72.5 mg   Week 2 10 mg 12.5 mg 10 mg 10 mg 10 mg  mg  mg  mg   Week 3  mg  mg  mg  mg  mg  mg  mg  mg   Week 4  mg  mg  mg  mg  mg  mg  mg  mg         Patient is taking single or multiple strength tablet(s) :   Multiple strength tab(s)   Multiple Tab Strengths :   5 mg tab, 7.5 mg tab   Sunday :   10 mg   Monday :   15 mg   Tuesday :   10 mg   Wednesday :   10 mg   Thursday :   10 mg   Friday :   10 mg   Saturday :   10 mg   Week 1 Total Dose :   75 mg   Sunday :   10 mg   Monday :   12.5 mg   Patient Instructions :   INR = 2.2 per mdINR.  Per protocol, take 15mg once then resume Warfarin 12.5mg Mondays and 10mg ROW. Recheck INR in 1 week. Advised via portal.     Melinda REES, Shazia - 2/8/2021 11:47 AM CST

## 2022-02-16 NOTE — TELEPHONE ENCOUNTER
After-hours phone call received at 6:40 PM.  The answering service patch the call directly to me notifying me that patient's INR was critical at 5.5.  Patient does identify himself on his voicemail so I left a detailed message that patient should not use his warfarin today or tomorrow and should contact the clinic on Monday to get his warfarin rechecked and talk with 1 of the INR nurses about adjusting his dose.

## 2022-02-16 NOTE — TELEPHONE ENCOUNTER
---------------------  From: Shazia Lawrence RN   To: WENDI BOWSER    Sent: 10/30/2020 3:58:35 PM CDT  Subject: General Message     Tez Oates -     Your INR came back at 3.6. You can continue your Warfarin 12.5mg Monday and 10mg rest of the days.  Recheck in 1 week.    Please let us know if you have any questions or concerns.    Have a great weekend!    Thanks,  Shazia HUBER RN

## 2022-02-16 NOTE — TELEPHONE ENCOUNTER
Entered by Nisha Miller CMA on December 26, 2019 8:48:28 PM CST  ---------------------  From: Nisha Miller CMA   To: [facility] #[id number]    Sent: 12/26/2019 8:48:28 PM CST  Subject: Medication Management     ** Submitted: **  Order:atorvastatin (atorvastatin 10 mg oral tablet)  1 tab(s)  Oral  daily  Qty:  30 tab(s)        Refills:  0          Substitutions Allowed     Route To [facility] #[id number]    Signed by Nisha Miller CMA  12/26/2019 8:48:00 PM    ** Submitted: **  Complete:atorvastatin (atorvastatin 10 mg oral tablet)   Signed by Nisha Miller CMA  12/26/2019 8:48:00 PM    ** Not Approved:  **  atorvastatin (ATORVASTATIN 10MG TABLETS)  TAKE 1 TABLET BY MOUTH EVERY DAY  Qty:  30 tab(s)        Days Supply:  30        Refills:  0          Substitutions Allowed     Route To [facility] #[id number]   Signed by Nisha Miller CMA            ** Patient matched by Nisha Miller CMA on 12/26/2019 8:46:29 PM CST **      ------------------------------------------  From: [facility] #[id number]  To: David Jenkins MD  Sent: December 26, 2019 1:34:32 PM CST  Subject: Medication Management  Due: December 27, 2019 1:34:32 PM CST    ** On Hold Pending Signature **  Drug: atorvastatin (atorvastatin 10 mg oral tablet)  TAKE 1 TABLET BY MOUTH EVERY DAY  Quantity: 30 tab(s)  Days Supply: 30  Refills: 0  Substitutions Allowed  Notes from Pharmacy:     Dispensed Drug: atorvastatin (atorvastatin 10 mg oral tablet)  TAKE 1 TABLET BY MOUTH EVERY DAY  Quantity: 30 tab(s)  Days Supply: 30  Refills: 0  Substitutions Allowed  Notes from Pharmacy:   ------------------------------------------Med Refill      Date of last office visit and reason:  6/27/19; wound concern      Date of last Med Check / Px:   10/18/18; px  Date of last labs pertaining to med:  10/18/18    RTC order in chart:  yes; due now for px    For Protocol refill, has patient been contacted:  msg sent to pharmacy

## 2022-02-16 NOTE — TELEPHONE ENCOUNTER
---------------------  From: Rosemary Lama CMA (Phone Messages Pool ([id number]_[facility]))   To: INR Pool ( [address]24_[facility]);     Sent: 11/6/2020 10:21:39 AM CST  Subject: FW: INR 4.3           ---------------------  From: WENDI BOWSER  To: [facility]  Sent: 11/06/2020 10:14 a.m. CST  Subject: INR 4.3  No changes, 10 daily 10 1/2 on Monday INR is 4.3noted

## 2022-02-16 NOTE — TELEPHONE ENCOUNTER
---------------------  From: Vitor REES, Samira   To: MAGUE WENDI WEST    Sent: 12/16/2019 2:50:23 PM CST  Subject: INR     Benny Oates,    I received your INR  of 2.1 from 12/14/19 today. Please increase your warfarin dose to 12.5mg warfarin on Monday and Thursdays and 10mg the rest of the days of the week. Recheck INR in 1 week. Please let us know if you need a new Rx.    Thank you,    Samira MARIN RN

## 2022-02-16 NOTE — TELEPHONE ENCOUNTER
---------------------  From: Mary Michelle RN   To: WENDI BOWSER    Cc: INR Pool ( [id number]_[facility]);      Sent: 12/3/2019 1:52:38 PM CST  Subject: INR result today     Benny Oates,    I got a fax from [facility] showing that your home INR today was 2.3.    According to documentation last week you were going to stay on Warfarin 10 mg daily. I am going to speak to Dr. Devries and see if he would like you to increase your dose or stay on the same.    I will message you again before 5 pm.    Please let me know if you have any questions or concerns,    Mary Michelle RN---------------------  From: Mary Michelle RN (INR Pool ( [address]24_[facility]))   To: WENDI BOWSER    Sent: 12/3/2019 2:19:59 PM CST  Subject: RE: INR result today     Dr. Kayce Oates would like you to continue the warfarin 10 mg daily and recheck INR in 1 week.    Have a great day,    Mary Michelle RN

## 2022-02-16 NOTE — TELEPHONE ENCOUNTER
---------------------  From: Donna Celestin CMA (Phone Messages Pool (32224_[facility]))   To: INR Pool ( [address]24_[facility]);     Sent: 4/12/2021 7:56:46 AM CDT  Subject: CONSUMERMESSAGE: INR 2.4           ---------------------  From: WENDI BOWSER  To: [facility]  Sent: 04/11/2021 06:16 p.m. CDT  Subject: INR 2.4  12.5 Monday and Friday, 10 others  INR 2.4---------------------  From: Viviana REES, Mary HERNANDEZ (INR Pool ( [address]24_[facility]))   To: WENDI BOWSER    Sent: 4/12/2021 8:41:38 AM CDT  Subject: RE: CONSUMERMESSAGE: INR 2.4     Benny Oates,    Please continue your warfarin 12.5 mg Monday and Friday, 10 mg all other days. Recheck INR in 1 week.    Have a great day,    Mary Michelle RN

## 2022-02-16 NOTE — TELEPHONE ENCOUNTER
---------------------  From: Shazia Lawrence RN (INR Pool ( [address]24_[facility]))   To: Phone Messages [place] ([address]_[place] [facility] [place]);     Sent: 8/10/2020 9:21:39 AM CDT  Subject: Phone Call - INR     Called and left message for patient on his personal voicemail.  mdINR reported INR of 5.3 this AM. Left message for patient asking him to call back and schedule lab appointment for   Urgent Venous INR today. Also sent portal message.    If patient calls back - please have him schedule lab appointment.     Thank you!Pt responded to portal message NEGRITA Mccray on INR sent and will come into the clinic for repeat INR today.

## 2022-02-16 NOTE — TELEPHONE ENCOUNTER
---------------------  From: Vitor REES, Samira   To: WENDI BOWSER    Sent: 6/8/2020 5:15:33 PM CDT  Subject: Anticoagulant     Benny Oates,    I sent your message about an alternative anticoagulant to Dr. Jenkins. He is not in today but will get the message when he returns.    Samira MARIN RN

## 2022-02-16 NOTE — NURSING NOTE
Anticoagulation Therapy Management Entered On:  4/5/2021 10:02 AM CDT    Performed On:  4/5/2021 10:01 AM CDT by Shazia Lawrence RN               Anticoagulation Visit Assessment   Type of Visit - Anticoagulation :   Telephone   Anticoagulation Indication :   Deep vein thrombosis, Other: Factor V   Anticoagulant Start :   8/1/2012 CDT   Anticoagulant Duration :   Undetermined   Anticoagulation Medication Verified :   Yes   Patient Preferred Contact Method :   Cell   Shazia Lawrence RN - 4/5/2021 10:01 AM CDT   Anticoagulation Patient Assessment Grid   Change in Alcohol Consumption :   No   Change in Diet :   No   Change in Medications :   No   Diarrhea :   No   Rectal Bleeding :   No   Signs of Clotting :   No   Signs of Warfarin Intolerance :   No   Unusual Bleeding, Bruising :   No   Upcoming Procedures :   No   Vomiting :   No   Shazia Lawrence RN - 4/5/2021 10:01 AM CDT   Patient on Warfarin :   Yes   Shazia Lawrence RN - 4/5/2021 10:01 AM CDT   Warfarin   International Normalization Ratio TR :   2.4    Anticoagulant INR Goal Lower :   2.5    Anticoagulant INR Goal Upper :   3.5    Sunday :   10 mg   Monday :   12.5 mg   Tuesday :   10 mg   Wednesday :   10 mg   Thursday :   10 mg   Friday :   10 mg   Saturday :   10 mg   Total Dose :   72.5 mg   Warfarin Pt Reported Previous Week Dose :    Sun Mon Tues Wed Thurs Fri Sat Weekly Total Dose   Week 1 10 mg 12.5 mg 10 mg 10 mg 10 mg 12.5 mg 10 mg 75 mg   Week 2 10 mg 12.5 mg 10 mg 10 mg 10 mg 10 mg 10 mg 72.5 mg   Week 3  mg  mg  mg  mg  mg  mg  mg  mg   Week 4  mg  mg  mg  mg  mg  mg  mg  mg         Patient is taking single or multiple strength tablet(s) :   Multiple strength tab(s)   Multiple Tab Strengths :   5 mg tab, 7.5 mg tab   Sunday :   10 mg   Monday :   12.5 mg   Tuesday :   10 mg   Wednesday :   10 mg   Thursday :   10 mg   Friday :   12.5 mg   Saturday :   10 mg   Week 1 Total Dose :   75 mg   Sunday :   10 mg   Monday :   12.5 mg   Tuesday :   10 mg   Wednesday :   10  mg   Thursday :   10 mg   Friday :   10 mg   Saturday :   10 mg   Week 2 Total Dose :   72.5 mg   Warfarin Dosing Acknowledgement :   I have reviewed the patient's warfarin dosing schedule and confirmed its accuracy for today's visit.   Patient Instructions :   INR = 2.4 per mdINR on 4/3/21. Per protocol, continue Warfarin 12.5mg Mon/Fri and 10mg ROW. Recheck INR in 1 week. Advised via portal.      Melinda REES, Shazia - 4/5/2021 10:01 AM CDT

## 2022-02-16 NOTE — NURSING NOTE
Anticoagulation Therapy Entered On:  4/22/2019 2:04 PM CDT    Performed On:  4/22/2019 2:03 PM CDT by Samira Adler RN               Warfarin Management   Week 1 [place] Dose :   10    Week 1 Monday Dose :   10    Week 1 Tuesday Dose :   10    Week 1 Wednesday Dose :   10    Week 1 Thursday Dose :   10    Week 1 Friday Dose :   10    Week 1 Saturday Dose :   10    Week 1 Dose Total :   70    Week 2 Sunday Dose :   10    Week 2 Monday Dose :   10    Week 2 Tuesday Dose :   10    Week 2 Wednesday Dose :   10    Week 2 Thursday Dose :   10    Week 2 Friday Dose :   10    Week 2 Saturday Dose :   10    Week 2 Dose Total :   70    Indication :   DVT, Other: FVLeiden   International Normalization Ratio :   3.2    INR Range :   2.5 - 3.5   INR Therapeutic Range :   Yes   Warfarin Abnormal Findings :   none   Anticoagulation Recheck :   1 week   Warfarin Physician :   David Jenkins MD Special Instructions :   INR = 3.2 per Patient on 4/22/19  Take 10mg warfarin daily and recheck INR in 1 week per protocol. Patient advised via portal.   Samira Adler RN - 4/22/2019 2:03 PM CDT

## 2022-02-16 NOTE — NURSING NOTE
Anticoagulation Therapy Entered On:  3/30/2020 10:23 AM CDT    Performed On:  3/30/2020 10:18 AM CDT by Samira Adler RN               Warfarin Management   Week 1 [place] Dose :   10    Week 1 Monday Dose :   12.5    Week 1 Tuesday Dose :   10    Week 1 Wednesday Dose :   10    Week 1 Thursday Dose :   12.5    Week 1 Friday Dose :   10    Week 1 Saturday Dose :   10    Week 1 Dose Total :   75    Week 2 Sunday Dose :   10    Week 2 Monday Dose :   12.5    Week 2 Tuesday Dose :   10    Week 2 Wednesday Dose :   10    Week 2 Thursday Dose :   12.5    Week 2 Friday Dose :   10    Week 2 Saturday Dose :   10    Week 2 Dose Total :   75    Planned Duration :   Indefinite   Indication :   DVT, Other: Factor V   INR Range :   2.5 - 3.5   INR Therapeutic Range :   Yes   Warfarin Abnormal Findings :   In [facility] Last reported dose from patient  12.5 M, Th and 10mg ROW.    Anticoagulation Recheck :   1 week   Warfarin Physician :   David Jenkins MD   Warfarin Special Instructions :   INR = 2.5 on 3/28/20 from MDINGRETA received on 3/30/20 Patient is to stay on 12.5mg Mon and Thurs and 10 mg ROW. He has self adjusted doses in [facility]. Recheck INR in 1 week.   Samira Adler RN - 3/30/2020 10:18 AM CDT

## 2022-02-16 NOTE — TELEPHONE ENCOUNTER
---------------------  From: Zenia Baker CMA (Phone Messages Pool (32224_[facility]))   To: TFS Message Pool (32224_[place] [facility] [place]);     Sent: 2/7/2020 10:30:33 AM CST !  Subject: General Message     Critical lab report from hospital:    [facility] 5.9---------------------  From: Rosemary Lama CMA (TFS Message Pool (32224_[facility]))   To: INR Pool ( 32224_[facility]);     Cc: David Jenkins MD;      Sent: 2/7/2020 10:32:54 AM CST  Subject: FW: General MessageDonmilka

## 2022-02-16 NOTE — TELEPHONE ENCOUNTER
---------------------  From: Max Easton CMA (Phone Messages Pool (36124_[facility]))   To: INR Pool ( 32224_[facility]);     Sent: 4/11/2019 10:53:33 AM CDT  Subject: FW: INR 1.2           ---------------------  From: WENDI BOWSER  To: [facility]  Sent: 04/11/2019 10:25 a.m. CDT  Subject: INR 1.2  I skipped 2 days, still on 10 daily  INR is 1.2---------------------  From: Viviana REES, Mary HERNANDEZ (INR Pool ( 32224_[facility]))   To: WENDI BOWSER    Sent: 4/11/2019 11:02:21 AM CDT  Subject: RE: INR 1.2     Dr. Alice Lama would like you to keep on the 10 mg of warfarin daily and recheck in 1 week. I'm not sure what happened, but now we have to be sure you come back up for your INR!     Please let us know if you have any questions or concerns and be sure to go to the emergency room if you have symptoms of blood clot.    Be safe if you're traveling and I'll talk to you next week.    Have a great day,    Mary Michelle RN   I will STOP taking the medications listed below when I get home from the hospital:  None

## 2022-02-16 NOTE — CARE COORDINATION
Pt appears on  TFS chronic disease panel as out of parameters for tobacco use.  Pt is overdue for annual visit and fasting labs.  Placed RTC for OV and fasting labs.  Cessation can be discussed at OV.  Carlotta De La Cruz CMA.

## 2022-02-16 NOTE — TELEPHONE ENCOUNTER
---------------------  From: Astrid Estrada LPN (Phone Messages Pool (32224_[facility]))   To: INR [place] ( [address]24_[facility]);     Sent: 8/19/2019 7:02:30 PM CDT  Subject: CONSUMER MESSAGE FW: INR 3.3           ---------------------  From: WENDI BOWSER  To: [facility]  Sent: 08/19/2019 06:55 p.m. CDT  Subject: INR 3.3  same dosage 10 daily  INR 3.3done

## 2022-02-16 NOTE — NURSING NOTE
Anticoagulation Therapy Management Entered On:  7/12/2021 9:46 AM CDT    Performed On:  7/12/2021 9:45 AM CDT by Shazia Lawrence RN               Anticoagulation Visit Assessment   Type of Visit - Anticoagulation :   Telephone   Anticoagulation Indication :   Deep vein thrombosis, Other: Factor V   Anticoagulant Start :   8/1/2012 CDT   Anticoagulant Duration :   Undetermined   Anticoagulation Medication Verified :   Yes   Patient Preferred Contact Method :   Cell   Shazia Lawrence RN - 7/12/2021 9:45 AM CDT   Anticoagulation Patient Assessment Grid   Change in Alcohol Consumption :   No   Change in Diet :   No   Change in Medications :   No   Diarrhea :   No   Rectal Bleeding :   No   Signs of Clotting :   No   Signs of Warfarin Intolerance :   No   Unusual Bleeding, Bruising :   No   Upcoming Procedures :   No   Vomiting :   No   Shazia Lawrence RN - 7/12/2021 9:45 AM CDT   Patient on Warfarin :   Yes   Shazia Lawrence RN - 7/12/2021 9:45 AM CDT   Warfarin   International Normalization Ratio TR :   3.8    Anticoagulant INR Goal Lower :   2.5    Anticoagulant INR Goal Upper :   3.5    Sunday :   10 mg   Monday :   12.5 mg   Tuesday :   10 mg   Wednesday :   12.5 mg   Thursday :   10 mg   Friday :   12.5 mg   Saturday :   10 mg   Total Dose :   77.5 mg   Warfarin Pt Reported Previous Week Dose :    Sun Mon Tues Wed Thurs Fri Sat Weekly Total Dose   Week 1 10 mg 12.5 mg 10 mg 12.5 mg 10 mg 12.5 mg 10 mg 77.5 mg   Week 2 10 mg 12.5 mg 10 mg 12.5 mg 10 mg 12.5 mg 10 mg 77.5 mg   Week 3  mg  mg  mg  mg  mg  mg  mg  mg   Week 4  mg  mg  mg  mg  mg  mg  mg  mg         Patient is taking single or multiple strength tablet(s) :   Multiple strength tab(s)   Multiple Tab Strengths :   5 mg tab, 7.5 mg tab   Sunday :   10 mg   Monday :   10 mg   Tuesday :   10 mg   Wednesday :   12.5 mg   Thursday :   10 mg   Friday :   12.5 mg   Saturday :   10 mg   Week 1 Total Dose :   75 mg   Sunday :   10 mg   Monday :   10 mg   Tuesday :   10 mg    Wednesday :   12.5 mg   Thursday :   10 mg   Friday :   12.5 mg   Saturday :   10 mg   Week 2 Total Dose :   75 mg   Warfarin Dosing Acknowledgement :   I have reviewed the patient's warfarin dosing schedule and confirmed its accuracy for today's visit.   Patient Instructions :   INR = 3.8 per mdINR. Per protocol, decrease Warfarin to 12.5mg Wed/Fri and 10mg ROW. Recheck INR in 1 week. Directions sent via portal.      Melinda REES, Shazia - 7/12/2021 9:45 AM CDT

## 2022-02-16 NOTE — TELEPHONE ENCOUNTER
---------------------  From: Shazia Lawrence RN (Phone Messages Pool (32224_[facility]))   Sent: 10/1/2020 9:05:52 AM CDT  Subject: Phone Message     Phone Message    PCP:   TFS      Time of Call:  0832       Person Calling:  [facility]  Phone number:  [phone]    Returned call at: _    Note:   [facility] returned call to make sure that rx was being sen per TFS to patient's pharmacy. Sent Rx.  [facility] will supply Lovenox while pt is in hospital port-op.     Last office visit and reason:  _

## 2022-02-16 NOTE — NURSING NOTE
Anticoagulation Therapy Entered On:  1/24/2020 3:19 PM CST    Performed On:  1/24/2020 3:16 PM CST by Samira Adler RN               Warfarin Management   Week 1 Sunday Dose :   10    Week 1 Monday Dose :   12.5    Week 1 Tuesday Dose :   10    Week 1 Wednesday Dose :   10    Week 1 Thursday Dose :   12.5    Week 1 Friday Dose :   10    Week 1 Saturday Dose :   10    Week 1 Dose Total :   75    Week 2 Sunday Dose :   10    Week 2 Monday Dose :   12.5    Week 2 Tuesday Dose :   10    Week 2 Wednesday Dose :   10    Week 2 Thursday Dose :   12.5    Week 2 Friday Dose :   10    Week 2 Saturday Dose :   10    Week 2 Dose Total :   75    Planned Duration :   Indefinite   Indication :   DVT, Other: Factor V   International Normalization Ratio :   2.0    INR Range :   2.5 - 3.5   INR Therapeutic Range :   No   Warfarin Abnormal Findings :   In [facility]   Anticoagulation Recheck :   1 week   Warfarin Physician :   David Jenkins MD Special Instructions :   INR = 2.0 per MDINR on 1/24/20. Patient is to stay on 12.5mg warfarin on Mon, and Thurs and 10 mg the rest of the days of the week. Recheck INR in 1 week per TFS. Patient advised via portal.   Samira Adler RN - 1/24/2020 3:16 PM CST

## 2022-02-16 NOTE — NURSING NOTE
Anticoagulation Therapy Management Entered On:  8/6/2021 12:35 PM CDT    Performed On:  8/6/2021 12:35 PM CDT by Mary Michelle RN               Anticoagulation Visit Assessment   Anticoagulation Indication :   Deep vein thrombosis, Other: Factor V   Anticoagulant Start :   8/1/2012 CDT   Anticoagulant Duration :   Undetermined   Anticoagulation Medication Verified :   Yes   Patient Preferred Contact Method :   Cell   Patient on Warfarin :   Yes   Mary Michelle RN - 8/6/2021 12:35 PM CDT   Warfarin   International Normalization Ratio TR :   4.3    Anticoagulant INR Goal Lower :   2.5    Anticoagulant INR Goal Upper :   3.5    Sunday :   10 mg   Monday :   10 mg   Tuesday :   10 mg   Wednesday :   10 mg   Thursday :   10 mg   Friday :   10 mg   Saturday :   10 mg   Total Dose :   70 mg   Warfarin Pt Reported Previous Week Dose :    Sun Mon Tues Wed Thurs Fri Sat Weekly Total Dose   Week 1 10 mg 10 mg 10 mg 12.5 mg 10 mg 12.5 mg 10 mg 75 mg   Week 2 10 mg 10 mg 10 mg 12.5 mg 10 mg 12.5 mg 10 mg 75 mg   Week 3  mg  mg  mg  mg  mg  mg  mg  mg   Week 4  mg  mg  mg  mg  mg  mg  mg  mg         Patient is taking single or multiple strength tablet(s) :   Multiple strength tab(s)   Multiple Tab Strengths :   5 mg tab, 7.5 mg tab   Sunday :   10 mg   Monday :   10 mg   Tuesday :   10 mg   Wednesday :   10 mg   Thursday :   10 mg   Friday :   10 mg   Saturday :   10 mg   Week 1 Total Dose :   70 mg   Sunday :   10 mg   Monday :   10 mg   Tuesday :   10 mg   Wednesday :   12.5 mg   Thursday :   10 mg   Friday :   12.5 mg   Saturday :   10 mg   Week 2 Total Dose :   75 mg   Warfarin Dosing Acknowledgement :   I have reviewed the patient's warfarin dosing schedule and confirmed its accuracy for today's visit.   Patient Instructions :   Home INR = 4.3; per protocol Hold warfarin x 1 day then resume 10 mg daily. Recheck INR in 1 week. Message sent through portal.     Mary Michelle RN - 8/6/2021 12:35 PM CDT

## 2022-02-16 NOTE — NURSING NOTE
Monthly report of INR home test values reviewed as received from [facility]. All out of range results have been addressed and patient is in compliance for testing frequency. Diagnosis Hx DVT and Factor V per TFS.

## 2022-02-16 NOTE — TELEPHONE ENCOUNTER
---------------------  From: Juan Foley PA-C   To: [facility] Message Pool ([id number]_[facility]);     Sent: 11/26/2021 12:15:53 PM CST  Subject: General Message     Please call and encourage him to FU with derm. Multiple suspicious facial lesions. They have been unable to reach him.    JOSE---------------------  From: Donna Sapp LPN ([facility] Message Pool ([id number]_[facility]))   To: Juan Foley PA-C;     Sent: 12/6/2021 9:27:04 AM CST  Subject: FW: General Message     contacted patient and he said that he has an appt this Friday---------------------  From: Juan Foley PA-C   To: [facility] ([address]_[facility]);     Sent: 12/6/2021 9:28:17 AM CST  Subject: RE: General Message     Ani GARCIA

## 2022-02-16 NOTE — NURSING NOTE
Comprehensive Intake Entered On:  6/21/2019 10:45 AM CDT    Performed On:  6/21/2019 10:38 AM CDT by Flori Nunez               Summary   Chief Complaint :   [facility] admit.    Systolic Blood Pressure :   138 mmHg (HI)    Peripheral Pulse Rate :   105 bpm (HI)    Temperature Tympanic :   97.5 DegF(Converted to: 36.4 DegC)  (LOW)    Flori Nunez - 6/21/2019 10:38 AM CDT   Health Status   Allergies Verified? :   Yes   Medication History Verified? :   Yes   Medical History Verified? :   Yes   Pre-Visit Planning Status :   Completed   Tobacco Use? :   Current every day smoker   Flori Nunez - 6/21/2019 10:38 AM CDT   Meds / Allergies   (As Of: 6/21/2019 10:45:57 AM CDT)   Allergies (Active)   No Known Medication Allergies  Estimated Onset Date:   Unspecified ; Created By:   Laisha Murillo CMA; Reaction Status:   Active ; Category:   Drug ; Substance:   No Known Medication Allergies ; Type:   Allergy ; Updated By:   Laisha Murillo CMA; Reviewed Date:   12/1/2017 11:04 AM CST        Medication List   (As Of: 6/21/2019 10:45:57 AM CDT)   Prescription/Discharge Order    sildenafil  :   sildenafil ; Status:   Prescribed ; Ordered As Mnemonic:   Viagra 100 mg oral tablet ; Simple Display Line:   See Instructions, TAKE AS DIRECTED, 8 tab(s), 11 Refill(s) ; Ordering Provider:   David Jenkins MD; Catalog Code:   sildenafil ; Order Dt/Tm:   4/20/2016 12:38:56 PM          fluticasone nasal  :   fluticasone nasal ; Status:   Prescribed ; Ordered As Mnemonic:   fluticasone 50 mcg/inh nasal spray ; Simple Display Line:   2 spray(s), nasal, daily, for 90 day(s), 3 EA, 3 Refill(s) ; Ordering Provider:   David Jenkins MD; Catalog Code:   fluticasone nasal ; Order Dt/Tm:   10/18/2018 9:06:03 AM          atorvastatin  :   atorvastatin ; Status:   Prescribed ; Ordered As Mnemonic:   atorvastatin 10 mg oral tablet ; Simple Display Line:   10 mg, 1 tab(s), Oral, daily, 90 tab(s), 3 Refill(s) ;  Ordering Provider:   David Jenkins MD; Catalog Code:   atorvastatin ; Order Dt/Tm:   10/18/2018 9:06:05 AM          warfarin  :   warfarin ; Status:   Prescribed ; Ordered As Mnemonic:   warfarin 5 mg oral tablet ; Simple Display Line:   10 mg, 2 tab(s), Oral, daily, 180 tab(s), 3 Refill(s) ; Ordering Provider:   David Jenkins MD; Catalog Code:   warfarin ; Order Dt/Tm:   10/18/2018 9:06:06 AM          enoxaparin  :   enoxaparin ; Status:   Prescribed ; Ordered As Mnemonic:   Lovenox 100 mg/mL injectable solution ; Simple Display Line:   100 mg, Subcutaneous, q12 hrs, take 100mg bid start june 10th last dose am of june 12th, 5 EA, 0 Refill(s) ; Ordering Provider:   David Jenkins MD; Catalog Code:   enoxaparin ; Order Dt/Tm:   5/31/2019 3:15:11 PM            Home Meds    acetaminophen  :   acetaminophen ; Status:   Documented ; Ordered As Mnemonic:   acetaminophen ; Simple Display Line:   See Instructions, 325mg-650 mg PRN for pain/fever., 0 Refill(s) ; Catalog Code:   acetaminophen ; Order Dt/Tm:   6/19/2019 3:01:15 PM          Al hydroxide/Mg hydroxide/simethicone  :   Al hydroxide/Mg hydroxide/simethicone ; Status:   Documented ; Ordered As Mnemonic:   aluminum hydroxide/magnesium hydroxide/simethicone 200 mg-200 mg-20 mg/5 mL oral suspension ; Simple Display Line:   20 mL, Oral, qid, PRN: for indigestion, 400 mL, 0 Refill(s) ; Catalog Code:   Al hydroxide/Mg hydroxide/simethicone ; Order Dt/Tm:   6/19/2019 3:03:11 PM          bisacodyl  :   bisacodyl ; Status:   Documented ; Ordered As Mnemonic:   Dulcolax Laxative 10 mg rectal suppository ; Simple Display Line:   10 mg, 1 supp, RI, bid, PRN: for constipation, 10 supp, 0 Refill(s) ; Catalog Code:   bisacodyl ; Order Dt/Tm:   6/19/2019 3:04:20 PM          enoxaparin  :   enoxaparin ; Status:   Documented ; Ordered As Mnemonic:   Lovenox ; Simple Display Line:   See Instructions, 90 mg q12 hours, 0 Refill(s) ; Catalog Code:   enoxaparin ; Order  Dt/Tm:   6/19/2019 3:05:12 PM          fluticasone nasal  :   fluticasone nasal ; Status:   Documented ; Ordered As Mnemonic:   fluticasone 50 mcg/inh nasal spray ; Simple Display Line:   50 mcg, Nasal, daily, PRN for rhinitis., 0 Refill(s) ; Catalog Code:   fluticasone nasal ; Order Dt/Tm:   6/19/2019 3:06:00 PM          glucose  :   glucose ; Status:   Documented ; Ordered As Mnemonic:   glucose 15 g/42 mL oral gel ; Simple Display Line:   PRN: Hypoglycemia, 0 Refill(s) ; Catalog Code:   glucose ; Order Dt/Tm:   6/19/2019 3:07:23 PM          glucose  :   glucose ; Status:   Documented ; Ordered As Mnemonic:   TRUEplus 4 g oral tablet, chewable ; Simple Display Line:   16 gm, 4 tab(s), Chewed, once, 0 Refill(s) ; Catalog Code:   glucose ; Order Dt/Tm:   6/19/2019 3:08:25 PM          hydrocortisone topical  :   hydrocortisone topical ; Status:   Documented ; Ordered As Mnemonic:   hydrocortisone 1% topical cream ; Simple Display Line:   1 marija, Topical, qid, PRN: for itching, 0 Refill(s) ; Catalog Code:   hydrocortisone topical ; Order Dt/Tm:   6/19/2019 3:09:18 PM          insulin aspart  :   insulin aspart ; Status:   Documented ; Ordered As Mnemonic:   NovoLOG FlexPen 100 units/mL injectable solution ; Simple Display Line:   See Instructions, 2-10 Units, 0 Refill(s) ; Catalog Code:   insulin aspart ; Order Dt/Tm:   6/19/2019 3:10:31 PM          magnesium hydroxide  :   magnesium hydroxide ; Status:   Documented ; Ordered As Mnemonic:   Fields Milk of Magnesia 8% oral suspension ; Simple Display Line:   2.4 gm, 30 mL, Oral, hs, PRN: for constipation, 300 mL, 0 Refill(s) ; Catalog Code:   magnesium hydroxide ; Order Dt/Tm:   6/19/2019 3:15:07 PM          ondansetron  :   ondansetron ; Status:   Documented ; Ordered As Mnemonic:   ondansetron 8 mg oral tablet, disintegrating ; Simple Display Line:   8 mg, 1 tab(s), Oral, q8 hrs, PRN: for nausea/vomiting, 10 tab(s), 0 Refill(s) ; Catalog Code:   ondansetron ; Order  Dt/Tm:   6/19/2019 3:15:35 PM          docusate-senna  :   docusate-senna ; Status:   Documented ; Ordered As Mnemonic:   Senna Plus 50 mg-8.6 mg oral tablet ; Simple Display Line:   2 tab(s), Oral, hs, 0 Refill(s) ; Catalog Code:   docusate-senna ; Order Dt/Tm:   6/19/2019 3:16:22 PM          oxyCODONE  :   oxyCODONE ; Status:   Documented ; Ordered As Mnemonic:   oxyCODONE 5 mg oral tablet ; Simple Display Line:   See Instructions, 5-10 mgs Q4H, PRN: as needed for pain, 0 Refill(s) ; Catalog Code:   oxyCODONE ; Order Dt/Tm:   6/19/2019 3:18:11 PM          gabapentin  :   gabapentin ; Status:   Documented ; Ordered As Mnemonic:   gabapentin 100 mg oral capsule ; Simple Display Line:   100 mg, 1 cap(s), Oral, tid, 0 Refill(s) ; Catalog Code:   gabapentin ; Order Dt/Tm:   6/21/2019 10:40:48 AM          warfarin  :   warfarin ; Status:   Documented ; Ordered As Mnemonic:   Coumadin 5 mg oral tablet ; Simple Display Line:   5 mg, 1 tab(s), Oral, daily, 0 Refill(s) ; Catalog Code:   warfarin ; Order Dt/Tm:   6/21/2019 10:40:27 AM

## 2022-02-16 NOTE — TELEPHONE ENCOUNTER
---------------------  From: Astrid Estrada LPN (Phone Messages Pool ([id number]_[facility]))   To: INR Pool ( 32224_[facility]);     Sent: 12/11/2020 11:04:08 AM CST  Subject: CONSUMER MESSAGE FW: Inr 3.5           ---------------------  From: WENDI BOWSER  To: [facility]  Sent: 12/11/2020 11:01 a.m. CST  Subject: Inr 3.5  I m not sure you re getting my results from the INR company so I m reporting it directly at 3.5, still the same dosage 10 daily 12.5 on Monday---------------------  From: Shazia Lawrence RN (INR Pool ( 32224_[facility]))   To: WENDI BOWSER    Sent: 12/11/2020 11:08:13 AM CST  Subject: RE: CONSUMER MESSAGE FW: Inr 3.5     [facility] -    I do not see that the result was reported to us - so thank you for reaching out!  I will enter that information in.    Thanks and have a great weekend!    Shazia HUBER RN

## 2022-02-16 NOTE — TELEPHONE ENCOUNTER
After-hours telephone call   Call received: 10:22 AM   Call returned: 10:30 AM   Patient is an INR 5.2.  Check it was 1.8.  He denies any recent change in his warfarin dosing.  Diet has been unchanged.  Advised him to hold for the next 2 days and recheck on Monday.  He is to call if he develops any bleeding issues.

## 2022-02-16 NOTE — NURSING NOTE
Comprehensive Intake Entered On:  6/27/2019 9:02 AM CDT    Performed On:  6/27/2019 8:57 AM CDT by Uma Kay               Summary   Chief Complaint :   f/up from recent knee surgery.  Swelling in foot, leg and knee.  Drainage from incision.    Additonal Information :   medication discrepencies in med list from [facility]   Weight Measured :   190.0 lb(Converted to: 190 lb 0 oz, 86.18 kg)    Systolic Blood Pressure :   95 mmHg   Diastolic Blood Pressure :   62 mmHg   Mean Arterial Pressure :   73 mmHg   Peripheral Pulse Rate :   118 bpm (HI)    Vital Signs Comments :   weight per kinni   Temperature Tympanic :   97.1 DegF(Converted to: 36.2 DegC)  (LOW)    Uma Kay - 6/27/2019 8:57 AM CDT   Health Status   Allergies Verified? :   Yes   Medication History Verified? :   No   Tobacco Use? :   Current every day smoker   Uma Kay - 6/27/2019 8:57 AM CDT

## 2022-02-16 NOTE — TELEPHONE ENCOUNTER
---------------------  From: Mary Michelle RN (INR Pool ( 32224_[facility]))   To: WENDI BOWSER    Sent: 11/29/2021 2:01:00 PM CST  Subject: RE: INR 1.4     Tez Oates,    Please resume your warfarin 10 mg daily. Are you able to check your INR Friday before 4pm?    Any further issues with bleeding from the ear or other concerns?    Thank you,    Mary Michelle RN      ---------------------  From: WENDI BOWSER  To: INR Pool ( [address]24_[facility])  Sent: 11/29/2021 11:22 a.m. CST  Subject: RE: INR 1.4  I skipped dosage Friday and Saturday night Sunday night took 5 mg my INR is 1.4  ---------------------  From: Mary Michelle RN (INR Pool ( [address]))  To: WENDI BOWSER  Sent: 11/26/2021 3:11:04 PM CST  Subject: RE: INR 6.5       I saw that visit note from seeing the provider.       If you start to bleed from your ear again or anywhere else, please get help immediately.       Mary Michelle RN            ---------------------  From: WENDI BOWSER  To: INR Pool ( [address])  Sent: 11/26/2021 02:57 p.m. CST  Subject: RE: INR 6.5  I had bleeding from my ear,  gave me antibiotics drops       Addendum by Mary Michelle RN on November 26, 2021 2:44:25 PM CST  ---------------------  From: Mary Michelle RN (INR Pool ( [address]))  To: WENDI BOWSER  Sent: 11/26/2021 2:44:25 PM CST  Subject: RE: INR 6.5       Benny Oates,       Please do not take any warfarin for 2 days then take a half dose on the third day.       Please check your INR on Monday, November 29th.       Do you have any bleeding, bruising, dizziness, extreme headache, or other symptoms that could be related to bleeding?       Please go to the Emergency Room if you develop any symptoms.       Mary Michelle RN  ---------------------  From: Samira Adler RN (Phone Messages Pool ([id number]_[place] [facility] [place]))  To: INR Pool ( [id number]_[facility]);  Sent: 11/26/2021 2:37:05 PM CST  Subject: FW: INR 6.5                       ---------------------  From: WENDI BOWSER  To: [facility]  Sent: 11/26/2021 02:35 p.m. CST  Subject: INR 6.5  Same dosage 10 daily, I checked it twice and came out at 6.5 both times

## 2022-02-16 NOTE — PROGRESS NOTES
Patient:   WENDI BOWSER            MRN: [id number]            FIN: [id number]               Age:   69 years     Sex:  Male     :  1950   Associated Diagnoses:   Drainage from wound   Author:   David Jenkins MD      Visit Information      Date of Service: 2019 08:53 am  Performing Location: [facility]  Encounter#: [id number]      Primary Care Provider (PCP):  David Jenkins MD    NPI# [id number]      Referring Provider:  David Jenkins MD# [id number]      Chief Complaint   2019 8:57 AM CDT    f/up from recent knee surgery.  Swelling in foot, leg and knee.  Drainage from incision.        History of Present Illness   Here for wound check  Noted scant clear dc with exercise  no fever  no increased pain  still swollen      Review of Systems   Constitutional:  Negative except as documented in history of present illness.    Musculoskeletal:  Negative except as documented in history of present illness.    Integumentary:  Negative except as documented in history of present illness.    Neurologic:  Negative.       Health Status   Allergies:    Allergic Reactions (Selected)  No Known Medication Allergies   Medications:  (Selected)   Prescriptions  Prescribed  Lovenox 100 mg/mL injectable solution: ( 100 mg ), Subcutaneous, q12 hrs, Instructions: confirmed with kirill, still taking, # 5 EA, 0 Refill(s), Type: Maintenance, other reason (Rx)  Viagra 100 mg oral tablet: See Instructions, Instructions: TAKE AS DIRECTED, # 8 tab(s), 11 Refill(s), Pharmacy: [facility] [id number], TAKE AS DIRECTED  atorvastatin 10 mg oral tablet: = 1 tab(s) ( 10 mg ), Oral, daily, # 90 tab(s), 3 Refill(s), Type: Maintenance, Pharmacy: [facility] [id number], 1 tab(s) Oral daily  fluticasone 50 mcg/inh nasal spray: = 2 spray(s), nasal, daily, # 3 EA, 3 Refill(s), Type: Maintenance, Pharmacy: [facility] [id number], 2 spray(s) Nasal daily,x90 day(s)  oxyCODONE 5 mg oral tablet: 1-2 tab(s),  Oral, q4 hrs, Instructions: 1 tab Q4hrs for pain (1-5)   2 tabs Q4hrs for pain (6-10), PRN: as needed for pain, # 30 tab(s), 0 Refill(s), Type: Maintenance  warfarin 5 mg oral tablet: = 2 tab(s) ( 10 mg ), Oral, daily, # 180 tab(s), 3 Refill(s), Type: Maintenance, Pharmacy: [facility] [id number], 2 tab(s) Oral daily  Documented Medications  Documented  Dulcolax Laxative 10 mg rectal suppository: = 1 supp ( 10 mg ), RI, bid, PRN: for constipation, # 10 supp, 0 Refill(s), Type: Maintenance  Lovenox: See Instructions, Instructions: 90 mg q12 hours, 0 Refill(s), Type: Maintenance  NovoLOG FlexPen 100 units/mL injectable solution: See Instructions, Instructions: 2-10 Units, 0 Refill(s), Type: Maintenance  Fields Milk of Magnesia 8% oral suspension: 30 mL ( 2.4 gm ), Oral, hs, PRN: for constipation, # 300 mL, 0 Refill(s), Type: Maintenance  Senna Plus 50 mg-8.6 mg oral tablet: 2 tab(s), Oral, hs, 0 Refill(s), Type: Maintenance  TRUEplus 4 g oral tablet, chewable: = 4 tab(s) ( 16 gm ), Chewed, once, 0 Refill(s), Type: Maintenance  acetaminophen: See Instructions, Instructions: 325mg-650 mg PRN for pain/fever., 0 Refill(s), Type: Maintenance  aluminum hydroxide/magnesium hydroxide/simethicone 200 mg-200 mg-20 mg/5 mL oral suspension: 20 mL, Oral, qid, PRN: for indigestion, # 400 mL, 0 Refill(s), Type: Maintenance  fluticasone 50 mcg/inh nasal spray: ( 50 mcg ), Nasal, daily, Instructions: PRN for rhinitis., 0 Refill(s), Type: Maintenance  gabapentin 100 mg oral capsule: = 1 cap(s) ( 100 mg ), Oral, tid, 0 Refill(s), Type: Maintenance  glucose 15 g/42 mL oral gel: PRN: Hypoglycemia, 0 Refill(s), Type: Maintenance  hydrocortisone 1% topical cream: 1 marija, Topical, qid, PRN: for itching, 0 Refill(s), Type: Maintenance  ondansetron 8 mg oral tablet, disintegrating: = 1 tab(s) ( 8 mg ), Oral, q8 hrs, PRN: for nausea/vomiting, # 10 tab(s), 0 Refill(s), Type: Maintenance,    Medications          *denotes recorded  medication          *aluminum hydroxide/magnesium hydroxide/simethicone 200 mg-200 mg-20 mg/5 mL oral suspension: 20 mL, Oral, qid, PRN: for indigestion, 400 mL, 0 Refill(s).          *acetaminophen: See Instructions, 325mg-650 mg PRN for pain/fever., 0 Refill(s).          atorvastatin 10 mg oral tablet: 10 mg, 1 tab(s), Oral, daily, 90 tab(s), 3 Refill(s).          *Dulcolax Laxative 10 mg rectal suppository: 10 mg, 1 supp, OH, bid, PRN: for constipation, 10 supp, 0 Refill(s).          *Senna Plus 50 mg-8.6 mg oral tablet: 2 tab(s), Oral, hs, 0 Refill(s).          *Lovenox: See Instructions, 90 mg q12 hours, 0 Refill(s).          Lovenox 100 mg/mL injectable solution: 100 mg, Subcutaneous, q12 hrs, confirmed with kirill, still taking, 5 EA, 0 Refill(s).          fluticasone 50 mcg/inh nasal spray: 2 spray(s), nasal, daily, for 90 day(s), 3 EA, 3 Refill(s).          *fluticasone 50 mcg/inh nasal spray: 50 mcg, Nasal, daily, PRN for rhinitis., 0 Refill(s).          *gabapentin 100 mg oral capsule: 100 mg, 1 cap(s), Oral, tid, 0 Refill(s).          *glucose 15 g/42 mL oral gel: PRN: Hypoglycemia, 0 Refill(s).          *TRUEplus 4 g oral tablet, chewable: 16 gm, 4 tab(s), Chewed, once, 0 Refill(s).          *hydrocortisone 1% topical cream: 1 marija, Topical, qid, PRN: for itching, 0 Refill(s).          *NovoLOG FlexPen 100 units/mL injectable solution: See Instructions, 2-10 Units, 0 Refill(s).          *Fields Milk of Magnesia 8% oral suspension: 2.4 gm, 30 mL, Oral, hs, PRN: for constipation, 300 mL, 0 Refill(s).          *ondansetron 8 mg oral tablet, disintegratin mg, 1 tab(s), Oral, q8 hrs, PRN: for nausea/vomiting, 10 tab(s), 0 Refill(s).          oxyCODONE 5 mg oral tablet: 1-2 tab(s), Oral, q4 hrs, 1 tab Q4hrs for pain (1-5)   2 tabs Q4hrs for pain (6-10), PRN: as needed for pain, 30 tab(s), 0 Refill(s).          Viagra 100 mg oral tablet: See Instructions, TAKE AS DIRECTED, 8 tab(s), 11 Refill(s).           warfarin 5 mg oral tablet: 10 mg, 2 tab(s), Oral, daily, 180 tab(s), 3 Refill(s).     Problem list:    All Problems (Selected)  Adenomatous colon polyp / SNOMED CT 3445793025 / Confirmed  Anticoagulated / SNOMED CT 35194911 / Confirmed  Factor V Leiden / SNOMED CT 7722597287 / Confirmed  History of DVT of lower extremity / SNOMED CT 2476641526 / Confirmed  History of pulmonary embolism / SNOMED CT 296948713 / Confirmed  Inpatient stay / SNOMED CT 149395588 / Confirmed  Lipids abnormal / SNOMED CT 105789521 / Confirmed  Obesity / SNOMED CT 9135455338 / Probable  PVD (peripheral vascular disease) / SNOMED CT 3766758491 / Confirmed  Prediabetes / SNOMED CT 5930270362 / Confirmed  Seasonal allergies / SNOMED CT 404199124 / Confirmed  Tobacco user / SNOMED CT 197757672 / Probable      Histories   Past Medical History:    Active  Inpatient stay (964452062): Onset on 6/16/2019 at 69 years.  Comments:  6/19/2019 CDT 9:47 AM CDT - Renetta Gerardo  @ [facility] due to LLE ischemic limb.  Prediabetes (9449361896): Onset on 4/19/2013 at 63 years.  Lipids abnormal (749487105): Onset on 10/26/2012 at 62 years.  Adenomatous colon polyp (2323957043): Onset on 4/27/2012 at 62 years.  History of DVT of lower extremity (4756692428): Onset on 4/27/2012 at 62 years.  PVD (peripheral vascular disease) (3571906228)  Anticoagulated (88616977)  Obesity (7475934565)  Factor V Leiden (0768443034)  History of pulmonary embolism (019010296)  Seasonal allergies (276928232)  Resolved  *Hospitalized@[facility] - Left superficial femoral artery chronic occlusion: Onset on 7/12/2012 at 62 years.  Resolved.  Rupture of anterior cruciate ligament (972952308): Onset in 2005 at 55 years.  Resolved.  Lyme disease (9496314944): Onset in the month of 7/1999 at 49 years  Resolved.  Tobacco user (305.1): Onset on 1/1/1968 at 17 years.  Resolved on 1/1/2012 at 61 years.   Family History:    Heart disease  Uncle (M)  Father (Blayne,  )  Comments:  2010 9:16 AM CDT - Noe RAHMAN Samira  [facility]     Procedure history:    Percutaneous mechanical thrombectomy of vein of lower limb using fluoroscopic guidance (SNOMED CT 6650056509) performed by Shaun Davila MD on 2019 at 69 Years.  Comments:  2019 9:57 AM CDT - Akin Zai  left  Arthroplasty of the knee (SNOMED CT 587979458) on 2019 at 69 Years.  Comments:  2019 9:50 AM CDT - Akin Zai  left  Flexible sigmoidoscopy (SNOMED CT 50853943) on 2017 at 67 Years.  Insertion of arterial stent (SNOMED CT 525310810) on 2012 at 62 Years.  Insertion of arterial stent (SNOMED CT 631812311) on 2012 at 62 Years.  Comments:  2012 11:54 PM CDT - Missy Rubi  Left superficial femoral artery chronic occlusion.  Colectomy on 2012 at 62 Years.  Comments:  2012 9:43 PM CDT - Missy Rubi  Hand-assisted laparoscopic total abdominal colectomy.    2012 3:17 PM CDT - Mir Toribio MD  Numerous adenomatous polyps and one large cecal polyp  Colonoscopy (SNOMED CT 764515107) performed by Mir Toribio MD on 2012 at 61 Years.  Colonoscopy (SNOMED CT 495605200) on 10/17/2005 at 55 Years.  Flexible sigmoidoscopy (SNOMED CT 40404760) on 9/15/2005 at 55 Years.  Arthroscopy of knee (SNOMED CT 929824432) in  at 55 Years.  Comments:  2019 9:50 AM CDT - Renetta Gerardo  Right  ACL R Knee in  at 55 Years.  Flexible sigmoidoscopy (SNOMED CT 49705615) on 2001 at 51 Years.  Flexible sigmoidoscopy (SNOMED CT 70984083) on 2001 at 51 Years.  Vasectomy (SNOMED CT 67723750).   Social History:        Tobacco Assessment            Current every day smoker, Cigarettes, 20 per day.      Substance Abuse Assessment            Past, Marijuana      Employment and Education Assessment            Retired      Home and Environment Assessment            Marital status: .  Spouse/Partner name: Sherice Cancino.  Lives with Significant other.   Risks in               environment: sometimes wears bike helmet, owns secured gun.      Nutrition and Health Assessment            Type of diet: warfarin.      Exercise and Physical Activity Assessment            Exercise frequency: Never.      Sexual Assessment            Sexually active: No.  Sexual orientation: Heterosexual.      Physical Examination   Vital Signs   6/27/2019 8:57 AM CDT Temperature Tympanic 97.1 DegF  LOW    Peripheral Pulse Rate 118 bpm  HI    Systolic Blood Pressure 95 mmHg    Diastolic Blood Pressure 62 mmHg    Mean Arterial Pressure 73 mmHg    Vital Signs Comments weight per kinni      Measurements from flowsheet : Measurements   6/27/2019 8:57 AM CDT    Weight Measured - Standard                190.0 lb     General:  Alert and oriented, No acute distress.    Musculoskeletal:  left knee moderate swelling no reddness no current dc distal cms otherwise intact.    Neurologic:  Alert, Oriented.       Impression and Plan   Diagnosis     Drainage from wound ([id number] T14.8XXA).     Course:  Progressing as expected.    Plan:  see ortho to recheck in next few days  track inr  cont lovenox for now.    Patient Instructions:       Counseled: Patient, Regarding diagnosis, Regarding treatment, Regarding medications, Activity.

## 2022-02-16 NOTE — NURSING NOTE
Comprehensive Intake Entered On:  6/12/2020 12:53 PM CDT    Performed On:  6/12/2020 12:52 PM CDT by Uma Kay               Summary   Chief Complaint :   chronic disease visit.    Weight Measured :   193.6 lb(Converted to: 193 lb 10 oz, 87.82 kg)    Height Measured :   72 in(Converted to: 6 ft 0 in, 182.88 cm)    Body Mass Index :   26.25 kg/m2 (HI)    Body Surface Area :   2.11 m2   Systolic Blood Pressure :   109 mmHg   Diastolic Blood Pressure :   72 mmHg   Mean Arterial Pressure :   84 mmHg   Peripheral Pulse Rate :   94 bpm   BP Method :   Electronic   HR Method :   Electronic   Temperature Tympanic :   98.4 DegF(Converted to: 36.9 DegC)    Uma Kay - 6/12/2020 12:52 PM CDT   Health Status   Allergies Verified? :   Yes   Medication History Verified? :   Yes   Pre-Visit Planning Status :   Completed   Tobacco Use? :   Current every day smoker   Uma Kay - 6/12/2020 12:52 PM CDT   ID Risk Screen   Recent Travel History :   No recent travel   Family Member Travel History :   No recent travel   Other Exposure to Infectious Disease :   Unknown   Uma Kay - 6/12/2020 12:52 PM CDT

## 2022-02-16 NOTE — NURSING NOTE
Anticoagulation Therapy Entered On:  6/4/2019 1:30 PM CDT    Performed On:  6/4/2019 1:29 PM CDT by Mary Michelle RN               Warfarin Management   Week 1 Baldemar Dose :   10    Week 1 Monday Dose :   10    Week 1 Tuesday Dose :   10    Week 1 Wednesday Dose :   10    Week 1 Thursday Dose :   10    Week 1 Friday Dose :   10    Week 1 Saturday Dose :   10    Week 1 Dose Total :   70    Week 2 Sunday Dose :   10    Week 2 Monday Dose :   10    Week 2 Tuesday Dose :   10    Week 2 Wednesday Dose :   10    Week 2 Thursday Dose :   10    Week 2 Friday Dose :   10    Week 2 Saturday Dose :   10    Week 2 Dose Total :   70    Indication :   DVT, Other: FVLeiden   Warfarin Management Comments :   PHM fax   International Normalization Ratio :   2.2    INR Range :   2.5 - 3.5   INR Therapeutic Range :   No   Anticoagulation Recheck :   1 week   Warfarin Special Instructions :   Per protocol continue warfarin 10 mg daily; recheck 1 week; message sent through portal.   Mary Michelle RN - 6/4/2019 1:29 PM CDT

## 2022-02-16 NOTE — TELEPHONE ENCOUNTER
---------------------  From: WENDI BOWSER  To: INR Pool ( [id number]_[facility])  Sent: 02/08/2021 11:50 a.m. CST  Subject: RE: Inr 2.2  Diet is totally different eating lots of everything ??  ???  Addendum by Shazia Lawrence RN on February 08, 2021 11:47:36 AM CST  ---------------------  From: Shazia Lawrence RN (INR Pool ( [id number]_[facility]))  To: WENDI BOWSER  Sent: 2/8/2021 11:47:36 AM CST  Subject: RE: Inr 2.2  ???  Hi Иван -  ???  Any changes in diet or medication that would cause your INR to be low?  Please take 15mg Warfarin today then after today resume the 12.5mg Mondays and 10mg the rest of the week.  Recheck your INR next week.  ???  Thanks,  Shazia HUBER RN  ---------------------  From: Samira Adler RN (Phone Messages Pool ([id number]_[place] [facility] [place]))  To: INR Pool ( [id number]_[facility]);  Sent: 2/8/2021 11:36:21 AM CST  Subject: FW: Inr 2.2  ???  ???  ???  ???  ---------------------  From: WEDNI BOWSER  To: [facility]  Sent: 02/08/2021 11:29 a.m. CST  Subject: Inr 2.2  Same dosage 2.2 INR

## 2022-02-16 NOTE — NURSING NOTE
Anticoagulation Therapy Management Entered On:  10/15/2020 4:55 PM CDT    Performed On:  10/15/2020 4:52 PM CDT by Samira Adler RN               Anticoagulation Visit Assessment   Anticoagulation Indication :   Deep vein thrombosis, Other: Factor V   Anticoagulant Start :   8/1/2012 CDT   Anticoagulant Duration :   Undetermined   Patient Preferred Contact Method :   Cell   Samira Adelr RN - 10/15/2020 4:52 PM CDT   Anticoagulation Patient Assessment Grid   Change in Medications :   Yes   (Comment: finished lovenox today [Samira Adler RN - 10/15/2020 4:52 PM CDT] )   Samira Adler RN - 10/15/2020 4:52 PM CDT   Patient on Warfarin :   Yes   Samira Adler RN - 10/15/2020 4:52 PM CDT   Warfarin   Anticoagulant INR Goal Lower :   2.5    Anticoagulant INR Goal Upper :   3.5    Information Given by :   Patient   Sunday :   10 mg   Monday :   12.5 mg   Tuesday :   10 mg   Wednesday :   10 mg   Thursday :   10 mg   Friday :   10 mg   Saturday :   10 mg   Total Dose :   72.5 mg   Warfarin Pt Reported Previous Week Dose :    Sun Mon Tues Wed Thurs Fri Sat Weekly Total Dose   Week 1 10 mg 12.5 mg 10 mg 10 mg 10 mg 10 mg 10 mg 72.5 mg   Week 2  mg  mg  mg  mg  mg  mg  mg  mg   Week 3  mg  mg  mg  mg  mg  mg  mg  mg   Week 4  mg  mg  mg  mg  mg  mg  mg  mg         Patient is taking single or multiple strength tablet(s) :   Multiple strength tab(s)   Multiple Tab Strengths :   5 mg tab, 7.5 mg tab   Sunday :   10 mg   Monday :   12.5 mg   Tuesday :   10 mg   Wednesday :   10 mg   Thursday :   10 mg   Friday :   10 mg   Saturday :   10 mg   Week 1 Total Dose :   72.5 mg   Patient Instructions :   INR = 2.5 today per patient via portal via home test. Patient is to stay on 12.5mg warfarin on Mon and 10 mg the rest of the days of the week recheck INR in 1 week per protocol. Patient advised via portal.     Samira Adler RN - 10/15/2020 4:52 PM CDT

## 2022-02-16 NOTE — NURSING NOTE
Anticoagulation Therapy Entered On:  7/26/2019 8:11 AM CDT    Performed On:  7/26/2019 8:10 AM CDT by Mary Michelle RN               Warfarin Management   Week 1 Baldemar Dose :   10    Week 1 Monday Dose :   10    Week 1 Tuesday Dose :   10    Week 1 Wednesday Dose :   10    Week 1 Thursday Dose :   10    Week 1 Friday Dose :   10    Week 1 Saturday Dose :   10    Week 1 Dose Total :   70    Week 2 Sunday Dose :   10    Week 2 Monday Dose :   10    Week 2 Tuesday Dose :   10    Week 2 Wednesday Dose :   10    Week 2 Thursday Dose :   10    Week 2 Friday Dose :   10    Week 2 Saturday Dose :   10    Week 2 Dose Total :   70    Planned Duration :   Indefinite   Indication :   DVT, Other: FVLeiden   Warfarin Management Comments :   Patient portal message from 7/25/19   International Normalization Ratio :   2.9    INR Range :   2.5 - 3.5   INR Therapeutic Range :   Yes   Anticoagulation Recheck :   1 week   Warfarin Special Instructions :   Per protocol continue warfarin 10 mg daily, recheck INR in 1 week; message sent to patient through portal on 7/26/19   Mary Michelle RN - 7/26/2019 8:10 AM CDT

## 2022-02-16 NOTE — TELEPHONE ENCOUNTER
---------------------  From: Sidra/Nevaeh RAHMAN (Phone Messages Pool ([id number]_[facility]))   To: Mary Michelle RN;     Sent: 9/24/2019 4:19:38 PM CDT  Subject: FW: INR testing           ---------------------  From: WENDI BOWSER  To: [facility]  Sent: 09/24/2019 03:05 p.m. CDT  Subject: FW: INR testing  Thanks for the reply, as long as they get the billing straightened out to make it preventive care instead of an office visit I don t care  ---------------------  From: Mary Michelle RN  To: WENDI BOWSER  Sent: 9/24/2019 2:29:15 PM CDT  Subject: INR testing       Benny Oates,       I got a message that you were hoping to test your INR s every 2 weeks. Kapil requires weekly testing, however, we do have two other companies available who allow 2-4 week testing.       One company is [employer] and the other is [employer].       If you would like us to submit enrollment to one of them and see if we can get you switched over, just let us know.       Hope you are well,       Mary Michelle RN---------------------  From: Mary Michelle RN   To: Catrina Saavedra;     Sent: 9/24/2019 4:21:50 PM CDT  Subject: FW: INR testing     Please see message below from patient. Is this a Coding thing? Billing thing? Medicare thing?    Thanks,    Mary Michelle RN---------------------  From: Catrina Saavedra   To: Mary Michelle RN;     Cc: Jane Mclean;      Sent: 9/25/2019 10:14:48 AM CDT  Subject: RE: INR testing     Hi Mary,     This is a coverage matter.  The coding is correct for the interpretation of the home INR's.  Jane stated in the original message that he is getting charged a copay because of his insurance coverage. I checked with Zofia Mayo in the Business office and a copay is applied to each home INR interpretation. Looking at his account if he comes in to the office to have the INR done, there is not a copay.   Thanks  Catrina Sahu sent to patient.

## 2022-02-16 NOTE — NURSING NOTE
Anticoagulation Therapy Management Entered On:  6/11/2021 3:21 PM CDT    Performed On:  6/11/2021 3:02 PM CDT by Samira Adler RN               Anticoagulation Visit Assessment   Anticoagulation Indication :   Deep vein thrombosis, Other: Factor V   Anticoagulant Start :   8/1/2012 CDT   Anticoagulant Duration :   Undetermined   Anticoagulation Medication Verified :   Yes   Patient Preferred Contact Method :   Cell   Samira Adler RN - 6/11/2021 3:02 PM CDT   Anticoagulation Patient Assessment Grid   Change in Medications :   Yes   (Comment: missed dose  then made up for it next day this week [Samira Adler RN - 6/11/2021 3:02 PM CDT] )   Samira Adler RN - 6/11/2021 3:02 PM CDT   Patient on Warfarin :   Yes   Samira Adler RN - 6/11/2021 3:02 PM CDT   Warfarin   International Normalization Ratio TR :   5.0    Anticoagulant INR Goal Lower :   2.5    Anticoagulant INR Goal Upper :   3.5    Information Given by :   Other: portal   Sunday :   10 mg   Monday :   12.5 mg   Tuesday :   10 mg   Wednesday :   12.5 mg   Thursday :   10 mg   Friday :   12.5 mg   Saturday :   10 mg   Total Dose :   77.5 mg   Warfarin Pt Reported Previous Week Dose :    Sun Mon Tues Wed Thurs Fri Sat Weekly Total Dose   Week 1 10 mg 12.5 mg 10 mg 12.5 mg 10 mg 12.5 mg 10 mg 77.5 mg   Week 2 10 mg 12.5 mg 10 mg 10 mg 10 mg 10 mg 10 mg 72.5 mg   Week 3  mg  mg  mg  mg  mg  mg  mg  mg   Week 4  mg  mg  mg  mg  mg  mg  mg  mg         Patient is taking single or multiple strength tablet(s) :   Multiple strength tab(s)   Multiple Tab Strengths :   5 mg tab, 7.5 mg tab   Sunday :   0 mg   Monday :   0 mg   Tuesday :   0 mg   Wednesday :   0 mg   Thursday :   0 mg   Friday :   0 mg   Saturday :   0 mg   Week 1 Total Dose :   0 mg   Warfarin Dosing Acknowledgement :   I have reviewed the patient's warfarin dosing schedule and confirmed its accuracy for today's visit.   Patient Instructions :   INR = 5.0 per MDINR today per patient  portal. Per [facility] patient is to hold warfarin x 3 days.  Recheck INR on 6/14/21. Patient advised via portal.     Ana Luisa Adler RNzabeth - 6/11/2021 3:02 PM CDT

## 2022-02-16 NOTE — TELEPHONE ENCOUNTER
---------------------  From: WENDI BOWSER  To: [facility]  Sent: 10/01/2020 09:45 a.m. CDT  Subject: RE: General Message warfarin bridge  Thanks, I have 2 lovenox injections left from knee surgery, I will check expiration date  ---------------------  From: Shazia Lawrence RN (Phone Messages Pool ([id number]_[facility]))  To: WENDI BOWSER  Sent: 10/1/2020 9:42:27 AM CDT  Subject: RE: General Message warfarin bridge  ???  Hi Иван -  ???  Yes. You will be taking both Warfarin and Lovenox after the procedure. Once your INR is above 2, then the Lovenox will be stopped and you will continue your Warfarin. It will be important to check your INR within the week after surgery (either 10/12 or 10/13).  Also, please let us know if you end up needing a refill of the Lovenox - we can send that in to pharmacy if needed.  ???  Thanks,  ???  Shazia HUBER RN  ???  ???  ---------------------  From: WENDI BOWSER  To: [facility]  Sent: 10/01/2020 09:17 a.m. CDT  Subject: RE: General Message warfarin bridge  So after the procedure I take the warfarin and the  Lovenox shots?  ---------------------  From: Shazia Lawrence RN (Phone Messages Pool ([id number]_[facility]))  To: WENDI BOWSER  Cc: INR Pool ( [id number]_[facility]);  Sent: 10/1/2020 9:13:01 AM CDT  Subject: General Message  ???  Hi Иван,  ???  We received contact from [facility] regarding your upcoming shoulder surgery and the need for Lovenox bridging.  Below is the instructions that Dr. Jenkins gave regarding stopping your Warfarin and starting the Lovenox injections.  I have also sent the Lovenox to [facility].  ???  Stop coumadin 5 days before procedure  Start Lovenox 100mg subcutaneous every 12 hours 4 days before procedure  take last dose of lovenox 24 hours before procedure  resume coumadin the day after procedure  resume lovenox every 12 hours after procedure  continue lovenox until inr  >2  ???  Please let us know if you have any questions or concerns.  ???  Thanks!  Shazia HUBER RN---------------------  From: Shazia Lawrence RN (Phone Messages Pool ([id number]_[facility]))   To: INR Pool ( [id number]_[facility]);     Sent: 10/1/2020 9:47:19 AM CDT  Subject: FW: General Message warfarin bridge

## 2022-02-16 NOTE — TELEPHONE ENCOUNTER
---------------------  From: WENDI BOWSER  To: [facility]  Sent: 04/28/2021 10:35 a.m. CDT  Subject: FW: General Message  Message received  ---------------------  From: David Jenkins MD  To: WENDI BOWSER  Sent: 4/28/2021 8:24:46 AM CDT  Subject: General Message  ???  All labs acceptable.  Please let us know you received this message by either selecting Forward or Reply at the top.? Thank you  ???  ???  Results:  Date Result Name Ind Value Ref Range   4/27/2021 1:37 PM Glucose Level ??? 90 mg/dL (65 - 99)   4/27/2021 1:37 PM Hgb A1c ??? 5.3 ( - <5.7)   4/27/2021 1:37 PM Cholesterol ??? 140 mg/dL ( - <200)   4/27/2021 1:37 PM HDL ??? 43 mg/dL (> OR = 40 - )   4/27/2021 1:37 PM LDL ??? 71 ???   4/27/2021 1:37 PM Triglyceride ((H)) 180 mg/dL ( - <150)

## 2022-02-16 NOTE — TELEPHONE ENCOUNTER
---------------------  From: Vitor REES, Samira   To: WENDI BOWSER    Cc: INR Pool ( [id number]_[facility]);      Sent: 11/6/2020 12:02:04 PM CST  Subject: INR     Benny Oates,     Received your INR today of 4.3. Are you having any symptoms of bleeding or other concerns of high INR such as headache? Have you had any medication, diet or alcohol changes? Would you be interested in coming in today to confirm the result against a lab drawn sample? If so please call and schedule for this afternoon before 4 pm. Please let me know.    Thank you,    Samira MARIN RN

## 2022-02-16 NOTE — NURSING NOTE
Anticoagulation Therapy Entered On:  4/11/2019 11:03 AM CDT    Performed On:  4/11/2019 11:02 AM CDT by Mary Michelle RN               Warfarin Management   Week 1 Baldemar Dose :   10    Week 1 Monday Dose :   10    Week 1 Tuesday Dose :   10    Week 1 Wednesday Dose :   10    Week 1 Thursday Dose :   10    Week 1 Friday Dose :   10    Week 1 Saturday Dose :   10    Week 1 Dose Total :   70    Week 2 Sunday Dose :   10    Week 2 Monday Dose :   10    Week 2 Tuesday Dose :   10    Week 2 Wednesday Dose :   10    Week 2 Thursday Dose :   10    Week 2 Friday Dose :   10    Week 2 Saturday Dose :   10    Week 2 Dose Total :   70    Planned Duration :   Indefinite   Indication :   DVT, Other: FVLeiden   Warfarin Management Comments :   Patient portal   International Normalization Ratio :   1.2    INR Range :   2.5 - 3.5   INR Therapeutic Range :   No   Mary Michelle RN - 4/11/2019 11:02 AM CDT   Warfarin Management and Results Grid   Signs of Thrombolic :   No   Signs of Warfarin Intolerance :   No   Changes in Diet or Alcohol Intake :   No   Changes in Medication or Antibiotics :   No   Unusual Bleeding or Bruising :   No   Rectal Bleeding or Black Stools :   No   Vitamin K Food Handout :   No   Heart Valve Replacement :   No   Mary Michelle RN - 4/11/2019 11:02 AM CDT   Anticoagulation Recheck :   1 week   Warfarin Special Instructions :   Per TFS continue warfarin 10 mg daily; recheck 1 week; message sent to patient through portal.   Mary Michelle RN - 4/11/2019 11:02 AM CDT

## 2022-02-16 NOTE — NURSING NOTE
Anticoagulation Therapy Management Entered On:  6/15/2020 4:41 PM CDT    Performed On:  6/15/2020 4:38 PM CDT by Samira Adler RN               Anticoagulation Visit Assessment   Anticoagulation Indication :   Deep vein thrombosis, Other: Factor V   Anticoagulant Duration :   Undetermined   Patient on Warfarin :   Yes   Samira Adler RN - 6/15/2020 4:38 PM CDT   Warfarin   Anticoagulant INR Goal Lower :   2.5    Anticoagulant INR Goal Upper :   3.5    Warfarin Pt Reported Previous Week Dose :    Sun Mon Tues Wed Thurs Fri Sat Weekly Total Dose   Week 1 10 mg 12.5 mg 10 mg 10 mg 12.5 mg 10 mg 10 mg 75 mg   Week 2  mg  mg  mg  mg  mg  mg  mg  mg   Week 3  mg  mg  mg  mg  mg  mg  mg  mg   Week 4  mg  mg  mg  mg  mg  mg  mg  mg         Patient is taking single or multiple strength tablet(s) :   Multiple strength tab(s)   Multiple Tab Strengths :   5 mg tab, 7.5 mg tab   Sunday :   10 mg   Monday :   12.5 mg   Tuesday :   10 mg   Wednesday :   10 mg   Thursday :   12.5 mg   Friday :   10 mg   Saturday :   10 mg   Week 1 Total Dose :   75 mg   Patient Instructions :   INR = 3.5 per MDINR on 6/13/20 Patient is to stay on 12.5mg warfarin on Mon and Thursday and 10mg the rest of the days of the week Recheck INR in 1 week per protocol. Patient advised via portal.     Samira Adler RN - 6/15/2020 4:38 PM CDT

## 2022-02-16 NOTE — TELEPHONE ENCOUNTER
---------------------  From: Samira Adler RN   To: WENDI BOWSER    Sent: 11/25/2019 2:47:30 PM CST  Subject: INR      Benny Oates,    Please stay on 10mg warfarin daily and recheck the INR in 1 week for your INR of 1.7 on 11/24/19. Travel safe and happy!    Thank you,    Samira

## 2022-02-16 NOTE — TELEPHONE ENCOUNTER
Entered by Christel Best CMA on August 13, 2021 9:53:54 AM CDT  TFS-I dont see Albuterol on pt's med list...ok to refill? Last px 4/2021 I have forwarded INR question to INR nurse pool to advise.     INR on 8/6:  Warfarin Dosing Acknowledgement :   I have reviewed the patient's warfarin dosing schedule and confirmed its accuracy for today's visit.   Patient Instructions :   Home INR = 4.3; per protocol Hold warfarin x 1 day then resume 10 mg daily. Recheck INR in 1 week. Message sent through portal.       ---------------------  From: WENDI BOWSER  To: [facility]  Sent: 08/13/2021 09:51 a.m. CDT  Subject: InR 2.7  I skipped the dosage last Friday and have taken 10 daily my INR is 2.7    I was wondering if the doctor could call in a prescription for my ventolin  inhaler hfa 90---------------------  From: Christel Best CMA (Phone Messages Pool ([id number]))   To: TFS Message Pool ([id number]);     Cc: INR Pool ( [id number]);      Sent: 8/13/2021 9:54:04 AM CDT  Subject: FW: InR 2.7---------------------  From: Max Easton CMA ([facility] Message Pool ([id number]))   To: David Jenkins MD;     Sent: 8/13/2021 10:25:34 AM CDT  Subject: FW: InR 2.7---------------------  From: David Jenkins MD   To: [facility] Message Pool ([id number]);     Sent: 8/13/2021 10:31:11 AM CDT  Subject: RE: InR 2.7     ok for albuterol inhale 2 puffs qid prn sob  ** Submitted: **  Order:albuterol (albuterol 90 mcg/inh inhalation aerosol)  2 puff(s)  Inhale  qid  Qty:  1 EA        Refills:  1          Substitutions Allowed     PRN  shortness of breath or wheezing      Route To Pharmacy - [id number]    Signed by Max Eastno CMA  8/13/2021 6:14:00 PM UTJeanika, refills of your albuterol have been sent to your pharmacy per Dr Jenkins orders.  Have a nice day.  Max Easton CMA---------------------  From: Max Easton CMA (TFS Message Pool ([id number]_[place]  [place]))   To: WENDI BOWSER    Sent: 8/13/2021 1:15:33 PM CDT  Subject: FW: InR 2.7

## 2022-02-16 NOTE — TELEPHONE ENCOUNTER
---------------------  From: Irene Bird (Phone Messages Pool ([id number]_[facility]))   To: INR Pool ( [id number]);     Sent: 7/27/2020 12:08:31 PM CDT  Subject: FW: Inr 1.4           ---------------------  From: WENDI BOWSER  To: [facility]  Sent: 07/27/2020 11:19 a.m. CDT  Subject: Inr 1.4  I did not take Friday and Saturday night dosage as directed I took the 10 mg last night and my INR is 1.4---------------------  From: Shazia Lawrence RN (INR Pool ( [id number]_[facility]))   To: WENDI BOWSER    Sent: 7/27/2020 12:24:42 PM CDT  Subject: RE: Inr 1.4     Tez Oates,    Let's have you resume your 12.5mg Warfarin on Monday/Thursday and 10mg the  rest of the days of the week. Recheck your INR on Monday 8-3-20.    Please let me know if you have any questions or concerns.    Have a great day!    Shazia HUBER RN

## 2022-02-16 NOTE — NURSING NOTE
Anticoagulation Therapy Management Entered On:  4/30/2021 10:47 AM CDT    Performed On:  4/30/2021 10:46 AM CDT by Mary Michelle RN               Anticoagulation Visit Assessment   Anticoagulation Indication :   Deep vein thrombosis, Other: Factor V   Anticoagulant Start :   8/1/2012 CDT   Anticoagulant Duration :   Undetermined   Anticoagulation Medication Verified :   Yes   Patient Preferred Contact Method :   Cell   Mary Michelle RN - 4/30/2021 10:46 AM CDT   Anticoagulation Patient Assessment Grid   Change in Alcohol Consumption :   No   Change in Diet :   No   Change in Medications :   No   Diarrhea :   No   Rectal Bleeding :   No   Signs of Clotting :   No   Signs of Warfarin Intolerance :   No   Unusual Bleeding, Bruising :   No   Upcoming Procedures :   No   Vomiting :   No   Mary Michelle RN - 4/30/2021 10:46 AM CDT   Patient on Warfarin :   Yes   Mary Michelle RN - 4/30/2021 10:46 AM CDT   Warfarin   International Normalization Ratio TR :   2.7    Anticoagulant INR Goal Lower :   2.5    Anticoagulant INR Goal Upper :   3.5    Sunday :   10 mg   Monday :   12.5 mg   Tuesday :   10 mg   Wednesday :   12.5 mg   Thursday :   10 mg   Friday :   12.5 mg   Saturday :   10 mg   Total Dose :   77.5 mg   Warfarin Pt Reported Previous Week Dose :    Sun Mon Tues Wed Thurs Fri Sat Weekly Total Dose   Week 1 10 mg 12.5 mg 10 mg 12.5 mg 10 mg 12.5 mg 10 mg 77.5 mg   Week 2 10 mg 12.5 mg 10 mg 10 mg 10 mg 10 mg 10 mg 72.5 mg   Week 3  mg  mg  mg  mg  mg  mg  mg  mg   Week 4  mg  mg  mg  mg  mg  mg  mg  mg         Patient is taking single or multiple strength tablet(s) :   Multiple strength tab(s)   Multiple Tab Strengths :   5 mg tab, 7.5 mg tab   Sunday :   10 mg   Monday :   12.5 mg   Tuesday :   10 mg   Wednesday :   12.5 mg   Thursday :   10 mg   Friday :   12.5 mg   Saturday :   10 mg   Week 1 Total Dose :   77.5 mg   Sunday :   10 mg   Monday :   12.5 mg   Tuesday :   10 mg   Wednesday :   10 mg   Thursday  :   10 mg   Friday :   10 mg   Saturday :   10 mg   Week 2 Total Dose :   72.5 mg   Warfarin Dosing Acknowledgement :   I have reviewed the patient's warfarin dosing schedule and confirmed its accuracy for today's visit.   Patient Instructions :   Home INR = 2.7; per protocol Continue warfarin 12.5mg M/W/F  10mg ROW Recheck INR in 1 week. Message sent to pt by portal.     Mary Michelle RN - 4/30/2021 10:46 AM CDT

## 2022-02-16 NOTE — TELEPHONE ENCOUNTER
---------------------  From: Donna Celestin CMA (mohchi Message Pool ([address] [place] [address]))   To: David Jenkins MD;     Sent: 10/9/2020 1:58:06 PM CDT  Subject: FW: General Message warfarin bridge           ---------------------  From: WENDI BOWSER  To: TFS Message Pool ([address] [place] [address])  Sent: 10/09/2020 01:40 p.m. CDT  Subject: RE: General Message warfarin bridge  I am taking the aceta Federman, they told me the ibuprofen would be OK to take also for swelling. In the past I have not been able to take ibuprofen because it affects my INR., My question is can I take the ibuprofen in addition to the aceta Federman       Addendum by Donna Celestin CMA on October 09, 2020 1:24:40 PM CDT  ---------------------  From: Donna Celestin CMA ([facility] Message Pool ([facility]))  To: WENDI BOWSER  Sent: 10/9/2020 1:24:40 PM CDT  Subject: RE: General Message warfarin bridge       Dr. Jenkins said it is ok to use Tylenol post op instead of Ibuprofen.       Addendum by David Jenkins MD on October 09, 2020 12:25:27 PM CDT  ---------------------  From: David Jenkins MD  To: [facility]ohchi Message Pool ([address] [place] [address]);  Sent: 10/9/2020 12:25:27 PM CDT  Subject: RE: General Message warfarin bridge       yes       Addendum by Donna Celestin CMA on October 09, 2020 12:15:32 PM CDT  ---------------------  From: Donna Celestin CMA (m[facility] Message Pool ([address] [place] [address]))  To: David Jenkins MD;  Sent: 10/9/2020 12:15:32 PM CDT  Subject: FW: General Message warfarin bridge       Addendum by Shazia Lawrence RN on October 09, 2020 11:35:39 AM CDT  ---------------------  From: Shazia Lawrence RN (INR Pool ( [id number]_[facility]))  To: TFS Message Pool ([address]_[facility]);  Sent: 10/9/2020 11:35:39 AM CDT  Subject: FW: General Message warfarin bridge       Please advise.       Pt had rotator cuff repair yesterday. OK for Tylenol post-op instead of the Ibuprofen?  ---------------------  From: Astrid Estrada LPN  (Phone Messages Pool ([id number]_[place]))  To: INR Pool ( [address] [place] [address]);  Sent: 10/9/2020 10:53:50 AM CDT  Subject: FW: General Message warfarin bridge                      ---------------------  From: WENDI BOWSER  To: [facility]  Sent: 10/09/2020 10:45 a.m. CDT  Subject: RE: General Message warfarin bridge  They took my INR yesterday before surgery it was .99 they gave me ibuprofen to take 200 mg tablets and I m supposed to take three tablets every eight hours for five days. Won t that mess up my INR? I did my injection last night and today I will do my injections twice I ve already done one this morning and I ll do another one tonight and resume the warfarin  ---------------------  From: Shazia Lawrence RN (Phone Messages Pool ([address]))  To: WENDI BOWSER  Sent: 10/1/2020 9:42:27 AM CDT  Subject: RE: General Message warfarin bridge       Hi Иван -       Yes. You will be taking both Warfarin and Lovenox after the procedure. Once your INR is above 2, then the Lovenox will be stopped and you will continue your Warfarin. It will be important to check your INR within the week after surgery (either 10/12 or 10/13).  Also, please let us know if you end up needing a refill of the Lovenox - we can send that in to pharmacy if needed.       Thanks,       Shazia HUBER RN            ---------------------  From: WENDI BOWSER  To: [facility]  Sent: 10/01/2020 09:17 a.m. CDT  Subject: RE: General Message warfarin bridge  So after the procedure I take the warfarin and the  Lovenox shots?  ---------------------  From: Shazia Lawrence RN (Phone Messages Pool ([facility]))  To: WENDI BOWSER  Cc: INR Pool ( [address] [place] [address]);  Sent: 10/1/2020 9:13:01 AM CDT  Subject: General Message       Tez Oates,       We received contact from [facility] regarding your upcoming shoulder surgery and the need for Lovenox bridging.  Below  is the instructions that Dr. Jenkins gave regarding stopping your Warfarin and starting the Lovenox injections.  I have also sent the Lovenox to [facility].       Stop coumadin 5 days before procedure  Start Lovenox 100mg subcutaneous every 12 hours 4 days before procedure  take last dose of lovenox 24 hours before procedure  resume coumadin the day after procedure  resume lovenox every 12 hours after procedure  continue lovenox until inr >2       Please let us know if you have any questions or concerns.       Thanks!  Shazia HUBER RN---------------------  From: David Jenkins MD   To: [employer] Message Pool ([id number]_[place]);     Sent: 10/9/2020 2:00:51 PM CDT  Subject: RE: General Message warfarin bridge     not with warfarin---------------------  From: Donna Celestin CMA ([employer] Message Pool ([id number]_[facility]))   To: WENDI BOWSER    Sent: 10/9/2020 2:02:47 PM CDT  Subject: RE: General Message warfarin bridge     Dr. Jenkins says no

## 2022-02-16 NOTE — TELEPHONE ENCOUNTER
---------------------  From: Astrid Estrada LPN (Phone Messages Pool ([id number]_[facility]))   To: INR Pool ( [address]_[facility]);     Sent: 11/6/2020 12:42:43 PM CST  Subject: FW: INR           ---------------------  From: WENDI BOWSER  To: [facility]  Sent: 11/06/2020 12:34 p.m. CST  Subject: FW: INR  I have been taking Tylenol twice a day but I don t believe that affects my INR  ---------------------  From: Samira Adler RN  To: WENDI BOWSER  Cc: INR Pool ( [address]_[facility]);  Sent: 11/6/2020 12:02:04 PM CST  Subject: INR       Benny Oates,       Received your INR today of 4.3. Are you having any symptoms of bleeding or other concerns of high INR such as headache? Have you had any medication, diet or alcohol changes? Would you be interested in coming in today to confirm the result against a lab drawn sample? If so please call and schedule for this afternoon before 4 pm. Please let me know.       Thank you,       Samira Stein

## 2022-02-16 NOTE — NURSING NOTE
Anticoagulation Therapy Entered On:  1/8/2020 12:20 PM CST    Performed On:  1/8/2020 12:16 PM CST by Samira Adler RN               Warfarin Management   Week 1 Sunday Dose :   10    Week 1 Monday Dose :   10    Week 1 Tuesday Dose :   10    Week 1 Wednesday Dose :   10    Week 1 Friday Dose :   10    Week 1 Saturday Dose :   10    Planned Duration :   Indefinite   Indication :   DVT, Other: FVLeiden   International Normalization Ratio :   4.9    INR Range :   2.5 - 3.5   INR Therapeutic Range :   No   Warfarin Abnormal Findings :   traveling in [facility]. Diet has been different.   Samira Adler RN - 1/8/2020 12:16 PM CST   Warfarin Management and Results Grid   Signs of Thrombolic :   No   Signs of Warfarin Intolerance :   No   Changes in Diet or Alcohol Intake :   Yes   Changes in Medication or Antibiotics :   No   Unusual Bleeding or Bruising :   No   Rectal Bleeding or Black Stools :   No   Samira Adler RN - 1/8/2020 12:16 PM CST   Anticoagulation Recheck :   2 days    Warfarin Physician :   David Jenkins MD Special Instructions :   INR = 4.9 per MDINR on 1/8/20. Patient is to hold warfarin x 2 days then decrease to warfarin to 10 mg daily. Recheck INR in 2 days then in 1 week on 10 mg daily. Patient advised via portal.   Samira Adler RN - 1/8/2020 12:16 PM CST

## 2022-02-16 NOTE — TELEPHONE ENCOUNTER
---------------------  From: Shazia Lawrence RN (INR Pool ( [address]_[facility]))   To: WENDI BOWSER    Sent: 9/28/2020 3:57:27 PM CDT  Subject: General Message     Hi Иван,    I received your home INR results of 1.7 from today. It shows that you have been holding your Warfarin since Friday 9-25-20. If this is correct, I would like you to resume your Warfarin at previous dose - 12.5mg Monday and 10mg the rest of the days. Recheck your INR in 1 week.    Please let me know if you have any questions or concerns.     Thanks,    Shazia HUBER RN

## 2022-02-16 NOTE — TELEPHONE ENCOUNTER
---------------------  From: WENDI BOWSER  To: [facility]  Sent: 06/15/2020 10:16 a.m. CDT  Subject: RE: FW: Patient Message - Results Notification  Did Dr. Jenkins send in my Flonase prescription? I don?t see it at [facility]. Can you tell me what test I took for the coronavirus and what its accuracy rate is please? Thank you  ---------------------  From: Frieda darden LPN (Phone Messages Pool ([address]_[facility]))  To: WENDI BOWSER  Sent: 6/15/2020 8:17:49 AM CDT  Subject: FW: Patient Message - Results Notification  ???  ???  Wendi,  The antibody test is at the bottom. It was negative.  Frieda Baker LPN  ???  ---------------------  From: WENDI BOWSER  To: [facility]  Sent: 06/14/2020 04:20 p.m. CDT  Subject: FW: Patient Message - Results Notification  Yes i replied previously, I?m still waiting for antibodies test  ---------------------  From: David Jenkins MD  To: WENDI BOWSER  Sent: 6/14/2020 11:00:27 AM CDT  Subject: Patient Message - Results Notification  ???  All labs acceptable.  Please let us know you received this message by either selecting Forward or Reply at the top.? Thank you  ???  Results:  Date Result Name Ind Value Ref Range   6/12/2020 1:20 PM Sodium Level ??? 138 mmol/L (135 - 146)   6/12/2020 1:20 PM Potassium Level ??? 4.2 mmol/L (3.5 - 5.3)   6/12/2020 1:20 PM Glucose Level ((H)) 101 mg/dL (65 - 99)   6/12/2020 1:20 PM Creatinine Level ??? 0.97 mg/dL (0.70 - 1.25)   6/12/2020 1:20 PM AST/SGOT ??? 18 unit/L (10 - 35)   6/12/2020 1:20 PM ALT/SGPT ??? 15 unit/L (9 - 46)   6/12/2020 1:20 PM Hgb A1c ??? 5.6 ( - <5.7)   6/12/2020 1:20 PM Cholesterol ??? 160 mg/dL ( - <200)   6/12/2020 1:20 PM HDL ??? 43 mg/dL (> OR = 40 - )   6/12/2020 1:20 PM LDL ??? 90 ???   6/12/2020 1:20 PM Triglyceride ((H)) 175 mg/dL ( - <150)   6/12/2020 1:22 PM Coronavirus SARS-CoV-2 (COVID-19) Ab IgG ??? NEGATIVE ???Please advise, TFS  OC.---------------------  From: Laisha Murillo CMA (Phone Messages Pool ([address] [place] [address]))   To: Advanced Practice Provider Pool ([address] [place] [address]);     Sent: 6/15/2020 10:29:26 AM CDT  Subject: FW: FW: Patient Message - Results Notification---------------------  From: Nick Bello PA-C (Advanced Practice Provider Pool ([address] [place] [address]))   To: Phone Messages Pool ([address] [place] [address]);     Sent: 6/15/2020 10:55:45 AM CDT  Subject: RE: FW: Patient Message - Results Notification     I refilled the flonase, TFS can address concerns about coronavirus test tomorrow---------------------  From: Laisha Murillo CMA (Phone Messages Pool ([address] [place] [address]))   To: TFS Message Pool ([address] [place] [address]);     Sent: 6/15/2020 12:17:51 PM CDT  Subject: FW: FW: Patient Message - Results Notification---------------------  From: Gavin Romo (TFS Message Pool ([address] [place] [address]))   To: David Jenkins MD;     Sent: 6/16/2020 10:29:55 AM CDT  Subject: FW: FW: Patient Message - Results Notification     Please review below.---------------------  From: David Jenkins MD   To: TFS Message Pool ([address] [place] [address]);     Sent: 6/16/2020 11:18:55 AM CDT  Subject: RE: FW: Patient Message - Results Notification     negative antibody test  is very accurate---------------------  From: Gavin Romo (TFS Message Pool ([address] [place] [address]))   To: WENDI BOWSER    Sent: 6/16/2020 12:10:18 PM CDT  Subject: FW: FW: Patient Message - Results Notification     Hey,     You took the antibody test, which its very accurate per Dr. Jenkins.   Let us know if you have any other questions!      MY Alonso

## 2022-02-16 NOTE — NURSING NOTE
Monthly report of INR home test values reviewed as received from [facility]. All out of range results have been addressed and patient is not in compliance for testing frequency. Diagnosis DVT per TFS.

## 2022-02-16 NOTE — TELEPHONE ENCOUNTER
---------------------  From: Viviana REES, Mary HERNANDEZ   To: WENDI BOWSER    Sent: 1/20/2020 12:00:35 PM CST  Subject: Warfarin dosing     Benny Oates,    I got your INR result from 1/18/20.    Dr. Crouch would like you to increase your warfarin dose to 12.5 mg Monday and Thursday and 10 mg all other days. Recheck your INR in 1 week.    Please, let me know if you have any questions or concerns.    Have a great day,    Mary Michelle RN

## 2022-02-16 NOTE — NURSING NOTE
Anticoagulation Therapy Entered On:  5/21/2019 5:39 PM CDT    Performed On:  5/21/2019 5:37 PM CDT by Samira Adler RN               Warfarin Management   Week 1 [place] Dose :   10    Week 1 Monday Dose :   10    Week 1 Tuesday Dose :   10    Week 1 Wednesday Dose :   10    Week 1 Thursday Dose :   10    Week 1 Friday Dose :   10    Week 1 Saturday Dose :   10    Week 1 Dose Total :   70    Week 2 Sunday Dose :   10    Week 2 Monday Dose :   10    Week 2 Tuesday Dose :   10    Week 2 Wednesday Dose :   10    Week 2 Thursday Dose :   10    Week 2 Friday Dose :   10    Week 2 Saturday Dose :   10    Week 2 Dose Total :   70    Planned Duration :   Indefinite   Indication :   DVT, Other: FVLeiden   International Normalization Ratio :   2.7    INR Range :   2.5 - 3.5   INR Therapeutic Range :   Yes   Anticoagulation Recheck :   1 week   Warfarin Physician :   David Jenkins MD Special Instructions :   INR = 2.7 per patient via portal on 5/21/19. Patient is to continue 10 mg warfarin daily and recheck INR in 1 week per protocol. Advised via portal.   Samira Adler RN - 5/21/2019 5:37 PM CDT

## 2022-02-16 NOTE — NURSING NOTE
Comprehensive Intake Entered On:  11/22/2021 10:03 AM CST    Performed On:  11/22/2021 9:57 AM CST by Grace RAHMAN, Catrina               Summary   Chief Complaint :   c/o right ear bleeding early this AM, couldn't hear out of ear.  is on warfarin.   Advance Directive :   Yes   Weight Measured :   199.4 lb(Converted to: 199 lb 6 oz, 90.446 kg)    Height Measured :   72 in(Converted to: 6 ft 0 in, 182.88 cm)    Body Mass Index :   27.04 kg/m2 (HI)    Body Surface Area :   2.14 m2   Systolic Blood Pressure :   127 mmHg   Diastolic Blood Pressure :   80 mmHg   Mean Arterial Pressure :   96 mmHg   Peripheral Pulse Rate :   83 bpm   BP Site :   Right arm   Pulse Site :   Radial artery   BP Method :   Electronic   HR Method :   Electronic   Temperature Tympanic :   96 DegF(Converted to: 35.6 DegC)  (LOW)    Catrina Edwards MA - 11/22/2021 9:57 AM CST   Health Status   Allergies Verified? :   Yes   Medication History Verified? :   Yes   Medical History Verified? :   Yes   Pre-Visit Planning Status :   Not completed   Tobacco Use? :   Current every day smoker   Catrina Edwards MA - 11/22/2021 9:57 AM CST   Consents   Consent for Immunization Exchange :   Consent Granted   Consent for Immunizations to Providers :   Consent Granted   Catrina Edwards MA - 11/22/2021 9:57 AM CST   Meds / Allergies   (As Of: 11/22/2021 10:03:36 AM CST)   Allergies (Active)   No Known Medication Allergies  Estimated Onset Date:   Unspecified ; Created By:   Laisha Murillo CMA; Reaction Status:   Active ; Category:   Drug ; Substance:   No Known Medication Allergies ; Type:   Allergy ; Updated By:   Laisha Murillo CMA; Reviewed Date:   10/11/2021 1:41 PM CDT        Medication List   (As Of: 11/22/2021 10:03:36 AM CST)   Prescription/Discharge Order    albuterol  :   albuterol ; Status:   Prescribed ; Ordered As Mnemonic:   albuterol 90 mcg/inh inhalation aerosol ; Simple Display Line:   2 puff(s), Inhale, qid, PRN: shortness of breath or  wheezing, 1 EA, 1 Refill(s) ; Ordering Provider:   David Jenkins MD; Catalog Code:   albuterol ; Order Dt/Tm:   8/13/2021 1:14:09 PM CDT          atorvastatin  :   atorvastatin ; Status:   Prescribed ; Ordered As Mnemonic:   atorvastatin 20 mg oral tablet ; Simple Display Line:   20 mg, 1 tab(s), Oral, daily, 90 tab(s), 3 Refill(s) ; Ordering Provider:   David Jenkins MD; Catalog Code:   atorvastatin ; Order Dt/Tm:   4/27/2021 1:21:43 PM CDT          diclofenac topical  :   diclofenac topical ; Status:   Prescribed ; Ordered As Mnemonic:   Voltaren 1% topical gel ; Simple Display Line:   2 gm, Topical, qid, PRN: shoulder pain, 100 gm, 11 Refill(s) ; Ordering Provider:   Freda Massey MD; Catalog Code:   diclofenac topical ; Order Dt/Tm:   5/7/2020 11:15:01 AM CDT          doxycycline  :   doxycycline ; Status:   Completed ; Ordered As Mnemonic:   doxycycline hyclate 100 mg oral tablet ; Simple Display Line:   200 mg, 2 tab(s), po, daily, for 1 day(s), 2 tab(s), 0 Refill(s) ; Ordering Provider:   Mir Toribio MD; Catalog Code:   doxycycline ; Order Dt/Tm:   10/11/2021 1:59:23 PM CDT          fluticasone nasal  :   fluticasone nasal ; Status:   Prescribed ; Ordered As Mnemonic:   fluticasone 50 mcg/inh nasal spray ; Simple Display Line:   2 spray(s), nasal, daily, for 90 day(s), 3 EA, 3 Refill(s) ; Ordering Provider:   Nick Bello PA-C; Catalog Code:   fluticasone nasal ; Order Dt/Tm:   6/15/2020 10:54:23 AM CDT          sildenafil  :   sildenafil ; Status:   Prescribed ; Ordered As Mnemonic:   Viagra 100 mg oral tablet ; Simple Display Line:   See Instructions, TAKE AS DIRECTED, 8 tab(s), 11 Refill(s) ; Ordering Provider:   David Jenkins MD; Catalog Code:   sildenafil ; Order Dt/Tm:   4/20/2016 12:38:56 PM CDT          warfarin  :   warfarin ; Status:   Prescribed ; Ordered As Mnemonic:   warfarin 5 mg oral tablet ; Simple Display Line:   See Instructions, TAKE TWO AND ONE-HALF TABLETS BY  MOUTH ON MONDAY AND THURSDAY. TAKE 2 TABLETS DAILY THE REST OF THE WEEK., 192 tab(s), 0 Refill(s) ; Ordering Provider:   David Jenkins MD; Catalog Code:   warfarin ; Order Dt/Tm:   7/6/2021 3:06:58 PM CDT            Home Meds    acetaminophen  :   acetaminophen ; Status:   Documented ; Ordered As Mnemonic:   acetaminophen ; Simple Display Line:   See Instructions, 325mg-650 mg PRN for pain/fever., 0 Refill(s) ; Catalog Code:   acetaminophen ; Order Dt/Tm:   6/19/2019 3:01:15 PM CDT          aspirin  :   aspirin ; Status:   Discontinued ; Ordered As Mnemonic:   aspirin 81 mg oral delayed release tablet ; Simple Display Line:   81 mg, 1 tab(s), Oral, daily, 0 Refill(s) ; Catalog Code:   aspirin ; Order Dt/Tm:   4/27/2021 1:08:00 PM CDT            Social History   Social History   (As Of: 11/22/2021 10:03:36 AM CST)   Alcohol:        Liquor (Hard) (1.5 oz), Daily, 2 drinks/episode average.  3 drinks/episode maximum.   (Last Updated: 5/4/2021 2:08:57 PM CDT by Barbie Uribe)          Tobacco:        Current every day smoker, Cigarettes, 20 per day.   (Last Updated: 4/13/2017 12:38:29 PM CDT by Sarah Li)   10 or more cigarettes (1/2 pack or more)/day in last 30 days, Cigarettes   (Last Updated: 4/27/2021 1:08:35 PM CDT by Laisha Murillo CMA)          Electronic Cigarette/Vaping:        Electronic Cigarette Use: Never.   (Last Updated: 4/27/2021 1:08:39 PM CDT by Laisha Murillo CMA)          Substance Abuse:        Marijuana, 1-2 times per week   (Last Updated: 5/4/2021 2:10:26 PM CDT by Barbie Uribe)          Employment/School:        Retired   (Last Updated: 4/13/2017 12:38:43 PM CDT by Sarah Li)          Home/Environment:        Marital status: .  Spouse/Partner name: Sherice Cancino.  Lives with Significant other.  Living situation: Home/Independent.  Injuries/Abuse/Neglect in household: No.  Feels unsafe at home: No.  Family/Friends available for support: Yes.  Risks in environment: [place],  Stairs, sometimes wears bike helmet, owns secured gun.   (Last Updated: 5/4/2021 2:12:15 PM CDT by Barbie Uribe)          Nutrition/Health:        Type of diet: warfarin.   (Last Updated: 4/13/2017 12:39:21 PM CDT by Sarah Li)   Type of diet: Regular.  Wants to lose weight: Yes.   (Last Updated: 5/4/2021 2:11:00 PM CDT by Barbie Uribe)          Exercise:        Exercise frequency: Daily.  Exercise type: Stretching shoulder.   (Last Updated: 5/4/2021 2:11:21 PM CDT by Barbie Uribe)          Sexual:        Sexually active: No.  Sexual orientation: Heterosexual.   (Last Updated: 4/13/2017 12:39:37 PM CDT by Sarah Li)

## 2022-02-16 NOTE — TELEPHONE ENCOUNTER
---------------------  From: WENDI BOWSER  To: [facility]  Sent: 06/15/2020 03:17 p.m. CDT  Subject: FW: Warfarin  Lynn slater was not approved by my insurance company [facility] said they?re coming back to statements to request another drug. I am continuing my warfarin until I get a different prescription. My INR last time I believe was 3.2  ---------------------  From: Vitor REES, Samira  To: WENDI BOWSER  Sent: 6/15/2020 2:56:48 PM CDT  Subject: Warfarin  ???  Иван,  ???  Have you stopped the warfarin and picked up the Eliquis? I see you talked about it with Dr. Jenkins on 6/12/20. He did want you to stop the warfarin and start the Eliquis when your INR is at 2. Is this still your plan to switch to Eliquis? If so disregard previous message and let us know.  ???  Thank you,  ???  Samira MARIN RN---------------------  From: Laisha Murillo CMA (Phone Messages Pool ([facility]))   To: Advanced Practice Provider Pool ([address] [place] [address]);     Cc: TFS Message Pool ([address] [place] [address]);      Sent: 6/15/2020 3:45:11 PM CDT  Subject: FW: Warfarin---------------------  From: Laisha Murillo CMA (Phone Messages Pool ([facility]))   To: INR Pool ( [address] [place] [address]);     Sent: 6/15/2020 3:45:40 PM CDT  Subject: FW: Warfarin---------------------  From: Nick Bello PA-C (Advanced Practice Provider Pool ([address] [place] [address]))   To: Phone Messages Pool ([address] [place] [address]);     Cc: TFS Message Pool ([id number] [facility] [place]);      Sent: 6/15/2020 3:47:11 PM CDT  Subject: RE: Warfarin     please forward to TFS message poolSong proceeding with Jennifer ANN sent insurance info:  [phone]  ID # [id number]---------------------  From: Uma Kay ([employer] Pool ([address]_[facility]))   To: David Jenkins MD;     Sent: 6/17/2020 7:39:45 AM CDT  Subject: FW: Warfarin     It looks like insurance requires a PA for Eliquis.  What is  the reason he cannot continue warfarin?---------------------  From: David Jenkins MD   To: [employer] Pool ([id number] [facility] [place]);     Sent: 6/17/2020 9:17:31 AM CDT  Subject: RE: Warfarin     cost of home testing---------------------  From: Uma Kay ([employer] Message Pool ([id number]_[facility]))   To: David Jenkins MD;     Sent: 6/17/2020 9:43:36 AM CDT  Subject: RE: Warfarin     I spoke with insurance and initiated PA.  The preferred medication is Xaralto.  Is this an option for him?---------------------  From: David Jenkins MD   To: Yashi Pool ([id number]_[place] [facility] [place]);     Sent: 6/17/2020 9:52:33 AM CDT  Subject: RE: Warfarin     yes xarelto 20 mg daily dsp 90 3 ref instead of eliquis Please let patient knowSpoke with patient and notified of recommendation.

## 2022-02-16 NOTE — TELEPHONE ENCOUNTER
---------------------  From: Flori Smalls CMA (Phone Messages Pool ([address]_[facility]))   To: INR Pool ( [address]_[facility]);     Sent: 2/14/2020 11:22:49 AM CST  Subject: FW: Warfarin dosing           ---------------------  From: WENDI BOWSER  To: [facility]  Sent: 02/14/2020 10:59 a.m. CST  Subject: FW: Warfarin dosing  Yes I understand 10 daily 12.5 Monday and Thursday  ---------------------  From: Mary Michelle RN  To: WENDI BOWSER  Sent: 2/14/2020 8:54:21 AM CST  Subject: Warfarin dosing       Good morning Иван,       I wanted to make sure you were informed yesterday after your INR of 1.9 that Dr. Jenkins would like you to increase your warfarin dose to 12.5 mg Mondays and Thursdays and 10 mg all the rest of the days.       Please check your INR in 1 week.       If you have any questions let me know,       Mary Michelle RNNoted

## 2022-02-16 NOTE — TELEPHONE ENCOUNTER
---------------------  From: Astrid Estrada LPN (Phone Messages Pool ([address]224_[facility]))   To: INR Pool ( [address]24_[facility]);     Sent: 7/2/2021 10:24:56 AM CDT  Subject: CONSUMER MESSAGE FW: INR 4.1           ---------------------  From: WENDI BOWSER  To: [facility]  Sent: 07/02/2021 10:15 a.m. CDT  Subject: INR 4.1  Same dosage 12.5 M W F 10 other days  INR 4.1See Anticoagulation charting.

## 2022-02-16 NOTE — TELEPHONE ENCOUNTER
---------------------  From: Astrid Estrada LPN (Phone Messages Pool ([address] [place] [address]))   To: TFS Message Pool ([address] [place] [address]);     Sent: 6/17/2020 2:31:45 PM CDT  Subject: CONSUMER MESSAGE FW: Warfarin           ---------------------  From: WENDI BOWSER  To: [facility]  Sent: 06/17/2020 02:06 p.m. CDT  Subject: Warfarin  My cost for tiago is $541 for 90 days supply so I will stay on warfarin---------------------  From: Uma Kay (TFS Message Pool ([address] [place] [address]))   To: David Jenkins MD;     Cc: INR Pool ( [address] [place] [address]);      Sent: 6/17/2020 2:47:40 PM CDT  Subject: FW: CONSUMER MESSAGE FW: Warfarin     SAMSON

## 2022-02-16 NOTE — NURSING NOTE
Anticoagulation Therapy Management Entered On:  6/19/2020 2:19 PM CDT    Performed On:  6/19/2020 2:18 PM CDT by Mary Michelle RN               Anticoagulation Visit Assessment   Anticoagulation Indication :   Deep vein thrombosis, Other: Factor V   Anticoagulant Start :   8/1/2012 CDT   Anticoagulant Duration :   Undetermined   Anticoagulation Medication Verified :   Yes   Mary Michelle RN - 6/19/2020 2:18 PM CDT   Anticoagulation Patient Assessment Grid   Change in Alcohol Consumption :   No   Change in Diet :   No   Change in Medications :   No   Diarrhea :   No   Rectal Bleeding :   No   Signs of Clotting :   No   Signs of Warfarin Intolerance :   No   Unusual Bleeding, Bruising :   No   Upcoming Procedures :   No   Vomiting :   No   Mary Michelle RN - 6/19/2020 2:18 PM CDT   Patient on Warfarin :   Yes   Mary Michelle RN - 6/19/2020 2:18 PM CDT   Warfarin   Anticoagulant INR Goal Lower :   2.5    Anticoagulant INR Goal Upper :   3.5    INR Home Monitoring Result :   3.7    Sunday :   10 mg   Monday :   12.5 mg   Tuesday :   10 mg   Wednesday :   10 mg   Thursday :   12.5 mg   Friday :   10 mg   Saturday :   10 mg   Total Dose :   75 mg   Warfarin Pt Reported Previous Week Dose :    Sun Mon Tues Wed Thurs Fri Sat Weekly Total Dose   Week 1 10 mg 12.5 mg 10 mg 10 mg 12.5 mg 10 mg 10 mg 75 mg   Week 2  mg  mg  mg  mg  mg  mg  mg  mg   Week 3  mg  mg  mg  mg  mg  mg  mg  mg   Week 4  mg  mg  mg  mg  mg  mg  mg  mg         Patient is taking single or multiple strength tablet(s) :   Multiple strength tab(s)   Multiple Tab Strengths :   5 mg tab, 7.5 mg tab   Sunday :   10 mg   Monday :   12.5 mg   Tuesday :   10 mg   Wednesday :   10 mg   Thursday :   12.5 mg   Friday :   10 mg   Saturday :   10 mg   Week 1 Total Dose :   75 mg   Patient Instructions :   mdINR  = 3.7; per protocol continue warfarin 12.5 mg Mon/Thur and 10 mg ROW; recheck INR in 1 week; message sent to patient by portal.     Mary Michelle RN  - 6/19/2020 2:18 PM CDT

## 2022-02-16 NOTE — TELEPHONE ENCOUNTER
---------------------  From: Astrid Estrada LPN (Phone Messages Pool ([address]224_[facility]))   To: INR Pool ( [address]24_[facility]);     Sent: 11/6/2020 12:34:07 PM CST  Subject: FW: INR           ---------------------  From: WENDI BOWSER  To: [facility]  Sent: 11/06/2020 12:33 p.m. CST  Subject: FW: INR  I have not had any changes in diet or my activity is down because of my shoulder but I believe the way I have always blend and I don t have a car so I can t come in for a test  ---------------------  From: Samira Adler RN  To: WENDI BOWSER  Cc: INR Pool ( [address]_[facility]);  Sent: 11/6/2020 12:02:04 PM CST  Subject: INR       Benny Oates,       Received your INR today of 4.3. Are you having any symptoms of bleeding or other concerns of high INR such as headache? Have you had any medication, diet or alcohol changes? Would you be interested in coming in today to confirm the result against a lab drawn sample? If so please call and schedule for this afternoon before 4 pm. Please let me know.       Thank you,       Samira Stein

## 2022-02-16 NOTE — TELEPHONE ENCOUNTER
---------------------  From: Donna Celestin CMA (Phone Messages Pool ([address]_[facility]))   To: INR Pool ( [address]_[facility]);     Sent: 7/12/2021 9:23:49 AM CDT  Subject: FW: INR 3.8           ---------------------  From: WENDI BOWSER  To: [facility]  Sent: 07/10/2021 09:23 p.m. CDT  Subject: INR 3.8  Same dosage,skipped last Friday as directed---------------------  From: Shazia Lawrence RN (INR Pool ( [address]_[facility]))   To: WENDI BOWSER    Sent: 7/12/2021 9:45:22 AM CDT  Subject: FW: INR 3.8     Tez Oates -     Thank you for sending in your results!  Since you have been out of range for two INRs - I will have you decrease your weekly Warfarin dose.  Please start the following dose - 12.5mg Wednesdays/Fridays and 10mg the rest of the week.   If you have had any medication or diet changes, please let us know. Otherwise, recheck your INR in 1 week.    Let me know if you have any questions or concerns.    Thanks!  Shazia HUBER RN

## 2022-02-16 NOTE — TELEPHONE ENCOUNTER
---------------------  From: Samira Adler RN   Sent: 9/20/2019 4:10:05 PM CDT  Subject: INR     Benny Oates,    Nice INR today. Please stay on 10 mg warfarin daily and recheck The INR in 1 week. I will send Dr. Jenkins a message with your request. Have a great week end!    Thanks,    Samira

## 2022-02-16 NOTE — TELEPHONE ENCOUNTER
---------------------  From: Astrid Estrada LPN (Phone Messages Pool ([address]_[facility]))   To: INR Pool ( [address]_[facility]);     Sent: 7/17/2020 4:04:12 PM CDT  Subject: CONSUMER MESSAGE FW: Inr 6.0           ---------------------  From: WENDI BOWSER  To: [facility]  Sent: 07/17/2020 04:01 p.m. CDT  Subject: Inr 6.0  No change in medication, 10 daily 12 1/2 on Monday and Thursday, maybe because I m eating produce from the [employer] to patient at 1606  Pt is coming in for Urgent venous INR nowUrgent venous INR = 3.9  See anticoagulation charting

## 2022-02-16 NOTE — TELEPHONE ENCOUNTER
---------------------  From: Juliane Obrien RN (Phone Messages Pool ([address]_[facility]))   To: INR Pool ( [address]_[facility]);     Sent: 11/15/2021 8:17:30 AM CST  Subject: consumer message: FW: INR 3.9           ---------------------  From: WENDI BOWSER  To: [facility]  Sent: 11/13/2021 09:56 a.m. CST  Subject: INR 3.9  10 daily  INR 3.9

## 2022-02-16 NOTE — NURSING NOTE
Anticoagulation Therapy Management Entered On:  12/24/2021 10:58 AM CST    Performed On:  12/24/2021 10:55 AM CST by Samira Adler RN               Anticoagulation Visit Assessment   Anticoagulation Indication :   Deep vein thrombosis, Other: Factor V   Anticoagulant Start :   8/1/2012 CDT   Anticoagulant Duration :   Undetermined   Anticoagulation Medication Verified :   Yes   Patient Preferred Contact Method :   Cell   Samira Adler RN - 12/24/2021 10:55 AM CST   Anticoagulation Patient Assessment Grid   Change in Medications :   Yes   (Comment: held 2 doses warfarin last week as advised  [Samira Adler RN - 12/24/2021 10:55 AM CST] )   Samira Adler RN - 12/24/2021 10:55 AM CST   Patient on Warfarin :   Yes   Samira Adler RN - 12/24/2021 10:55 AM CST   Warfarin   Anticoagulant INR Goal Lower :   2.5    Anticoagulant INR Goal Upper :   3.5    Monday :   10 mg   Tuesday :   10 mg   Wednesday :   10 mg   Thursday :   10 mg   Friday :   10 mg   Total Dose :   50 mg   Warfarin Pt Reported Previous Week Dose :    Sun Mon Tues Wed Thurs Fri Sat Weekly Total Dose   Week 1 10 mg 10 mg 10 mg 10 mg 10 mg 10 mg 10 mg 70 mg   Week 2 10 mg 10 mg 10 mg 12.5 mg 10 mg 12.5 mg 10 mg 75 mg   Week 3  mg  mg  mg  mg  mg  mg  mg  mg   Week 4  mg  mg  mg  mg  mg  mg  mg  mg         Patient is taking single or multiple strength tablet(s) :   Multiple strength tab(s)   Multiple Tab Strengths :   5 mg tab, 7.5 mg tab   Sunday :   10 mg   Monday :   10 mg   Tuesday :   10 mg   Wednesday :   10 mg   Thursday :   10 mg   Friday :   10 mg   Saturday :   10 mg   Week 1 Total Dose :   70 mg   Sunday :   10 mg   Monday :   10 mg   Tuesday :   10 mg   Wednesday :   12.5 mg   Thursday :   10 mg   Friday :   12.5 mg   Saturday :   10 mg   Week 2 Total Dose :   75 mg   Warfarin Dosing Acknowledgement :   I have reviewed the patient's warfarin dosing schedule and confirmed its accuracy for today's visit.   Patient Instructions :    INR = 2.4 per MDINR today. Patient is to stay on 10 mg warfarin daily and recheck INR in 1 week. Advised via portal.     Samira Adler RN - 12/24/2021 10:55 AM CST

## 2022-02-16 NOTE — TELEPHONE ENCOUNTER
---------------------  From: Astrid Estrada LPN (Phone Messages Pool (32224_[facility]))   To: INR Pool ( [address]);     Sent: 7/16/2021 10:23:40 AM CDT  Subject: CONSUMER MESSAGE FW: Inr 4.4           ---------------------  From: WENDI BOWSER  To: [facility]  Sent: 07/16/2021 10:10 a.m. CDT  Subject: Inr 4.4  I reduced Mondays dosage to 10 12 1/2 on Wednesday 12 1/2 tonight my INR is 4.3. I wonder if it s my test strips again and do you want me to come in for a labHi Blayne, that would be great please come in today as soon as you can.---------------------  From: Samira Adler RN (INR Pool ( [address]24_[facility]))   To: WENDI BOWSER    Sent: 7/16/2021 11:10:25 AM CDT  Subject: FW: CONSUMER MESSAGE FW: Inr 4.4done

## 2022-02-16 NOTE — TELEPHONE ENCOUNTER
---------------------  From: Viviana REES, Mary HERNANDEZ (INR Pool ( [address]24_[facility]))   To: WENDI BOWSER    Sent: 7/24/2020 2:16:48 PM CDT  Subject: RE: INR 5.2     Иван,  I will be calling you. Per our policy you need to come in and have an urgent venous INR drawn.  Talk to you soon,  Mary Michelle RN      ---------------------  From: Sidra/Nevaeh RAHMAN (Phone Messages Pool ([address]224_[facility]))   To: INR Pool ( [address]24_[facility]);     Sent: 7/24/2020 2:03:49 PM CDT  Subject: FW: INR 5.2           ---------------------  From: WENDI BOWSER  To: [facility]  Sent: 07/24/2020 01:59 p.m. CDT  Subject: INR 5.2  Same dosage, 10 daily 12 1/2 on Mondays and Thursdays. I did the test twice the first one with the bad batch of strips that I have that was 5.9 I used a new batch of strips and it was 5.2

## 2022-02-16 NOTE — TELEPHONE ENCOUNTER
---------------------  From: Astrid Estrada LPN (Phone Messages Pool ([address]224_[facility]))   To: INR Pool ( [address]);     Sent: 5/21/2021 9:26:26 AM CDT  Subject: CONSUMER MESSAGE FW: INR 3.3           ---------------------  From: WENDI BOWSER  To: [facility]  Sent: 05/21/2021 09:22 a.m. CDT  Subject: INR 3.3  Same dose, Monday Wednesday and Friday 12 1/2 Tuesday Thursday Saturday and Baldemar 10  INR 3.3---------------------  From: Mary Michelle RN (INR Pool ( [address]_[facility]))   To: WENDI BOWSER    Sent: 5/21/2021 10:07:46 AM CDT  Subject: RE: CONSUMER MESSAGE FW: INR 3.3     Thanks for the update Иван.    Keep up the good work!    Mary Michelle, NEGRITA

## 2022-02-16 NOTE — TELEPHONE ENCOUNTER
---------------------  From: Shazia Lawrence RN (INR Pool ( [address]_[facility]))   To: WENDI BOWSER    Sent: 9/28/2020 4:40:42 PM CDT  Subject: RE: General Message     Иван -     Thanks for your response. Continue your same dose and we will see where your INR is at next week.    Thanks!  Shazia HUBER RN      ---------------------  From: WENDI BOWSER  To: INR Pool ( [address]_[facility])  Sent: 09/28/2020 04:36 p.m. CDT  Subject: RE: General Message  I stopped taking Friday and Saturday, I took dosage Sunday night  ---------------------  From: Shazia Lawrence RN (INR Pool ( [address]_[facility]))  To: WENDI BOWSER  Sent: 9/28/2020 3:57:27 PM CDT  Subject: General Message       Tez Oates,       I received your home INR results of 1.7 from today. It shows that you have been holding your Warfarin since Friday 9-25-20. If this is correct, I would like you to resume your Warfarin at previous dose - 12.5mg Monday and 10mg the rest of the days. Recheck your INR in 1 week.       Please let me know if you have any questions or concerns.       Thanks,       Shazia HUBER RN

## 2022-02-16 NOTE — TELEPHONE ENCOUNTER
---------------------  From: Liss Carrero CMA (Phone Messages Pool (19624_[facility]))   To: INR Pool ( 14424_[facility]);     Sent: 1/30/2019 10:40:01 AM CST  Subject: FW: Inr           ---------------------  From: WENDI BOWSER  To: [facility]  Sent: 01/30/2019 10:27 a.m. CST  Subject: Inr  Same dosage 10 daily inr 2.3---------------------  From: Viviana REES, Mary HERNANDEZ (INR Pool ( [address]_[facility]))   To: WENDI BOWSER    Sent: 1/30/2019 10:56:58 AM CST  Subject: RE: Inr and warfarin dosing       Good Morning Иван,    Thank you for the update on your INR and warfarin dosing. If you have no concerns or changes to report at this time then please continue your warfarin at 10 mg daily and recheck in a week.    Have a great day and stay warm,    Mary Michelle RN

## 2022-02-16 NOTE — TELEPHONE ENCOUNTER
---------------------  From: Vitor REES, Samira   To: WENDI BOWSER    Sent: 10/4/2019 9:18:55 AM CDT  Subject: INR     Benyn Иван,    I received your INR of 3.0 from 10/3/19 today via fax. Nice result, Please stay on 10mg warfarin daily and recheck the INR in 1 week. Have a great week end!     Thank you,    Samira MARIN RN

## 2022-02-16 NOTE — TELEPHONE ENCOUNTER
---------------------  From: Vitor REES, Samira (INR Pool ( [address]_[facility]))   To: INR Pool ( 32224_[facility]); WENDI BOWSER    Sent: 8/21/2020 1:32:17 PM CDT  Subject: INR     Benny Oates,    I received your INR today of 3.7 from MDINR. If you are still taking 12.5mg warfarin on Monday and Thursday and 10mg the rest of the days of the week I would suggest taking 12.5mg on Mondays only and 10 mg all other days. This would be a small reduction of 3.3 % of weekly dose. Your INR has been running a bit high in August. Please let us know how you are doing and confirm the dose.    Thank you,    Samira SALAS RN.

## 2022-02-16 NOTE — TELEPHONE ENCOUNTER
---------------------  From: Shazia Lawrence RN   To: WENDI BOWSER    Sent: 8/31/2020 10:03:49 AM CDT  Subject: INR     Good morning Иван,    Just checking in to see if you have checked your INR this morning. I see that you reported an INR of 5.5 on Saturday.  If you are still over 4.5 today, I would like to have you come in for a venous draw.   Also wanting to make sure you do not have any concerns with bruising or bleeding.    Thanks Иван!    Shazia HUBER RN

## 2022-02-16 NOTE — TELEPHONE ENCOUNTER
---------------------  From: Samuel REES, Frieda ALFARO   To: WENDI BOWSER    Sent: 10/23/2020 2:41:41 PM CDT  Subject: MDINR 2.3     Good afternoon Иван,  I received your INR from today that was 2.3. Your range is 2.5-3.5 so you are almost there. I would like you to take 12.5mg today to boost you into range then continue 12.5mg only on Monday's and 10mg the rest of the days. We will see where you are at next week.   Frieda Baker RN

## 2022-02-16 NOTE — TELEPHONE ENCOUNTER
---------------------  From: Samira Adler RN (Phone Messages Pool (35424_[facility]))   To: INR Pool ( [address]6424_[facility]);     Sent: 10/1/2021 10:18:56 AM CDT  Subject: FW: INR 2.7           ---------------------  From: WENDI BOWSER  To: [facility]  Sent: 10/01/2021 10:17 a.m. CDT  Subject: INR 2.7  Hi Skip the DOSAGE last Friday, 10 daily, INR 2.7---------------------  From: Mary Michelle RN (INR Pool ( [address]_[facility]))   To: WENDI BOWSER    Sent: 10/1/2021 10:19:53 AM CDT  Subject: RE: INR 2.7     Good Morning Иван,    Please continue your warfarin at 10 mg daily and recheck your INR in 1 week.    Have a beautiful day,    Mary Michelle RN

## 2022-02-16 NOTE — TELEPHONE ENCOUNTER
---------------------  From: Astrid Estrada LPN (Phone Messages Pool (24924_[facility]))   To: INR Pool ( 48924_[facility]);     Sent: 6/15/2021 7:54:25 AM CDT  Subject: CONSUMER MESSAGE FW: INR1.3           ---------------------  From: WENDI BOWSER  To: [facility]  Sent: 06/14/2021 10:22 p.m. CDT  Subject: INR1.3  I didn t take any Saturday or Sunday INR 1.3  I took 12.5 tonight---------------------  From: Shazia Lawrence RN (INR Pool ( [address]_[facility]))   To: WENDI BOWSER    Sent: 6/15/2021 8:01:55 AM CDT  Subject: RE: CONSUMER MESSAGE FW: INR1.3     Tez Kumar     Thanks for sending in your INR results. Let's have you resume your Warfarin at your previous dose - 12.5mg Monday/Wednesday/Friday and 10mg the rest of the week. Recheck your INR 6/18/21.    Please let me now if you have any questions or concerns.  Have a great day!    Thanks,  Shazia HUBER RN

## 2022-02-16 NOTE — TELEPHONE ENCOUNTER
---------------------  From: Sidra/Nevaeh RAHMAN (Phone Messages Pool (67760_[facility]))   To: WENDI BOWSER    Sent: 6/28/2021 10:41:24 AM CDT  Subject: FW: Infection in leg wounds     Tez Gruber,    It looks like Dr. Jenkins wanted you to be seen in clinic if your wounds do not improve or if they are worsening. Please call [phone] to schedule an in clinic appt.     Nevaeh HORTON [facility]         ---------------------  From: WENDI BOWSER  To: [facility]  Sent: 06/28/2021 10:16 a.m. CDT  Subject: Infection in leg wounds  I saw Dr. Arora last Thursday to remove stitches from a wound in my leg. I am concerned about infection and would like another prescription for anabiotic s

## 2022-02-16 NOTE — TELEPHONE ENCOUNTER
---------------------  From: Mary Michelle RN   Sent: 6/3/2019 10:30:10 AM CDT  Subject: Monthly home INR report     The patients monthly home INR testing report from [facility] was received today. All results from the month of May 2019 were received and addressed previously. Report is being sent to [facility] for scanning.

## 2022-02-16 NOTE — TELEPHONE ENCOUNTER
---------------------  From: WENDI BOWSER  To: [facility]  Sent: 08/10/2020 09:06 a.m. CDT  Subject: Inr 5.3  Same dosage 10 daily 12 women have on Monday and Thursday  INR 5.3---------------------  From: Laisha Murillo CMA (Phone Messages Pool (32224_[facility]))   To: INR Pool ( [address]);     Sent: 8/10/2020 10:36:30 AM CDT  Subject: FW: Inr 5.3---------------------  From: Laisha Murillo CMA (Phone Messages Pool (32224_[facility]))   To: TFS Message Pool ([address]224_[place] [facility] [place]);     Sent: 8/10/2020 10:36:56 AM CDT  Subject: FW: Inr 5.3This has been addressed.

## 2022-02-16 NOTE — TELEPHONE ENCOUNTER
---------------------  From: Flori Nunez (eRx Pool (32224_[facility]))   To: INR Pool ( 32224_[facility]);     Sent: 7/5/2019 1:37:27 PM CDT  Subject: FW: Medication Management   Due Date/Time: 7/4/2019 10:01:00 AM CDT             ** Patient matched by Flori Nunez on 7/5/2019 1:36:35 PM CDT **      ------------------------------------------  From: [facility] [id number]  To: David Jenkins MD  Sent: July 3, 2019 10:01:03 AM CDT  Subject: Medication Management  Due: July 4, 2019 10:01:03 AM CDT    ** On Hold Pending Signature **  Drug: warfarin (warfarin 10 mg oral tablet)  TAKE 1 TABLET BY MOUTH EVERY DAY WITH 1/2 OF 7.5MG TABLET TO EQUAL 13.75MG  Quantity: 90 tab(s)     Days Supply: 30        Refills: 0  Substitutions Allowed  Notes from Pharmacy: **Patient requests 90 days supply**    Dispensed Drug: warfarin (warfarin 10 mg oral tablet)  TAKE 1 TABLET BY MOUTH EVERY DAY WITH 1/2 OF 7.5MG TABLET TO EQUAL 13.75MG  Quantity: 90 tab(s)     Days Supply: 30        Refills: 0  Substitutions Allowed  Notes from Pharmacy: **Patient requests 90 days supply**    ** On Hold Pending Signature **  Drug: warfarin (warfarin 7.5 mg oral tablet)  TAKE 1/2 TABLET(3.75MG) ALONG WITH 10MG DAILY TO EQUAL 13.75MG DAILY  Quantity: 45 tab(s)     Days Supply: 30        Refills: 0  Substitutions Allowed  Notes from Pharmacy: **Patient requests 90 days supply**    Dispensed Drug: warfarin (warfarin 7.5 mg oral tablet)  TAKE 1/2 TABLET(3.75MG) ALONG WITH 10MG DAILY TO EQUAL 13.75MG DAILY  Quantity: 45 tab(s)     Days Supply: 30        Refills: 0  Substitutions Allowed  Notes from Pharmacy: **Patient requests 90 days supply**  ------------------------------------------  ** Submitted: **  Order:warfarin (warfarin 10 mg oral tablet)  1 tab(s)  Oral  daily  Or as directed by provider  Qty:  90 tab(s)        Days Supply:  30        Refills:  1          Substitutions Allowed     Route To Pharmacy -  [facility] [employer] [id number]    Signed by Mary Michelle RN  7/5/2019 1:45:00 PM    ** Submitted: **  Complete:warfarin (warfarin 5 mg oral tablet)   Signed by Mary Michelle RN  7/5/2019 1:45:00 PM    ** Not Approved:  **  warfarin (WARFARIN SOD 10MG (TEN MG) TB WHITE)  TAKE 1 TABLET BY MOUTH EVERY DAY WITH 1/2 OF 7.5MG TABLET TO EQUAL 13.75MG  Qty:  90 tab(s)        Days Supply:  30        Refills:  0          Substitutions Allowed     Route To [facility] [employer] [facility]   Note from Pharmacy:  **Patient requests 90 days supply**  Signed by Mary Michelle RN---------------------  From: Mary Michelle RN   [facility] [employer] [facility]    Sent: 7/5/2019 1:46:24 PM CDT  Subject: FW: Medication Management     ** Submitted: **  Order:warfarin (warfarin 7.5 mg oral tablet)  0.5 tab(s)  Oral  daily  Or as directed by provider  Qty:  45 tab(s)        Days Supply:  30        Refills:  1          Substitutions Allowed     Route To [facility] [employer] [facility]    Signed by Mary Michelle RN  7/5/2019 1:46:00 PM    ** Submitted: **  Complete:warfarin (warfarin 10 mg oral tablet)   Signed by Mary Michelle RN  7/5/2019 1:46:00 PM    ** Not Approved:  **  warfarin (WARFARIN SOD 7.5MG TABLETS (YELLOW))  TAKE 1/2 TABLET(3.75MG) ALONG WITH 10MG DAILY TO EQUAL 13.75MG DAILY  Qty:  45 tab(s)        Days Supply:  30        Refills:  0          Substitutions Allowed     Route To [facility] [employer] [facility]   Note from Pharmacy:  **Patient requests 90 days supply**  Signed by Mary Michelle RN DPatient compliant with warfarin management.  Filled for several months.

## 2022-02-16 NOTE — TELEPHONE ENCOUNTER
---------------------  From: Astrid Estrada LPN (Phone Messages Pool (32224_[facility]))   To: [facility] Message Pool (32224_[facility] [facility] [place]);     Sent: 7/3/2019 8:25:24 AM CDT  Subject: med list     Phone Message    PCP:   FRANCES      Time of Call:  8:18am       Person Calling:  Dominguez- could not understand caller's name  Phone number:  [phone]    Note:   Caller PASHA stating she sent over discharge orders yesterday. She says she got the orders back but did not receive a signed med list.    She says pt is leaving within the next hour and needs this ASAP.    Last office visit and reason:  6/27/19 wound concern

## 2022-02-16 NOTE — TELEPHONE ENCOUNTER
"---------------------  From: Mary Michelle RN (INR Pool ( [address]_[facility]))   To: WENDI BOWSER    Sent: 12/24/2019 12:23:20 PM CST  Subject: RE: INR result and Warfarin dosing     Иван    Wonderful! Thanks for the info!    Jazz Portillos!    Mary Michelle RN      ---------------------  From: WENDI BOWSER  To: INR Pool ( [address]_[facility])  Sent: 12/24/2019 10:59 a.m. CST  Subject: RE: INR result and Warfarin dosing  I have 10, and 5 tabs  ---------------------  From: Mary Michelle RN (INR Pool ( [address]_[facility]))  To: WENDI BOWSER  Sent: 12/24/2019 10:42:31 AM CST  Subject: INR result and Warfarin dosing       Benny Oates,       I got your INR result of 1.8 from yesterday. Dr. Warren would like you to continue your warfarin at 12.5 mg Monday and Thursday and 10 mg all other days of the week.       Please check your INR in 1 week and be mindful of signs or symptoms of clot due to your blood being \"thicker\" right now. You should also avoid foods high in Vitamin K.       I see you have 7.5 mg warfarin tabs. I know at one time you said you had 5 mg tabs left from previous prescriptions. Because of the prescribed dose of warfarin, I am concerned you are not getting the correct amount if you are only using 7.5 mg tabs. Do you want a prescription sent in for 5 mg or 10 mg tabs?       I hope you have a wonderful day. Please, let us know if you need anything.       Sincerely,       Mary Michelle RN  "

## 2022-02-16 NOTE — TELEPHONE ENCOUNTER
---------------------  From: Viviana REES, Mary HERNANDEZ   To: WENDI BOWSER    Sent: 2/14/2020 8:54:21 AM CST  Subject: Warfarin dosing     Good morning Иван,    I wanted to make sure you were informed yesterday after your INR of 1.9 that Dr. Jenkins would like you to increase your warfarin dose to 12.5 mg Mondays and Thursdays and 10 mg all the rest of the days.    Please check your INR in 1 week.    If you have any questions let me know,    Mary Michelle RN

## 2022-02-16 NOTE — PROGRESS NOTES
Patient:   WENDI BOWSER            MRN: [id number]            FIN: [id number]               Age:   67 years     Sex:  Male     :  1950   Associated Diagnoses:   Encounter for Medicare annual wellness exam; Epicondylitis, lateral, left; Prediabetes; Lipids abnormal; PVD (Peripheral Vascular Disease)   Author:   David Jenkins MD      Visit Information      Date of Service: 2017 12:45 pm  Performing Location: [facility]  Encounter#: [id number]      Primary Care Provider (PCP):  David Jenkins MD    NPI# [id number]      Referring Provider:  No referring provider recorded for selected visit.   Visit type:  Annual exam.       Chief Complaint   2017 12:49 PM CDT   AWV   Medicare Initial / Annual Preventative Visit      Well Adult History   Well Adult History             The patient presents for well adult exam.  ADL's and Safety were reviewed.  ___none___ found.  Please see copy of scanned form.    I have reviewed with the patient the completed health risk assessment and health history form and we have agreed on the following plans for identified risk factors. ___none____ found.  Please see copy of scanned form.    Just returned from [facility].  He had been doing fine until 3 days ago when he started having left buttocks pain radiating down into his left calf.  Is worse with walking better when he elevates some pain with rest makes him limp.  Is all posterior.  No numbness tingling or weakness.  He has had prior stents in that leg which are being occluded for some time.  No bowel or bladder changes.  He continues to be anticoagulated and therapeutic continues to be followed by cardiology. .        Review of Systems   Constitutional:  Negative except as documented in history of present illness.    Eye:  See Preventative Services Checklist for Vision/Glaucoma Test dates..    Ear/Nose/Mouth/Throat:  Negative, Hearing is evaluated..    Respiratory:  Negative.    Cardiovascular:  Negative.     Gastrointestinal:  Negative.    Genitourinary:  Negative.    Musculoskeletal:  Negative except as documented in history of present illness.    Integumentary:  Negative except as documented in history of present illness.    Neurologic:  Negative except as documented in history of present illness.    Psychiatric:  PHQ-9 Noted.    See completed Health History for Review of Systems.      Health Status   Allergies:    Allergic Reactions (Selected)  No Known Medication Allergies   Medications:  (Selected)   Prescriptions  Prescribed  Alere INRation 2 (PT INR) Test Strip: Alere INRation 2 (PT INR) Test Strip, See Instructions, Instructions: Test INR monthly as directed, Supply, # 1 box(es), 11 Refill(s), Type: Maintenance  Viagra 100 mg oral tablet: See Instructions, Instructions: TAKE AS DIRECTED, # 8 tab(s), 11 Refill(s), Pharmacy: [facility] [id number], TAKE AS DIRECTED  atorvastatin 10 mg oral tablet: 1 tab(s) ( 10 mg ), po, daily, # 30 tab(s), 0 Refill(s), Type: Maintenance, Pharmacy: [facility] [id number], Due visit before any more refills  predniSONE 10 mg oral tablet: 4 tabs daily for 3 days taper bey 1 tab every 3 days, PO, Daily, # 30 tab(s), 0 Refill(s), Type: Maintenance, Pharmacy: [facility] [id number], 4 tabs daily for 3 days taper bey 1 tab every 3 days po daily  warfarin 5 mg oral tablet: 2.5 tab(s) ( 12.5 mg ), po, daily, # 75 tab(s), 0 Refill(s), Type: Maintenance, Pharmacy: [facility] [id number], Due visit  Documented Medications  Documented  aspirin 81 mg oral tablet: 1 tab(s) ( 81 mg ), PO, Daily, # 30 tab(s), 0 Refill(s), Type: Maintenance   Problem list:    All Problems  Adenomatous colon polyp / ICD-9-.3 / Confirmed  History of DVT of lower extremity / ICD-9-CM V12.52 / Confirmed  PVD (Peripheral Vascular Disease) / ICD-9-.9 / Confirmed  Anticoagulated / ICD-9-CM V58.61 / Confirmed  Obesity / ICD-9-.00 / Probable  Factor V Leiden / ICD-9-.81 /  Confirmed  Lipids abnormal / ICD-9-.9 / Confirmed  Prediabetes / ICD-9-.29 / Confirmed  History of pulmonary embolism / ICD-9-CM V12.55 / Confirmed  Tobacco user / SNOMED CT 241673025 / Probable  Resolved: Tobacco user / ICD-9-.1  Resolved: Lyme Disease / ICD-9-.81  Resolved: Rupture of Anterior Cruciate Ligament / ICD-9-.2  Resolved: *Hospitalized@[place] - Left superficial femoral artery chronic occlusion  Canceled: DVT (Deep Venous Thrombosis) / ICD-9-.40  Canceled: PE (Pulmonary Embolism) / ICD-9-.19  Canceled: History of pulmonary embolism / ICD-9-CM V12.51     Current Providers and Suppliers Noted in Demographic Area. [facility]      Histories   Past Medical History:    Active  History of pulmonary embolism (V12.55)  Resolved  *Hospitalized@[facility] - Left superficial femoral artery chronic occlusion: Onset on 2012 at 62 years.  Resolved.  Rupture of Anterior Cruciate Ligament (844.2): Onset in  at 54 years.  Resolved.  Lyme Disease (088.81): Onset in the month of 1999 at 49 years  Resolved.  Tobacco user (305.1): Onset on 1968 at 17 years.  Resolved on 2012 at 61 years.   Family History:    Heart disease  Uncle (M)  Father (Blayne, )  Comments:  2010 9:16 AM - Samira Larson MA     Procedure history:    Insertion of arterial stent (SNOMED CT 608659006) on 2012 at 62 Years.  Insertion of arterial stent (SNOMED CT 499121943) on 2012 at 62 Years.  Comments:  2012 11:54 PM - Missy Rubi  Left superficial femoral artery chronic occlusion.  Colectomy on 2012 at 62 Years.  Comments:  2012 9:43 PM - Missy Rubi  Hand-assisted laparoscopic total abdominal colectomy.    2012 3:17 PM - Mir Toribio MD  Numerous adenomatous polyps and one large cecal polyp  Colonoscopy (SNOMED CT 585169006) performed by Mir Toribio MD on 2012 at 61 Years.  Colonoscopy (SNOMED CT 059403917) on 10/17/2005 at 55 Years.  Flexible  sigmoidoscopy (SNOMED CT 26494834) on 9/15/2005 at 55 Years.  Arthroscopy of knee (SNOMED CT 819489405) in 2005 at 55 Years.  ACL R Knee in 2005 at 55 Years.  Flexible sigmoidoscopy (SNOMED CT 04203514) on 12/14/2001 at 51 Years.  Flexible sigmoidoscopy (SNOMED CT 78836056) on 12/14/2001 at 51 Years.  Vasectomy (SNOMED CT 11652652).   Social History:        Alcohol Assessment            Past                     Comments:                      01/27/2012 - Genny Knapp RN                     3-4 drinks each episode      Tobacco Assessment            Current every day smoker, Cigarettes                     Comments:                      04/20/2016 - Liss Carrero CMA                     1ppd                       01/25/2012 - Qian White                     70 pack year history of smoking      Substance Abuse Assessment            Past, Marijuana      Employment and Education Assessment            Employed, Work/School description: [employer]      Home and Environment Assessment            Marital status: .  Spouse/Partner name: Sherice Cancino.  Lives with Significant other.      Exercise and Physical Activity Assessment            Exercise frequency: Daily.  Exercise type: Walking.      Sexual Assessment            Sexually active: Yes.  Sexual orientation: Heterosexual.        Physical Examination   Exam at Provider Discretion   Vital Signs   4/12/2017 12:49 PM CDT Temperature Tympanic 97.5 DegF  LOW    Peripheral Pulse Rate 78 bpm    HR Method Electronic    Systolic Blood Pressure 95 mmHg    Diastolic Blood Pressure 67 mmHg    Mean Arterial Pressure 76 mmHg    BP Site Right arm    BP Method Electronic      Measurements from flowsheet : Measurements   4/12/2017 12:49 PM CDT Height Measured - Standard 72 in    Weight Measured - Standard 200 lb    BSA 2.14 m2    Body Mass Index 27.12 kg/m2      General:  Alert and oriented, No acute distress.    HENT:  No pharyngeal erythema.    Neck:  Supple, Non-tender.     Respiratory:  Lungs are clear to auscultation, Respirations are non-labored.    Cardiovascular:  Normal rate, Regular rhythm, No edema.    Gastrointestinal:  Soft, Non-tender.    Musculoskeletal:  degenerative changes noted, Left leg reveals it to be warm with no evidence of ischemia on exam.  He does have poor foot pulses in both feet but no signs of ischemia capillary refill seems normal on both sides and symmetric.  He has intact knee jerk and ankle jerk reflexes positive straight leg raise on the left difficult to  range of motion because of limited by pain as well as strength. .    Integumentary:  No rash.    Neurologic:  Alert, Oriented, No signs of cognitive impairment..    Psychiatric:  Patient's PHQ9 and CAGE questionnaire reviewed and discussed with patient..       Review / Management   Results review:  Lab results   4/12/2017 1:22 PM CDT Sodium Level 140 mmol/L    Potassium Level 4.4 mmol/L    Chloride Level 106 mmol/L    CO2 Level 23 mmol/L    AGAP 11    Glucose Level 97 mg/dL    BUN 15 mg/dL    Creatinine Level 0.96 mg/dL    BUN/Creat Ratio 16    eGFR  >60    eGFR Non-African American >60    Calcium Level 9.2 mg/dL    WBC 9.2    RBC 4.49    Hgb 14.7 g/dL    Hct 42.6 %    MCV 95 fL    MCH 32.7 pg    MCHC 34.5 g/dL    RDW 14.2 %    Platelet 240    MPV 9.5 fL    Lymphocytes 21.9 %    Abs Lymphocytes 2.0    Neutrophils 68.9 %    Abs Neutrophils 6.3    Monocytes 7.1 %    Abs Monocytes 0.7    Eosinophils 1.7 %    Abs Eosinophils 0.2    Basophils 0.4 %    Abs Basophils 0.0    Protime 23.1    INR 2.1   4/6/2017 11:47 AM CDT INR 2.3   3/27/2017 3:43 PM CDT INR 3.9   2/24/2017 1:52 PM CST INR 4.2   1/13/2017 11:34 AM CST INR 2.1   , INCLUDE PHQ 9 0.       Impression and Plan   Diagnosis     Encounter for Medicare annual wellness exam (HGL28-ON Z00.00).     Epicondylitis, lateral, left (EBZ76-VW M77.12).     Prediabetes (FTS40-AZ R73.09).     Lipids abnormal (TNQ97-YY E78.89).     PVD  (Peripheral Vascular Disease) (WIO00-LU I73.9).     Course:  Progressing as expected.    Plan:  Please list plan for positive findings, treatment options, risk vs. benefits.  Left leg pain likely radicular ultrasound showed no clot arterial studies are pending consider CTA depending on the results while were waiting will treat him with prednisone taper plan on recheck tomorrow if not doing any better sooner if worse.  Otherwise continue with anticoagulation continue to be followed up by [facility].  Encouraged him to quit smoking. , pt for elbow.    Patient Instructions:       Counseled: Patient, Discussed preventative services including  Screening for AAA (Family history or male 65-70 who has smoked more than 100 cigarettes in a lifetime.), Lipid screening, Diabetes screening, Nutrition, Bone mass, Cancer screening (cervical, breast, colon), Immunizations (flu, Pneumovax, Hep. B, Zostavax), Glaucoma screening and ECG if needed.  Appropriate weight and diet discussed.  Discussed Advance Life Directives.    Preventative services checklist reviewed, updated and copy given to patient.  Please see scanned document.       .

## 2022-02-16 NOTE — NURSING NOTE
Anticoagulation Therapy Management Entered On:  7/27/2020 12:26 PM CDT    Performed On:  7/27/2020 12:24 PM CDT by Shazia Lawrence RN               Anticoagulation Visit Assessment   Type of Visit - Anticoagulation :   Telephone   Anticoagulation Indication :   Deep vein thrombosis, Other: Factor V   Anticoagulant Start :   8/1/2012 CDT   Anticoagulant Duration :   Undetermined   Anticoagulation Medication Verified :   Yes   Patient Preferred Contact Method :   Cell   Patient on Warfarin :   Yes   Shazia Lawrence RN - 7/27/2020 12:24 PM CDT   Warfarin   International Normalization Ratio TR :   1.4    Anticoagulant INR Goal Lower :   2.5    Anticoagulant INR Goal Upper :   3.5    Sunday :   10 mg   Monday :   12.5 mg   Tuesday :   10 mg   Wednesday :   10 mg   Thursday :   12.5 mg   Friday :   0 mg   Saturday :   0 mg   Total Dose :   55 mg   Warfarin Pt Reported Previous Week Dose :    Sun Mon Tues Wed Thurs Fri Sat Weekly Total Dose   Week 1 10 mg 12.5 mg 10 mg 10 mg 12.5 mg 10 mg 10 mg 75 mg   Week 2  mg  mg  mg  mg  mg  mg  mg  mg   Week 3  mg  mg  mg  mg  mg  mg  mg  mg   Week 4  mg  mg  mg  mg  mg  mg  mg  mg         Patient is taking single or multiple strength tablet(s) :   Multiple strength tab(s)   Multiple Tab Strengths :   5 mg tab, 7.5 mg tab   Sunday :   10 mg   Monday :   12.5 mg   Tuesday :   10 mg   Wednesday :   10 mg   Thursday :   12.5 mg   Friday :   10 mg   Saturday :   10 mg   Week 1 Total Dose :   75 mg   Patient Instructions :   INR = 1.4 per mdINR. Per protocol, resume Warfarin 12.5mg Mon, Thurs and 10mg ROW. Recheck INR in 1 week. Message sent via portal epr pt request.      Shazia Lawrence RN - 7/27/2020 12:24 PM CDT

## 2022-02-16 NOTE — TELEPHONE ENCOUNTER
---------------------  From: Astrid Estrada LPN (Phone Messages Pool ([address]224_[facility]))   To: INR Pool ( [address]24_[facility]);     Sent: 11/6/2020 1:59:24 PM CST  Subject: FW: INR           ---------------------  From: WENDI BOWSER  To: [facility]  Sent: 11/06/2020 01:52 p.m. CST  Subject: FW: INR  Message received. Typically I would go back to 10 daily after my high swings  ---------------------  From: Samira Adler RN  To: WENDI BOWSER  Sent: 11/6/2020 1:50:08 PM CST  Subject: INR       Hello again,       Please hold your warfarin today. Then resume your usual dose. I have this as 12.5mg on Monday and 10 mg the rest of the days of the week. Recheck your INR in 1 week. Be seen if you have any symptoms of bleeding or concern. Have a great week end and be safe out there. Please respond so I know you got this message.       Thank you,       Samira MARIN RN.noted

## 2022-02-16 NOTE — TELEPHONE ENCOUNTER
---------------------  From: Samira Adler RN   To: MAGUEWENDI WEST    Sent: 8/9/2019 12:05:47 PM CDT  Subject: INR     Benny Oates,    Please continue 10 mg warfarin daily for the INR of 3.6 on 8/9/19. Recheck INR in one week. Hope your doing well. Call if any concerns.    Thanks,    Samira MARIN RN

## 2022-02-16 NOTE — TELEPHONE ENCOUNTER
---------------------  From: Viviana REES, Mary HERNANDEZ (Phone Messages Pool ([id number]_[facility]))   To: WENDI BOWSER    Sent: 10/15/2021 1:41:11 PM CDT  Subject: RE: INR 2.7     Thanks for the information, Иван.  Continue your Warfarin 10 mg daily and recheck your INR in 1 week.  Have a great weekend,  Mary Michelle RN      ---------------------  From: WENDI BOWSER  To: [facility]  Sent: 10/15/2021 01:32 p.m. CDT  Subject: INR 2.7  Skipped two days, Friday &Saturday, also took doxycycline for tick bite  INR 2.7

## 2022-02-16 NOTE — TELEPHONE ENCOUNTER
---------------------  From: Cammie Hinkle CMA (Phone Messages Pool (32224_[facility]))   To: INR Pool ( [address]_[facility]);     Sent: 6/3/2021 10:53:37 AM CDT  Subject: FW: INR 3.2           ---------------------  From: WENDI BOWSER  To: [facility]  Sent: 06/03/2021 10:37 a.m. CDT  Subject: INR 3.2  I missed a night last week so I took the next day and now my INR s at 3.2, same dosage 10 daily except Monday Wednesday and Friday which are 12 1/2Done

## 2022-02-16 NOTE — PROGRESS NOTES
Patient:   WENDI BOWSER            MRN: [id number]            FIN: [id number]               Age:   71 years     Sex:  Male     :  1950   Associated Diagnoses:   Encounter for removal of sutures   Author:   David Jenkins MD      Visit Information      Date of Service: 2021 10:40 am  Performing Location: [facility]  Encounter#: [id number]      Primary Care Provider (PCP):  David Jenkins MD    NPI# [id number]      Referring Provider:  David Jenkins MD    NPI# [id number]      Chief Complaint   2021 10:42 AM CDT   suture removal--placed 2021 at [facility], left leg.  concerned about possible infection, no drainage.        History of Present Illness   chief complaint and symptoms as noted above confirmed with patient       Interval History   General Exam   No problems with wound site or sutures.        Review of Systems   Integumentary:  No redness, swelling, or drainage at suture site.       Health Status   Allergies:    Allergic Reactions (Selected)  No Known Medication Allergies   Medications:  (Selected)   Prescriptions  Prescribed  Viagra 100 mg oral tablet: See Instructions, Instructions: TAKE AS DIRECTED, # 8 tab(s), 11 Refill(s), [facility]acy: [id number], TAKE AS DIRECTED  Voltaren 1% topical gel: ( 2 gm ), Topical, qid, PRN: shoulder pain, # 100 gm, 11 Refill(s), Type: Maintenance, Pharmacy: [facility] #[id number], 2 gm Topical qid,PRN:shoulder pain  atorvastatin 20 mg oral tablet: = 1 tab(s) ( 20 mg ), Oral, daily, # 90 tab(s), 3 Refill(s), Type: Maintenance, Pharmacy: [facility] #[id number], 1 tab(s) Oral daily, 72, in, 21 13:09:00 CDT, Height Measured, 195.9, lb, 21 13:09:00 CDT, Weight Measured  fluticasone 50 mcg/inh nasal spray: = 2 spray(s), nasal, daily, # 3 EA, 3 Refill(s), Type: Maintenance, Pharmacy: [facility] #[id number], 2 spray(s) Nasal daily,x90 day(s), 72, in, 20 12:52:00 CDT, Height Measured, 193.6,  lb, 06/12/20 12:52:00 CDT, Weight Measured  warfarin 5 mg oral tablet: See Instructions, Instructions: TAKE 2& 1/2 TABLETS BY MOUTH MONDAY, THURSDAY AND 2 TABLETS REST OF WEEK, # 192 tab(s), 6 Refill(s), Type: Maintenance, Pharmacy: [facility] #[id number], TAKE 2& 1/2 TABLETS BY MOUTH MONDAY, THURSDAY AND 2 TABLETS...  Documented Medications  Documented  acetaminophen: See Instructions, Instructions: 325mg-650 mg PRN for pain/fever., 0 Refill(s), Type: Maintenance  aspirin 81 mg oral delayed release tablet: = 1 tab(s) ( 81 mg ), Oral, daily, 0 Refill(s), Type: Maintenance,    Medications          *denotes recorded medication          *acetaminophen: See Instructions, 325mg-650 mg PRN for pain/fever., 0 Refill(s).          *aspirin 81 mg oral delayed release tablet: 81 mg, 1 tab(s), Oral, daily, 0 Refill(s).          atorvastatin 20 mg oral tablet: 20 mg, 1 tab(s), Oral, daily, 90 tab(s), 3 Refill(s).          Voltaren 1% topical gel: 2 gm, Topical, qid, PRN: shoulder pain, 100 gm, 11 Refill(s).          fluticasone 50 mcg/inh nasal spray: 2 spray(s), nasal, daily, for 90 day(s), 3 EA, 3 Refill(s).          Viagra 100 mg oral tablet: See Instructions, TAKE AS DIRECTED, 8 tab(s), 11 Refill(s).          warfarin 5 mg oral tablet: See Instructions, TAKE 2& 1/2 TABLETS BY MOUTH MONDAY, THURSDAY AND 2 TABLETS REST OF WEEK, 192 tab(s), 6 Refill(s).       Problem list:    All Problems  Seasonal allergies / SNOMED CT 631587034 / Confirmed  Inpatient stay / SNOMED CT 290484138 / Confirmed  History of arterial thrombosis / SNOMED CT 6921359450 / Confirmed  Prediabetes / SNOMED CT 7964777596 / Confirmed  Factor V Leiden / SNOMED CT 6421899056 / Confirmed  Adenomatous colon polyp / SNOMED CT 9512924099 / Confirmed  Arthritis of right acromioclavicular joint / SNOMED CT 7941290010 / Confirmed  Lipids abnormal / SNOMED CT 317461621 / Confirmed  History of DVT of lower extremity / SNOMED CT 8307346920 / Confirmed  Obesity /  SNOMED CT 5138965730 / Probable  History of pulmonary embolism / SNOMED CT 401370815 / Confirmed  PVD (peripheral vascular disease) / SNOMED CT 1683717307 / Confirmed  Anticoagulated / SNOMED CT 09626493 / Confirmed  Tobacco user / SNOMED CT 728157806 / Probable  Resolved: Inpatient stay / SNOMED CT 570197453  Resolved: Rupture of anterior cruciate ligament / SNOMED CT 667187666  Resolved: Tobacco user / ICD-9-.1  Resolved: Lyme disease / SNOMED CT 8089511547  Canceled: History of pulmonary embolism / ICD-9-CM V12.51  Canceled: DVT (Deep Venous Thrombosis) / ICD-9-.40  Canceled: PE (Pulmonary Embolism) / ICD-9-.19      Histories   Past Medical History:    Active  Inpatient stay (622265703): Onset on 6/16/2019 at 69 years.  Comments:  6/19/2019 CDT 9:47 AM CDT - Renetta Gerardo  @ [facility] due to LLE ischemic limb.  Prediabetes (0080069009): Onset on 4/19/2013 at 63 years.  Lipids abnormal (123126782): Onset on 10/26/2012 at 62 years.  Adenomatous colon polyp (0405371190): Onset on 4/27/2012 at 62 years.  History of DVT of lower extremity (0176271868): Onset on 4/27/2012 at 62 years.  PVD (peripheral vascular disease) (8999722349)  Anticoagulated (66879033)  Obesity (9328277958)  Factor V Leiden (3327855185)  History of pulmonary embolism (048513302)  Seasonal allergies (644155055)  History of arterial thrombosis (9891820070)  Arthritis of right acromioclavicular joint (1598340612)  Resolved  Inpatient stay (547722509): Onset on 7/12/2012 at 62 years.  Resolved.  Comments:  7/9/2019 CDT 8:00 AM CDT - Missy Rubi  @[place], [place] - Left superficial femoral artery chronic occlusion  Rupture of anterior cruciate ligament (841749217): Onset in 2005 at 55 years.  Resolved.  Lyme disease (8645084576): Onset in the month of 7/1999 at 49 years  Resolved.  Tobacco user (305.1): Onset on 1/1/1968 at 17 years.  Resolved on 1/1/2012 at 61 years.   Family History:    Heart disease  Uncle (M)  Father  (Blayne, )  Comments:  2010 9:16 AM CDT - Samira Larson MA  [place] - Cancer of colon  Uncle     Procedure history:    Percutaneous mechanical thrombectomy of vein of lower limb using fluoroscopic guidance (SNOMED CT 1601982058) performed by Shaun Davila MD on 2019 at 69 Years.  Comments:  2019 9:57 AM CDT - Renetta Gerardo  left  Arthroplasty of the knee (SNOMED CT 262583495) on 2019 at 69 Years.  Comments:  2019 9:50 AM CDT - Renetta Gerardo  left  Flexible sigmoidoscopy (SNOMED CT 31377422) on 2017 at 67 Years.  Insertion of arterial stent (SNOMED CT 838016440) on 2012 at 62 Years.  Insertion of arterial stent (SNOMED CT 433480290) on 2012 at 62 Years.  Comments:  2012 11:54 PM CDT - Missy Rubi  Left superficial femoral artery chronic occlusion.  Colectomy on 2012 at 62 Years.  Comments:  2012 9:43 PM CDT - Missy Rubi  Hand-assisted laparoscopic total abdominal colectomy.    2012 3:17 PM CDT - Mir Toribio MD  Numerous adenomatous polyps and one large cecal polyp  Colonoscopy (SNOMED CT 982215476) performed by Mir Toribio MD on 2012 at 61 Years.  Colonoscopy (SNOMED CT 901836914) on 10/17/2005 at 55 Years.  Flexible sigmoidoscopy (SNOMED CT 67757873) on 9/15/2005 at 55 Years.  Arthroscopy of knee (SNOMED CT 904934276) in  at 55 Years.  Comments:  2019 9:50 AM CDT - Renetta Gerardo  Right  ACL R Knee in  at 55 Years.  Flexible sigmoidoscopy (SNOMED CT 78050787) on 2001 at 51 Years.  Flexible sigmoidoscopy (SNOMED CT 45752868) on 2001 at 51 Years.  Vasectomy (SNOMED CT 08839822).   Social History:        Electronic Cigarette/Vaping Assessment            Electronic Cigarette Use: Never.      Alcohol Assessment            Liquor (Hard) (1.5 oz), Daily, 2 drinks/episode average.  3 drinks/episode maximum.      Tobacco Assessment            Current every day smoker, Cigarettes, 20 per day.            10 or  more cigarettes (1/2 pack or more)/day in last 30 days, Cigarettes      Substance Abuse Assessment            Marijuana, 1-2 times per week      Employment and Education Assessment            Retired      Home and Environment Assessment            Marital status: .  Spouse/Partner name: Sherice Cancino.  Lives with Significant other.  Living situation:               Home/Independent.  Injuries/Abuse/Neglect in household: No.  Feels unsafe at home: No.  Family/Friends               available for support: Yes.  Risks in environment: Pool/[place], Stairs, sometimes wears bike helmet, owns               secured gun.      Nutrition and Health Assessment            Type of diet: warfarin.            Type of diet: Regular.  Wants to lose weight: Yes.      Exercise and Physical Activity Assessment            Exercise frequency: Daily.  Exercise type: Stretching shoulder.      Sexual Assessment            Sexually active: No.  Sexual orientation: Heterosexual.        Physical Examination   Vital Signs   6/24/2021 10:42 AM CDT Temperature Tympanic 98.3 DegF    Peripheral Pulse Rate 82 bpm    Pulse Site Radial artery    HR Method Electronic    Systolic Blood Pressure 112 mmHg    Diastolic Blood Pressure 71 mmHg    Mean Arterial Pressure 85 mmHg    BP Site Right arm    BP Method Electronic    Oxygen Saturation 95 %      Measurements from flowsheet : Measurements   6/24/2021 10:42 AM CDT Height Measured - Standard 72 in    Weight Measured - Standard 189.4 lb    BSA 2.09 m2    Body Mass Index 25.68 kg/m2  HI      General:  Alert and oriented.    Integumentary:  Intact (wound), No erythema or drainage, Location of sutures.       Health Maintenance      Recommendations     Pending (in the next year)        OverDue           Depression Screen due  04/12/18  and every 1  year(s)        Due            Abdominal Aortic Aneurysm Screen (if hx of smoking) due  06/24/21  One-time only           Fall Risk Screen due  06/24/21  and every 1   year(s)           Hepatitis C Screen 4177-9012 due  06/24/21  One-time only           Lung Cancer Screen due  06/24/21  and every 1  year(s)        Due In Future            Influenza Vaccine not due until  09/01/21  and every 1  year(s)           Colorectal Cancer Screen (Colonoscopy) not due until  02/02/22  and every 10  year(s)           Lipid Disorders Screen not due until  04/27/22  and every 1  year(s)     Satisfied (in the past 1 year)        Satisfied            Body Mass Index Check on  06/24/21.           Body Mass Index Check on  04/27/21.           COVID-19 Vaccine (Pfizer) Dose 2 on  05/05/21.           COVID-19 Vaccine (Pfizer) Dose 1 on  04/09/21.           High Blood Pressure Screen on  06/24/21.           High Blood Pressure Screen on  04/27/21.           Influenza Vaccine on  09/18/20.           Lipid Disorders Screen on  04/27/21.           Lipid Disorders Screen on  04/27/21.           Lipid Disorders Screen on  04/27/21.           Lipid Disorders Screen on  04/27/21.           Obesity Screen and Counseling on  06/24/21.           Obesity Screen and Counseling on  04/27/21.           Tetanus Vaccine on  07/31/20.          Review / Management   Results review:  Lab results   6/14/2021 7:58 AM CDT INR TR 1.3   6/11/2021 3:02 PM CDT INR TR 5.0   4/30/2021 10:46 AM CDT INR TR 2.7   4/27/2021 1:37 PM CDT Glucose Level 90 mg/dL    Hgb A1c 5.3    Cholesterol 140 mg/dL    Non-HDL 97    HDL 43 mg/dL    Chol/HDL Ratio 3.3    LDL 71    Triglyceride 180 mg/dL  HI   4/12/2021 8:41 AM CDT INR TR 2.4   4/5/2021 10:01 AM CDT INR TR 2.4   3/26/2021 5:26 PM CDT INR TR 2.4   .    Course:  Progressing as expected, Sutures removed without incident, patient tolerated well.       Impression and Plan   Diagnosis     Encounter for removal of sutures ([id number] Z48.02).     Course:  Progressing as expected, Reviewed wound cares, and expectations. Discussed pathology results with patient, RTC if no improvements or  worsening.    Orders

## 2022-02-16 NOTE — NURSING NOTE
PT/INR POC Entered On:  7/16/2021 12:01 PM CDT    Performed On:  7/16/2021 12:00 PM CDT by LORI PLEITEZ               PT/INR POC   INR POC :   4.5    Reference Range - INR POC :   2.0-3.0   LORI PLEITEZ - 7/16/2021 12:00 PM CDT   Details   Collection Date :   7/16/2021 12:00 PM CDT   Expiration Date :   7/31/2022 CDT   Lot#/Manufacture :   [id number]   Handling Specimen POC :   Capillary    :   LORI Camarena - 7/16/2021 12:00 PM CDT

## 2022-02-16 NOTE — NURSING NOTE
Anticoagulation Therapy Entered On:  2/14/2020 8:55 AM CST    Performed On:  2/14/2020 8:54 AM CST by Mary Michelle RN               Warfarin Management   Week 1 Sunday Dose :   10    Week 1 Monday Dose :   12.5    Week 1 Tuesday Dose :   10    Week 1 Wednesday Dose :   10    Week 1 Thursday Dose :   12.5    Week 1 Friday Dose :   10    Week 1 Saturday Dose :   10    Week 1 Dose Total :   75    Week 2 Sunday Dose :   10    Week 2 Monday Dose :   12.5    Week 2 Tuesday Dose :   10    Week 2 Wednesday Dose :   10    Week 2 Thursday Dose :   12.5    Week 2 Friday Dose :   10    Week 2 Saturday Dose :   10    Week 2 Dose Total :   75    Planned Duration :   Indefinite   Indication :   DVT, Other: Factor V   Warfarin Management Comments :   mdINR result received on 2/13/20   International Normalization Ratio :   1.9    INR Range :   2.5 - 3.5   INR Therapeutic Range :   No   Anticoagulation Recheck :   1 week   Warfarin Special Instructions :   Per TFS increase warfarin to 12.5 mg M/Th and 10 mg ROW; recheck INR in 1 week; message sent to pt through portal   Mary Michelle RN - 2/14/2020 8:54 AM CST

## 2022-02-16 NOTE — TELEPHONE ENCOUNTER
---------------------  From: Astrid Estrada LPN (Phone Messages Pool ([id number]_[facility]))   To: INR Pool ( [id number]);     Sent: 10/30/2020 2:52:49 PM CDT  Subject: CONSUMER MESSAGE FW: Inr           ---------------------  From: WENDI BOWSER  To: [facility]  Sent: 10/30/2020 02:47 p.m. CDT  Subject: Inr  My inr was 4.8, I took an extra 5 mg last Friday and Monday otherwise 10 daily---------------------  From: Shazia Lawrence RN (INR Pool ( [id number]_[facility]))   To: WENDI BOWSER    Sent: 10/30/2020 2:59:31 PM CDT  Subject: RE: CONSUMER MESSAGE FW: Inr     Tez Oates -     Would you be able to come into clinic for a venous INR today to confirm your result?  Have you had any other medication or diet changes? Any concerns with bruising or bleeding?    Thanks,    Shazia HUBER RN

## 2022-02-16 NOTE — TELEPHONE ENCOUNTER
---------------------  From: Vitor REES, Samira   To: WENDI BOWSER    Sent: 2/11/2020 4:00:50 PM CST  Subject: CORDELL Oates,    I did receive your message I will forward it to Dr. Jenkins. He will be in on Thursday.    Thank you,    Samira MARIN RN

## 2022-02-16 NOTE — TELEPHONE ENCOUNTER
---------------------  From: Viviana REES, Mary HERNANDEZ   To: MAGUE WENDI P    Sent: 6/19/2020 2:17:16 PM CDT  Subject: Warfarin dosing     Benny Oates,    I got your INR result of 3.7 today from home testing.    It looks like you are continuing warfarin at 12.5 mg Mondays and Thursdays, 10 mg all other days of the week.    Please let me know if this is not correct.    Stay on your current dose of warfarin and check your INR in 1 week.    Thank you kindly,    Mary Michelle RN

## 2022-02-16 NOTE — TELEPHONE ENCOUNTER
---------------------  From: Donna Sapp LPN (Phone Messages Pool ([id number]_[facility]))   To: INR Pool ( [address]_[facility]);     Sent: 7/6/2020 10:37:14 AM CDT  Subject: FW: Inr           ---------------------  From: WENDI BOWSER  [facility]  Sent: 07/06/2020 10:32 a.m. CDT  Subject: Inr  I skipped Saturday and Sunday per Dr. adelso manning I tested this morning 3.4, same dosage 10 daily Monday and Thursday 12.5---------------------  From: Shazia Lawrence RN (INR Pool ( [id number]_[facility]))   To: WENDI BOWSRE    Sent: 7/6/2020 10:42:46 AM CDT  Subject: FW: Inr     Tez Brunner,    Thank you for updating us on your INR result. Please continue the 12.5mg Warfarin Monday and Thursday and 10mg the rest of the days.  As usual, recheck your INR next week. Please let us know if you have any questions or concerns.     Thanks Wendi, have a great rest of the day!    Shazia HUBER RN

## 2022-02-16 NOTE — PROGRESS NOTES
Patient:   WENDI BOWSER            MRN: [id number]            FIN: [id number]               Age:   69 years     Sex:  Male     :  1950   Associated Diagnoses:   Critical lower limb ischemia   Author:   David Jenkins MD      Visit Information      Date of Service: 2019 10:35 am  Performing Location: [facility]  Encounter#: [id number]      Primary Care Provider (PCP):  David Jenkins MD    NPI# [id number]      Referring Provider:  David Jenkins MD# [id number]      Chief Complaint   2019 10:38 AM CDT   [facility] admit.        History of Present Illness   Patient is here for hospital follow-up.  He is status post left total knee replacement at [facility].  Postoperatively he had a complication with the stent in his left leg occluding causing his left leg to become ischemic.  He was transferred to [facility] where he had an emergency thrombectomy which took 5 hours.  At one point they were looking at amputation.  They successfully revascularized the left leg he is now at the rehab center for therapy and wound care.  He is on Lovenox until his INR is 2-3 on his Coumadin.  Pain is controlled.         Review of Systems   Constitutional:  Negative except as documented in history of present illness.    Respiratory:  Negative.    Cardiovascular:  Negative.    Gastrointestinal:  Negative.    Genitourinary:  Negative.    Musculoskeletal:  Negative except as documented in history of present illness.    Integumentary:  Negative except as documented in history of present illness.    Neurologic:  Negative except as documented in history of present illness.       Health Status   Allergies:    Allergic Reactions (Selected)  No Known Medication Allergies   Medications:  (Selected)   Prescriptions  Prescribed  Lovenox 100 mg/mL injectable solution: ( 100 mg ), Subcutaneous, q12 hrs, Instructions: take 100mg bid start  last dose am of , # 5 EA, 0 Refill(s),  Type: Maintenance, Pharmacy: [facility] [id number], 100 mg Subcutaneous q12 hrs,Instr:take 100mg bid start marlys 10th l...  Viagra 100 mg oral tablet: See Instructions, Instructions: TAKE AS DIRECTED, # 8 tab(s), 11 Refill(s), Pharmacy: [facility] [id number], TAKE AS DIRECTED  atorvastatin 10 mg oral tablet: = 1 tab(s) ( 10 mg ), Oral, daily, # 90 tab(s), 3 Refill(s), Type: Maintenance, Pharmacy: [facility] [id number], 1 tab(s) Oral daily  fluticasone 50 mcg/inh nasal spray: = 2 spray(s), nasal, daily, # 3 EA, 3 Refill(s), Type: Maintenance, Pharmacy: [facility] [id number], 2 spray(s) Nasal daily,x90 day(s)  warfarin 5 mg oral tablet: = 2 tab(s) ( 10 mg ), Oral, daily, # 180 tab(s), 3 Refill(s), Type: Maintenance, Pharmacy: [facility] [id number], 2 tab(s) Oral daily  Documented Medications  Documented  Coumadin 5 mg oral tablet: = 1 tab(s) ( 5 mg ), Oral, daily, 0 Refill(s), Type: Maintenance  Dulcolax Laxative 10 mg rectal suppository: = 1 supp ( 10 mg ), LA, bid, PRN: for constipation, # 10 supp, 0 Refill(s), Type: Maintenance  Lovenox: See Instructions, Instructions: 90 mg q12 hours, 0 Refill(s), Type: Maintenance  NovoLOG FlexPen 100 units/mL injectable solution: See Instructions, Instructions: 2-10 Units, 0 Refill(s), Type: Maintenance  Fields Milk of Magnesia 8% oral suspension: 30 mL ( 2.4 gm ), Oral, hs, PRN: for constipation, # 300 mL, 0 Refill(s), Type: Maintenance  Senna Plus 50 mg-8.6 mg oral tablet: 2 tab(s), Oral, hs, 0 Refill(s), Type: Maintenance  TRUEplus 4 g oral tablet, chewable: = 4 tab(s) ( 16 gm ), Chewed, once, 0 Refill(s), Type: Maintenance  acetaminophen: See Instructions, Instructions: 325mg-650 mg PRN for pain/fever., 0 Refill(s), Type: Maintenance  aluminum hydroxide/magnesium hydroxide/simethicone 200 mg-200 mg-20 mg/5 mL oral suspension: 20 mL, Oral, qid, PRN: for indigestion, # 400 mL, 0 Refill(s), Type: Maintenance  fluticasone 50 mcg/inh nasal spray: ( 50  mcg ), Nasal, daily, Instructions: PRN for rhinitis., 0 Refill(s), Type: Maintenance  gabapentin 100 mg oral capsule: = 1 cap(s) ( 100 mg ), Oral, tid, 0 Refill(s), Type: Maintenance  glucose 15 g/42 mL oral gel: PRN: Hypoglycemia, 0 Refill(s), Type: Maintenance  hydrocortisone 1% topical cream: 1 marija, Topical, qid, PRN: for itching, 0 Refill(s), Type: Maintenance  ondansetron 8 mg oral tablet, disintegrating: = 1 tab(s) ( 8 mg ), Oral, q8 hrs, PRN: for nausea/vomiting, # 10 tab(s), 0 Refill(s), Type: Maintenance  oxyCODONE 5 mg oral tablet: See Instructions, Instructions: 5-10 mgs Q4H, PRN: as needed for pain, 0 Refill(s), Type: Maintenance,    Medications          *denotes recorded medication          *aluminum hydroxide/magnesium hydroxide/simethicone 200 mg-200 mg-20 mg/5 mL oral suspension: 20 mL, Oral, qid, PRN: for indigestion, 400 mL, 0 Refill(s).          *acetaminophen: See Instructions, 325mg-650 mg PRN for pain/fever., 0 Refill(s).          atorvastatin 10 mg oral tablet: 10 mg, 1 tab(s), Oral, daily, 90 tab(s), 3 Refill(s).          *Dulcolax Laxative 10 mg rectal suppository: 10 mg, 1 supp, MN, bid, PRN: for constipation, 10 supp, 0 Refill(s).          *Senna Plus 50 mg-8.6 mg oral tablet: 2 tab(s), Oral, hs, 0 Refill(s).          Lovenox 100 mg/mL injectable solution: 100 mg, Subcutaneous, q12 hrs, take 100mg bid start june 10th last dose am of june 12th, 5 EA, 0 Refill(s).          *Lovenox: See Instructions, 90 mg q12 hours, 0 Refill(s).          fluticasone 50 mcg/inh nasal spray: 2 spray(s), nasal, daily, for 90 day(s), 3 EA, 3 Refill(s).          *fluticasone 50 mcg/inh nasal spray: 50 mcg, Nasal, daily, PRN for rhinitis., 0 Refill(s).          *gabapentin 100 mg oral capsule: 100 mg, 1 cap(s), Oral, tid, 0 Refill(s).          *glucose 15 g/42 mL oral gel: PRN: Hypoglycemia, 0 Refill(s).          *TRUEplus 4 g oral tablet, chewable: 16 gm, 4 tab(s), Chewed, once, 0 Refill(s).           *hydrocortisone 1% topical cream: 1 marija, Topical, qid, PRN: for itching, 0 Refill(s).          *NovoLOG FlexPen 100 units/mL injectable solution: See Instructions, 2-10 Units, 0 Refill(s).          *Fields Milk of Magnesia 8% oral suspension: 2.4 gm, 30 mL, Oral, hs, PRN: for constipation, 300 mL, 0 Refill(s).          *ondansetron 8 mg oral tablet, disintegratin mg, 1 tab(s), Oral, q8 hrs, PRN: for nausea/vomiting, 10 tab(s), 0 Refill(s).          *oxyCODONE 5 mg oral tablet: See Instructions, 5-10 mgs Q4H, PRN: as needed for pain, 0 Refill(s).          Viagra 100 mg oral tablet: See Instructions, TAKE AS DIRECTED, 8 tab(s), 11 Refill(s).          warfarin 5 mg oral tablet: 10 mg, 2 tab(s), Oral, daily, 180 tab(s), 3 Refill(s).          *Coumadin 5 mg oral tablet: 5 mg, 1 tab(s), Oral, daily, 0 Refill(s).     Problem list:    All Problems (Selected)  Adenomatous colon polyp / SNOMED CT 5283444884 / Confirmed  Anticoagulated / SNOMED CT 24935609 / Confirmed  Factor V Leiden / SNOMED CT 6063618579 / Confirmed  History of DVT of lower extremity / SNOMED CT 2943259456 / Confirmed  History of pulmonary embolism / SNOMED CT 545406447 / Confirmed  Inpatient stay / SNOMED CT 434053439 / Confirmed  Lipids abnormal / SNOMED CT 822256116 / Confirmed  Obesity / SNOMED CT 9060114735 / Probable  PVD (peripheral vascular disease) / SNOMED CT 3219790757 / Confirmed  Prediabetes / SNOMED CT 2768756063 / Confirmed  Seasonal allergies / SNOMED CT 274336711 / Confirmed  Tobacco user / SNOMED CT 641268937 / Probable      Histories   Past Medical History:    Active  Inpatient stay (060360105): Onset on 2019 at 69 years.  Comments:  2019 CDT 9:47 AM CDT - Renetta Gerardo  @ [facility] due to LLE ischemic limb.  Prediabetes (8700869126): Onset on 2013 at 63 years.  Lipids abnormal (206858996): Onset on 10/26/2012 at 62 years.  Adenomatous colon polyp (4042914164): Onset on 2012 at 62 years.  History of  DVT of lower extremity (9470512917): Onset on 2012 at 62 years.  PVD (peripheral vascular disease) (6468534359)  Anticoagulated (43974739)  Obesity (0371879158)  Factor V Leiden (6029470590)  History of pulmonary embolism (460066685)  Seasonal allergies (886989374)  Resolved  *Hospitalized@[facility] - Left superficial femoral artery chronic occlusion: Onset on 2012 at 62 years.  Resolved.  Rupture of anterior cruciate ligament (881372775): Onset in  at 55 years.  Resolved.  Lyme disease (2225120512): Onset in the month of 1999 at 49 years  Resolved.  Tobacco user (305.1): Onset on 1968 at 17 years.  Resolved on 2012 at 61 years.   Family History:    Heart disease  Uncle (M)  Father (Blayne, )  Comments:  2010 9:16 AM CDT - Samira Larson MA     Procedure history:    Percutaneous mechanical thrombectomy of vein of lower limb using fluoroscopic guidance (SNOMED CT 0291060805) performed by Shaun Davila MD on 2019 at 69 Years.  Comments:  2019 9:57 AM CDT - Renetta Gerardo  left  Arthroplasty of the knee (SNOMED CT 475465458) on 2019 at 69 Years.  Comments:  2019 9:50 AM CDT - Renetta Gerardo  left  Flexible sigmoidoscopy (SNOMED CT 10521891) on 2017 at 67 Years.  Insertion of arterial stent (SNOMED CT 825663426) on 2012 at 62 Years.  Insertion of arterial stent (SNOMED CT 083546787) on 2012 at 62 Years.  Comments:  2012 11:54 PM CDT - Missy Rubi  Left superficial femoral artery chronic occlusion.  Colectomy on 2012 at 62 Years.  Comments:  2012 9:43 PM CDT - Missy Rubi  Hand-assisted laparoscopic total abdominal colectomy.    2012 3:17 PM CDT - Mir Toribio MD  Numerous adenomatous polyps and one large cecal polyp  Colonoscopy (SNOMED CT 375823735) performed by Mir Toribio MD on 2012 at 61 Years.  [facility] on 10/17/2005 at 55 Years.  [facility] 39920966) on  9/15/2005 at 55 Years.  Arthroscopy of knee (SNOMED CT 311879181) in 2005 at 55 Years.  Comments:  6/19/2019 9:50 AM CDT - Renetta Gerardo  Right  ACL R Knee in 2005 at 55 Years.  Flexible sigmoidoscopy (SNOMED CT 12542205) on 12/14/2001 at 51 Years.  Flexible sigmoidoscopy (SNOMED CT 22546108) on 12/14/2001 at 51 Years.  Vasectomy (SNOMED CT 99041324).   Social History:        Tobacco Assessment            Current every day smoker, Cigarettes, 20 per day.      Substance Abuse Assessment            Past, Marijuana      Employment and Education Assessment            Retired      Home and Environment Assessment            Marital status: .  Spouse/Partner name: Sherice Cancino.  Lives with Significant other.  Risks in               environment: sometimes wears bike helmet, owns secured gun.      Nutrition and Health Assessment            Type of diet: warfarin.      Exercise and Physical Activity Assessment            Exercise frequency: Never.      Sexual Assessment            Sexually active: No.  Sexual orientation: Heterosexual.      Physical Examination   Vital Signs   6/21/2019 10:38 AM CDT Temperature Tympanic 97.5 DegF  LOW    Peripheral Pulse Rate 105 bpm  HI    Systolic Blood Pressure 138 mmHg  HI      General:  Alert and oriented, No acute distress.    Respiratory:  Respirations are non-labored.    Cardiovascular:  Normal rate, Regular rhythm.    Musculoskeletal:  Left knee incision looks good no signs of infection he has a small skin tear that stress just below it.  He has 2+ edema in with ecchymoses noted from his thigh down to his toes.  Foot is warm nontender.  .    Neurologic:  Alert, Oriented.       Impression and Plan   Diagnosis     Critical lower limb ischemia (VJK26-TS I99.8).     Course:  Improving.    Plan:  Patient status post thrombectomy with opening of a SFA stent also status post total knee arthroplasty.  We reviewed his medications today.  Continue Lovenox as above.  Continue with  therapy at rehab and follow-up with his vascular surgeon and Ortho.  .    Patient Instructions:       Counseled: Patient, Regarding diagnosis, Regarding treatment, Regarding medications, Activity.

## 2022-02-16 NOTE — PROGRESS NOTES
Patient Information     Name:WENDI BOWSER      [facility] [address] [place] [place] [id number]     Sex:Male     YOB: 1950     [phone]     MRN:[id number]     FIN:[id number]     Location:[facility]     Date of Service:05/07/2020      Primary Care Physician:       Alice GAMBLE, David, [phone]      Attending Physician:       Bryson GAMBLE, Freda, [phone]  Subjective       patient present to clinic today for right shoulder pain.  He reports that he would like to have another shoulder injection.  Chart review indicates no evidence of x-rays however we do have notes from his previous visits with orthopedic surgery outpatient clinic appointments.  These indicate that he has had a total of 3 subacromial bursa injections.  He reports the first injection worked well to provide pain relief however he only recalls 1 other injection and states that this did not provide very much relief.  He also indicates that he has no desire to undergo any type of surgical intervention.  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  He indicates that he has some residual left leg pain but he is very grateful to his daughter who advocated to try to restore blood flow to the leg when it is been recommended that they proceed with amputation.      He has been practicing strict social isolation secondary to his pandemic.  He is wearing an N 95 respirator today.      He reports he last checked his INR last Friday and it was 2.9.  We discussed that because he is anticoagulated he would be an increased risk compared to people that were not anticoagulated of developing bleeding into his joint.      Review of systems is negative except as per HPI including:  no fevers, chills, sore throat, runny nose, nausea, vomiting, constipation, diarrhea, rash or new skin lesions, chest pain, palpitations,  slurred speech, new paresthesia, shortness of breath or wheeze.      Exam:      General: alert and oriented ×3 no acute distress.      HEENT: pupils are equal round and reactive to light extraocular motion is intact. Normocephalic and atraumatic.       Hearing is grossly normal and there is no otorrhea.       Nares are patent there is no rhinorrhea.       Mucous membranes are moist and pink.      Chest: has bilateral rise with no increased work of breathing.      Cardiovascular: normal perfusion and brisk capillary refill.      Musculoskeletal: no gross focal abnormalities and normal gait.      Neuro: no gross focal abnormalities and memory seems intact.      Psychiatric: speech is clear and coherent and fluent. Patient dressed appropriately for the weather. Mood is appropriate and affect is full.                     Discussed with patient to return to clinic if symptoms worsen or do not improve      procedure Note: right subacromial bursa injections      Informed consent obtained including risks of pain, bleeding, infection, injury to surrounding tissue and need for  further treatment, benefit hopefully reduce pain, no guarantee made, alternatives, doing nothing, trying oral medications such as NSAIDS, TCAs, gabapentin, using ice, trying PT, OT,  yoga, timmy chi, referral to another provider, or referral to Ortho.       Location: right subacromial bursa lateral approach      Number of injections:1 to each side, total 2             pain prior to procedure:   moderate  to severe             pain following procedure: minimal,      prepped with betadine then injected with 1 ml of 40 mg / 1 ml kenalog and 4 ml lidocaine 1% plain to the right  subacromial bursa using a lateral approach      tolerated well      complications none  Objective   Vitals & Measurements    T: 98.4   F (Tympanic)  HR: 90(Peripheral)  BP: 122/70  SpO2: 98%  WT: 197.0 lb    Lab Results        Lab Results (Last 4 results within 90 days)         INR:  2.4 (05/01/20 13:56:00)        INR: 2.9 (04/24/20 16:36:00)        INR: 3 (04/14/20 16:42:00)        INR: 1.9 (02/14/20 08:54:00)  Assessment/Plan       Anticoagulated (Z79.01)         Ordered:          76082 office outpatient visit 15 minutes (Charge), Quantity: 1, Modifier(s): 25, Shoulder pain, right  Anticoagulated  History of arterial thrombosis  Factor V Leiden                Arthritis of right acromioclavicular joint (M19.011)         Ordered:                Factor V Leiden (D68.51)         Ordered:          96440 office outpatient visit 15 minutes (Charge), Quantity: 1, Modifier(s): 25, Shoulder pain, right  Anticoagulated  History of arterial thrombosis  Factor V Leiden                History of arterial thrombosis (Z86.718)         Ordered:          20647 office outpatient visit 15 minutes (Charge), Quantity: 1, Modifier(s): 25, Shoulder pain, right  Anticoagulated  History of arterial thrombosis  Factor V Leiden                Shoulder pain, right (M25.511)         Ordered:          diclofenac topical, ( 2 gm ), Topical, qid, PRN: shoulder pain, # 100 gm, 11 Refill(s), Type: Maintenance, Pharmacy: [id number], 2 gm Topical qid,PRN:shoulder pain, (Ordered)          45121 office outpatient visit 15 minutes (Charge), Quantity: 1, Modifier(s): 25, Shoulder pain, right  Anticoagulated  History of arterial thrombosis  Factor V Leiden               He continues to be anticoagulated, and will follow-up with his PCP as needed.  Pt reports he does not want any more surgery due to previous complications with arterial thrombosis, if pt gets poor pain relief with injection and voltaren then may consider referral to interventional pain medicine for evaluation of possible suprascapular nerve ablation,

## 2022-02-16 NOTE — TELEPHONE ENCOUNTER
---------------------  From: Samira Adler RN   To: WENDI BOWSER    Sent: 1/8/2020 12:16:00 PM CST  Subject: INR     Dr. Alice Lama would like you to hold your warfarin for 2 days then take 10 mg warfarin daily. He would like you to recheck the INR in 2 days before you restart the warfarin at the new 10mg every day dose. Check the INR again in 1 week after you restart the warfarin. Have a good trip. Travel safe. Call if you have any questions.    Thanks,    Samira MARIN RN

## 2022-02-16 NOTE — NURSING NOTE
Anticoagulation Therapy Entered On:  5/7/2019 11:26 AM CDT    Performed On:  5/7/2019 11:24 AM CDT by Samira Alder RN               Warfarin Management   Week 1 Baldemar Dose :   10    Week 1 Monday Dose :   10    Week 1 Tuesday Dose :   10    Week 1 Wednesday Dose :   10    Week 1 Thursday Dose :   10    Week 1 Friday Dose :   10    Week 1 Saturday Dose :   10    Week 1 Dose Total :   70    Week 2 Sunday Dose :   10    Week 2 Monday Dose :   10    Week 2 Tuesday Dose :   10    Week 2 Wednesday Dose :   10    Week 2 Thursday Dose :   10    Week 2 Friday Dose :   10    Week 2 Saturday Dose :   10    Week 2 Dose Total :   70    Planned Duration :   Indefinite   Indication :   DVT, Other: FVLeiden   International Normalization Ratio :   2.7    INR Range :   2.5 - 3.5   INR Therapeutic Range :   Yes   Anticoagulation Recheck :   1 week   Warfarin Physician :   David Jenkins MD Special Instructions :   INR = 2.7 today per Patient report from portal. Patient is to stay on 10mg warfarin daily and recheck INR in 1 week per protocol. Patient advised via portal.   Samira Adler RN - 5/7/2019 11:24 AM CDT

## 2022-02-16 NOTE — NURSING NOTE
Anticoagulation Therapy Management Entered On:  8/3/2020 9:23 AM CDT    Performed On:  8/3/2020 9:22 AM CDT by Mary Michelle RN               Anticoagulation Visit Assessment   Anticoagulation Indication :   Deep vein thrombosis, Other: Factor V   Anticoagulant Start :   8/1/2012 CDT   Anticoagulant Duration :   Undetermined   Anticoagulation Medication Verified :   Yes   Patient Preferred Contact Method :   Cell   Patient on Warfarin :   Yes   Mary Michelle RN - 8/3/2020 9:22 AM CDT   Warfarin   Anticoagulant INR Goal Lower :   2.5    Anticoagulant INR Goal Upper :   3.5    INR Home Monitoring Result :   2.2    Sunday :   10 mg   Monday :   12.5 mg   Tuesday :   10 mg   Wednesday :   10 mg   Thursday :   12.5 mg   Friday :   10 mg   Saturday :   10 mg   Total Dose :   75 mg   Warfarin Pt Reported Previous Week Dose :    Sun Mon Tues Wed Thurs Fri Sat Weekly Total Dose   Week 1 10 mg 12.5 mg 10 mg 10 mg 12.5 mg 10 mg 10 mg 75 mg   Week 2  mg  mg  mg  mg  mg  mg  mg  mg   Week 3  mg  mg  mg  mg  mg  mg  mg  mg   Week 4  mg  mg  mg  mg  mg  mg  mg  mg         Patient is taking single or multiple strength tablet(s) :   Multiple strength tab(s)   Multiple Tab Strengths :   5 mg tab, 7.5 mg tab   Sunday :   10 mg   Monday :   12.5 mg   Tuesday :   10 mg   Wednesday :   10 mg   Thursday :   12.5 mg   Friday :   10 mg   Saturday :   10 mg   Week 1 Total Dose :   75 mg   Patient Instructions :   mdINR result received from 8/1/2020 = 2.2; per protocol continue warfarin 12.5 mg M/Th and 10 mg ROW; recheck INR on 8/7/2020; message sent by portal on 8/3/2020     Mary Michelle RN - 8/3/2020 9:22 AM CDT

## 2022-02-16 NOTE — NURSING NOTE
Anticoagulation Therapy Entered On:  11/25/2019 2:45 PM CST    Performed On:  11/25/2019 2:42 PM CST by Samira Adler RN               Warfarin Management   Week 1 Sunday Dose :   10    Week 1 Monday Dose :   10    Week 1 Tuesday Dose :   10    Week 1 Wednesday Dose :   10    Week 1 Thursday Dose :   10    Week 1 Friday Dose :   10    Week 1 Saturday Dose :   10    Week 1 Dose Total :   70    Week 2 Sunday Dose :   10    Week 2 Monday Dose :   10    Week 2 Tuesday Dose :   10    Week 2 Wednesday Dose :   10    Week 2 Thursday Dose :   10    Week 2 Friday Dose :   10    Week 2 Saturday Dose :   10    Week 2 Dose Total :   70    Indication :   DVT, Other: FVLeiden   INR Range :   2.5 - 3.5   INR Therapeutic Range :   No   Warfarin Abnormal Findings :   traveling. Possibly missed dosages and different diet per patient.   Samira Adler RN - 11/25/2019 2:42 PM CST   Warfarin Management and Results Grid   Changes in Diet or Alcohol Intake :   Yes   Samira Adler RN - 11/25/2019 2:42 PM CST   Anticoagulation Recheck :   1 week   Warfarin Physician :   David Jenkins MD Special Instructions :   INR = 1.7 per MDINR on 11/24/19. Patient is to stay on 10mg warfarin daily and recheck INR in 1 week per JDL Patient advised via portal and discuss in call.   Samira Adler RN - 11/25/2019 2:42 PM CST

## 2022-02-16 NOTE — NURSING NOTE
Anticoagulation Therapy Management Entered On:  11/6/2020 1:54 PM CST    Performed On:  11/6/2020 1:50 PM CST by Samira Adler RN               Anticoagulation Visit Assessment   Anticoagulation Indication :   Deep vein thrombosis, Other: Factor V   Anticoagulant Start :   8/1/2012 CDT   Anticoagulant Duration :   Undetermined   Anticoagulation Medication Verified :   Yes   Patient Preferred Contact Method :   Cell   Samira Adler RN - 11/6/2020 1:50 PM CST   Anticoagulation Patient Assessment Grid   Change in Alcohol Consumption :   No   Change in Diet :   No   Change in Medications :   No   (Comment: tylenol BID [Samira Adler RN - 11/6/2020 1:50 PM CST] )   Diarrhea :   No   Rectal Bleeding :   No   Signs of Clotting :   No   Signs of Warfarin Intolerance :   No   Unusual Bleeding, Bruising :   No   Upcoming Procedures :   No   Vomiting :   No   Samira Adler RN - 11/6/2020 1:50 PM CST   Patient on Warfarin :   Yes   Patient on Other Anticoagulant :   No   Samira Adler RN - 11/6/2020 1:50 PM CST   Warfarin   Anticoagulant INR Goal Lower :   2.5    Anticoagulant INR Goal Upper :   3.5    Information Given by :   Patient, Other: portal   Sunday :   10 mg   Monday :   12.5 mg   Tuesday :   10 mg   Wednesday :   10 mg   Thursday :   10 mg   Friday :   10 mg   Saturday :   10 mg   Total Dose :   72.5 mg   Warfarin Pt Reported Previous Week Dose :    Sun Mon Tues Wed Thurs Fri Sat Weekly Total Dose   Week 1 10 mg 12.5 mg 10 mg 10 mg 10 mg 10 mg 10 mg 72.5 mg   Week 2  mg  mg  mg  mg  mg  mg  mg  mg   Week 3  mg  mg  mg  mg  mg  mg  mg  mg   Week 4  mg  mg  mg  mg  mg  mg  mg  mg         Patient is taking single or multiple strength tablet(s) :   Multiple strength tab(s)   Multiple Tab Strengths :   5 mg tab, 7.5 mg tab   Sunday :   10 mg   Monday :   12.5 mg   Tuesday :   10 mg   Wednesday :   10 mg   Thursday :   10 mg   Friday :   0 mg   Saturday :   10 mg   Week 1 Total Dose :   62.5 mg    Patient Instructions :   INR = 4.3 per MDINR today. Patient is to hold warfarin today then resume 12.5mg on Mon and 10 mg the rest of the days of the week. Recheck INR in 1 week per protocol. Directions via portal.     Samira Adler RN - 11/6/2020 1:50 PM CST

## 2022-02-16 NOTE — TELEPHONE ENCOUNTER
---------------------  From: Astrid Estrada LPN (Phone Messages Pool ([id number]_[facility]))   To: INR Pool ( [address]_[facility]);     Sent: 11/13/2020 4:43:53 PM CST  Subject: CONSUM ER MESSSAGE FW: Inr 2.6           ---------------------  From: WENDI BOWSER  To: [facility]  Sent: 11/13/2020 04:41 p.m. CST  Subject: Inr 2.6  I took 10 daily including Monday and it came out at 2.6Jeffery,   Glad to see that it is back within range. You may continue 12mg M and 10mg all the rest of the days and recheck it next week.   Frieda Baker RN---------------------  From: Frieda Baker RN (INR Pool ( [address]_[facility]))   To: WENDI BOWSER    Sent: 11/13/2020 5:20:28 PM CST  Subject: FW: CONSUM ER MESSSAGE FW: Inr 2.6     Yasmani,   Glad to see that it is back within range. You may continue 12.5mg M and 10mg all the rest of the days and recheck it next week. Please disgregard the preveous message.  Frieda Baker RN

## 2022-02-16 NOTE — TELEPHONE ENCOUNTER
---------------------  From: Samira Adler RN (Phone Messages Pool ([address]_[facility]))   To: INR Pool ( [address]_[facility]);     Sent: 7/30/2021 1:33:48 PM CDT  Subject: FW: INR 3.1           ---------------------  From: WENDI BOWSER  To: [facility]  Sent: 07/30/2021 01:19 p.m. CDT  Subject: INR 3.1  10 daily  3.1inr

## 2022-02-16 NOTE — TELEPHONE ENCOUNTER
---------------------  From: Nisha Miller CMA (Phone Messages Pool (32224_[facility]))   To: INR Pool ( [address]_[facility]);     Sent: 1/16/2019 3:39:05 PM CST  Subject: CONSUMER MESSAGE: INR 3.2           ---------------------  From: WENDI BOWSER  To: [facility]  Sent: 01/16/2019 03:34 p.m. CST  Subject: INR 3.2  same dosage , 10 daily  INR 3.2---------------------  From: Viviana REES, Mary HERNANDEZ (INR Pool ( [address]_[facility]))   To: WENDI BOWSER    Sent: 1/16/2019 4:37:45 PM CST  Subject:  CONSUMER MESSAGE: INR 3.2     Benny Oates,    Thank you for the update on the INR result today. I'm glad you got your test strips!    Continue your warfarin at 10 mg daily and recheck your INR in one week.    Let us know if you need anything else,    Mary Michelle RN

## 2022-02-16 NOTE — NURSING NOTE
Anticoagulation Therapy Management Entered On:  5/7/2021 2:40 PM CDT    Performed On:  5/7/2021 2:37 PM CDT by Samira Adler RN               Anticoagulation Visit Assessment   Anticoagulation Indication :   Deep vein thrombosis, Other: Factor V   Anticoagulant Start :   8/1/2012 CDT   Anticoagulant Duration :   Undetermined   Anticoagulation Medication Verified :   Yes   Patient Preferred Contact Method :   Cell   Patient on Warfarin :   Yes   Samira Adler RN - 5/7/2021 2:37 PM CDT   Warfarin   Anticoagulant INR Goal Lower :   2.5    Anticoagulant INR Goal Upper :   3.5    Information Given by :   Patient   Sunday :   10 mg   Monday :   12.5 mg   Tuesday :   10 mg   Wednesday :   12.5 mg   Thursday :   10 mg   Friday :   12.5 mg   Saturday :   10 mg   Total Dose :   77.5 mg   Warfarin Pt Reported Previous Week Dose :    Sun Mon Tues Wed Thurs Fri Sat Weekly Total Dose   Week 1 10 mg 12.5 mg 10 mg 12.5 mg 10 mg 12.5 mg 10 mg 77.5 mg   Week 2 10 mg 12.5 mg 10 mg 10 mg 10 mg 10 mg 10 mg 72.5 mg   Week 3  mg  mg  mg  mg  mg  mg  mg  mg   Week 4  mg  mg  mg  mg  mg  mg  mg  mg         Patient is taking single or multiple strength tablet(s) :   Multiple strength tab(s)   Multiple Tab Strengths :   5 mg tab, 7.5 mg tab   Sunday :   10 mg   Monday :   12.5 mg   Tuesday :   10 mg   Wednesday :   12.5 mg   Thursday :   10 mg   Friday :   12.5 mg   Saturday :   10 mg   Week 1 Total Dose :   77.5 mg   Warfarin Dosing Acknowledgement :   I have reviewed the patient's warfarin dosing schedule and confirmed its accuracy for today's visit.   Patient Instructions :   INR = 3.5 today per MDINR.  Patient is to stay on 12.5mg warfarin on Mon, Wed, and Fri and 10 mg the rest of the days of the week Recheck INR in 1 week. Advised via portal.     Samira Adler RN - 5/7/2021 2:37 PM CDT

## 2022-02-16 NOTE — TELEPHONE ENCOUNTER
---------------------  From: Mary Michelle RN (INR Pool ( [address]_[facility]))   To: WENDI BOWSER    Sent: 8/30/2021 10:29:49 AM CDT  Subject: RE: Inr 3.2     That's ok.  It shouldn't make too much of a difference as long as you have continued your Warfarin every day since then.    Mary Michelle RN      ---------------------  From: WENDI BOWSER  To: INR Pool ( [facility]24[facility])  Sent: 08/30/2021 10:02 a.m. CDT  Subject: RE: Inr 3.2  I skipped the dose on Friday night       Addendum by Mary Michelle RN on August 30, 2021 8:43:33 AM CDT  ---------------------  From: Mary Michelle RN (INR Pool ( [address]))  To: WENDI BOWSER  Sent: 8/30/2021 8:43:33 AM CDT  Subject: RE: Inr 3.2       Benny Oates,       I got your INR from 8/27/21.       Keep on your Warfarin 10 mg daily and check your INR in a week.       Have a great day,       Mary Michelle RN  ---------------------  From: aSmira Adler RN (Phone Messages Pool ([address] [place] [address]))  To: INR Pool ( [address] [place] [address]);  Sent: 8/30/2021 8:21:42 AM CDT  Subject: FW: Inr 3.2                      ---------------------  From: WENDI BOWSER  To: [facility]  Sent: 08/27/2021 10:12 p.m. CDT  Subject: Inr 3.2  Skip the last Friday 10  g daily iINR 3.2

## 2022-02-16 NOTE — TELEPHONE ENCOUNTER
---------------------  From: Astrid Estrada LPN (Phone Messages Pool (808[address]_[facility]))   To: INR Pool ( 322[address]_[facility]);     Sent: 5/14/2021 2:55:25 PM CDT  Subject: CONSUMER MESSAGE FW: INR2.6           ---------------------  From: WENDI BOWSER  To: [facility]  Sent: 05/14/2021 02:50 p.m. CDT  Subject: INR2.6  M w & f 12.5 , 10 other days  2.6 INR---------------------  From: Mary Michelle RN (INR Pool ( [address]_[facility]))   To: WENDI BOWSER    Sent: 5/14/2021 2:59:03 PM CDT  Subject: RE: CONSUMER MESSAGE FW: INR2.6     Tez Oates    Thanks for the update on your INR and warfarin dosing.    Keep up the good work and continue the same dose. Please check your INR in 1 week.    Have a great weekend,    Mary Michelle RN

## 2022-02-16 NOTE — TELEPHONE ENCOUNTER
---------------------  From: Liss Carrero CMA (Phone Messages Pool ([address]224_[facility]))   To: INR Pool ( [address]_[facility]);     Sent: 5/7/2019 10:56:54 AM CDT  Subject: FW: INR 2.7           ---------------------  From: WENDI BOWSER  To: [facility]  Sent: 05/07/2019 10:42 a.m. CDT  Subject: INR 2.7  Same dosage, 10 daily INR 2.7done

## 2022-02-16 NOTE — TELEPHONE ENCOUNTER
---------------------  From: David Jenkins MD   To: WENDI BOWSER    Sent: 6/12/2020 1:50:49 PM CDT  Subject: Patient Message - Results Notification        All labs acceptable.  Please let us know you received this message by either selecting Forward or Reply at the top.  Thank you    Results:  Date Result Name Value Ref Range   6/12/2020 1:27 PM Hgb 15.1 g/dL (13.5 - 17.5)   6/12/2020 1:27 PM Platelet 199 (140 - 440)

## 2022-02-16 NOTE — TELEPHONE ENCOUNTER
---------------------  From: Samira Adler RN   To: INR Pool ( [address]_[facility]);     Sent: 12/16/2019 2:57:48 PM CST  Subject: warfarin dosage     Call to patient to ask if he needs a new rx for warfarin to accommodate his new directions of 12.5mg Mon and Thurs and 10mg the rest of the days of the week. I asked what size pills he has on hand. He tells me 7.5mg. He tells me he cannot talk now. He will call if he needs new pill size. I asked him to call when he can talk.

## 2022-02-16 NOTE — NURSING NOTE
Anticoagulation Therapy Entered On:  11/11/2019 9:25 AM CST    Performed On:  11/11/2019 9:24 AM CST by Mary Michelle RN               Warfarin Management   Week 1 Sunday Dose :   10    Week 1 Monday Dose :   10    Week 1 Tuesday Dose :   10    Week 1 Wednesday Dose :   10    Week 1 Thursday Dose :   10    Week 1 Friday Dose :   10    Week 1 Saturday Dose :   10    Week 1 Dose Total :   70    Week 2 Sunday Dose :   10    Week 2 Monday Dose :   10    Week 2 Tuesday Dose :   10    Week 2 Wednesday Dose :   10    Week 2 Thursday Dose :   10    Week 2 Friday Dose :   10    Week 2 Saturday Dose :   10    Week 2 Dose Total :   70    Planned Duration :   Indefinite   Indication :   DVT, Other: FVLeiden   Warfarin Management Comments :   mdINR result 11/7/19   International Normalization Ratio :   3.4    INR Range :   2.5 - 3.5   INR Therapeutic Range :   Yes   Anticoagulation Recheck :   1 week   Warfarin Special Instructions :   Per protocol continue warfarin 10 mg daily; recheck INR in 1 week; LVM for pt on 11/7/19   Mary Michelle RN - 11/11/2019 9:24 AM CST

## 2022-02-16 NOTE — TELEPHONE ENCOUNTER
---------------------  From: Samira Adler RN (INR Pool ( [address]_[facility]))   To: WENDI BOWSER    Sent: 8/21/2020 3:57:15 PM CDT  Subject: RE: INR     Thanks so much! Have a great week end!      ---------------------  From: WENDI BOWSER  To: INR Pool ( [address]_[facility])  Sent: 08/21/2020 01:42 p.m. CDT  Subject: RE: INR  Yes you had the dosage correct and I will discontinue the extra 2 1/2 on Thursday, I m doing fine thank you I hope you are too  ---------------------  From: Samira Adler RN (INR Pool ( [address]_[facility]))  To: INR Pool ( 322[address]_[facility]); WENDI BOWSER  Sent: 8/21/2020 1:32:17 PM CDT  Subject: INR       Benny Oates,       I received your INR today of 3.7 from MDINR. If you are still taking 12.5mg warfarin on Monday and Thursday and 10mg the rest of the days of the week I would suggest taking 12.5mg on Mondays only and 10 mg all other days. This would be a small reduction of 3.3 % of weekly dose. Your INR has been running a bit high in August. Please let us know how you are doing and confirm the dose.       Thank you,       Samira SALAS RN.

## 2022-02-16 NOTE — TELEPHONE ENCOUNTER
---------------------  From: Samira Adler RN   Sent: 8/17/2020 9:59:02 AM CDT  Subject: INR      Benny Oates,    I received your INR from 8/14/20. It is back to therapeutic. Please continue your current dose of 12.5mg warfarin on Monday and Thursday and 10 mg the rest of the days of the week Recheck INR on Friday. Call us if you have any concerns. Have a nice week!    Thank you,    Samira MARIN RN

## 2022-02-16 NOTE — TELEPHONE ENCOUNTER
---------------------  From: Viviana REES, Mary HERNANDEZ   To: WENDI BOWSER    Sent: 6/29/2020 9:54:13 AM CDT  Subject: Warfarin dosing     Good morning Иван,    I got your INR result from 6/27/20. 1.8 is quite low.     Have you been taking your usual warfarin dose of 12.5 mg on Mondays and Thursdays and 10 mg all other days of the week? Have there been any diet changes, missed doses, medication changes?    Please let me know either way so we can get your warfarin dose straightened out.    Talk to you soon,    Mary Michelle RN

## 2022-02-16 NOTE — TELEPHONE ENCOUNTER
---------------------  From: Viviana REES, Mary HERNANDEZ   To: [facility]    Sent: 8/3/2020 9:21:27 AM CDT  Subject: Warfarin dosing     Good morning KIMMY Oates got your INR result of 2.2 from 8/1/2020.    Please continue your usual dose of warfarin at 12.5 mg Mon/Thurs and 10 mg rest of the days.    Can you please check your INR before 4 pm on Friday, August 7th? That way we can address it before the weekend in case it isn't within your therapeutic range.    Please let us know if you have any questions or concerns.    Have a great day,    Mary Michelle RN

## 2022-02-16 NOTE — NURSING NOTE
Anticoagulation Therapy Entered On:  2/10/2020 3:29 PM CST    Performed On:  2/10/2020 3:23 PM CST by Samira Adler RN               Warfarin Management   Week 1 Monday Dose :   7.5    Week 1 Tuesday Dose :   7.5    Week 1 Wednesday Dose :   7.5    Week 1 Thursday Dose :   7.5    Week 1 Friday Dose :   7.5    Planned Duration :   Indefinite   International Normalization Ratio :   1.8    INR Range :   2.5 - 3.5   INR Therapeutic Range :   No   Warfarin Abnormal Findings :   Traveling in [facility] Patient has concerns about the humidity there and his strips.   Anticoagulation Recheck :   3 days   Warfarin Physician :   David Jenkins MD Special Instructions :   INR = 1.8 per patient portal. on 2/10/20. Patient is to take 7.5mg warfarin daily and recheck INR in 3 days per JDL. Patient advised via call and portal.   Samira Adler RN - 2/10/2020 3:23 PM CST

## 2022-02-16 NOTE — TELEPHONE ENCOUNTER
---------------------  From: Viviana REES, Mary HERNANDEZ (INR Pool ( [address]_[facility]))   To: WENDI BOWSER    Sent: 6/4/2019 1:29:38 PM CDT  Subject: INR follow up     Benny Oates,    I got a fax from [facility] that your INR today was 2.2.    Our records show you are taking 10 mg warfarin daily.    Please continue this dose and check your INR in one week.    Let me know if you have any questions or concerns.    Have a wonderful day,    Mary Michelle, RN

## 2022-02-16 NOTE — TELEPHONE ENCOUNTER
---------------------  From: Flori Smalls CMA (Phone Messages Pool ([address]_[facility]))   To: INR Pool ( [address]_[facility]);     Sent: 8/30/2020 9:19:59 AM CDT  Subject: FW: Inr 5.5           ---------------------  From: WENDI BOWSER  To: [facility]  Sent: 08/29/2020 06:11 p.m. CDT  Subject: Inr 5.5  I reduced Thursday from 12 1/2 to 10, 10 every day except Monday 12 1/2 on Monday. I tested twice today one was 5.5 and one was 5.8. I will skip my DOSE  Saturday night and Sunday night and test again Monday morning and report to youSavisionge sent to pt via portal requesting update.

## 2022-02-16 NOTE — TELEPHONE ENCOUNTER
---------------------  From: Vitor REES, Samira   To: WENDI BOWSER    Sent: 6/18/2021 4:26:33 PM CDT  Subject: INR     Benny Oates,    INR 1.8 today is going up nice. Please stay on 12.5mg Warfarin on Mon, Wed, and Fri and 10mg the rest of the days of the week. Recheck INR in 1 week. Call if you have any questions or concerns. Have a great weekend!    Thank you,    Samira MARIN RN

## 2022-02-16 NOTE — TELEPHONE ENCOUNTER
---------------------  From: Donna Sapp LPN (Phone Messages Pool ([address]_[facility]))   To: INR Pool ( [address]_[facility]);     Sent: 4/5/2019 12:26:25 PM CDT  Subject: FW: Inr 5.9, and 6.2           ---------------------  From: WENDI BOWSER  To: [facility]  Sent: 04/05/2019 10:53 a.m. CDT  Subject: Inr 5.9, and 6.2  I am on the same dosage 10 daily, nothing has changed, I tested twice today first one was 5.9, I thought that could be wrong so I tested again and it was 6.2---------------------  From: Mary Michelle RN (INR Pool ( [address]_[facility]))   To: David Jenkins MD;     Sent: 4/5/2019 1:49:24 PM CDT  Subject: FW: Inr 5.9, and 6.2     I called patient about elevated INR. He is out of town fishing, denies any changes in food/alcohol intake, denies any s/s of bleeding/bruising/black stools/headaches/dizziness/etc. Per TFS directions I told him to hold warfarin x 2 days and recheck INR on Sunday. He already took his warfarin for the day so said he will hold dose on Saturday and Sunday and check INR at home on Monday. He will seek emergency help with any bleeding concerns.

## 2022-02-16 NOTE — NURSING NOTE
Anticoagulation Therapy Entered On:  1/30/2019 10:59 AM CST    Performed On:  1/30/2019 10:58 AM CST by Mary Michelle RN               Warfarin Management   Week 1 Baldemar Dose :   10    Week 1 Monday Dose :   10    Week 1 Tuesday Dose :   10    Week 1 Wednesday Dose :   10    Week 1 Thursday Dose :   10    Week 1 Friday Dose :   10    Week 1 Saturday Dose :   10    Week 1 Dose Total :   70    Week 2 Sunday Dose :   10    Week 2 Monday Dose :   10    Week 2 Tuesday Dose :   10    Week 2 Wednesday Dose :   10    Week 2 Thursday Dose :   10    Week 2 Friday Dose :   10    Week 2 Saturday Dose :   10    Week 2 Dose Total :   70    Planned Duration :   Indefinite   Indication :   DVT, Other: FVLeiden   Warfarin Management Comments :   Patient portal   International Normalization Ratio :   2.3    INR Range :   2.5 - 3.5   INR Therapeutic Range :   No   Mary Michelle RN - 1/30/2019 10:58 AM CST   Warfarin Management and Results Grid   Signs of Thrombolic :   No   Signs of Warfarin Intolerance :   No   Changes in Diet or Alcohol Intake :   No   Changes in Medication or Antibiotics :   No   Unusual Bleeding or Bruising :   No   Rectal Bleeding or Black Stools :   No   Vitamin K Food Handout :   No   Heart Valve Replacement :   No   Mary Michelle RN - 1/30/2019 10:58 AM CST   Anticoagulation Recheck :   1 week   Warfarin Special Instructions :   Per protocol continue warfarin 10 mg daily and recheck in 1 week; notified by patient portal.   Mary Michelle RN - 1/30/2019 10:58 AM CST

## 2022-02-16 NOTE — NURSING NOTE
Anticoagulation Therapy Management Entered On:  4/23/2021 5:52 PM CDT    Performed On:  4/23/2021 5:48 PM CDT by Samira Adler RN               Anticoagulation Visit Assessment   Anticoagulation Indication :   Deep vein thrombosis, Other: Factor V   Anticoagulant Start :   8/1/2012 CDT   Anticoagulant Duration :   Undetermined   Anticoagulation Medication Verified :   Yes   Patient Preferred Contact Method :   Cell   Patient on Warfarin :   Yes   Samira Adler RN - 4/23/2021 5:48 PM CDT   Warfarin   Anticoagulant INR Goal Lower :   2.5    Anticoagulant INR Goal Upper :   3.5    Information Given by :   Patient   Sunday :   10 mg   Monday :   12.5 mg   Tuesday :   10 mg   Wednesday :   10 mg   Thursday :   10 mg   Friday :   12.5 mg   Saturday :   10 mg   Total Dose :   75 mg   Warfarin Pt Reported Previous Week Dose :    Sun Mon Tues Wed Thurs Fri Sat Weekly Total Dose   Week 1 10 mg 12.5 mg 10 mg 10 mg 10 mg 12.5 mg 10 mg 75 mg   Week 2 10 mg 12.5 mg 10 mg 10 mg 10 mg 10 mg 10 mg 72.5 mg   Week 3  mg  mg  mg  mg  mg  mg  mg  mg   Week 4  mg  mg  mg  mg  mg  mg  mg  mg         Patient is taking single or multiple strength tablet(s) :   Multiple strength tab(s)   Multiple Tab Strengths :   5 mg tab, 7.5 mg tab   Sunday :   10 mg   Monday :   12.5 mg   Tuesday :   10 mg   Wednesday :   12.5 mg   Thursday :   10 mg   Friday :   12.5 mg   Saturday :   10 mg   Week 1 Total Dose :   77.5 mg   Sunday :   10 mg   Monday :   12.5 mg   Tuesday :   10 mg   Wednesday :   10 mg   Thursday :   10 mg   Friday :   10 mg   Saturday :   10 mg   Week 2 Total Dose :   72.5 mg   Warfarin Dosing Acknowledgement :   I have reviewed the patient's warfarin dosing schedule and confirmed its accuracy for today's visit.   Patient Instructions :   INR = 2.1 per MDINR today Patient is to increase warfarin to 12.5mg Mon, Wed, Fri and 10 mg the rest of the days of the week Recheck INR in 1 week per protocol. Patient advised via message to  portal.     Vitor REES, Samira - 4/23/2021 5:48 PM CDT

## 2022-02-16 NOTE — NURSING NOTE
Anticoagulation Therapy Entered On:  7/15/2019 8:55 AM CDT    Performed On:  7/15/2019 8:53 AM CDT by Mary Michelle RN               Warfarin Management   Week 1 Baldemar Dose :   15    Week 1 Monday Dose :   15    Week 1 Tuesday Dose :   15    Week 1 Wednesday Dose :   15    Week 1 Thursday Dose :   15    Week 1 Friday Dose :   15    Week 1 Saturday Dose :   15    Week 1 Dose Total :   105    Planned Duration :   Indefinite   Indication :   DVT, Other: FVLeiden   Warfarin Management Comments :   Home INR testing result from 7/13/19   International Normalization Ratio :   3.9    INR Range :   2.5 - 3.5   INR Therapeutic Range :   No   Warfarin Abnormal Findings :   Patient left portal message that he would decrease warfarin dose on own and recheck INR 7/15/19   Anticoagulation Recheck :   2 days   Warfarin Special Instructions :   Per GTG patient to continue warfairn 15 mg daily and recheck INR today; message sent to patient through portal.   Mary Michelle RN - 7/15/2019 8:53 AM CDT

## 2022-02-16 NOTE — TELEPHONE ENCOUNTER
---------------------  From: Vitor REES, Samira   To: WENDI BOWSER    Sent: 4/14/2020 4:53:56 PM CDT  Subject: INR     Иван,  I received your INR today of 3.0, very nice! Please stay on 12.5mg warfarin on Monday  and Thursday and 10mg the rest of the days of the week. Recheck INR in 1 week. Let us know if anything has changed with you or your medications or dose, or health.    Thank you,  Samira MARIN RN

## 2022-02-16 NOTE — TELEPHONE ENCOUNTER
---------------------  From: Astrid Estrada LPN (Phone Messages Pool ([address]_[facility]))   To: INR Pool ( [address]_[facility]);     Sent: 3/5/2021 2:23:25 PM CST  Subject: FW: INR4.0           ---------------------  From: WENDI BOWSER  To: [facility]  Sent: 03/05/2021 02:15 p.m. CST  Subject: INR4.0  No changes, Inr 4.0Addressed

## 2022-02-16 NOTE — TELEPHONE ENCOUNTER
---------------------  From: Vitor REES, Samira   To: [facility]WEND[facility]    Sent: 3/18/2020 4:04:50 PM CDT  Subject: INR     Benny Oates,    I received your INR of 2.3 done on 3/16/20 today. Please stay on the same dose of warfarin and recheck the INR in 1 week. I have your warfarin dose as 12.5mg Monday and Thursday an10 mg the rest of the days of the week. Let us know if you have any changes in health or medications.     Thank you,     Samira MARIN RN

## 2022-02-16 NOTE — NURSING NOTE
Anticoagulation Therapy Management Entered On:  12/10/2021 1:04 PM CST    Performed On:  12/10/2021 1:03 PM CST by Samira Adler RN               Anticoagulation Visit Assessment   Anticoagulation Indication :   Deep vein thrombosis, Other: Factor V   Anticoagulant Start :   8/1/2012 CDT   Anticoagulant Duration :   Undetermined   Anticoagulation Medication Verified :   Yes   Patient Preferred Contact Method :   Cell   Patient on Warfarin :   Yes   Samira Adler RN - 12/10/2021 1:03 PM CST   Warfarin   Anticoagulant INR Goal Lower :   2.5    Anticoagulant INR Goal Upper :   3.5    Sunday :   10 mg   Monday :   10 mg   Tuesday :   10 mg   Wednesday :   10 mg   Thursday :   10 mg   Friday :   10 mg   Saturday :   10 mg   Total Dose :   70 mg   Warfarin Pt Reported Previous Week Dose :    Sun Mon Tues Wed Thurs Fri Sat Weekly Total Dose   Week 1 10 mg 5 mg 10 mg 10 mg 5 mg 10 mg 10 mg 60 mg   Week 2 10 mg 10 mg 10 mg 12.5 mg 10 mg 12.5 mg 10 mg 75 mg   Week 3  mg  mg  mg  mg  mg  mg  mg  mg   Week 4  mg  mg  mg  mg  mg  mg  mg  mg         Patient is taking single or multiple strength tablet(s) :   Multiple strength tab(s)   Multiple Tab Strengths :   5 mg tab, 7.5 mg tab   Sunday :   10 mg   Monday :   10 mg   Tuesday :   10 mg   Wednesday :   10 mg   Thursday :   10 mg   Friday :   10 mg   Saturday :   10 mg   Week 1 Total Dose :   70 mg   Warfarin Dosing Acknowledgement :   I have reviewed the patient's warfarin dosing schedule and confirmed its accuracy for today's visit.   Patient Instructions :   INR = 3.4 per MDINR today. Patient is to stay on 10 mg warfarin daily and recheck INR in 1 week. Patient advised via portal.     Samira Adler RN - 12/10/2021 1:03 PM CST

## 2022-02-16 NOTE — NURSING NOTE
Anticoagulation Therapy Management Entered On:  10/12/2020 2:54 PM CDT    Performed On:  10/12/2020 2:52 PM CDT by Shazia Lawrence RN               Anticoagulation Visit Assessment   Type of Visit - Anticoagulation :   Telephone   Anticoagulation Indication :   Deep vein thrombosis, Other: Factor V   Anticoagulant Start :   8/1/2012 CDT   Anticoagulant Duration :   Undetermined   Anticoagulation Medication Verified :   Yes   Patient Preferred Contact Method :   Cell   Shazia Lawrence RN - 10/12/2020 2:52 PM CDT   Anticoagulation Patient Assessment Grid   Change in Medications :   Yes   (Comment: Off Warfarin for surgery - bridging with Lovenox [Shazia Lawrence RN - 10/12/2020 2:52 PM CDT] )   Shazia Lawrence RN - 10/12/2020 2:52 PM CDT   Patient on Warfarin :   Yes   Shazia Lawrence RN - 10/12/2020 2:52 PM CDT   Warfarin   International Normalization Ratio TR :   1.5    Anticoagulant INR Goal Lower :   2.5    Anticoagulant INR Goal Upper :   3.5    Sunday :   10 mg   Monday :   12.5 mg   Tuesday :   10 mg   Wednesday :   10 mg   Thursday :   10 mg   Friday :   10 mg   Saturday :   10 mg   Total Dose :   72.5 mg   Warfarin Pt Reported Previous Week Dose :    Sun Mon Tues Wed Thurs Fri Sat Weekly Total Dose   Week 1 10 mg 12.5 mg 10 mg 10 mg 10 mg 10 mg 10 mg 72.5 mg   Week 2  mg  mg  mg  mg  mg  mg  mg  mg   Week 3  mg  mg  mg  mg  mg  mg  mg  mg   Week 4  mg  mg  mg  mg  mg  mg  mg  mg         Patient is taking single or multiple strength tablet(s) :   Multiple strength tab(s)   Multiple Tab Strengths :   5 mg tab, 7.5 mg tab   Sunday :   10 mg   Monday :   12.5 mg   Tuesday :   10 mg   Wednesday :   10 mg   Thursday :   10 mg   Friday :   10 mg   Saturday :   10 mg   Week 1 Total Dose :   72.5 mg   Patient Instructions :   INR = 1.5 per home testing. Per protocol, continue Warfarin 12.5mg Monday and 10mg ROW. Recheck INR 10-15-20.  Pt to continue Lovenox injections until therapeutic. Advised via protal.      Shazia Lawrence RN -  10/12/2020 2:52 PM CDT

## 2022-02-16 NOTE — TELEPHONE ENCOUNTER
---------------------  From: Samira Adler RN   Sent: 6/8/2020 12:02:03 PM CDT  Subject: INR     Benny Oates,    Received your INR of 3.2 from 6/6/20. Very nice. Please stay on your same dose, we have this as 12.5mg warfarin on Monday and Thursday and 10mg the rest of the days of the week. Recheck your INR in 1 week. Let us know if you have changes in medications or diet or other factors. Please be seen if you have any symptoms of bruising or bleeding.    Thank you,    Samira MARIN RN

## 2022-02-16 NOTE — TELEPHONE ENCOUNTER
---------------------  From: WENDI BOWSER  To: INR Pool ( [id number])  Sent: 10/30/2020 03:09 p.m. CDT  Subject: RE: CONSUMER MESSAGE FW: Inr  I?ll come right in  ???  Addendum by Shazia Lawrence RN on October 30, 2020 2:59:31 PM CDT  ---------------------  From: Shazia Lawrence RN (INR Pool ( [id number]_[facility]))  To: WENDI BOWSER  Sent: 10/30/2020 2:59:31 PM CDT  Subject: RE: CONSUMER MESSAGE FW: Inr  ???  Hi Иван -  ???  Would you be able to come into clinic for a venous INR today to confirm your result?  Have you had any other medication or diet changes? Any concerns with bruising or bleeding?  ???  Thanks,  ???  Shazia HUBER RN  ---------------------  From: Astrid Estrada LPN (Phone Messages Pool ([id number]))  To: INR Pool ( [address]);  Sent: 10/30/2020 2:52:49 PM CDT  Subject: CONSUMER MESSAGE FW: Inr  ???  ???  ???  ???  ---------------------  From: WENDI BOWSER  To: [facility]  Sent: 10/30/2020 02:47 p.m. CDT  Subject: Inr  My inr was 4.8, I took an extra 5 mg last Friday and Monday otherwise 10 daily

## 2022-02-16 NOTE — TELEPHONE ENCOUNTER
---------------------  From: Cammie Hinkle CMA (Phone Messages Pool ([id number]_[facility]))   To: INR Pool ( [id number]);     Sent: 7/9/2019 3:47:57 PM CDT  Subject: FW: INR 2.0     Sending to you since INR result is different from yesterday.       ---------------------  From: WENDI BOWSER  To: [facility]  Sent: 07/09/2019 03:39 p.m. CDT  Subject: INR 2.0  I didn t get your response to my last message, unless it came from Stefania, so I called my surgeon and she is doing tramadol? prescription.  Same dosage of 15 daily  INR 2.0---------------------  From: Viviana REES, Mary HERNANDEZ (INR Pool ( [id number]_[facility]))   To: WENDI BOWSER    Sent: 7/9/2019 3:55:37 PM CDT  Subject: RE: INR 2.0       Benny Oates,    I'm sorry you didn't get the response from yesterday.  Dr. Devries would like you to continue your warfarin at 15 mg daily and recheck your INR Wednesday or Thursday.    I'm glad you were able to get a hold of your surgeon. Dr. Jenkins was out of clinic yesterday and when I didn't hear back from you I assumed you were ok for the day on pain medications.    Please, let me know you get this by responding.    I will talk to you regarding your next INR tomorrow or the next day.    Have a wonderful evening,    Mary Michelle RN   Urinary tract infection without hematuria, site unspecified

## 2022-02-16 NOTE — TELEPHONE ENCOUNTER
---------------------  From: Viviana REES, Mary HERNANDEZ   To: MAGUE WENDI [facility]    Sent: 5/15/2020 3:52:42 PM CDT  Subject: Warfarin dosing     Benny Oates,    I got your INR result of 2.2 this afternoon.    Our records show your warfarin dose is 12.5 mg Mondays and Thursdays, 10 mg rest of the days. Is this correct?    Have you had any medication changes, diet changes, or surgeries/procedures in the last week?    If all is well, you have no questions, concerns, or changes to report; please, continue your warfarin dose as listed above and check your INR in a week again.    Let us know if you need anything and have a wonderful day.    Thank you,    Mary Michelle RN

## 2022-02-16 NOTE — NURSING NOTE
Anticoagulation Therapy Management Entered On:  7/2/2021 10:52 AM CDT    Performed On:  7/2/2021 10:51 AM CDT by Mary Michelle RN               Anticoagulation Visit Assessment   Anticoagulation Indication :   Deep vein thrombosis, Other: Factor V   Anticoagulant Start :   8/1/2012 CDT   Anticoagulant Duration :   Undetermined   Anticoagulation Medication Verified :   Yes   Patient Preferred Contact Method :   Cell   Mary Michelle RN - 7/2/2021 10:51 AM CDT   Anticoagulation Patient Assessment Grid   Change in Alcohol Consumption :   No   Change in Diet :   No   Diarrhea :   No   Rectal Bleeding :   No   Signs of Clotting :   No   Signs of Warfarin Intolerance :   No   Unusual Bleeding, Bruising :   No   Upcoming Procedures :   No   Vomiting :   No   Mary Michelle RN - 7/2/2021 10:51 AM CDT   Patient on Warfarin :   Yes   Mary Michelle RN - 7/2/2021 10:51 AM CDT   Warfarin   International Normalization Ratio TR :   4.1    Anticoagulant INR Goal Lower :   2.5    Anticoagulant INR Goal Upper :   3.5    Sunday :   10 mg   Monday :   12.5 mg   Tuesday :   10 mg   Wednesday :   12.5 mg   Thursday :   10 mg   Friday :   12.5 mg   Saturday :   10 mg   Total Dose :   77.5 mg   Warfarin Pt Reported Previous Week Dose :    Sun Mon Tues Wed Thurs Fri Sat Weekly Total Dose   Week 1 10 mg 12.5 mg 10 mg 12.5 mg 10 mg 12.5 mg 10 mg 77.5 mg   Week 2 10 mg 12.5 mg 10 mg 12.5 mg 10 mg 12.5 mg 10 mg 77.5 mg   Week 3  mg  mg  mg  mg  mg  mg  mg  mg   Week 4  mg  mg  mg  mg  mg  mg  mg  mg         Patient is taking single or multiple strength tablet(s) :   Multiple strength tab(s)   Multiple Tab Strengths :   5 mg tab, 7.5 mg tab   Sunday :   10 mg   Monday :   12.5 mg   Tuesday :   10 mg   Wednesday :   12.5 mg   Thursday :   10 mg   Friday :   12.5 mg   Saturday :   10 mg   Week 1 Total Dose :   77.5 mg   Sunday :   10 mg   Monday :   12.5 mg   Tuesday :   10 mg   Wednesday :   12.5 mg   Thursday :   10 mg   Friday :   12.5 mg    Saturday :   10 mg   Week 2 Total Dose :   77.5 mg   Warfarin Dosing Acknowledgement :   I have reviewed the patient's warfarin dosing schedule and confirmed its accuracy for today's visit.   Patient Instructions :   Home INR = 4.1; per protocol Hold x1 day then Continue Warfarin 12.5mg Mon/Wed/Fri and 10mg ROW. Recheck INR 1 week. Message sent through portal.     Mary Michelle RN - 7/2/2021 10:51 AM CDT

## 2022-02-16 NOTE — TELEPHONE ENCOUNTER
---------------------  From: Vitor REES, Samira   To: MAGUE WENDI P    Sent: 4/1/2019 2:29:43 PM CDT  Subject: INR     Benny Иван,    I received your monthly summery from [facility] for INR results. The February INR did look good. Continue your dosage as is and recheck INRs weekly per Dr. Jenkins. Please call us if you have any concerns.    Thanks,    Samira MARIN RN

## 2022-02-16 NOTE — NURSING NOTE
Anticoagulation Therapy Management Entered On:  9/25/2020 11:37 AM CDT    Performed On:  9/25/2020 11:36 AM CDT by Frieda Baker RN               Anticoagulation Visit Assessment   Type of Visit - Anticoagulation :   Face to face   Anticoagulation Indication :   Deep vein thrombosis, Other: Factor V   Anticoagulant Start :   8/1/2012 CDT   Anticoagulant Duration :   Undetermined   Anticoagulation Medication Verified :   Yes   Patient Preferred Contact Method :   Cell   Frieda Baker RN - 9/25/2020 11:36 AM CDT   Anticoagulation Patient Assessment Grid   Change in Alcohol Consumption :   No   Change in Diet :   No   Change in Medications :   No   Diarrhea :   No   Rectal Bleeding :   No   Signs of Clotting :   No   Signs of Warfarin Intolerance :   No   Unusual Bleeding, Bruising :   No   Upcoming Procedures :   No   Vomiting :   No   Frieda Baker RN - 9/25/2020 11:36 AM CDT   Patient on Warfarin :   Yes   Frieda Baker RN - 9/25/2020 11:36 AM CDT   Warfarin   Anticoagulant INR Goal Lower :   2.5    INR POC :   5.0    Anticoagulant INR Goal Upper :   3.5    Sunday :   10 mg   Monday :   12.5 mg   Tuesday :   10 mg   Wednesday :   10 mg   Thursday :   10 mg   Friday :   10 mg   Saturday :   10 mg   Total Dose :   72.5 mg   Warfarin Pt Reported Previous Week Dose :    Sun Mon Tues Wed Thurs Fri Sat Weekly Total Dose   Week 1 10 mg 12.5 mg 10 mg 10 mg 10 mg 10 mg 10 mg 72.5 mg   Week 2  mg  mg  mg  mg  mg  mg  mg  mg   Week 3  mg  mg  mg  mg  mg  mg  mg  mg   Week 4  mg  mg  mg  mg  mg  mg  mg  mg         Patient is taking single or multiple strength tablet(s) :   Multiple strength tab(s)   Multiple Tab Strengths :   5 mg tab, 7.5 mg tab   Sunday :   10 mg   Monday :   12.5 mg   Tuesday :   10 mg   Wednesday :   10 mg   Thursday :   10 mg   Friday :   0 mg   Saturday :   0 mg   Week 1 Total Dose :   52.5 mg   Patient Instructions :   RFAH = 5.0  Hold warfarin x 2 days. then Resume Warfarin 12.5mg Mon and 10mg ROW.  Recheck INR 9-.  Per protocol. Advised patient by phone.     Samuel REES, Frieda ALFARO - 9/25/2020 11:36 AM CDT

## 2022-02-16 NOTE — PROGRESS NOTES
Patient:   WENDI BOWSER            MRN: [id number]            FIN: [id number]               Age:   69 years     Sex:  Male     :  1950   Associated Diagnoses:   None   Author:   Alice GAMBLE, David      Procedure   EKG procedure   Indication: preop.     Position: supine.     EKG findings   Interpretation: by primary care provider.     Rhythm: sinus.     Axis: left axis deviation, no normal axis, normal configuration.     Intervals: VT normal, QRS normal, QT normal.     P waves: normal.     ST-T-U complex.     Interpretation: IRBBB.     Discussed: with patient.        Impression and Plan   Orders

## 2022-02-16 NOTE — NURSING NOTE
Anticoagulation Therapy Entered On:  1/20/2020 11:57 AM CST    Performed On:  1/20/2020 11:56 AM CST by Mary Michelle RN               Warfarin Management   Week 1 Sunday Dose :   10    Week 1 Monday Dose :   12.5    Week 1 Tuesday Dose :   10    Week 1 Wednesday Dose :   10    Week 1 Thursday Dose :   12.5    Week 1 Friday Dose :   10    Week 1 Saturday Dose :   10    Week 1 Dose Total :   75    Planned Duration :   Indefinite   Indication :   DVT, Other: Factor V   Warfarin Management Comments :   [facility] fax received from 1/18/20   International Normalization Ratio :   1.7    INR Range :   2.5 - 3.5   INR Therapeutic Range :   No   Warfarin Abnormal Findings :   Patient on vacation in [facility]   Anticoagulation Recheck :   1 week   Warfarin Special Instructions :   Per GTG increase warfarin to 12.5 mg Mon/Thur and 10 mg ROW; recheck INR in 1 week; message to pt through portal.   Mary Michelle RN - 1/20/2020 11:56 AM CST

## 2022-02-16 NOTE — TELEPHONE ENCOUNTER
---------------------  From: Laisha Murillo CMA   To: WENDI BOWSER    Sent: 8/11/2020 9:47:23 AM CDT  Subject: Portal Message       Good Morning Иван,   A new prescription of Warfarin 5mg tabs was sent to [facility] for you.   Frieda Baker RN

## 2022-02-16 NOTE — TELEPHONE ENCOUNTER
---------------------  From: Samira Adler RN   To: MAGUEWENDI WEST    Sent: 6/8/2020 12:05:12 PM CDT  Subject: INR     Benny Oates,    Received your INR of 3.2 from 6/6/20. Very nice. Please stay on your same dose, we have this as 12.5mg warfarin on Monday and Thursday and 10mg the rest of the days of the week. Recheck your INR in 1 week. Let us know if you have changes in medications or diet or other factors. Please be seen if you have any symptoms of bruising or bleeding.    Thank you,    Samira MARIN RN

## 2022-02-16 NOTE — NURSING NOTE
The patients monthly home INR testing report from [facility] was received today. All abnormal results from the month of May 2018 were received and addressed previously. Report is being sent to [facility] for scanning.

## 2022-02-16 NOTE — TELEPHONE ENCOUNTER
---------------------  From: Mary Michelle RN (INR Pool ( [id number]))   To: WENDI BOWSER    Sent: 11/15/2021 9:47:27 AM CST  Subject: RE: INR 3.9     Иван,    Thanks for the quick reply.    Would you mind checking your INR today? I don't want to give you any directions based on a reading from 2 days ago if I can help it.    Thank you,    Mary Michelle RN      ---------------------  From: WENDI BOSWER  To: INR Pool ( [id number])  Sent: 11/15/2021 09:43 a.m. CST  Subject: RE: INR 3.9  I continued 10 daily       Addendum by Mary Michelle RN on November 15, 2021 8:15:25 AM CST  ---------------------  From: Mary Michelle RN (INR Pool ( [id number]))  To: WENDI BOWSER  Sent: 11/15/2021 8:15:25 AM CST  Subject: RE: INR 3.9       Good Morning Иван,       Can you let me know what you did with your warfarin dosing after doing your INR on Saturday?       If you can check your INR before 4 pm on Fridays we will be able to give you directions before the weekend.       Thank you,       Mary Michelle RN  ---------------------  From: Mary Michelle RN (Phone Messages Pool ([id number]))  To: INR Pool ( [address] [place] [address]);  Sent: 11/15/2021 8:13:03 AM CST  Subject: FW: INR 3.9                      ---------------------  From: WENDI BOWSER  To: [facility]  Sent: 11/13/2021 09:56 a.m. CST  Subject: INR 3.9  10 daily  INR 3.9

## 2022-02-16 NOTE — TELEPHONE ENCOUNTER
---------------------  From: Astrid Estrada LPN (Phone Messages Pool ([id number]_[facility]))   To: INR Pool ( [id number]);     Sent: 11/6/2020 10:20:50 AM CST  Subject: FW: INR 4.3           ---------------------  From: WENDI BOWSER  To: [facility]  Sent: 11/06/2020 10:14 a.m. CST  Subject: INR 4.3  No changes, 10 daily 10 1/2 on Monday INR is 4.3

## 2022-02-16 NOTE — PROGRESS NOTES
Patient:   WENDI BOWSER            MRN: [id number]            FIN: [id number]               Age:   69 years     Sex:  Male     :  1950   Associated Diagnoses:   Knee osteoarthritis; Factor V Leiden   Author:   David Jenkins MD      Preoperative Information   Indication for surgery:  djd knee.    Accompanied by:  No one.    Source of history:  Self.    History limitation:  None.       Chief Complaint   2019 3:06 PM CDT    Preop.  Left knee replacement. 19  [facility].  Dr. uJnior      Review of Systems   Constitutional:  Negative.    Eye:  Negative.    Ear/Nose/Mouth/Throat:  Negative.    Respiratory:  Negative.    Cardiovascular:  Negative.    Gastrointestinal:  Negative.    Genitourinary:  Negative.    Hematology/Lymphatics:  Negative.    Endocrine:  Negative.    Immunologic:  Negative.    Musculoskeletal:  Negative.    Integumentary:  Negative.    Neurologic:  Negative.       Health Status   Allergies:    Allergic Reactions (Selected)  No Known Medication Allergies   Medications:  (Selected)   Prescriptions  Prescribed  Lovenox 100 mg/mL injectable solution: ( 100 mg ), Subcutaneous, q12 hrs, Instructions: take 100mg bid start  last dose am of , # 5 EA, 0 Refill(s), Type: Maintenance, Pharmacy: [facility], 100 mg Subcutaneous q12 hrs,Instr:take 100mg bid start  l...  Viagra 100 mg oral tablet: See Instructions, Instructions: TAKE AS DIRECTED, # 8 tab(s), 11 Refill(s), Pharmacy: [id number], TAKE AS DIRECTED  atorvastatin 10 mg oral tablet: = 1 tab(s) ( 10 mg ), Oral, daily, # 90 tab(s), 3 Refill(s), Type: Maintenance, Pharmacy: [facility] [id number], 1 tab(s) Oral daily  fluticasone 50 mcg/inh nasal spray: = 2 spray(s), nasal, daily, # 3 EA, 3 Refill(s), Type: Maintenance, Pharmacy: [id number], 2 spray(s) Nasal daily,x90 day(s)  warfarin 5 mg oral tablet: = 2 tab(s) ( 10 mg ), Oral, daily, # 180 tab(s), 3  Refill(s), Type: Maintenance, Pharmacy: [id number], 2 tab(s) Oral daily,    Medications          *denotes recorded medication          atorvastatin 10 mg oral tablet: 10 mg, 1 tab(s), Oral, daily, 90 tab(s), 3 Refill(s).          Lovenox 100 mg/mL injectable solution: 100 mg, Subcutaneous, q12 hrs, take 100mg bid start june 10th last dose am of june 12th, 5 EA, 0 Refill(s).          fluticasone 50 mcg/inh nasal spray: 2 spray(s), nasal, daily, for 90 day(s), 3 EA, 3 Refill(s).          Viagra 100 mg oral tablet: See Instructions, TAKE AS DIRECTED, 8 tab(s), 11 Refill(s).          warfarin 5 mg oral tablet: 10 mg, 2 tab(s), Oral, daily, 180 tab(s), 3 Refill(s).     Problem list:    All Problems  Adenomatous colon polyp / SNOMED CT 9104974767 / Confirmed  Anticoagulated / SNOMED CT 34690527 / Confirmed  Factor V Leiden / SNOMED CT 8985741931 / Confirmed  History of DVT of lower extremity / SNOMED CT 7685575125 / Confirmed  History of pulmonary embolism / SNOMED CT 282257972 / Confirmed  Lipids abnormal / SNOMED CT 327696324 / Confirmed  Obesity / SNOMED CT 4710916277 / Probable  PVD (peripheral vascular disease) / SNOMED CT 9918344383 / Confirmed  Prediabetes / SNOMED CT 7978203668 / Confirmed  Seasonal allergies / SNOMED CT 018414454 / Confirmed  Tobacco user / SNOMED CT 053203218 / Probable  Resolved: *Hospitalized@[place] - Left superficial femoral artery chronic occlusion  Resolved: Rupture of anterior cruciate ligament / SNOMED CT 484154313  Resolved: Lyme disease / SNOMED CT 9711072957  Resolved: Tobacco user / ICD-9-.1  Canceled: DVT (Deep Venous Thrombosis) / ICD-9-.40  Canceled: History of pulmonary embolism / ICD-9-CM V12.51  Canceled: PE (Pulmonary Embolism) / ICD-9-.19      Histories   Past Medical History:    Active  Prediabetes (7624467721): Onset on 4/19/2013 at 63 years.  Lipids abnormal (283570534): Onset on 10/26/2012 at 62 years.  Adenomatous colon polyp  (4033462097): Onset on 2012 at 62 years.  History of DVT of lower extremity (1672564133): Onset on 2012 at 62 years.  PVD (peripheral vascular disease) (1110284915)  Anticoagulated (58668826)  Obesity (0655745141)  Factor V Leiden (5818414109)  History of pulmonary embolism (080850382)  Seasonal allergies (944204053)  Resolved  *Hospitalized@[place] - Left superficial femoral artery chronic occlusion: Onset on 2012 at 62 years.  Resolved.  Rupture of anterior cruciate ligament (392969566): Onset in  at 55 years.  Resolved.  Lyme disease (1330107221): Onset in the month of 1999 at 49 years  Resolved.  Tobacco user (305.1): Onset on 1968 at 17 years.  Resolved on 2012 at 61 years.   Family History:    Heart disease  Uncle (M)  Father (Blayne, )  Comments:  2010 9:16 AM CDT - Samira Larson MA     Procedure history:    Flexible sigmoidoscopy (SNOMED CT 93553447) on 2017 at 67 Years.  Insertion of arterial stent (SNOMED CT 661706219) on 2012 at 62 Years.  Insertion of arterial stent (SNOMED CT 813191622) on 2012 at 62 Years.  Comments:  2012 11:54 PM Missy Landin  Left superficial femoral artery chronic occlusion.  Colectomy on 2012 at 62 Years.  Comments:  2012 9:43 PM Missy Landin  Hand-assisted laparoscopic total abdominal colectomy.    2012 3:17 PM CDT - Mir Toribio MD  Numerous adenomatous polyps and one large cecal polyp  Colonoscopy (SNOMED CT 012522251) performed by Mri Toribio MD on 2012 at 61 Years.  Colonoscopy (SNOMED CT 228294347) on 10/17/2005 at 55 Years.  Flexible sigmoidoscopy (SNOMED CT 08342213) on 9/15/2005 at 55 Years.  Arthroscopy of knee (SNOMED CT 803586462) in  at 55 Years.  ACL R Knee in  at 55 Years.  Flexible sigmoidoscopy (SNOMED CT 06110862) on 2001 at 51 Years.  Flexible sigmoidoscopy ([facility] CT 01610404) on 2001 at 51 Years.  Vasectomy ([facility] CT 29456912).   Social  History:        Tobacco Assessment            Current every day smoker, Cigarettes, 20 per day.      Substance Abuse Assessment            Past, Marijuana      Employment and Education Assessment            Retired      Home and Environment Assessment            Marital status: .  Spouse/Partner name: Sherice Cancino.  Lives with Significant other.  Risks in               environment: sometimes wears bike helmet, owns secured gun.      Nutrition and Health Assessment            Type of diet: warfarin.      Exercise and Physical Activity Assessment            Exercise frequency: Never.      Sexual Assessment            Sexually active: No.  Sexual orientation: Heterosexual.     Has no history of anemia.  Has a  history of DVT or pulmonary embolism.  Has  no personal history of bleeding problems.   Has  no personal or family history of anesthesia reactions.  Patient  does not have active tuberculosis.    S/he has not taken aspirin or aspirin containing products in the last week.     S/he has not taken Plavix (Clopidogrel) in the last 2 weeks.    S/he has  taken warfarin in the past week.    S/he  has not been on corticosteroids for more than 2 weeks recently.      S/he  is not DNR before, during or after surgery.    Chest pain / SOB walking up 2 flights of steps? no  Pain in neck or jaw?no  CAD MI?  no  Afib? no  Heart Failure?no  Asthma  or Bronchitis? no  Diabetes? no       Insulin/Orals?   Seizure Disorder? no  CKD?no  Thyroid Disease?no  Liver Diseaseno  CVA?no         Physical Examination   Vital Signs   5/31/2019 3:06 PM CDT Temperature Tympanic 98.9 DegF    Peripheral Pulse Rate 94 bpm    HR Method Electronic    Systolic Blood Pressure 116 mmHg    Diastolic Blood Pressure 69 mmHg    Mean Arterial Pressure 85 mmHg    BP Method Electronic      Measurements from flowsheet : Measurements   5/31/2019 3:06 PM CDT Height Measured - Standard 72 in    Weight Measured - Standard 189.6 lb    BSA 2.09 m2    Body Mass  Index 25.71 kg/m2  HI      General:  Alert and oriented, No acute distress.    Eye:  Pupils are equal, round and reactive to light, Extraocular movements are intact.    HENT:  Normocephalic, Tympanic membranes are clear, Normal hearing, Oral mucosa is moist.    Neck:  Supple, Non-tender, No carotid bruit, No jugular venous distention, No lymphadenopathy, No thyromegaly.    Respiratory:  Lungs are clear to auscultation, Respirations are non-labored, Breath sounds are equal.    Cardiovascular:  Normal rate, Regular rhythm, No murmur, Good pulses equal in all extremities, No edema.    Gastrointestinal:  Soft, Non-tender, Non-distended, Normal bowel sounds, No organomegaly.    Musculoskeletal:  Normal range of motion, Normal strength, No tenderness, No swelling, No deformity.    Integumentary:  Warm, Dry.    Neurologic:  Alert, Oriented, Normal sensory, Normal motor function, No focal deficits, Cranial Nerves II-XII are grossly intact, Normal deep tendon reflexes.    Psychiatric:  Cooperative, Appropriate mood & affect, Normal judgment.       Health Maintenance      Recommendations     Pending (in the next year)        OverDue           Depression Screen (Male) due  04/12/18  and every 1  year(s)           Aspirin Therapy for Prevention of CVD (Male) due  09/03/18  and every 5  year(s)        Due            Abdominal Aortic Aneurysm Screen (if hx of smoking) due  05/31/19  One-time only           Fall Risk Screen (Male) due  05/31/19  and every 1  year(s)           Hepatitis C Screen 2770-4556 (Male) due  05/31/19  One-time only           Lung Cancer Screen (Male) due  05/31/19  and every 1  year(s)        Due In Future            Influenza Vaccine not due until  09/01/19  and every 1  year(s)           Lipid Disorders Screen (Male) not due until  10/18/19  and every 1  year(s)     Satisfied (in the past 1 year)        Satisfied            Body Mass Index Check (Male) on  05/31/19.           High Blood Pressure Screen  (Male) on  05/31/19.           High Blood Pressure Screen (Male) on  11/30/18.           High Blood Pressure Screen (Male) on  10/18/18.           Influenza Vaccine on  10/01/18.           Lipid Disorders Screen (Male) on  10/18/18.           Lipid Disorders Screen (Male) on  10/18/18.           Lipid Disorders Screen (Male) on  10/18/18.           Lipid Disorders Screen (Male) on  10/18/18.           Obesity Screen and Counseling (Male) on  05/31/19.           Obesity Screen and Counseling (Male) on  10/18/18.        Review / Management            Impression and Plan   Diagnosis     Knee osteoarthritis (GBL86-AB M17.10).     Factor V Leiden (BEH63-CH D68.51).     Condition:  ok for surgery asa2  Discussed coumadin and lovenox preop  will dc coumadin for 5 days before surgery.

## 2022-02-16 NOTE — TELEPHONE ENCOUNTER
---------------------  From: Rosemary Lama CMA (Phone Messages Pool ([id number]_[facility]))   To: INR Pool ( 32224_[facility]);     Sent: 7/8/2019 8:05:58 AM CDT  Subject: FW: INR 2.1           ---------------------  From: WENDI BOWSER  To: [facility]  Sent: 07/07/2019 12:05 p.m. CDT  Subject: INR 2.1  I have been on 15 daily since July 3, My INR today is 2.1  I need a refill for my pain pills oxycodone, Dr Jenkins needs to send to pharmacy by electronic form---------------------  From: Viviana REES, Mary HERNANDEZ (INR Pool ( 32224_[facility]))   To: TFS Message Pool (32224_[facility]); WENDI BOWSER    Sent: 7/8/2019 9:06:17 AM CDT  Subject: RE: INR 2.1     Good morning Dr. Alice Oates is out of clinic until tomorrow morning. Do you have enough pain medication to get through until then?    Dr. Devries would like you to continue your warfarin at 15 mg daily and recheck your INR on Wednesday or Thursday.    Please let me know if you need anything else.    Hope you are doing well and healing quickly,    Mary Michelle RN

## 2022-02-16 NOTE — TELEPHONE ENCOUNTER
---------------------  From: Samira Adler RN (Phone Messages Pool ([id number]_[facility]))   To: INR Pool ( 32224_[facility]);     Sent: 3/1/2021 8:08:51 AM CST  Subject: FW: INR 2.6           ---------------------  From: WENDI BOWSER  To: [facility]  Sent: 02/28/2021 11:30 a.m. CST  Subject: INR 2.6  Same dosage 10 daily 12.5 Monday---------------------  From: Shazia Lawrence RN (INR Pool ( [id number]_[facility]))   To: WENDI BOWSER    Sent: 3/1/2021 8:17:54 AM CST  Subject: RE: INR 2.6     Hi Иван -     Thank you for sending in your results!  Continue your Warfarin 12.5mg Mondays and 10mg the rest of days - recheck your INR again next week.    Let us know if you have any questions or concerns.    Have a great day!    Shazia HUBER RN

## 2022-02-16 NOTE — NURSING NOTE
Anticoagulation Therapy Management Entered On:  3/19/2021 4:32 PM CDT    Performed On:  3/19/2021 4:29 PM CDT by Samira Adler RN               Anticoagulation Visit Assessment   Anticoagulation Indication :   Deep vein thrombosis, Other: Factor V   Anticoagulant Start :   8/1/2012 CDT   Anticoagulant Duration :   Undetermined   Anticoagulation Medication Verified :   Yes   Patient Preferred Contact Method :   Cell   Patient on Warfarin :   Yes   Samira Adler RN - 3/19/2021 4:29 PM CDT   Warfarin   Anticoagulant INR Goal Lower :   2.5    Anticoagulant INR Goal Upper :   3.5    Sunday :   10 mg   Monday :   12.5 mg   Tuesday :   10 mg   Wednesday :   10 mg   Thursday :   10 mg   Friday :   10 mg   Saturday :   10 mg   Total Dose :   72.5 mg   Warfarin Pt Reported Previous Week Dose :    Sun Mon Tues Wed Thurs Fri Sat Weekly Total Dose   Week 1 10 mg 12.5 mg 10 mg 10 mg 10 mg 12.5 mg 10 mg 75 mg   Week 2 10 mg 12.5 mg 10 mg 10 mg 10 mg 10 mg 10 mg 72.5 mg   Week 3  mg  mg  mg  mg  mg  mg  mg  mg   Week 4  mg  mg  mg  mg  mg  mg  mg  mg         Patient is taking single or multiple strength tablet(s) :   Multiple strength tab(s)   Multiple Tab Strengths :   5 mg tab, 7.5 mg tab   Sunday :   10 mg   Monday :   12.5 mg   Tuesday :   10 mg   Wednesday :   10 mg   Thursday :   10 mg   Friday :   12.5 mg   Saturday :   10 mg   Week 1 Total Dose :   75 mg   Warfarin Dosing Acknowledgement :   I have reviewed the patient's warfarin dosing schedule and confirmed its accuracy for today's visit.   Patient Instructions :   INR = 2.3 per [facility] today. Patient is to increase warfarin to 12.5 mg on Mon and Fri and 10 mg the rest of the days of the week Recheck INR in 1 week per protocol. Increased 3.4 % per week.     Samira Adler RN - 3/19/2021 4:29 PM CDT

## 2022-02-16 NOTE — TELEPHONE ENCOUNTER
---------------------  From: Viviana REES, Mary HERNANDEZ   To: WENDI BOWSER    Sent: 5/1/2020 1:56:17 PM CDT  Subject: Warfarin directions     Benny Oates,    I received a fax from [facility] showing you had an INR of 2.4 today.    If you have no questions, concerns, and have had no changes in medications or diet please continue your warfarin dose at 12.5 mg Mondays and Thursdays and 10 mg all other days of the week. (This is the most recent dose I have on file for you).    If you are taking a different dose of warfarin, please, respond to this message and just let us know.    Have a wonderful day,    Mary Michelle RN

## 2022-02-16 NOTE — TELEPHONE ENCOUNTER
---------------------  From: Shazia Lawrence RN   To: WENDI BOWSER    Sent: 8/10/2020 9:15:54 AM CDT  Subject: INR Results     Tez Oates,    I received a call from [facility] this morning with your INR result of 5.3.  I will be calling you - per clinic policy, we need you to come in and have an urgent venous INR drawn.     Shazia Lawrence RN

## 2022-02-16 NOTE — TELEPHONE ENCOUNTER
---------------------  From: Samira Adler RN   To: MAGUE WENDI P    Sent: 3/19/2021 4:28:51 PM CDT  Subject: INR     Benny Oates,    Please take 12.5mg warfarin on Mondays and Fridays and 10 mg the rest of the days of the week.  Recheck your INR in one week. Have a great week end.    Thank you,    Samira MARIN RN

## 2022-02-16 NOTE — TELEPHONE ENCOUNTER
---------------------  From: Samira Adler RN   To: WENDI BOWSER    Sent: 10/10/2019 1:51:17 PM CDT  Subject: INR     Rajeev Lama INR today, 3.5. Please stay on 10mg warfarin daily and recheck INR in 1 week. Have a nice week end!    Thank you,    Samira MARIN RN

## 2022-02-16 NOTE — NURSING NOTE
Anticoagulation Therapy Management Entered On:  11/30/2020 10:01 AM CST    Performed On:  11/30/2020 10:00 AM CST by Shazia Lawrence RN               Anticoagulation Visit Assessment   Type of Visit - Anticoagulation :   Telephone   Anticoagulation Indication :   Deep vein thrombosis, Other: Factor V   Anticoagulant Start :   8/1/2012 CDT   Anticoagulant Duration :   Undetermined   Anticoagulation Medication Verified :   Yes   Patient Preferred Contact Method :   Cell   Patient on Warfarin :   Yes   Shazia Lawrence RN - 11/30/2020 10:00 AM CST   Warfarin   International Normalization Ratio TR :   2.8    Anticoagulant INR Goal Lower :   2.5    Anticoagulant INR Goal Upper :   3.5    Sunday :   10 mg   Monday :   12.5 mg   Tuesday :   10 mg   Wednesday :   10 mg   Thursday :   10 mg   Friday :   0 mg   Saturday :   10 mg   Total Dose :   62.5 mg   Warfarin Pt Reported Previous Week Dose :    Sun Mon Tues Wed Thurs Fri Sat Weekly Total Dose   Week 1 10 mg 12.5 mg 10 mg 10 mg 10 mg 0 mg 10 mg 62.5 mg   Week 2  mg  mg  mg  mg  mg  mg  mg  mg   Week 3  mg  mg  mg  mg  mg  mg  mg  mg   Week 4  mg  mg  mg  mg  mg  mg  mg  mg         Patient is taking single or multiple strength tablet(s) :   Multiple strength tab(s)   Multiple Tab Strengths :   5 mg tab, 7.5 mg tab   Sunday :   10 mg   Monday :   12.5 mg   Tuesday :   10 mg   Wednesday :   10 mg   Thursday :   10 mg   Friday :   10 mg   Saturday :   10 mg   Week 1 Total Dose :   72.5 mg   Patient Instructions :   INR = 2.8 per [facility]. Pt reported INR on 11/27/20 of 4.9. Patient held Warfarin x1 dose. He is to resume 12.5mg Monday and 10mg ROW. Recheck INR in 1 week. Orders sent through portal message.      Shazia Lawrence RN - 11/30/2020 10:00 AM CST

## 2022-02-16 NOTE — TELEPHONE ENCOUNTER
---------------------  From: Shazia Lawrence RN (Phone Messages Pool ([id number]_[facility]))   To: INR Pool ( 32224_[facility]);     Sent: 7/25/2019 7:22:03 PM CDT  Subject: FW: INR 2.9           ---------------------  From: WENDI BOWSER  To: [facility]  Sent: 07/25/2019 07:06 p.m. CDT  Subject: INR 2.9  10 daily  INR 2.9---------------------  From: Mary Michelle RN (INR Pool ( [id number]_[facility]))   To: WENDI BOWSER    Sent: 7/26/2019 8:09:14 AM CDT  Subject: RE: INR 2.9     Benny Oates    Thank you for giving us your INR result. Please continue your warfarin at 10 mg daily and recheck your INR in 1 week.    Let us know if you have any questions or concerns.    Have a great weekend,    Mary Michelle RN

## 2022-02-16 NOTE — TELEPHONE ENCOUNTER
---------------------  From: Shazia Lawrence RN (Phone Messages Pool ([id number]_[facility]))   To: WENDI BOWSER    Sent: 3/12/2021 3:31:13 PM CST  Subject: RE: Inr 2.2     [facility] -    Have you had any medication/diet changes or missed doses?  I have your current dose of Warfarin as 12.5mg Mondays and 10mg the rest of the week.   If this is correct - I'll have you take 12.5mg today then resume your previous dose. Recheck  your INR next week.     Please let me know if you have any questions or concerns.     Thanks,  Shazia HUBER RN      ---------------------  From: WENDI BOWSER  To: [facility]  Sent: 03/12/2021 03:22 p.m. CST  Subject: Inr 2.2  Same dosage INR 2.2

## 2022-02-16 NOTE — TELEPHONE ENCOUNTER
---------------------  From: Vitor REES, Samira   To: WENDI BOWSER    Sent: 3/9/2020 9:52:28 AM CDT  Subject: INR     Benny Oates,    I received your INR of 2.7  from 3/8/20. Pleases continue 12.5mg warfarin on Monday and Thursday and 10 mg the rest of the days of the week. Recheck INR in 1 week. Let us know if you have any changes in health, medication, diet or symptoms of concern. Have a great week.    Thank you,    Samira MARIN RN

## 2022-02-16 NOTE — NURSING NOTE
Anticoagulation Therapy Management Entered On:  11/5/2021 1:32 PM CDT    Performed On:  11/5/2021 1:30 PM CDT by Samira Adler RN               Anticoagulation Visit Assessment   Anticoagulation Indication :   Deep vein thrombosis, Other: Factor V   Anticoagulant Start :   8/1/2012 CDT   Anticoagulant Duration :   Undetermined   Anticoagulation Medication Verified :   Yes   Patient Preferred Contact Method :   Cell   Patient on Warfarin :   Yes   Samira Adler RN - 11/5/2021 1:30 PM CDT   Warfarin   Anticoagulant INR Goal Lower :   2.5    Anticoagulant INR Goal Upper :   3.5    Information Given by :   Patient   Sunday :   10 mg   Monday :   10 mg   Tuesday :   10 mg   Wednesday :   10 mg   Thursday :   10 mg   Friday :   10 mg   Saturday :   10 mg   Total Dose :   70 mg   Warfarin Pt Reported Previous Week Dose :    Sun Mon Tues Wed Thurs Fri Sat Weekly Total Dose   Week 1 10 mg 5 mg 10 mg 10 mg 5 mg 10 mg 10 mg 60 mg   Week 2 10 mg 10 mg 10 mg 12.5 mg 10 mg 12.5 mg 10 mg 75 mg   Week 3  mg  mg  mg  mg  mg  mg  mg  mg   Week 4  mg  mg  mg  mg  mg  mg  mg  mg         Patient is taking single or multiple strength tablet(s) :   Multiple strength tab(s)   Multiple Tab Strengths :   5 mg tab, 7.5 mg tab   Sunday :   10 mg   Monday :   5 mg   Tuesday :   10 mg   Wednesday :   10 mg   Thursday :   5 mg   Friday :   10 mg   Saturday :   10 mg   Week 1 Total Dose :   60 mg   Warfarin Dosing Acknowledgement :   I have reviewed the patient's warfarin dosing schedule and confirmed its accuracy for today's visit.   Patient Instructions :   INR = 2.5 from home test per patient portal message today. Patient is to stay on 10 mg warfarin daily and recheck INR in 1 week per protocol. Patient advised via portal.     Samira Adler RN - 11/5/2021 1:30 PM CDT

## 2022-02-16 NOTE — NURSING NOTE
Anticoagulation Therapy Management Entered On:  12/3/2021 10:41 AM CST    Performed On:  12/3/2021 10:40 AM CST by Mary Michelle RN               Anticoagulation Visit Assessment   Anticoagulation Indication :   Deep vein thrombosis, Other: Factor V   Anticoagulant Start :   8/1/2012 CDT   Anticoagulant Duration :   Undetermined   Anticoagulation Medication Verified :   Yes   Patient Preferred Contact Method :   Cell   Patient on Warfarin :   Yes   Mary Michelle RN - 12/3/2021 10:40 AM CST   Warfarin   International Normalization Ratio TR :   2.3    Anticoagulant INR Goal Lower :   2.5    Anticoagulant INR Goal Upper :   3.5    Sunday :   10 mg   Monday :   10 mg   Tuesday :   10 mg   Wednesday :   10 mg   Thursday :   10 mg   Friday :   10 mg   Saturday :   10 mg   Total Dose :   70 mg   Warfarin Pt Reported Previous Week Dose :    Sun Mon Tues Wed Thurs Fri Sat Weekly Total Dose   Week 1 10 mg 5 mg 10 mg 10 mg 5 mg 10 mg 10 mg 60 mg   Week 2 10 mg 10 mg 10 mg 12.5 mg 10 mg 12.5 mg 10 mg 75 mg   Week 3  mg  mg  mg  mg  mg  mg  mg  mg   Week 4  mg  mg  mg  mg  mg  mg  mg  mg         Patient is taking single or multiple strength tablet(s) :   Multiple strength tab(s)   Multiple Tab Strengths :   5 mg tab, 7.5 mg tab   Sunday :   10 mg   Monday :   5 mg   Tuesday :   10 mg   Wednesday :   10 mg   Thursday :   5 mg   Friday :   10 mg   Saturday :   10 mg   Week 1 Total Dose :   60 mg   Sunday :   10 mg   Monday :   10 mg   Tuesday :   10 mg   Wednesday :   12.5 mg   Thursday :   10 mg   Friday :   12.5 mg   Saturday :   10 mg   Week 2 Total Dose :   75 mg   Warfarin Dosing Acknowledgement :   I have reviewed the patient's warfarin dosing schedule and confirmed its accuracy for today's visit.   Patient Instructions :   Home INR = 2.3. Per protocol continue warfarin 10 mg daily. Recheck INR in 1 week. Message sent to pt by portal.     Mary Michelle RN - 12/3/2021 10:40 AM CST

## 2022-02-16 NOTE — TELEPHONE ENCOUNTER
---------------------  From: Rosemary Lama CMA (Phone Messages Pool (73524_[facility]))   To: INR Pool ( 32224_[facility]);     Sent: 3/14/2021 8:16:57 AM CDT  Subject: FW: Inr 2.2           ---------------------  From: WENDI BOWSER  To: [facility]  Sent: 03/13/2021 09:37 a.m. CST  Subject: RE: Inr 2.2  No changes or missed doses  ---------------------  From: Shazia Lawrence RN (Phone [place] Pool (70124_[facility]))  To: WENDI BOWSER  Sent: 3/12/2021 3:31:13 PM CST  Subject: RE: Inr 2.2       [facility] -       Have you had any medication/diet changes or missed doses?  I have your current dose of Warfarin as 12.5mg Mondays and 10mg the rest of the week.  If this is correct - I ll have you take 12.5mg today then resume your previous dose. Recheck  your INR next week.       Please let me know if you have any questions or concerns.       Thanks,  Shazia HUBER RN            ---------------------  From: WENDI BOWSER  To: [facility]  Sent: 03/12/2021 03:22 p.m. CST  Subject: Inr 2.2  Same dosage INR 2.2

## 2022-02-16 NOTE — NURSING NOTE
Anticoagulation Therapy Entered On:  10/4/2019 9:21 AM CDT    Performed On:  10/4/2019 9:19 AM CDT by Samira Adler RN               Warfarin Management   Week 1 Baldemar Dose :   10    Week 1 Monday Dose :   10    Week 1 Tuesday Dose :   10    Week 1 Wednesday Dose :   10    Week 1 Thursday Dose :   10    Week 1 Friday Dose :   10    Week 1 Saturday Dose :   10    Week 1 Dose Total :   70    Week 2 Sunday Dose :   10    Week 2 Monday Dose :   10    Week 2 Tuesday Dose :   10    Week 2 Wednesday Dose :   10    Week 2 Thursday Dose :   10    Week 2 Friday Dose :   10    Week 2 Saturday Dose :   10    Week 2 Dose Total :   70    Planned Duration :   Indefinite   Indication :   DVT, Other: FVLeiden   INR Range :   2.5 - 3.5   INR Therapeutic Range :   Yes   Anticoagulation Recheck :   1 week   Warfarin Physician :   David Jenkins MD Special Instructions :   INR = 3.0 per MDINR on 10/3/19 received by fax on 10/4/19 Patient is to stay on 10mg warfarin daily and recheck INR in 1 week per protocol. Patient advised via portal.   Samira Adler RN - 10/4/2019 9:19 AM CDT

## 2022-02-16 NOTE — PROGRESS NOTES
Patient:   WENDI BOWSER            MRN: [id number]            FIN: [id number]               Age:   71 years     Sex:  Male     :  1950   Associated Diagnoses:   Facial skin lesion; Blood in right ear canal   Author:   Juan Foley PA-C      Visit Information      Date of Service: 2021 09:53 am  Performing Location: [facility]  Encounter#: [id number]   Visit type:  General concerns.    Accompanied by:  No one.    Source of history:  Self, Medical record.    History limitation:  None.       Chief Complaint   2021 9:57 AM CST   c/o right ear bleeding early this AM, couldn't hear out of ear.  is on warfarin.        History of Present Illness             The patient presents with earache.  The location of the earache is the right ear.  The earache is characterized by bleeding.  The severity of the earache No real pain. No trauma. INR 3.4 on . Check multiple facial lesions..        Review of Systems   Constitutional   Eye:  Negative.    Ear/Nose/Mouth/Throat:  Negative except as documented in history of present illness.       Health Status   Allergies:    Allergic Reactions (Selected)  No Known Medication Allergies   Medications:  (Selected)   Prescriptions  Prescribed  Floxin Otic 0.3% otic solution: 5 drop(s), Ear-Right, bid, x 5 day(s), # 5 mL, 0 Refill(s), Type: Acute, Pharmacy: [facility] #[id number], 5 drop(s) Ear-Right bid,x5 day(s), 72, in, 21 9:57:00 CST, Height Measured, 199.4, lb, 21 9:57:00 CST, Weight Measured  Viagra 100 mg oral tablet: See Instructions, Instructions: TAKE AS DIRECTED, # 8 tab(s), 11 Refill(s), Pharmacy: [id number], TAKE AS DIRECTED  Voltaren 1% topical gel: ( 2 gm ), Topical, qid, PRN: shoulder pain, # 100 gm, 11 Refill(s), Type: Maintenance, Pharmacy: [facility] #[id number], 2 gm Topical qid,PRN:shoulder pain  albuterol 90 mcg/inh inhalation aerosol: 2 puff(s), Inhale, qid, PRN: shortness of breath or  wheezing, # 1 EA, 1 Refill(s), Type: Maintenance, Pharmacy: [facility] #[id number], 2 puff(s) Inhale qid,PRN:shortness of breath or wheezing, 72, in, 06/24/21 10:42:00 CDT, Height Measured, 189....  atorvastatin 20 mg oral tablet: = 1 tab(s) ( 20 mg ), Oral, daily, # 90 tab(s), 3 Refill(s), Type: Maintenance, Pharmacy: [facility] #[id number], 1 tab(s) Oral daily, 72, in, 04/27/21 13:09:00 CDT, Height Measured, 195.9, lb, 04/27/21 13:09:00 CDT, Weight Measured  fluticasone 50 mcg/inh nasal spray: = 2 spray(s), nasal, daily, # 3 EA, 3 Refill(s), Type: Maintenance, Pharmacy: [facility] #[id number], 2 spray(s) Nasal daily,x90 day(s), 72, in, 06/12/20 12:52:00 CDT, Height Measured, 193.6, lb, 06/12/20 12:52:00 CDT, Weight Measured  warfarin 5 mg oral tablet: See Instructions, Instructions: TAKE TWO AND ONE-HALF TABLETS BY MOUTH ON MONDAY AND THURSDAY. TAKE 2 TABLETS DAILY THE REST OF THE WEEK., # 192 tab(s), 0 Refill(s), Type: Maintenance, Pharmacy: [facility] #[id number], TAKE TWO AND ONE-HALF TABLE...  Documented Medications  Documented  acetaminophen: See Instructions, Instructions: 325mg-650 mg PRN for pain/fever., 0 Refill(s), Type: Maintenance   Problem list:    All Problems (Selected)  Tobacco user / SNOMED CT 345933481 / Probable  Seasonal allergies / SNOMED CT 903897976 / Confirmed  PVD (peripheral vascular disease) / SNOMED CT 7301127210 / Confirmed  Prediabetes / SNOMED CT 2268229676 / Confirmed  Obesity / SNOMED CT 8241597348 / Probable  Lipids abnormal / SNOMED CT 508088974 / Confirmed  Inpatient stay / SNOMED CT 622382786 / Confirmed  History of pulmonary embolism / SNOMED CT 739603704 / Confirmed  History of DVT of lower extremity / SNOMED CT 0722991803 / Confirmed  History of arterial thrombosis / SNOMED CT 1669371791 / Confirmed  Factor V Leiden / SNOMED CT 4766469071 / Confirmed  Arthritis of right acromioclavicular joint / SNOMED CT 4045894569 / Confirmed  Anticoagulated / SNOMED CT  87317950 / Confirmed  Adenomatous colon polyp / SNOMED CT 1914098995 / Confirmed      Histories   Past Medical History:    Active  Inpatient stay (247009053): Onset on 2019 at 69 years.  Comments:  2019 CDT 9:47 AM CDT - Renetta Gerardo  @ [facility] due to LLE ischemic limb.  Prediabetes (5030813446): Onset on 2013 at 63 years.  Lipids abnormal (649092715): Onset on 10/26/2012 at 62 years.  Adenomatous colon polyp (0573789588): Onset on 2012 at 62 years.  History of DVT of lower extremity (1975170610): Onset on 2012 at 62 years.  PVD (peripheral vascular disease) (0830042233)  Anticoagulated (64508155)  Obesity (7710979708)  Factor V Leiden (9792011012)  History of pulmonary embolism (558373579)  Seasonal allergies (572134423)  History of arterial thrombosis (7606339443)  Arthritis of right acromioclavicular joint (5643124022)  Resolved  Inpatient stay (466233738): Onset on 2012 at 62 years.  Resolved.  Comments:  2019 CDT 8:00 AM CDT - Missy Rubi  @[place], [place] - Left superficial femoral artery chronic occlusion  Rupture of anterior cruciate ligament (483416363): Onset in  at 55 years.  Resolved.  Lyme disease (2800936922): Onset in the month of 1999 at 49 years  Resolved.  Tobacco user (305.1): Onset on 1968 at 17 years.  Resolved on 2012 at 61 years.   Family History:    Heart disease  Uncle (M)  Father (Blayne, )  Comments:  2010 9:16 AM CDT - Samira Larson MA - Cancer of colon  Uncle     Procedure history:    Percutaneous mechanical thrombectomy of vein of lower limb using fluoroscopic guidance (6657787261) on 2019 at 69 Years.  Comments:  2019 9:57 AM CDT - Renetta Gerardo  left  Arthroplasty of the knee (926805979) on 2019 at 69 Years.  Comments:  2019 9:50 AM CDT - Renetta Gerardo  left  Flexible sigmoidoscopy (85157476) on 2017 at 67 Years.  Insertion of arterial stent (954782016) on 2012 at 62  Years.  Insertion of arterial stent (648862611) on 7/12/2012 at 62 Years.  Comments:  8/7/2012 11:54 PM CDT - Missy Rubi  Left superficial femoral artery chronic occlusion.  Colectomy on 5/9/2012 at 62 Years.  Comments:  6/1/2012 9:43 PM JODIT - Missy Rubi  Hand-assisted laparoscopic total abdominal colectomy.    5/17/2012 3:17 PM CDT - Mir Toribio MD  Numerous adenomatous polyps and one large cecal polyp  Colonoscopy (479845877) on 2/2/2012 at 61 Years.  Colonoscopy (676162712) on 10/17/2005 at 55 Years.  Flexible sigmoidoscopy (06946634) on 9/15/2005 at 55 Years.  Arthroscopy of knee (549835539) in 2005 at 55 Years.  Comments:  6/19/2019 9:50 AM CDT - Renetta Gerardo  Right  ACL R Knee in 2005 at 55 Years.  Flexible sigmoidoscopy (19197385) on 12/14/2001 at 51 Years.  Flexible sigmoidoscopy (58573106) on 12/14/2001 at 51 Years.  Vasectomy (81237814).   Social History:        Electronic Cigarette/Vaping Assessment            Electronic Cigarette Use: Never.      Alcohol Assessment            Liquor (Hard) (1.5 oz), Daily, 2 drinks/episode average.  3 drinks/episode maximum.      Tobacco Assessment            Current every day smoker, Cigarettes, 20 per day.            10 or more cigarettes (1/2 pack or more)/day in last 30 days, Cigarettes      Substance Abuse Assessment            Marijuana, 1-2 times per week      Employment and Education Assessment            Retired      Home and Environment Assessment            Marital status: .  Spouse/Partner name: Sherice Cancino.  Lives with Significant other.  Living situation:               Home/Independent.  Injuries/Abuse/Neglect in household: No.  Feels unsafe at home: No.  Family/Friends               available for support: Yes.  Risks in environment: Pool/[place], Stairs, sometimes wears bike helmet, owns               secured gun.      Nutrition and Health Assessment            Type of diet: warfarin.            Type of diet: Regular.  Wants to lose  weight: Yes.      Exercise and Physical Activity Assessment            Exercise frequency: Daily.  Exercise type: Stretching shoulder.      Sexual Assessment            Sexually active: No.  Sexual orientation: Heterosexual.        Physical Examination   Vital Signs   11/22/2021 9:57 AM CST Temperature Tympanic 96 DegF  LOW    Peripheral Pulse Rate 83 bpm    Pulse Site Radial artery    HR Method Electronic    Systolic Blood Pressure 127 mmHg    Diastolic Blood Pressure 80 mmHg    Mean Arterial Pressure 96 mmHg    BP Site Right arm    BP Method Electronic      Measurements from flowsheet : Measurements   11/22/2021 9:57 AM CST Height Measured - Standard 72 in    Weight Measured - Standard 199.4 lb    BSA 2.14 m2    Body Mass Index 27.04 kg/m2  HI      General:  No acute distress.    HENT:  Tympanic membranes are clear.         Ear: clotted blood in the right ear canal. TM appears normal..    Integumentary:  Multiple raised papules. Some with rolled borders. Multiple flat darkly pigmented lesions on face..       Impression and Plan   Diagnosis     Facial skin lesion ([id number] L98.9).     Blood in right ear canal (NKU39-HM H92.21).     Patient Instructions:       Counseled: Patient, Regarding diagnosis, Regarding treatment, Regarding medications, Verbalized understanding.    Orders     Orders (Selected)   Outpatient Orders  Ordered  Referral (Request): 11/22/21 10:14:00 CST, Referred to: Dermatology, Referred to: [facility], Reason for referral: Facial lesions, Facial skin lesion  Prescriptions  Prescribed  Floxin Otic 0.3% otic solution: 5 drop(s), Ear-Right, bid, x 5 day(s), # 5 mL, 0 Refill(s), Type: Acute, Pharmacy: [facility] #[id number], 5 drop(s) Ear-Right bid,x5 day(s), 72, in, 11/22/21 9:57:00 CST, Height Measured, 199.4, lb, 11/22/21 9:57:00 CST, Weight Measured.     FU PRN

## 2022-02-16 NOTE — TELEPHONE ENCOUNTER
---------------------  From: Astrid Estrada LPN (Phone Messages Pool (32224_[facility]))   To: INR Pool ( 32224_[facility]);     Sent: 1/29/2021 1:14:22 PM CST  Subject: CONSUMER MESSAGE FW: INR 2.6           ---------------------  From: WENDI BOWSER  To: [facility]  Sent: 01/29/2021 01:12 p.m. CST  Subject: INR 2.6  Same dosage 10 daily 12 1/2 on Monday INR was 2.6OK

## 2022-02-16 NOTE — TELEPHONE ENCOUNTER
---------------------  From: Astrid Estrada LPN (Phone Messages Pool (32224_[facility]))   To: INR Pool ( 32224_[facility]);     Sent: 6/11/2021 12:21:55 PM CDT  Subject: CONSUMER MESSAGE FW: INR 5.0           ---------------------  From: WENDI BOWSER  To: [facility]  Sent: 06/11/2021 12:21 p.m. CDT  Subject: INR 5.0  Same dosage, I missed a night and caught up next day  12.5 Monday Wednesday Friday 10 other daysDone

## 2022-02-16 NOTE — TELEPHONE ENCOUNTER
---------------------  From: Viviana REES, Mary HERNANDEZ   To: WENDI BOWSER    Sent: 9/12/2019 4:26:49 PM CDT  Subject: INR response     Benny Oates,    I'm sorry to hear that you are not getting notifications when we send you a response through the patient portal. I am going to contact someone on our end and see if there is something we can do to straighten it out.    Also, I will send a message to our coding department about the billing. I don't know much about that part of it, so I will leave it to the experts.     If you are going to be charged every time you test and this is a financial burden, we can look into other options for you INR testing - such as changing home INR company to one that lets you test every 2-4 weeks or coming in to clinic for INR's.    As far as your INR of 3.1, keep up the good work, continue your warfarin 10 mg daily, recheck your INR in a week.    Please let me know if you need anything else or have any questions.    Have a wonderful evening,    Mary Michelle RNI called patient and gave him [facility] Pt Portal Support number to call about not getting email notifications.

## 2022-02-16 NOTE — TELEPHONE ENCOUNTER
---------------------  From: Courtney Sawrtz (INR Pool ( 32224_[facility]))   To: WENDI BOWSER    Sent: 1/8/2021 4:01:36 PM CST  Subject: INR instructions     Tez Oates  We received your INR 2.0. I would like you to take 12.5mg of warfarin today and monday, take 10mg remaining days of week. Please check INR in 1 wk. Lets us know if you have any questions/concerns.    Have a good weekend,  Courtney RN

## 2022-02-16 NOTE — TELEPHONE ENCOUNTER
"---------------------  From: Viviana REES, Mary HERNANDEZ   To: WENDI BOWSER    Sent: 9/25/2019 10:47:40 AM CDT  Subject: Concepcion Oates,    I sent a message to our coding and billing departments and this is the response I got:    \"I checked with the Business office and a copay is applied to each home INR interpretation. Looking at his account if he comes in to the office to have the INR done, there is not a copay.\"    I believe this means that every time you test there is an interpretation charge because we get your result and address it if needed.    Please let me know if you have any other question or need any other assistance.    Have a great day,    Mary Michelle RN  "

## 2022-02-16 NOTE — NURSING NOTE
Anticoagulation Therapy Management Entered On:  7/24/2020 2:24 PM CDT    Performed On:  7/24/2020 2:22 PM CDT by Mary Michelle RN               Anticoagulation Visit Assessment   Type of Visit - Anticoagulation :   Telephone   Anticoagulation Indication :   Deep vein thrombosis, Other: Factor V   Anticoagulant Start :   8/1/2012 CDT   Anticoagulant Duration :   Undetermined   Anticoagulation Medication Verified :   Yes   Patient Preferred Contact Method :   Cell   Mary Michelle RN - 7/24/2020 2:22 PM CDT   Anticoagulation Patient Assessment Grid   Change in Alcohol Consumption :   No   Change in Diet :   No   Change in Medications :   No   Diarrhea :   No   Rectal Bleeding :   No   Signs of Clotting :   No   Signs of Warfarin Intolerance :   No   Unusual Bleeding, Bruising :   No   Upcoming Procedures :   No   Vomiting :   No   Mary Michelle RN - 7/24/2020 2:22 PM CDT   Patient on Warfarin :   Yes   Mary Michelle RN - 7/24/2020 2:22 PM CDT   Warfarin   Anticoagulant INR Goal Lower :   2.5    Anticoagulant INR Goal Upper :   3.5    INR Home Monitoring Result :   5.2    Sunday :   10 mg   Monday :   12.5 mg   Tuesday :   10 mg   Wednesday :   10 mg   Thursday :   12.5 mg   Friday :   0 mg   Saturday :   0 mg   Total Dose :   55 mg   Warfarin Pt Reported Previous Week Dose :    Sun Mon Tues Wed Thurs Fri Sat Weekly Total Dose   Week 1 10 mg 12.5 mg 10 mg 10 mg 12.5 mg 10 mg 10 mg 75 mg   Week 2  mg  mg  mg  mg  mg  mg  mg  mg   Week 3  mg  mg  mg  mg  mg  mg  mg  mg   Week 4  mg  mg  mg  mg  mg  mg  mg  mg         Patient is taking single or multiple strength tablet(s) :   Multiple strength tab(s)   Multiple Tab Strengths :   5 mg tab, 7.5 mg tab   Sunday :   10 mg   Monday :   12.5 mg   Tuesday :   10 mg   Wednesday :   10 mg   Thursday :   12.5 mg   Friday :   10 mg   Saturday :   10 mg   Week 1 Total Dose :   75 mg   Patient Instructions :   mdINR = 5.2; called pt and he cannot come in for venous INR; he is  going to hold warfarin x2 days then take 10 mg Sunday; recheck INR on 7/27/20.     Viviana REES, Mary HERNANDEZ - 7/24/2020 2:22 PM CDT

## 2022-02-16 NOTE — TELEPHONE ENCOUNTER
---------------------  From: Vitor REES, Samira   To: WENDI BOWSER    Sent: 7/16/2021 12:29:08 PM CDT  Subject: INR     Benny Oates,    Thanks for coming in. I guess your strips are fine. Good to know. Please do not take any warfarin today. Reduce the warfarin to 10 mg daily. Recheck INR in 1 week. This decreases your dose by 6% per week.   Have a great day!    Samira MARIN RN

## 2022-02-16 NOTE — NURSING NOTE
Anticoagulation Therapy Entered On:  7/8/2019 9:52 AM CDT    Performed On:  7/8/2019 9:51 AM CDT by Mary Michelle RN               Warfarin Management   Week 1 Baldemar Dose :   15    Week 1 Monday Dose :   15    Week 1 Tuesday Dose :   15    Week 1 Wednesday Dose :   15    Week 1 Thursday Dose :   15    Week 1 Friday Dose :   15    Week 1 Saturday Dose :   15    Week 1 Dose Total :   105    Planned Duration :   Indefinite   Indication :   Aortic Valve Replacement, Other: FVLeiden   Warfarin Management Comments :   Patient portal message from home testing 7/7/19   International Normalization Ratio :   2.1    INR Range :   2.5 - 3.5   INR Therapeutic Range :   No   Anticoagulation Recheck :   3-4 days   Warfarin Special Instructions :   Per JDL continue warfairn 15 mg daily; recheck 3-4 days. Message sent to patient through portal on 7/8/19   Mary Michelle RN - 7/8/2019 9:51 AM CDT

## 2022-02-16 NOTE — NURSING NOTE
Anticoagulation Therapy Entered On:  3/18/2020 4:08 PM CDT    Performed On:  3/18/2020 4:05 PM CDT by Samira Adler RN               Warfarin Management   Week 1 Baldemar Dose :   10    Week 1 Monday Dose :   12.5    Week 1 Tuesday Dose :   10    Week 1 Wednesday Dose :   10    Week 1 Thursday Dose :   12.5    Week 1 Friday Dose :   10    Week 1 Saturday Dose :   10    Week 1 Dose Total :   75    Week 2 Sunday Dose :   10    Week 2 Monday Dose :   12.5    Week 2 Tuesday Dose :   10    Week 2 Wednesday Dose :   10    Week 2 Thursday Dose :   12.5    Week 2 Friday Dose :   10    Week 2 Saturday Dose :   10    Week 2 Dose Total :   75    Planned Duration :   Indefinite   Indication :   DVT, Other: Factor V   INR Range :   2.5 - 3.5   INR Therapeutic Range :   No   Anticoagulation Recheck :   1 week   Warfarin Physician :   David Jenkins MD Special Instructions :   INR = 2.3 per MDINR Patient is to stay on 12.5 mg warfarin on Mon and Thurs and 10 mg the rest of the days of the week Recheck INR in 1 week per protocol. Patient advised via portal.   Samira Adler RN - 3/18/2020 4:05 PM CDT

## 2022-02-16 NOTE — TELEPHONE ENCOUNTER
---------------------  From: WENDI BOWSER  To: [facility]  Sent: 06/12/2020 02:42 p.m. CDT  Subject: FW: Patient Message - Results Notification  Thanks  ---------------------  From: David Jenkins MD  To: WENDI BOWSER  Sent: 6/12/2020 1:50:49 PM CDT  Subject: Patient Message - Results Notification  ???  All labs acceptable.  Please let us know you received this message by either selecting Forward or Reply at the top.? Thank you  ???  Results:  Date Result Name Value Ref Range   6/12/2020 1:27 PM Hgb 15.1 g/dL (13.5 - 17.5)   6/12/2020 1:27 PM Platelet 199 (140 - 440)

## 2022-02-16 NOTE — TELEPHONE ENCOUNTER
---------------------  From: Viviana ERES, Mary HERNANDEZ (INR Pool ( 32224_[facility]))   To: WENDI BOWSER    Sent: 4/30/2021 10:45:58 AM CDT  Subject: Warfarin dosing     Benny Oates,    Thank you for the update on your INR.    Please continue your warfarin at the 12.5 M/W/F and 10 mg all other days of the week.    Check you INR in 1 week.    Have a wonderful day,    Mary Michelle, RN

## 2022-02-16 NOTE — TELEPHONE ENCOUNTER
---------------------  From: Samira Adler RN (Phone Messages Pool (43687_[facility]))   To: INR Pool ( [address]);     Sent: 11/26/2021 2:37:05 PM CST  Subject: FW: INR 6.5           ---------------------  From: WENDI BOWSER  To: [facility]  Sent: 11/26/2021 02:35 p.m. CST  Subject: INR 6.5  Same dosage 10 daily, I checked it twice and came out at 6.5 both times---------------------  From: Mary Michelle RN (INR Pool ( [address]))   To: WENDI BOWSER    Sent: 11/26/2021 2:44:25 PM CST  Subject: RE: INR 6.5     Benny Oates,    Please do not take any warfarin for 2 days then take a half dose on the third day.    Please check your INR on Monday, November 29th.    Do you have any bleeding, bruising, dizziness, extreme headache, or other symptoms that could be related to bleeding?    Please go to the Emergency Room if you develop any symptoms.    Mary Michelle RN---------------------  From: Mary Michlele RN (INR Pool ( 70[address]))   To: Ashly Zavala MD;     Sent: 11/26/2021 2:51:16 PM CST  Subject: FW: INR 6.5     PCP is [facility] who is [facility]  FYI: Pt INR elevated.  INR's are consistently inconsistent.  (Inserted Image. Unable to display)Noted.  Thanks

## 2022-02-16 NOTE — TELEPHONE ENCOUNTER
---------------------  From: Mary Michelle RN (INR Pool ( 32224_[facility]))   To: WENDI BOWSER    Sent: 2/3/2020 10:23:41 AM CST  Subject: RE: INR on 2/2/20     Hi Иван,    Thanks for the quick response.    Please continue your warfarin at the same dose and recheck in one week.    Are you able to check this Friday or next Monday? We are not in on weekends and it would be nice to get the result on a week day so we can address it right away if needed.    Hope all is well,    Mary Michelle RN      ---------------------  From: WENDI BOWSER  To: INR Pool ( 322[address]_[facility])  Sent: 02/03/2020 08:51 a.m. CST  Subject: RE: INR on 2/2/20  10 daily,12.5 Monday and Thursday, standard bruising, I finished amoxicillin last Monday for bronchitis  ---------------------  From: Mary Michelle RN (INR Pool ( [address]_[facility]))  To: WENDI BOWSER  Cc: INR Pool ( [address]_[facility]);  Sent: 2/3/2020 8:48:18 AM CST  Subject: INR on 2/2/20       Good Morning Иван,       I got your INR result of 4.7 from 2/2/20.       Can you please verify what dose of warfarin you have been taking?       Also, let me know if you have had any signs or symptoms of bleeding or excess bruising?       [place]       I look forward to your response,       Mary Michelle RN

## 2022-02-16 NOTE — LETTER
(Inserted Image. Unable to display)     October 21, 2019      WENDI CUDD  [address]  [place], [place] [id number]          Dear WENDI,      Thank you for selecting [facility] (previously [facility]) for your healthcare needs.    Our records indicate you are due for the following services:    Medicare Annual Wellness Visit.    Fasting Lab Tests ~ Please do not eat or drink anything 10 hours prior to your scheduled appointment time.  (Water and any medications that you may need are allowed unless directed otherwise.)    If you had your labs done at another facility or with Direct Access Lab Testing at [facility], please bring in a copy of the results to your next visit, mail a copy, or drop off a copy of your results to your Healthcare Provider.    You are due for lab work and an office visit; please schedule the lab appointment 1 week before the office visit.  This will assure all results are available to discuss with your provider during your visit.    **It is very helpful if you bring your medication bottles to your appointment.  This assures we have all of your current medications, including strength and dosing information, documented accurately in your medical record.    To schedule an appointment or if you have further questions, please contact your primary clinic:   [facility]       [phone]   [facility]       [phone]              [facility]     [phone]      Powered by [employer]    Sincerely,    David Jenkins MD

## 2022-02-16 NOTE — TELEPHONE ENCOUNTER
---------------------  From: Carmen Schmidt LPN (eRx Pool ([address]224_[facility]))   To: INR Pool ( [address]24_[facility]);     Sent: 11/2/2020 10:00:54 AM CST  Subject: FW: Medication Management   Due Date/Time: 11/1/2020 5:18:00 PM CST           ------------------------------------------  From: [facility] #[id number]  To: David Jenkins MD  Sent: October 31, 2020 6:18:24 PM CDT  Subject: Medication Management  Due: October 31, 2020 3:13:34 PM CDT     ** On Hold Pending Signature **     Dispensed Drug: warfarin (warfarin 5 mg oral tablet), TAKE 2& 1/2 TABLETS BY MOUTH MONDAY, THURSDAY AND 2 TABLETS REST OF WEEK  Quantity: 192 tab(s)  Days Supply: 90  Refills: 0  Substitutions Allowed  Notes from Pharmacy: **Patient requests 90 days supply**  ---------------------------------------------------------------  From: Shazia Lawrence RN   To: [facility] #[id number]    Sent: 11/2/2020 10:04:30 AM CST  Subject: FW: Medication Management     ** Submitted: **  Order:warfarin (warfarin 5 mg oral tablet)  See Instructions  TAKE 2& 1/2 TABLETS BY MOUTH MONDAY, THURSDAY AND 2 TABLETS REST OF WEEK  Qty:  192 tab(s)        Days Supply:  90        Refills:  0          Substitutions Allowed     Route To Pharmacy - [facility] #[id number]    Signed by Shazia Lawrence RN  11/2/2020 4:04:00 PM [facility]    ** Submitted: **  Complete:warfarin (warfarin 5 mg oral tablet)   Signed by Shazia Lawrence RN  11/2/2020 4:04:00 PM [facility]    ** Not Approved:  **  warfarin (WARFARIN SOD 5MG TABLETS (PEACH))  TAKE 2& 1/2 TABLETS BY MOUTH MONDAY, THURSDAY AND 2 TABLETS REST OF WEEK  Qty:  192 tab(s)        Days Supply:  90        Refills:  0          Substitutions Allowed     Route To Pharmacy - [facility] #[id number]   Note from Pharmacy:  **Patient requests 90 days supply**  Signed by Melinda REES, Vern compliant w/INR checks and taking Warfarin as directed.  Refilled per protocol.

## 2022-02-16 NOTE — TELEPHONE ENCOUNTER
---------------------  From: Samira Adler RN   To: WENDI BOWSER    Sent: 9/24/2021 2:52:20 PM CDT  Subject: INR4.0 today     Abelardorios Oates,    Please hold your warfarin for one day then resume warfarin 10mg daily. Recheck your INR in 1 week. Please call if you have any concerns.    Thank you,    Samira MARIN RN

## 2022-02-16 NOTE — TELEPHONE ENCOUNTER
---------------------  From: Viviana REES, Mary HERNANDEZ   To: WENDI BOWSER    Sent: 8/6/2021 12:34:59 PM CDT  Subject: Warfarin directions     Benny Oates,    I got your INR result of 4.3. Please Hold warfarin x 1 day then resume 10 mg daily. Recheck INR in 1 week.    If you have any questions or concerns, call or message me through the portal.    Have a great weekend,    Mary Michelle RN

## 2022-02-16 NOTE — NURSING NOTE
Anticoagulation Therapy Entered On:  4/24/2020 4:39 PM CDT    Performed On:  4/24/2020 4:36 PM CDT by Samira Adler RN               Warfarin Management   Week 1 Baldemar Dose :   10    Week 1 Monday Dose :   12.5    Week 1 Tuesday Dose :   10    Week 1 Wednesday Dose :   10    Week 1 Thursday Dose :   12.5    Week 1 Friday Dose :   10    Week 1 Saturday Dose :   10    Week 1 Dose Total :   75    Week 2 Sunday Dose :   10    Week 2 Monday Dose :   12.5    Week 2 Tuesday Dose :   10    Week 2 Wednesday Dose :   10    Week 2 Thursday Dose :   12.5    Week 2 Friday Dose :   10    Week 2 Saturday Dose :   10    Week 2 Dose Total :   75    Planned Duration :   Indefinite   Indication :   DVT, Other: Factor V   International Normalization Ratio :   2.9    INR Range :   2.5 - 3.5   INR Therapeutic Range :   Yes   Anticoagulation Recheck :   1 week   Warfarin Physician :   David Jenkins MD Special Instructions :   INR = 2.9 per MDINR on 4/24/20 Patient is to stay on 12.5mg warfarin on Mon and Thurs and 10 mg the rest of the days of the week Recheck INR in 1 week per protocol. Patient advised via portal.   Samira Adler RN - 4/24/2020 4:36 PM CDT

## 2022-02-16 NOTE — TELEPHONE ENCOUNTER
---------------------  From: Zohreh Willingham LPN (Phone Messages Pool (32224_[facility]))   To: INR [place] ( [address]_[facility]);     Sent: 4/22/2019 10:37:38 AM CDT  Subject: FW: INR 3.2           ---------------------  From: WENDI BOWSER  To: [facility]  Sent: 04/22/2019 10:25 a.m. CDT  Subject: INR 3.2  same dosage 10 daily  INR 3.2    I am having a total knee replacement June 13, Dr Lancaster said we will need a bridge period on the warfarin and Dr Arora should advise what that should be.  I would do my annual wellness exam prior to the procedure.noted.

## 2022-02-16 NOTE — TELEPHONE ENCOUNTER
---------------------  From: Vitor REES, Samira   To: WENDI BOWSER    Sent: 11/6/2020 1:50:08 PM CST  Subject: INR     Hello again,    Please hold your warfarin today. Then resume your usual dose. I have this as 12.5mg on Monday and 10 mg the rest of the days of the week. Recheck your INR in 1 week. Be seen if you have any symptoms of bleeding or concern. Have a great week end and be safe out there. Please respond so I know you got this message.    Thank you,    Samira MARIN RN.

## 2022-02-16 NOTE — TELEPHONE ENCOUNTER
---------------------  From: Vitor REES, Samira   To: MAGUEWENDI WEST    Sent: 5/7/2021 2:37:26 PM CDT  Subject: INR     Benny Oates,    Please stay on 12.5mg warfarin on Mon, Wed, and Fri and 10 mg the rest of the days of the week. Recheck INR in 1 week. Good INR at 3.5 today.    Thank you,     Samira MARIN RN

## 2022-02-16 NOTE — TELEPHONE ENCOUNTER
---------------------  From: Viviana REES, Mary HERNANDEZ   To: WENDI BOWSER    Sent: 8/13/2021 10:43:25 AM CDT  Subject: Warfarin dosing     Good morning Иван    Thanks for the update on your INR today.    Go ahead and continue warfarin 10 mg daily. Recheck INR in 1 week.    Have a wonderful day,    Mary Michelle RN

## 2022-02-16 NOTE — NURSING NOTE
Anticoagulation Therapy Management Entered On:  1/4/2021 3:28 PM CST    Performed On:  1/4/2021 3:27 PM CST by Shazia Lawrence RN               Anticoagulation Visit Assessment   Type of Visit - Anticoagulation :   Telephone   Anticoagulation Indication :   Deep vein thrombosis, Other: Factor V   Anticoagulant Start :   8/1/2012 CDT   Anticoagulant Duration :   Undetermined   Anticoagulation Medication Verified :   Yes   Patient Preferred Contact Method :   Cell   Patient on Warfarin :   Yes   Shazia Lawrence RN - 1/4/2021 3:27 PM CST   Warfarin   International Normalization Ratio TR :   3.9    Anticoagulant INR Goal Lower :   2.5    Anticoagulant INR Goal Upper :   3.5    Sunday :   10 mg   Monday :   12.5 mg   Tuesday :   10 mg   Wednesday :   10 mg   Thursday :   10 mg   Friday :   10 mg   Saturday :   10 mg   Total Dose :   72.5 mg   Warfarin Pt Reported Previous Week Dose :    Sun Mon Tues Wed Thurs Fri Sat Weekly Total Dose   Week 1 10 mg 12.5 mg 10 mg 10 mg 10 mg 10 mg 10 mg 72.5 mg   Week 2  mg  mg  mg  mg  mg  mg  mg  mg   Week 3  mg  mg  mg  mg  mg  mg  mg  mg   Week 4  mg  mg  mg  mg  mg  mg  mg  mg         Patient is taking single or multiple strength tablet(s) :   Multiple strength tab(s)   Multiple Tab Strengths :   5 mg tab, 7.5 mg tab   Sunday :   10 mg   Monday :   10 mg   Tuesday :   10 mg   Wednesday :   10 mg   Thursday :   10 mg   Friday :   10 mg   Saturday :   10 mg   Week 1 Total Dose :   70 mg   Patient Instructions :   INR = 3.9 per mdINR on 1/2/21. Per protocol, take 10mg today then resume 12.5mg Mondays and 10mg ROW. Recheck INR in 1 week. Advised by portal message.      Shazia aLwrence RN - 1/4/2021 3:27 PM CST

## 2022-02-16 NOTE — NURSING NOTE
Anticoagulation Therapy Management Entered On:  9/24/2021 2:56 PM CDT    Performed On:  9/24/2021 2:53 PM CDT by Samira Adler RN               Anticoagulation Visit Assessment   Anticoagulation Indication :   Deep vein thrombosis, Other: Factor V   Anticoagulant Start :   8/1/2012 CDT   Anticoagulant Duration :   Undetermined   Anticoagulation Medication Verified :   Yes   Patient Preferred Contact Method :   Cell   Samira Adler RN - 9/24/2021 2:53 PM CDT   Anticoagulation Patient Assessment Grid   Change in Alcohol Consumption :   No   Change in Diet :   No   Change in Medications :   No   Diarrhea :   No   Rectal Bleeding :   No   Signs of Clotting :   No   Signs of Warfarin Intolerance :   No   Unusual Bleeding, Bruising :   No   Upcoming Procedures :   No   Vomiting :   No   Samira Adler RN - 9/24/2021 2:53 PM CDT   Patient on Warfarin :   Yes   Samira Adler RN - 9/24/2021 2:53 PM CDT   Warfarin   International Normalization Ratio TR :   4.0    Anticoagulant INR Goal Lower :   2.5    Anticoagulant INR Goal Upper :   3.5    Information Given by :   Patient   Sunday :   10 mg   Monday :   10 mg   Tuesday :   10 mg   Wednesday :   10 mg   Thursday :   10 mg   Friday :   10 mg   Saturday :   10 mg   Total Dose :   70 mg   Warfarin Pt Reported Previous Week Dose :    Sun Mon Tues Wed Thurs Fri Sat Weekly Total Dose   Week 1 10 mg 10 mg 10 mg 10 mg 10 mg 10 mg 10 mg 70 mg   Week 2 10 mg 10 mg 10 mg 12.5 mg 10 mg 12.5 mg 10 mg 75 mg   Week 3  mg  mg  mg  mg  mg  mg  mg  mg   Week 4  mg  mg  mg  mg  mg  mg  mg  mg         Patient is taking single or multiple strength tablet(s) :   Multiple strength tab(s)   Multiple Tab Strengths :   5 mg tab, 7.5 mg tab   Sunday :   10 mg   Monday :   10 mg   Tuesday :   10 mg   Wednesday :   10 mg   Thursday :   10 mg   Friday :   10 mg   Saturday :   10 mg   Week 1 Total Dose :   70 mg   Warfarin Dosing Acknowledgement :   I have reviewed the patient's warfarin  dosing schedule and confirmed its accuracy for today's visit.   Patient Instructions :   INR = 4.0 per MDINR today.  Patient is to hold warfarin x 1 day then resume warfarin 10mg daily and recheck INR in 1 week. Patient advised via portal.     Samira Adler RN - 9/24/2021 2:53 PM CDT

## 2022-02-16 NOTE — TELEPHONE ENCOUNTER
---------------------  From: Mary Michelle RN (INR Pool ( [address] [place] [address]))   To: David Jenkins MD;     Cc: INR Pool ( [address] [place] [address]);      Sent: 6/28/2019 11:22:56 AM CDT  Subject: [facility] call-INR     Call was received from Clinton, nurse at [facility].    She states the patient is very frustrated with is INR remaining at 1.2 and warfarin doses not be increased. Is taking Warfarin 12.5 mg daily and Lovenox BID.    Clinton was hoping for insight from TFS so she can inform the patient of thought process and goal at this time.    I told Clinton it was likely due to pt condition and not wanting to have a significant/drastic change rapidly, but that I would ask TFS to elaborate.    Please advise.---------------------  From: David Jenkins MD   To: INR Pool ( [address] [place] [address]);     Sent: 6/28/2019 11:36:02 AM CDT  Subject: RE: [facility] call-INR     please call [facility] and ask the pharmacist to reviewed patients meds and consult on coumadin doseCall to [facility] at 1224    Clinton was informed of TFS message and will call or fax with order request per pharmacy suggestions.---------------------  From: Mary Michelle RN (INR Pool ( [address] [place] [address]))   To: David Jenkins MD;     Sent: 6/28/2019 1:41:24 PM CDT  Subject: FW: [facility] call-INR     Spoke to Clinton at [facility].    She states pharmacy suggestion for warfarin dosing is: increase to 13.75 mg daily and wait at least 2 days to check INR.    Please advise.---------------------  From: David Jenkins MD   To: INR Pool ( [address] [place] [address]);     Sent: 6/28/2019 2:57:45 PM CDT  Subject: RE: [facility] call-INR     ok please do that and verify that he is getting real coumadinCall to [facility] at 1607  Darleen, nurse, was given verbal direction and I will also fax directions. She will make sure no generic warfarin is being used. INR will be checked on 6/30/19 and faxed or called in to [facility] urgent care.INR done 7/1/19  Addressed by GTG

## 2022-02-16 NOTE — TELEPHONE ENCOUNTER
---------------------  From: Samira Adler RN (Phone Messages Pool ([address]224_[facility]))   To: INR Pool ( [address]);     Sent: 8/30/2021 8:21:42 AM CDT  Subject: FW: Inr 3.2           ---------------------  From: WENDI BOWSER  To: [facility]  Sent: 08/27/2021 10:12 p.m. CDT  Subject: Inr 3.2  Skip the last Friday 10  g daily iINR 3.2---------------------  From: Mary Michelle RN (INR Pool ( [address]_[facility]))   To: WENDI BOWSER    Sent: 8/30/2021 8:43:33 AM CDT  Subject: RE: Inr 3.2     Benny Oates,    I got your INR from 8/27/21.    Keep on your Warfarin 10 mg daily and check your INR in a week.    Have a great day,    Mary Michelle RN

## 2022-02-16 NOTE — TELEPHONE ENCOUNTER
---------------------  From: Samira Adler RN   To: WENDI BOWSER    Sent: 5/7/2019 11:24:30 AM CDT  Subject: INR     Rajeev Lama. Please stay on 10 mg warfarin daily and recheck INR in 1 week. Have a great day!    Samira SALAS RN.

## 2022-02-16 NOTE — NURSING NOTE
Anticoagulation Therapy Management Entered On:  8/31/2020 11:52 AM CDT    Performed On:  8/31/2020 11:51 AM CDT by Shazia Lawrence RN               Anticoagulation Visit Assessment   Type of Visit - Anticoagulation :   Telephone   Anticoagulation Indication :   Deep vein thrombosis, Other: Factor V   Anticoagulant Start :   8/1/2012 CDT   Anticoagulant Duration :   Undetermined   Anticoagulation Medication Verified :   Yes   Patient Preferred Contact Method :   Cell   Patient on Warfarin :   Yes   Shazia Lawrence RN - 8/31/2020 11:51 AM CDT   Warfarin   International Normalization Ratio TR :   2.4    Anticoagulant INR Goal Lower :   2.5    Anticoagulant INR Goal Upper :   3.5    Sunday :   0 mg   Monday :   12.5 mg   Tuesday :   10 mg   Wednesday :   10 mg   Thursday :   10 mg   Friday :   10 mg   Saturday :   0 mg   Total Dose :   52.5 mg   Warfarin Pt Reported Previous Week Dose :    Sun Mon Tues Wed Thurs Fri Sat Weekly Total Dose   Week 1 10 mg 12.5 mg 10 mg 10 mg 10 mg 10 mg 10 mg 72.5 mg   Week 2  mg  mg  mg  mg  mg  mg  mg  mg   Week 3  mg  mg  mg  mg  mg  mg  mg  mg   Week 4  mg  mg  mg  mg  mg  mg  mg  mg         Patient is taking single or multiple strength tablet(s) :   Multiple strength tab(s)   Multiple Tab Strengths :   5 mg tab, 7.5 mg tab   Sunday :   10 mg   Monday :   12.5 mg   Tuesday :   10 mg   Wednesday :   10 mg   Thursday :   10 mg   Friday :   10 mg   Saturday :   10 mg   Week 1 Total Dose :   72.5 mg   Patient Instructions :   INR = 2.4 per mdINR. Per protocol, resume Warfarin 12.5mg Monday and 1mg ROW. Recheck INR in 1 week. Message sent via portal.     Shazia Lawrence RN - 8/31/2020 11:51 AM CDT

## 2022-02-16 NOTE — TELEPHONE ENCOUNTER
---------------------  From: Mary Michelle RN (INR Pool ( [address]_[facility]))   To: WENDI BOWSER    Sent: 7/15/2019 3:27:05 PM CDT  Subject: RE: INR 3.9     JOVANNY Oates,    See you soon!    Mary Michelle RN      ---------------------  From: WENDI BOWSER  To: INR Pool ( [address]_[facility])  Sent: 07/15/2019 03:13 p.m. CDT  Subject: RE: INR 3.9  I will be in at 3:40 today for an INR       Addendum by Mary Michelle RN on July 15, 2019 8:53:17 AM CDT  ---------------------  From: Mary Michelle RN (INR Pool ( [address]_[facility]))  To: WENDI BOWSER  Sent: 7/15/2019 8:53:17 AM CDT  Subject: RE: INR 3.9       Benny Oates,       I got your result this morning.  Dr. Crouch would like you to recheck your INR today.  Please message me back with your result and we will go from there.       Thanks a bebetoch,       Mary Michelle RN  ---------------------  From: Zohreh Willingham LPN (Phone Messages [place] ([address]))  To: INR Pool ( [address]_[facility]);  Sent: 7/15/2019 8:43:11 AM CDT  Subject: FW: INR 3.9                      ---------------------  From: WENDI BOWSER  To: [facility]  Sent: 07/13/2019 06:39 p.m. CDT  Subject: INR 3.9  same dosage 15 daily  INR 3.9  I will cut back tonights dosage to 12.5 and resume 15 on Monday. Test again Monday.  Sorry this is so late inn the day

## 2022-02-16 NOTE — TELEPHONE ENCOUNTER
---------------------  From: Astrid Esrtada LPN (Phone Messages Pool ([address]_[facility]))   To: INR Pool ( [address]_[facility]);     Sent: 11/5/2021 12:06:30 PM CDT  Subject: CONSUMER MESSAGE FW: INR 2.5           ---------------------  From: WENDI BOWSER  To: [facility]  Sent: 11/05/2021 12:05 p.m. CDT  Subject: INR 2.5  Same dosage 10 daily INR 2.5---------------------  From: Samira Adler RN (INR Pool ( [address]_[facility]))   To: WENDI BOWSER    Sent: 11/5/2021 1:30:31 PM CDT  Subject: FW: CONSUMER MESSAGE FW: INR 2.5     Rajeev Lama INR today. Please stay on 10 mg warfarin daily and recheck your INR in 1 week.    Thank you,    Samira MARIN RN

## 2022-02-16 NOTE — TELEPHONE ENCOUNTER
---------------------  From: Samuel REES, Frieda ALFARO   Sent: 9/18/2020 4:47:16 PM CDT  Subject: INR     Good evening Иван,    I received that your INR was 3.6 today. I have that your dose is 12.5mg on Mondays and 10mg the rest of the days. If this is your current dose, Please continue and recheck your INR in 1 week.     Frieda Baker RN

## 2022-02-16 NOTE — TELEPHONE ENCOUNTER
---------------------  From: Zohreh Willingham LPN (Phone Messages [place] ([address]_[facility]))   To: INR [place] ( [address]_[facility]);     Sent: 7/15/2019 8:43:11 AM CDT  Subject: FW: INR 3.9           ---------------------  From: WENDI BOWSER  To: [facility]  Sent: 07/13/2019 06:39 p.m. CDT  Subject: INR 3.9  same dosage 15 daily  INR 3.9  I will cut back tonights dosage to 12.5 and resume 15 on Monday. Test again Monday.  Sorry this is so late inn the day---------------------  From: Mary Michelle RN (INR [place] ( [address]_[facility]))   To: WENDI BOWSER    Sent: 7/15/2019 8:53:17 AM CDT  Subject: RE: INR 3.9     Benny Oates,    I got your result this morning.  Dr. Crouch would like you to recheck your INR today.  Please message me back with your result and we will go from there.    Thanks a jon,    Mary Michelle RN

## 2022-02-16 NOTE — TELEPHONE ENCOUNTER
---------------------  From: Gardenia Castro RN (Phone Messages Pool ([address]_[facility]))   To: INR Pool ( [address]_[facility]);     Sent: 12/24/2021 8:52:05 AM CST  Subject: CONSUMER MESSAGE  FW: INR 2.4           ---------------------  From: WENDI BOWSER  To: [facility]  Sent: 12/24/2021 08:50 a.m. CST  Subject: INR 2.4  I skipped doses on Friday and Saturday last week 10 mg daily INR 2.4---------------------  From: Samira Adler RN (INR Pool ( [address]_[facility]))   To: WENDI BOWSER    Sent: 12/24/2021 10:54:53 AM CST  Subject: RE: CONSUMER MESSAGE  FW: INR 2.4     Benny Oates,    Please stay on 10 mg warfarin daily and recheck INR in 1 week. Best wishes to you for the New Year!    Samira FLORES

## 2022-02-16 NOTE — NURSING NOTE
Anticoagulation Therapy Entered On:  1/13/2020 12:31 PM CST    Performed On:  1/13/2020 12:27 PM CST by Samira Adler RN               Warfarin Management   Planned Duration :   Indefinite   Indication :   Atrial fibrillation   INR Therapeutic Range :   No   Warfarin Abnormal Findings :   held for 2 days  Travel in [facility] until April   Anticoagulation Recheck :   1 week   Warfarin Physician :   David Jenkins MD Special Instructions :   INR = 1.2 per Patient home test on 1/11/20 sent via portal, Pateint is  to take 10mg warfarin daily and recheck INR in 1 week per TFS as previously directed. Patient advised via portal.   Samira Adler RN - 1/13/2020 12:27 PM CST

## 2022-02-16 NOTE — TELEPHONE ENCOUNTER
---------------------  From: Viviana REES, Mary HERNANDEZ   To: WENDI BOWSER    Sent: 3/5/2021 3:03:08 PM CST  Subject: INR     Hi Иван,    Thanks for the INR result. Please continue your warfarin at 12.5 mg Mondays and 10 mg ROW.    Recheck your INR in 1 week, please.    Have a great day,    Mary Michelle RN

## 2022-02-16 NOTE — TELEPHONE ENCOUNTER
---------------------  From: Viviana REES, Mary HERNANDEZ   To: WENDI BOWSER    Sent: 3/14/2019 1:30:03 PM CDT  Subject: Warfarin dosing     Benny Oates,    Thank you for letting us know about your INR result of 2.6.    Please continue your warfarin at 10 mg daily and recheck your INR in 1 week.    Let us know if you have any questions or concerns.    Have a great day,    Mary Michelle RN

## 2022-02-16 NOTE — TELEPHONE ENCOUNTER
---------------------  From: Samira Adler RN   To: WENDI BOWSER    Sent: 3/26/2021 5:26:00 PM CDT  Subject: INR     Benny Oates,    Please continue your 12.5mg on Monday and Friday and 10mg the rest of the days of the week Warfarin Dose. Recheck the INR in 1 week please.    Thank you,    Samira MARIN RN

## 2022-02-16 NOTE — TELEPHONE ENCOUNTER
---------------------  From: Gardenia Castro RN (Phone Messages Pool ([address]_[facility]))   To: INR Pool ( [address]_[facility]);     Sent: 12/17/2021 9:49:19 AM CST  Subject: CONSUMER MESSAGE  FW: INR 4.8           ---------------------  From: WENDI BOWSER  To: [email or web address]]  Sent: 12/17/2021 09:46 a.m. CST  Subject: INR 4.8  Same dosage 10 daily INR 4.7---------------------  From: Mary Michelle RN (INR Pool ( [address]_[facility]))   To: WENDI BOWSER    Sent: 12/17/2021 11:12:25 AM CST  Subject: RE: CONSUMER MESSAGE  FW: INR 4.8     Benny Oates,    Please do not take Warfarin x2 days then resume your 10 mg daily. Please, let me know if you have any concerns with bleeding/bruising or any medication/diet changes.    We close at noon on 12/24/21, so please check your INR that morning or the day before.    Have a great Johanne,    Mary Michelle RN

## 2022-02-16 NOTE — NURSING NOTE
Comprehensive Intake Entered On:  10/11/2021 1:43 PM CDT    Performed On:  10/11/2021 1:38 PM CDT by Rosemary Lama CMA               Summary   Chief Complaint :   c/o tick bite on right lower abdomen, pulled tick off around 3AM today    Advance Directive :   Yes   Weight Measured :   194.5 lb(Converted to: 194 lb 8 oz, 88.224 kg)    Height Measured :   72 in(Converted to: 6 ft 0 in, 182.88 cm)    Body Mass Index :   26.38 kg/m2 (HI)    Body Surface Area :   2.11 m2   Systolic Blood Pressure :   118 mmHg   Diastolic Blood Pressure :   64 mmHg   Mean Arterial Pressure :   82 mmHg   Peripheral Pulse Rate :   84 bpm   BP Site :   Right arm   Pulse Site :   Radial artery   BP Method :   Manual   HR Method :   Manual   Temperature Tympanic :   98.4 DegF(Converted to: 36.9 DegC)    Rosemary Lama CMA - 10/11/2021 1:38 PM CDT   Health Status   Allergies Verified? :   Yes   Medication History Verified? :   Yes   Medical History Verified? :   No   Pre-Visit Planning Status :   Not completed   Tobacco Use? :   Current every day smoker   Rosemary Lama CMA - 10/11/2021 1:38 PM CDT   Consents   Consent for Immunization Exchange :   Consent Granted   Consent for Immunizations to Providers :   Consent Granted   Rosemary Lama CMA - 10/11/2021 1:38 PM CDT   Meds / Allergies   (As Of: 10/11/2021 1:43:36 PM CDT)   Allergies (Active)   No Known Medication Allergies  Estimated Onset Date:   Unspecified ; Created By:   Laisha Murillo CMA; Reaction Status:   Active ; Category:   Drug ; Substance:   No Known Medication Allergies ; Type:   Allergy ; Updated By:   Laisha Murillo CMA; Reviewed Date:   10/11/2021 1:41 PM CDT        Medication List   (As Of: 10/11/2021 1:43:36 PM CDT)   Prescription/Discharge Order    albuterol  :   albuterol ; Status:   Prescribed ; Ordered As Mnemonic:   albuterol 90 mcg/inh inhalation aerosol ; Simple Display Line:   2 puff(s), Inhale, qid, PRN: shortness of breath or wheezing, 1 EA, 1 Refill(s) ;  Ordering Provider:   David Jenkins MD; Catalog Code:   albuterol ; Order Dt/Tm:   8/13/2021 1:14:09 PM CDT          atorvastatin  :   atorvastatin ; Status:   Prescribed ; Ordered As Mnemonic:   atorvastatin 20 mg oral tablet ; Simple Display Line:   20 mg, 1 tab(s), Oral, daily, 90 tab(s), 3 Refill(s) ; Ordering Provider:   David Jenkins MD; Catalog Code:   atorvastatin ; Order Dt/Tm:   4/27/2021 1:21:43 PM CDT          diclofenac topical  :   diclofenac topical ; Status:   Prescribed ; Ordered As Mnemonic:   Voltaren 1% topical gel ; Simple Display Line:   2 gm, Topical, qid, PRN: shoulder pain, 100 gm, 11 Refill(s) ; Ordering Provider:   Freda Massey MD; Catalog Code:   diclofenac topical ; Order Dt/Tm:   5/7/2020 11:15:01 AM CDT          fluticasone nasal  :   fluticasone nasal ; Status:   Prescribed ; Ordered As Mnemonic:   fluticasone 50 mcg/inh nasal spray ; Simple Display Line:   2 spray(s), nasal, daily, for 90 day(s), 3 EA, 3 Refill(s) ; Ordering Provider:   Nick Bello PA-C; Catalog Code:   fluticasone nasal ; Order Dt/Tm:   6/15/2020 10:54:23 AM CDT          sildenafil  :   sildenafil ; Status:   Prescribed ; Ordered As Mnemonic:   Viagra 100 mg oral tablet ; Simple Display Line:   See Instructions, TAKE AS DIRECTED, 8 tab(s), 11 Refill(s) ; Ordering Provider:   David Jenkins MD; Catalog Code:   sildenafil ; Order Dt/Tm:   4/20/2016 12:38:56 PM CDT          warfarin  :   warfarin ; Status:   Prescribed ; Ordered As Mnemonic:   warfarin 5 mg oral tablet ; Simple Display Line:   See Instructions, TAKE TWO AND ONE-HALF TABLETS BY MOUTH ON MONDAY AND THURSDAY. TAKE 2 TABLETS DAILY THE REST OF THE WEEK., 192 tab(s), 0 Refill(s) ; Ordering Provider:   David Jenkins MD; Catalog Code:   warfarin ; Order Dt/Tm:   7/6/2021 3:06:58 PM CDT            Home Meds    acetaminophen  :   acetaminophen ; Status:   Documented ; Ordered As Mnemonic:   acetaminophen ; Simple Display Line:   See  Instructions, 325mg-650 mg PRN for pain/fever., 0 Refill(s) ; Catalog Code:   acetaminophen ; Order Dt/Tm:   6/19/2019 3:01:15 PM CDT          aspirin  :   aspirin ; Status:   Documented ; Ordered As Mnemonic:   aspirin 81 mg oral delayed release tablet ; Simple Display Line:   81 mg, 1 tab(s), Oral, daily, 0 Refill(s) ; Catalog Code:   aspirin ; Order Dt/Tm:   4/27/2021 1:08:00 PM CDT            Social History   Social History   (As Of: 10/11/2021 1:43:36 PM CDT)   Alcohol:        Liquor (Hard) (1.5 oz), Daily, 2 drinks/episode average.  3 drinks/episode maximum.   (Last Updated: 5/4/2021 2:08:57 PM CDT by Barbie Uribe)          Tobacco:        Current every day smoker, Cigarettes, 20 per day.   (Last Updated: 4/13/2017 12:38:29 PM CDT by Sarah Li)   10 or more cigarettes (1/2 pack or more)/day in last 30 days, Cigarettes   (Last Updated: 4/27/2021 1:08:35 PM CDT by Laisha Murillo CMA)          Electronic Cigarette/Vaping:        Electronic Cigarette Use: Never.   (Last Updated: 4/27/2021 1:08:39 PM CDT by Laisha Murillo CMA)          Substance Abuse:        Marijuana, 1-2 times per week   (Last Updated: 5/4/2021 2:10:26 PM CDT by Barbie Uribe)          Employment/School:        Retired   (Last Updated: 4/13/2017 12:38:43 PM CDT by Sarah Li)          Home/Environment:        Marital status: .  Spouse/Partner name: Sherice Cancino.  Lives with Significant other.  Living situation: Home/Independent.  Injuries/Abuse/Neglect in household: No.  Feels unsafe at home: No.  Family/Friends available for support: Yes.  Risks in environment: Pool/[place], Stairs, sometimes wears bike helmet, owns secured gun.   (Last Updated: 5/4/2021 2:12:15 PM CDT by Barbie Uribe)          Nutrition/Health:        Type of diet: warfarin.   (Last Updated: 4/13/2017 12:39:21 PM CDT by Sarah Li)   Type of diet: Regular.  Wants to lose weight: Yes.   (Last Updated: 5/4/2021 2:11:00 PM CDT by Barbie Uribe)           Exercise:        Exercise frequency: Daily.  Exercise type: Stretching shoulder.   (Last Updated: 5/4/2021 2:11:21 PM CDT by Barbie Uribe)          Sexual:        Sexually active: No.  Sexual orientation: Heterosexual.   (Last Updated: 4/13/2017 12:39:37 PM CDT by Sarah Li)

## 2022-02-16 NOTE — TELEPHONE ENCOUNTER
---------------------  From: Mary Michelle RN (Phone Messages Pool ([id number]_[facility]))   To: INR Pool ( [address]_[facility]);     Sent: 11/30/2020 9:45:04 AM CST  Subject: FW: Inr 2.8           ---------------------  From: WENDI BOWSER  To: [facility]UST  Sent: 11/30/2020 09:40 a.m. CST  Subject: Inr 2.8  I took my test last Friday and it was 4.9. I skipped Friday night and my test today is 2.8. They sent me a new machine---------------------  From: Shazia Lawrence RN (INR Pool ( [address]_[facility]))   To: WENDI BOWSER    Sent: 11/30/2020 10:00:19 AM CST  Subject: RE: Inr 2.8     Tez Lyons call holding your Warfarin Friday.  I have the last documented Warfarin dosage as 12.5mg Mondays and 10mg the rest of the days.  If this is correct, you can continue this dose and recheck your INR in 1 week.    Please let me know if you have any questions or concerns.    Thanks,  Shazia HUBER RN

## 2022-02-16 NOTE — TELEPHONE ENCOUNTER
---------------------  From: Frieda Baker RN (Phone Messages Pool ([id number]_[facility]))   To: INR Pool ( [address]_[facility]);     Sent: 10/12/2020 2:41:58 PM CDT  Subject: FW: Inr 1.5           ---------------------  From: WENDI BOWSER  To: [facility]  Sent: 10/12/2020 01:49 p.m. CDT  Subject: Inr 1.5  I did all of the Lovenox and started my warfarin one day after the surgery the day of surgery my test was .99 today it is 1.5. I am out of the Lovenox shots, they cost $18 apiece so if you need to order more order the minimum for me please---------------------  From: Shazia Lawrence RN (INR Pool ( [address]_[facility]))   To: WENDI BOWSER    Sent: 10/12/2020 2:52:38 PM CDT  Subject: RE: Inr 1.5     Hi Иван,    I sent in a refill of 6 Lovenox injections (3 days worth). I would like you to recheck 10-15-20 to see if you are still needing to continue the Lovenox. You can continue the Warfarin 12.5mg Mondays and 10mg the rest of the week. Please let me know if you have any questions or concerns.     Thanks,    Shazia HUBER RN

## 2022-02-16 NOTE — TELEPHONE ENCOUNTER
---------------------  From: Shazia Lawrence RN (INR Pool ( [address]_[facility]))   To: WENDI BOWSER    Sent: 1/15/2021 10:39:30 AM CST  Subject: INR Instructions     Tez Oates -    I received your INR result of 4.1. I would like you to not take any Warfarin today. Tomorrow you can resume your normal dose of 12.5mg Mondays and 10mg the rest of the days. Recheck your INR next week.     Please let me know if you have any questions or concerns.    Thanks!  Shazia HUBER RN

## 2022-02-16 NOTE — TELEPHONE ENCOUNTER
---------------------  From: Rosemary Lama CMA (Phone Messages Pool ([address]124_[facility]))   To: INR Pool ( [address]_[facility]);     Sent: 8/20/2021 1:50:42 PM CDT  Subject: FW: INR. 3.8           ---------------------  From: WENDI BOWSER  To: [facility]  Sent: 08/20/2021 01:42 p.m. CDT  Subject: INR. 3.8  10 daily, 3.8 inr---------------------  From: Viviana REES, Mary HERNANDEZ (INR Pool ( [address]_[facility]))   To: WENDI BOWSER    Sent: 8/20/2021 2:48:08 PM CDT  Subject: RE: INR. 3.8     Benny Oates,    Thank you for the message about your INR. Please Hold warfarin x1 day then Continue warfarin 10 mg daily. Recheck INR in 1 week.    Hope all is well,    Mary Michelle, NEGRITA

## 2022-02-16 NOTE — TELEPHONE ENCOUNTER
---------------------  From: Samira Adler RN   To: WENDI BOWSER    Sent: 4/23/2021 5:46:41 PM CDT  Subject: INR     Benny Oates,    Please increase your Warfarin to 12.5mg on Monday, Wednesday, and  Friday and 10mg the rest of the days of the week. Recheck the INR in one week. Call if you have any questions or concerns.    Thank you,    Samira MARIN RN

## 2022-02-16 NOTE — TELEPHONE ENCOUNTER
---------------------  From: Vitor REES, Samira   To: MAGUE WENDI P    Sent: 1/22/2021 5:34:10 PM CST  Subject: INR     Benny Oates,    I received your INR of 2.4 today. Please stay on the same dose of 12.5mg warfarin on Mondays and 10 mg the rest of the days of the week. Recheck INR in 1 week. Contact us if you have any concerns.  Have a great weekend!    Thank you,    Samira MARIN RN

## 2022-02-16 NOTE — TELEPHONE ENCOUNTER
---------------------  From: Astrid Estrada LPN (Phone Messages Pool (96044_[facility]))   To: INR Pool ( [address]_[facility]);     Sent: 10/9/2020 10:53:50 AM CDT  Subject: FW: General Message warfarin bridge           ---------------------  From: WENDI BOWSER  To: [facility]  Sent: 10/09/2020 10:45 a.m. CDT  Subject: RE: General Message warfarin bridge  They took my INR yesterday before surgery it was .99 they gave me ibuprofen to take 200 mg tablets and I m supposed to take three tablets every eight hours for five days. Won t that mess up my INR? I did my injection last night and today I will do my injections twice I ve already done one this morning and I ll do another one tonight and resume the warfarin  ---------------------  From: Shazia Lawrence RN (Phone Messages Pool (64624_[facility]))  To: WENDI BOWSER  Sent: 10/1/2020 9:42:27 AM CDT  Subject: RE: General Message warfarin bridge       Hi Иван -       Yes. You will be taking both Warfarin and Lovenox after the procedure. Once your INR is above 2, then the Lovenox will be stopped and you will continue your Warfarin. It will be important to check your INR within the week after surgery (either 10/12 or 10/13).  Also, please let us know if you end up needing a refill of the Lovenox - we can send that in to pharmacy if needed.       Thanks,       Shazia HUBER RN            ---------------------  From: WENDI BOWSER  To: [facility]  Sent: 10/01/2020 09:17 a.m. CDT  Subject: RE: General Message warfarin bridge  So after the procedure I take the warfarin and the  Lovenox shots?  ---------------------  From: Shazia Lawrence RN (Phone Messages Pool ([address]224_[facility]))  To: WENDI BOWSER  Cc: [facility] Pool ( [address]_[facility]);  Sent: 10/1/2020 9:13:01 AM CDT  Subject: General Message       Tez Oates,       We received contact from [facility] regarding your upcoming shoulder surgery and  the need for Lovenox bridging.  Below is the instructions that Dr. Jenkins gave regarding stopping your Warfarin and starting the Lovenox injections.  I have also sent the Lovenox to [facility].       Stop coumadin 5 days before procedure  Start Lovenox 100mg subcutaneous every 12 hours 4 days before procedure  take last dose of lovenox 24 hours before procedure  resume coumadin the day after procedure  resume lovenox every 12 hours after procedure  continue lovenox until inr >2       Please let us know if you have any questions or concerns.       Thanks!  Shazia HUBER RN---------------------  From: Shazia Lawrence RN (INR Pool ( [address]24_[facility]))   To: [employer] Limited Message Pool ([id number]);     Sent: 10/9/2020 11:35:39 AM CDT  Subject: FW: General Message warfarin bridge     Please advise.    Pt had rotator cuff repair yesterday. OK for Tylenol post-op instead of the Ibuprofen?---------------------  From: Donna Celestin CMA ([employer] Limited Message Pool ([facility]))   To: David Jenkins MD;     Sent: 10/9/2020 12:15:32 PM CDT  Subject: FW: General Message warfarin bridge---------------------  From: David Jenkins MD   To: [employer] Limited Message Pool ([id number]);     Sent: 10/9/2020 12:25:27 PM CDT  Subject: RE: General Message warfarin bridge     yes---------------------  From: Donna Celestin CMA ([employer] Limited Message Pool ([address]24_[facility]))   To: WENDI BOWSER    Sent: 10/9/2020 1:24:40 PM CDT  Subject: RE: General Message warfarin bridge     Dr. Jenkins said it is ok to use Tylenol post op instead of Ibuprofen.

## 2022-02-16 NOTE — TELEPHONE ENCOUNTER
---------------------  From: Vitor REES, Samira   To: WENDI BOWSER    Sent: 6/11/2021 3:00:55 PM CDT  Subject: INR     Benny Иван,    I got your message about the INR of 5 today. Per Dr. Box hold your warfarin x 3 days and recheck INR on Monday. If you have any bleeding please go to the ER. Stay hydrated.    Thank you,    Samira MARIN RN

## 2022-02-16 NOTE — TELEPHONE ENCOUNTER
---------------------  From: Samira Adler RN   To: WENDI BOWSER    Sent: 10/15/2020 4:50:55 PM CDT  Subject: INR     Rajeev Lama. Please continue 12.5mg warfarin on Mon and 10 mg the rest of the days of the week Recheck INR in 1 week. Have a great weekend!    Thank you,    Samira SALAS RN.

## 2022-02-16 NOTE — TELEPHONE ENCOUNTER
---------------------  From: Astrid Estrada LPN (Phone Messages Pool (32224_[facility]))   To: INR Pool ( [address]24_[facility]);     Sent: 9/17/2021 9:39:21 AM CDT  Subject: CONSUMER MESSAGE FW: INR 3.5           ---------------------  From: WENDI BOWSER  To: [facility]  Sent: 09/17/2021 09:34 a.m. CDT  Subject: INR 3.5  Same dosage 10 daily, INR 3.5done

## 2022-02-16 NOTE — TELEPHONE ENCOUNTER
---------------------  From: Liss Carrero CMA (Phone Messages Pool ([address]224_[facility]))   To: INR Pool ( [address]24_[facility]);     Sent: 3/26/2021 4:53:53 PM CDT  Subject: FW: Inr 2.4           ---------------------  From: WENDI BOWSER  To: [facility]  Sent: 03/26/2021 04:47 p.m. CDT  Subject: Inr 2.4  12.5 Monday and Friday, 10 other days  INR 2.4done

## 2022-02-16 NOTE — TELEPHONE ENCOUNTER
---------------------  From: Shazia Lawrence RN (Phone Messages Pool ([address]224_[facility]))   To: INR Pool ( [address]_[facility]);     Sent: 3/14/2019 12:32:14 PM CDT  Subject: FW: INR 2.6           ---------------------  From: WENDI BOWSER  To: [facility]  Sent: 03/14/2019 12:00 p.m. CDT  Subject: INR 2.6  same dosage, 10 daily, INR 2.6Addressed

## 2022-02-16 NOTE — NURSING NOTE
The patients monthly home INR testing report from [facility] was received today. All abnormal results from the month of January were received and addressed previously. Report is being sent to [facility] for scanning.

## 2022-02-16 NOTE — TELEPHONE ENCOUNTER
---------------------  From: Samira Adler RN (INR Pool ( [facility] [facility] [place]))   To: TFS Message Pool ([address] [place] [address] [place]);     Sent: 9/20/2019 5:00:51 PM CDT  Subject: INR     Patient would like to do his INR via MDINR every two weeks now. I let him know we would send the message to you with his request. Last 4 INRs were in range 2.5 - 3.5 Our policy is weekly testing for home testers at this time.---------------------  From: Flori Nunez (TFS Message Pool ([address] [place] [address] [place]))   To: David Jenkins MD;     Sent: 9/20/2019 5:23:09 PM CDT  Subject: FW: INR---------------------  From: David Jenkins MD   To: TFS Message Pool ([facility]     Sent: 9/22/2019 8:40:22 AM CDT  Subject: RE: INR     needs weekly to qualify for home testing---------------------  From: Uma Kay (TFS Message Pool ([facility] [facility] [place]))   To: Unit 2 Pool ([facility] ;     Sent: 9/24/2019 12:02:01 PM CDT  Subject: FW: INR---------------------  From: Samira Adler RN (Unit 2 Pool ([facility] [facility] [place] )   To: INR Pool ( [facility] [facility] [place]);     Sent: 9/24/2019 1:12:35 PM CDT  Subject: FW: INRMessage sent to patient through portal.

## 2022-02-16 NOTE — TELEPHONE ENCOUNTER
---------------------  From: Shazia Lawrence RN (Phone Messages Pool (32224_[facility]))   To: INR Pool ( 32224_[facility]);     Sent: 9/12/2019 4:13:16 PM CDT  Subject: FW: INR 3.1           ---------------------  From: WENDI BOWSER  To: [facility]  Sent: 09/12/2019 04:11 p.m. CDT  Subject: INR 3.1  Same dosage 10 daily INR 3.1  I m  still not getting an email telling me you have responded, so unless I keep checking I don t know.    Also the billing has changed; [employer] says this should be charged as preventive care, not primary care, so I have to pay $10 copay every week.

## 2022-02-16 NOTE — TELEPHONE ENCOUNTER
Entered by David Jenkins MD on July 06, 2021 3:07:02 PM CDT  ---------------------  From: David Jenkins MD   To: [id number]    Sent: 7/6/2021 3:07:02 PM CDT  Subject: FW: Medication Management     ** Submitted: **  Complete:warfarin (warfarin 5 mg oral tablet)   Signed by David Jenkins MD  7/6/2021 8:07:00 PM [facility]    ** Approved with modifications: **  warfarin (WARFARIN SOD 5MG TABLETS (PEACH))  TAKE TWO AND ONE-HALF TABLETS BY MOUTH ON MONDAY AND THURSDAY. TAKE 2 TABLETS DAILY THE REST OF THE WEEK.  Qty:  192 tab(s)        Days Supply:  90        Refills:  0          Substitutions Allowed     Route To Pharmacy - [facility] #[id number]               ---------------------  From: Uma Kay (eRx Pool (32224_[facility]))   To: David Jenkins MD;     Sent: 7/6/2021 2:46:38 PM CDT  Subject: FW: Medication Management   Due Date/Time: 7/7/2021 12:08:00 PM CDT           ------------------------------------------  From: [facility] #[id number]  To: David Jenkins MD  Sent: July 6, 2021 12:08:28 PM CDT  Subject: Medication Management  Due: June 4, 2021 8:25:53 PM CDT     ** On Hold Pending Signature **     Dispensed Drug: warfarin (warfarin 5 mg oral tablet), TAKE TWO AND ONE-HALF TABLETS BY MOUTH ON MONDAY AND THURSDAY. TAKE 2 TABLETS DAILY THE REST OF THE WEEK.  Quantity: 192 tab(s)  Days Supply: 90  Refills: 0  Substitutions Allowed  Notes from Pharmacy:  ------------------------------------------

## 2022-02-16 NOTE — NURSING NOTE
Anticoagulation Therapy Management Entered On:  7/6/2020 8:56 AM CDT    Performed On:  7/4/2020 8:55 AM CDT by Shazia Lawrence RN               Anticoagulation Visit Assessment   Type of Visit - Anticoagulation :   Telephone   Anticoagulation Indication :   Deep vein thrombosis, Other: Factor V   Anticoagulant Start :   8/1/2012 CDT   Anticoagulant Duration :   Undetermined   Anticoagulation Medication Verified :   Yes   Patient on Warfarin :   Yes   Shazia Lawrence RN - 7/6/2020 8:55 AM CDT   Warfarin   International Normalization Ratio TR :   5.2    Anticoagulant INR Goal Lower :   2.5    Anticoagulant INR Goal Upper :   3.5    Sunday :   10 mg   Monday :   12.5 mg   Tuesday :   10 mg   Wednesday :   10 mg   Thursday :   12.5 mg   Friday :   10 mg   Saturday :   10 mg   Total Dose :   75 mg   Warfarin Pt Reported Previous Week Dose :    Sun Mon Tues Wed Thurs Fri Sat Weekly Total Dose   Week 1 10 mg 12.5 mg 10 mg 10 mg 12.5 mg 10 mg 10 mg 75 mg   Week 2  mg  mg  mg  mg  mg  mg  mg  mg   Week 3  mg  mg  mg  mg  mg  mg  mg  mg   Week 4  mg  mg  mg  mg  mg  mg  mg  mg         Patient is taking single or multiple strength tablet(s) :   Multiple strength tab(s)   Multiple Tab Strengths :   5 mg tab, 7.5 mg tab   Sunday :   0 mg   Monday :   0 mg   Tuesday :   0 mg   Wednesday :   0 mg   Thursday :   0 mg   Friday :   0 mg   Saturday :   0 mg   Week 1 Total Dose :   0 mg   Patient Instructions :   INR = 5.2 per mdINR on 7-4-20. Per GTG - pt was instructed to hold Warfarin x2 days and recheck INR on 7-6-20.     Shazia Lawrence RN - 7/6/2020 8:55 AM CDT

## 2022-02-16 NOTE — PROGRESS NOTES
Chief Complaint    c/o tick bite on right lower abdomen, pulled tick off around 3AM today  History of Present Illness       Found a deer tick this morning and removed it. It could have been present since Friday.  Review of Systems       No fevers       No vomiting  Physical Exam   Vitals & Measurements    T: 98.4  F (Tympanic)  HR: 84 (Peripheral)  BP: 118/64     HT: 72 in  WT: 194.5 lb  BMI: 26.38        General: No acute distress       Musculoskeletal: Normal gait       Heart: Normal rate rhythm       Skin: In the right groin there is a distal area of erythema around the tick bite.  No cellulitis  Assessment/Plan       Tick bite: We will prophylax with doxycycline 200mg once.  Currently on Coumadin and INR last week was 4.4.  He did skip 2 doses over the weekend.  We will follow-up with any concerns.  Patient Information     Name:WENDI BOWSER      Address:      [address] [place] [id number]     Sex:Male     YOB: 1950     [phone]     Emergency Contact:JUANA BOWSER     MRN:[id number]     FIN:[id number]     Location:[facility]     Date of Service:10/11/2021      Primary Care Physician:       David Jenkins MD, [phone]      Attending Physician:       Mir Toribio MD, [phone]  Problem List/Past Medical History    Ongoing     Adenomatous colon polyp     Anticoagulated     Arthritis of right acromioclavicular joint     Factor V Leiden     History of arterial thrombosis     History of DVT of lower extremity     History of pulmonary embolism     Inpatient stay       Comments: @ [facility] due to LLE ischemic limb.     Lipids abnormal     Obesity     Prediabetes     PVD (peripheral vascular disease)     Seasonal allergies     Tobacco user    Historical     Inpatient stay       Comments: @[place], [place] - Left superficial femoral artery chronic occlusion     Lyme disease     Rupture of anterior cruciate ligament     Tobacco user  Medications     acetaminophen, See Instructions    albuterol 90 mcg/inh inhalation aerosol, 2 puff(s), Inhale, qid, PRN, 1 refills    aspirin 81 mg oral delayed release tablet, 81 mg= 1 tab(s), Oral, daily    atorvastatin 20 mg oral tablet, 20 mg= 1 tab(s), Oral, daily, 3 refills    fluticasone 50 mcg/inh nasal spray, 2 spray(s), Nasal, daily, 3 refills    Viagra 100 mg oral tablet, See Instructions, 11 refills    Voltaren 1% topical gel, 2 gm, Topical, qid, PRN, 11 refills    warfarin 5 mg oral tablet, See Instructions  Allergies    No Known Medication Allergies  Lab Results          Lab Results (Last 4 results within 90 days)           INR POC: 4.5 (07/16/21 12:00:00)          Reference Range - INR POC: 2.0-3.0 (07/16/21 12:00:00)          INR TR: 4.4 (10/08/21 13:33:00)          INR TR: 4 (09/24/21 14:53:00)          INR TR: 3.5 (09/17/21 11:26:00)          INR TR: 4.3 (08/06/21 12:35:00)

## 2022-02-16 NOTE — TELEPHONE ENCOUNTER
---------------------  From: Carmen Shcmidt LPN (Phone Messages Pool ([id number]_[facility]))   To: INR Pool ( [id number]_[facility]);     Sent: 7/11/2019 11:40:51 AM CDT  Subject: FW: INR 2.9           ---------------------  From: WENDI BOWSER  To: [facility]  Sent: 07/11/2019 11:00 a.m. CDT  Subject: INR 2.9  same dosage 15 daily  INR 2.9  I hope this means I can stop Lovenox shots!---------------------  From: Shazia Lawrence RN (INR Pool ( [id number]_[facility]))   To: WENDI BOWSER    Sent: 7/11/2019 12:08:52 PM CDT  Subject: RE: INR 2.9     Dr. Alice Donis reviewed your results and would like you to discontinue the Lovenox injections!  He would like you to continue taking the 15mg of Warfarin daily and recheck your INR on Saturday 7-13-19.  Let us know if there are any further questions or concerns.  (Please respond to let us know you received these instructions.)    Have a great day!    Shazia HUBER RN

## 2022-02-16 NOTE — NURSING NOTE
Anticoagulation Therapy Management Entered On:  4/12/2021 8:43 AM CDT    Performed On:  4/12/2021 8:41 AM CDT by Mary Michelle RN               Anticoagulation Visit Assessment   Anticoagulation Indication :   Deep vein thrombosis, Other: Factor V   Anticoagulant Start :   8/1/2012 CDT   Anticoagulant Duration :   Undetermined   Anticoagulation Medication Verified :   Yes   Patient Preferred Contact Method :   Cell   Mary Michelle RN - 4/12/2021 8:41 AM CDT   Anticoagulation Patient Assessment Grid   Change in Alcohol Consumption :   No   Change in Diet :   No   Change in Medications :   No   Diarrhea :   No   Rectal Bleeding :   No   Signs of Clotting :   No   Signs of Warfarin Intolerance :   No   Unusual Bleeding, Bruising :   No   Upcoming Procedures :   No   Vomiting :   No   Mary Michelle RN - 4/12/2021 8:41 AM CDT   Patient on Warfarin :   Yes   Mary Michelle RN - 4/12/2021 8:41 AM CDT   Warfarin   International Normalization Ratio TR :   2.4    Anticoagulant INR Goal Lower :   2.5    Anticoagulant INR Goal Upper :   3.5    Sunday :   10 mg   Monday :   12.5 mg   Tuesday :   10 mg   Wednesday :   10 mg   Thursday :   10 mg   Friday :   12.5 mg   Saturday :   10 mg   Total Dose :   75 mg   Warfarin Pt Reported Previous Week Dose :    Sun Mon Tues Wed Thurs Fri Sat Weekly Total Dose   Week 1 10 mg 12.5 mg 10 mg 10 mg 10 mg 12.5 mg 10 mg 75 mg   Week 2 10 mg 12.5 mg 10 mg 10 mg 10 mg 10 mg 10 mg 72.5 mg   Week 3  mg  mg  mg  mg  mg  mg  mg  mg   Week 4  mg  mg  mg  mg  mg  mg  mg  mg         Patient is taking single or multiple strength tablet(s) :   Multiple strength tab(s)   Multiple Tab Strengths :   5 mg tab, 7.5 mg tab   Sunday :   10 mg   Monday :   12.5 mg   Tuesday :   10 mg   Wednesday :   10 mg   Thursday :   10 mg   Friday :   12.5 mg   Saturday :   10 mg   Week 1 Total Dose :   75 mg   Sunday :   10 mg   Monday :   12.5 mg   Tuesday :   10 mg   Wednesday :   10 mg   Thursday :   10 mg    Friday :   10 mg   Saturday :   10 mg   Week 2 Total Dose :   72.5 mg   Warfarin Dosing Acknowledgement :   I have reviewed the patient's warfarin dosing schedule and confirmed its accuracy for today's visit.   Patient Instructions :   Home INR on 4/11/21 = 2.4; per protocol continue warfarin 12.5 mg Mon/Fri and 10 mg ROW; recheck INR in 1 week. Portal message sent to pt on 4/12/21.     Viviana REES, Mary HERNANDEZ - 4/12/2021 8:41 AM CDT

## 2022-02-16 NOTE — NURSING NOTE
Anticoagulation Therapy Entered On:  9/20/2019 4:54 PM CDT    Performed On:  9/20/2019 4:51 PM CDT by Samira Adler RN               Warfarin Management   Week 1 Bald[place]ar Dose :   10    Week 1 Monday Dose :   10    Week 1 Tuesday Dose :   10    Week 1 Wednesday Dose :   10    Week 1 Thursday Dose :   10    Week 1 Friday Dose :   10    Week 1 Saturday Dose :   10    Week 1 Dose Total :   70    Week 2 Sunday Dose :   10    Week 2 Monday Dose :   10    Week 2 Tuesday Dose :   10    Week 2 Wednesday Dose :   10    Week 2 Thursday Dose :   10    Week 2 Friday Dose :   10    Week 2 Saturday Dose :   10    Week 2 Dose Total :   70    Planned Duration :   Indefinite   Indication :   DVT, Other: FVLeiden   International Normalization Ratio :   2.9    INR Range :   2.5 - 3.5   INR Therapeutic Range :   Yes   Anticoagulation Recheck :   1 week   Warfarin Physician :   David Jenkins MD Special Instructions :   INR = 2.9 per MDINR on 9/20/19 Patient is to take 10mg warfarin daily and recheck INR in 1 week per protocol. Patient advised via portal.   Samira Adler RN - 9/20/2019 4:51 PM CDT

## 2022-02-16 NOTE — NURSING NOTE
Anticoagulation Therapy Entered On:  8/9/2019 12:07 PM CDT    Performed On:  8/9/2019 12:05 PM CDT by Samira Adler RN               Warfarin Management   Week 1 Baldemar Dose :   10    Week 1 Monday Dose :   10    Week 1 Tuesday Dose :   10    Week 1 Wednesday Dose :   10    Week 1 Thursday Dose :   10    Week 1 Friday Dose :   10    Week 1 Saturday Dose :   10    Week 1 Dose Total :   70    Week 2 Sunday Dose :   10    Week 2 Monday Dose :   10    Week 2 Tuesday Dose :   10    Week 2 Wednesday Dose :   10    Week 2 Thursday Dose :   10    Week 2 Friday Dose :   10    Week 2 Saturday Dose :   10    Week 2 Dose Total :   70    Planned Duration :   Indefinite   Indication :   DVT, Other: FVLeiden   International Normalization Ratio :   3.6    INR Range :   2.5 - 3.5   INR Therapeutic Range :   No   Anticoagulation Recheck :   1 week   Warfarin Special Instructions :   INR = 3.6 per MDINR on 8/9/19 Patient is to stay on 10mg warfarin daily and recheck INR in 1 week per protocol. Patient advised via portal.   Samira Adler RN - 8/9/2019 12:05 PM CDT

## 2022-02-16 NOTE — NURSING NOTE
Anticoagulation Therapy Entered On:  2/3/2020 10:25 AM CST    Performed On:  2/3/2020 10:24 AM CST by Mary Michelle RN               Warfarin Management   Week 1 Sunday Dose :   10    Week 1 Monday Dose :   12.5    Week 1 Tuesday Dose :   10    Week 1 Wednesday Dose :   10    Week 1 Thursday Dose :   12.5    Week 1 Friday Dose :   10    Week 1 Saturday Dose :   10    Week 1 Dose Total :   75    Week 2 Sunday Dose :   10    Week 2 Monday Dose :   12.5    Week 2 Tuesday Dose :   10    Week 2 Wednesday Dose :   10    Week 2 Thursday Dose :   12.5    Week 2 Friday Dose :   10    Week 2 Saturday Dose :   10    Week 2 Dose Total :   75    Planned Duration :   Indefinite   Indication :   DVT, Other: Factor V   Warfarin Management Comments :   mdINR result from 2/2/20   International Normalization Ratio :   4.7    INR Range :   2.5 - 3.5   INR Therapeutic Range :   No   Warfarin Abnormal Findings :   Was on amoxicillin   Mary Michelle RN - 2/3/2020 10:24 AM CST   Warfarin Management and Results Grid   Signs of Thrombolic :   No   Signs of Warfarin Intolerance :   No   Changes in Diet or Alcohol Intake :   No   Changes in Medication or Antibiotics :   Yes   Unusual Bleeding or Bruising :   No   Rectal Bleeding or Black Stools :   No   Vitamin K Food Handout :   No   Heart Valve Replacement :   No   Mary Michelle RN - 2/3/2020 10:24 AM CST   Anticoagulation Recheck :   1 week   Warfarin Special Instructions :   Per protocol continue warfarin 12.5 mg Mon/Thur and 10 mg ROW; recheck INR in 1 week; pt notified by portal on 2/3/20   Mary Michelle RN - 2/3/2020 10:24 AM CST

## 2022-02-16 NOTE — NURSING NOTE
Anticoagulation Therapy Entered On:  2/20/2019 1:38 PM CST    Performed On:  2/20/2019 1:37 PM CST by Mary Michelle RN               Warfarin Management   Week 1 Baldemar Dose :   10    Week 1 Monday Dose :   10    Week 1 Tuesday Dose :   10    Week 1 Wednesday Dose :   10    Week 1 Thursday Dose :   10    Week 1 Friday Dose :   10    Week 1 Saturday Dose :   10    Week 1 Dose Total :   70    Week 2 Sunday Dose :   10    Week 2 Monday Dose :   10    Week 2 Tuesday Dose :   10    Week 2 Wednesday Dose :   10    Week 2 Thursday Dose :   10    Week 2 Friday Dose :   10    Week 2 Saturday Dose :   10    Week 2 Dose Total :   70    Planned Duration :   Indefinite   Indication :   DVT, Other: FVLeiden   Warfarin Management Comments :   Message from patient portal   International Normalization Ratio :   2.3    INR Range :   2.5 - 3.5   INR Therapeutic Range :   No   Mary Michelle RN - 2/20/2019 1:37 PM CST   Warfarin Management and Results Grid   Signs of Thrombolic :   No   Signs of Warfarin Intolerance :   No   Changes in Diet or Alcohol Intake :   No   Changes in Medication or Antibiotics :   No   Unusual Bleeding or Bruising :   No   Rectal Bleeding or Black Stools :   No   Vitamin K Food Handout :   No   Heart Valve Replacement :   No   Mary Michelle RN - 2/20/2019 1:37 PM CST   Anticoagulation Recheck :   1 week   Warfarin Special Instructions :   Per protocol continue warfarin 10 mg daily; recheck 1 week; patient notified by portal.   Mary Michelle RN - 2/20/2019 1:37 PM CST

## 2022-02-16 NOTE — NURSING NOTE
Anticoagulation Therapy Management Entered On:  10/30/2020 3:59 PM CDT    Performed On:  10/30/2020 3:58 PM CDT by Shazia Lawrence RN               Anticoagulation Visit Assessment   Type of Visit - Anticoagulation :   Telephone   Anticoagulation Indication :   Deep vein thrombosis, Other: Factor V   Anticoagulant Start :   8/1/2012 CDT   Anticoagulant Duration :   Undetermined   Anticoagulation Medication Verified :   Yes   Patient Preferred Contact Method :   Cell   Shazia Lawrence RN - 10/30/2020 3:58 PM CDT   Anticoagulation Patient Assessment Grid   Change in Alcohol Consumption :   No   Change in Diet :   No   Change in Medications :   No   Diarrhea :   No   Rectal Bleeding :   No   Signs of Clotting :   No   Signs of Warfarin Intolerance :   No   Unusual Bleeding, Bruising :   No   Upcoming Procedures :   No   Vomiting :   No   Shazia Lawrence RN - 10/30/2020 3:58 PM CDT   Patient on Warfarin :   Yes   Shazia Lawrence RN - 10/30/2020 3:58 PM CDT   Warfarin   Anticoagulant INR Goal Lower :   2.5    Anticoagulant INR Goal Upper :   3.5    Sunday :   10 mg   Monday :   12.5 mg   Tuesday :   10 mg   Wednesday :   10 mg   Thursday :   10 mg   Friday :   10 mg   Saturday :   10 mg   Total Dose :   72.5 mg   Warfarin Pt Reported Previous Week Dose :    Sun Mon Tues Wed Thurs Fri Sat Weekly Total Dose   Week 1 10 mg 12.5 mg 10 mg 10 mg 10 mg 10 mg 10 mg 72.5 mg   Week 2  mg  mg  mg  mg  mg  mg  mg  mg   Week 3  mg  mg  mg  mg  mg  mg  mg  mg   Week 4  mg  mg  mg  mg  mg  mg  mg  mg         Patient is taking single or multiple strength tablet(s) :   Multiple strength tab(s)   Multiple Tab Strengths :   5 mg tab, 7.5 mg tab   Sunday :   10 mg   Monday :   12.5 mg   Tuesday :   10 mg   Wednesday :   10 mg   Thursday :   10 mg   Friday :   10 mg   Saturday :   10 mg   Week 1 Total Dose :   72.5 mg   Patient Instructions :   INR = 3.6 per [facility]. Per protocol, continue Warfarin 12.5mg Monday and 10mg ROW. Recheck INR in 1 week. Advised via  portal.      Melinda REES, Shazia - 10/30/2020 3:58 PM CDT

## 2022-02-16 NOTE — NURSING NOTE
Anticoagulation Therapy Management Entered On:  6/8/2020 11:56 AM CDT    Performed On:  6/8/2020 11:35 AM CDT by Samira Adler RN               Anticoagulation Visit Assessment   Anticoagulation Indication :   Deep vein thrombosis, Other: Factor V   Anticoagulant Duration :   Undetermined   Patient on Warfarin :   Yes   Samira Adler RN - 6/8/2020 11:35 AM CDT   Warfarin   Anticoagulant INR Goal Lower :   2.5    Anticoagulant INR Goal Upper :   3.5    Sunday :   10 mg   Monday :   12.5 mg   Tuesday :   10 mg   Wednesday :   10 mg   Thursday :   12.5 mg   Friday :   10 mg   Saturday :   10 mg   Total Dose :   75 mg   Warfarin Pt Reported Previous Week Dose :    Sun Mon Tues Wed Thurs Fri Sat Weekly Total Dose   Week 1                   Week 2                   Week 3                   Week 4                         Patient is taking single or multiple strength tablet(s) :   Multiple strength tab(s)   Multiple Tab Strengths :   5 mg tab, 7.5 mg tab   Sunday :   10 mg   Monday :   12.5 mg   Tuesday :   10 mg   Wednesday :   10 mg   Thursday :   12.5 mg   Friday :   10 mg   Saturday :   10 mg   Week 1 Total Dose :   75 mg   Patient Instructions :   INR = 3.2 on 6/6/20 per MDINR Patient is to stay on 12.5mg warfarin on Mon, Thurs, and 10 mg the rest of the days of the week Recheck INR in 1 week per protocol. Patient advised via portal.     Samira Adler RN - 6/8/2020 11:35 AM CDT   Medication History   Medication List   (As Of: 6/8/2020 11:56:54 AM CDT)   Prescription/Discharge Order    diclofenac topical  :   diclofenac topical ; Status:   Prescribed ; Ordered As Mnemonic:   Voltaren 1% topical gel ; Simple Display Line:   2 gm, Topical, qid, PRN: shoulder pain, 100 gm, 11 Refill(s) ; Ordering Provider:   Freda Massey MD; Catalog Code:   diclofenac topical ; Order Dt/Tm:   5/7/2020 11:15:01 AM CDT          warfarin  :   warfarin ; Status:   Prescribed ; Ordered As Mnemonic:   warfarin 7.5 mg oral tablet  ; Simple Display Line:   0.5 tab(s), Oral, daily, Or as directed by provider, 45 tab(s), 1 Refill(s) ; Ordering Provider:   David Jenkins MD; Catalog Code:   warfarin ; Order Dt/Tm:   7/5/2019 1:46:02 PM CDT          atorvastatin  :   atorvastatin ; Status:   Prescribed ; Ordered As Mnemonic:   atorvastatin 10 mg oral tablet ; Simple Display Line:   10 mg, 1 tab(s), Oral, daily, 30 tab(s), 0 Refill(s) ; Ordering Provider:   David Jenkins MD; Catalog Code:   atorvastatin ; Order Dt/Tm:   5/7/2020 11:00:38 AM CDT          fluticasone nasal  :   fluticasone nasal ; Status:   Prescribed ; Ordered As Mnemonic:   fluticasone 50 mcg/inh nasal spray ; Simple Display Line:   2 spray(s), nasal, daily, for 90 day(s), 3 EA, 3 Refill(s) ; Ordering Provider:   David Jenkins MD; Catalog Code:   fluticasone nasal ; Order Dt/Tm:   10/18/2018 9:06:03 AM CDT          sildenafil  :   sildenafil ; Status:   Prescribed ; Ordered As Mnemonic:   Viagra 100 mg oral tablet ; Simple Display Line:   See Instructions, TAKE AS DIRECTED, 8 tab(s), 11 Refill(s) ; Ordering Provider:   David Jenkins MD; Catalog Code:   sildenafil ; Order Dt/Tm:   4/20/2016 12:38:56 PM CDT            Home Meds    gabapentin  :   gabapentin ; Status:   Documented ; Ordered As Mnemonic:   gabapentin 100 mg oral capsule ; Simple Display Line:   100 mg, 1 cap(s), Oral, tid, 0 Refill(s) ; Catalog Code:   gabapentin ; Order Dt/Tm:   6/21/2019 10:40:48 AM CDT          acetaminophen  :   acetaminophen ; Status:   Documented ; Ordered As Mnemonic:   acetaminophen ; Simple Display Line:   See Instructions, 325mg-650 mg PRN for pain/fever., 0 Refill(s) ; Catalog Code:   acetaminophen ; Order Dt/Tm:   6/19/2019 3:01:15 PM CDT

## 2022-02-16 NOTE — TELEPHONE ENCOUNTER
---------------------  From: Astrid Estrada LPN (Phone Messages Pool ([id number]_[facility]))   To: INR Pool ( [id number]_[facility]);     Sent: 9/25/2020 9:56:02 AM CDT  Subject: CONSUMER MESSAGE FW: Inr           ---------------------  From: WENDI BOWSER  To: [facility]  Sent: 09/25/2020 09:53 a.m. CDT  Subject: Inr  I did my test today I am on the same schedule 10 daily 10 1/2 on Monday and my INR came out at 6.6. Please let me know what time I can come in for a lab testCalled pt at 1008 to inform him to come in any time that he can. transferred to scheduling.

## 2022-02-16 NOTE — TELEPHONE ENCOUNTER
---------------------  From: Flori Smalls CMA (Phone Messages Pool ([id number]_[facility]))   To: [place] Message Pool ([id number]_[facility] [facility] [place]);     Sent: 6/3/2019 3:52:02 PM CDT  Subject: FW: pre surgery physical           ---------------------  From: WENDI BOWSER  To: [facility]  Sent: 06/03/2019 03:39 p.m. CDT  Subject: pre surgery physical  I just found a form I should have given Dr Jenkins, Surgery History and physical requirements.  Fax to [facility] @ [phone]  EKG results interpreted and signed by the .  Thanks---------------------  From: Uma Kay ([place] Message Pool ([id number]_[facility]))   To: WENDI BOWSER    Sent: 6/4/2019 8:32:05 AM CDT  Subject: RE: pre surgery physical     Wendi-  We do not need the form.  The information on the form is summarized by Dr. Jenkins in his visit note and faxed to the Surgery center along with your EKG and lab results.  This information was all already sent to them.   Good luck with your surgery!  -Analia

## 2022-02-16 NOTE — NURSING NOTE
Anticoagulation Therapy Management Entered On:  10/23/2020 2:43 PM CDT    Performed On:  10/23/2020 2:41 PM CDT by Frieda Bkaer RN               Anticoagulation Visit Assessment   Type of Visit - Anticoagulation :   Telephone   Anticoagulation Indication :   Deep vein thrombosis, Other: Factor V   Anticoagulant Start :   8/1/2012 CDT   Anticoagulant Duration :   Undetermined   Anticoagulation Medication Verified :   Yes   Patient Preferred Contact Method :   Cell   Frieda Baker RN - 10/23/2020 2:41 PM CDT   Anticoagulation Patient Assessment Grid   Change in Alcohol Consumption :   No   Change in Diet :   No   Change in Medications :   No   Diarrhea :   No   Rectal Bleeding :   No   Signs of Clotting :   No   Signs of Warfarin Intolerance :   No   Unusual Bleeding, Bruising :   No   Upcoming Procedures :   No   Vomiting :   No   Frieda Baker RN - 10/23/2020 2:41 PM CDT   Patient on Warfarin :   Yes   Frieda Baker RN - 10/23/2020 2:41 PM CDT   Warfarin   Anticoagulant INR Goal Lower :   2.5    INR POC :   2.3    Anticoagulant INR Goal Upper :   3.5    Sunday :   10 mg   Monday :   12.5 mg   Tuesday :   10 mg   Wednesday :   10 mg   Thursday :   10 mg   Friday :   10 mg   Saturday :   10 mg   Total Dose :   72.5 mg   Warfarin Pt Reported Previous Week Dose :    Sun Mon Tues Wed Thurs Fri Sat Weekly Total Dose   Week 1 10 mg 12.5 mg 10 mg 10 mg 10 mg 10 mg 10 mg 72.5 mg   Week 2  mg  mg  mg  mg  mg  mg  mg  mg   Week 3  mg  mg  mg  mg  mg  mg  mg  mg   Week 4  mg  mg  mg  mg  mg  mg  mg  mg         Patient is taking single or multiple strength tablet(s) :   Multiple strength tab(s)   Multiple Tab Strengths :   5 mg tab, 7.5 mg tab   Sunday :   10 mg   Monday :   12.5 mg   Tuesday :   10 mg   Wednesday :   10 mg   Thursday :   10 mg   Friday :   10 mg   Saturday :   10 mg   Week 1 Total Dose :   72.5 mg   Patient Instructions :   MDINR = 2.3  Take 12.5mg today then Continue Warfarin 12.5mg Mon and 10mg ROW. Recheck  INR in 1 week.  Per protocol. Advised via portal.     Samuel REES, Frieda ALFARO - 10/23/2020 2:41 PM CDT

## 2022-02-16 NOTE — TELEPHONE ENCOUNTER
---------------------  From: Astrid Estrada LPN (Phone Messages Pool ([id number]_[facility]))   To: INR [place] ( [id number]_[facility]);     Sent: 10/29/2021 11:01:21 AM CDT  Subject: CONSUMER MESSAGE FW: INR 1.9           ---------------------  From: WENDI BOWSER  To: [facility]  Sent: 10/29/2021 10:47 a.m. CDT  Subject: INR 1.9  I skipped last Friday, 10 mg Saturday,Sunday,Tuesday,Wednesday, 5mg Monday, Thursday  INR 1.9Will address via portal message

## 2022-02-16 NOTE — PROGRESS NOTES
Patient:   WENDI BOWSER            MRN: [id number]            FIN: [id number]               Age:   70 years     Sex:  Male     :  1950   Associated Diagnoses:   Atherosclerosis; Hypercholesterolemia; Hyperlipidemia; Adenomatous colon polyp; Factor V Leiden; History of DVT of lower extremity; History of pulmonary embolism; Prediabetes; PVD (peripheral vascular disease); Tobacco user   Author:   David Jenkins MD      Visit Information      Date of Service: 2020 12:40 pm  Performing Location: [facility]  Encounter#: [id number]      Chief Complaint   2020 12:52 PM CDT   chronic disease visit.        Interval History   chief complaint and symptoms as noted above confirmed with patient phq9, cage reviewed with ptHome inr going well   Patient is in today for annual follow-up.  Overall is been doing well.  He wintered in [place] but had to come back early because of the pandemic.  He was quite sick the month of February with a febrile upper respiratory infection he is wondering if he had the pandemic.  He would like to switch from his Coumadin to Eliquis as the regular INR is again to be causing him between $3-$400 a month.  He understands the risk.  He continues to drink a bit too much at times.  Continues have annual follow-up for his: His DVTs his heart over the [facility].  He denies any headache lightheadedness dizziness chest pain shortness of breath cough abdominal pain no GI or  complaints no extremity complaints continues to be very active         Review of Systems   Constitutional:  Negative except as documented in history of present illness.    Eye:  Negative.    Ear/Nose/Mouth/Throat:  Negative.    Respiratory:  Negative.    Cardiovascular:  Negative.    Gastrointestinal:  Negative.    Genitourinary:  Negative.    Musculoskeletal:  Negative.    Integumentary:  Negative.       Health Status   Allergies:    Allergic Reactions (Selected)  No Known Medication Allergies    Medications:  (Selected)   Prescriptions  Prescribed  Eliquis 5 mg oral tablet: ( 5 mg ), Oral, bid, # 60 tab(s), 11 Refill(s), Type: Maintenance, [facility]acy: [facility] #[id number], 5 mg Oral bid, 72, in, 06/12/20 12:52:00 CDT, Height Measured, 193.6, lb, 06/12/20 12:52:00 CDT, Weight Measured  Viagra 100 mg oral tablet: See Instructions, Instructions: TAKE AS DIRECTED, # 8 tab(s), 11 Refill(s), Pharmacy: [id number], TAKE AS DIRECTED  Voltaren 1% topical gel: ( 2 gm ), Topical, qid, PRN: shoulder pain, # 100 gm, 11 Refill(s), Type: Maintenance, Pharmacy: [facility] #[id number], 2 gm Topical qid,PRN:shoulder pain  atorvastatin 10 mg oral tablet: = 1 tab(s) ( 10 mg ), Oral, daily, # 30 tab(s), 0 Refill(s), Type: Maintenance, other reason (Rx)  fluticasone 50 mcg/inh nasal spray: = 2 spray(s), nasal, daily, # 3 EA, 3 Refill(s), Type: Maintenance, Pharmacy: [facility] [id number], 2 spray(s) Nasal daily,x90 day(s)  Documented Medications  Documented  acetaminophen: See Instructions, Instructions: 325mg-650 mg PRN for pain/fever., 0 Refill(s), Type: Maintenance  gabapentin 100 mg oral capsule: = 1 cap(s) ( 100 mg ), Oral, hs, 0 Refill(s), Type: Maintenance   Problem list:    All Problems (Selected)  Adenomatous colon polyp / SNOMED CT 0618848578 / Confirmed  Anticoagulated / SNOMED CT 43426731 / Confirmed  Arthritis of right acromioclavicular joint / SNOMED CT 2293581085 / Confirmed  Factor V Leiden / SNOMED CT 4551808955 / Confirmed  History of DVT of lower extremity / SNOMED CT 3667978700 / Confirmed  History of pulmonary embolism / SNOMED CT 275451840 / Confirmed  History of arterial thrombosis / SNOMED CT 7144436352 / Confirmed  Inpatient stay / SNOMED CT 208618105 / Confirmed  Lipids abnormal / SNOMED CT 940884039 / Confirmed  Obesity / SNOMED CT 4091911573 / Probable  PVD (peripheral vascular disease) / SNOMED CT 5141439318 / Confirmed  Prediabetes / SNOMED CT 2301081567 /  Confirmed  Seasonal allergies / SNOMED CT 085411260 / Confirmed  Tobacco user / SNOMED CT 952341877 / Probable      Histories   Past Medical History:    Active  Inpatient stay (440421454): Onset on 2019 at 69 years.  Comments:  2019 CDT 9:47 AM CDT - Renetta Gerardo  @ [facility] due to LLE ischemic limb.  Prediabetes (4925977501): Onset on 2013 at 63 years.  Lipids abnormal (291356622): Onset on 10/26/2012 at 62 years.  Adenomatous colon polyp (7539368342): Onset on 2012 at 62 years.  History of DVT of lower extremity (5601536523): Onset on 2012 at 62 years.  PVD (peripheral vascular disease) (6102329466)  Anticoagulated (94119984)  Obesity (1836157318)  Factor V Leiden (5210160390)  History of pulmonary embolism (557137080)  Seasonal allergies (345248036)  Resolved  Inpatient stay (222840798): Onset on 2012 at 62 years.  Resolved.  Comments:  2019 CDT 8:00 AM CDT - Missy Rubi  @[place], [place] - Left superficial femoral artery chronic occlusion  Rupture of anterior cruciate ligament (703112775): Onset in  at 55 years.  Resolved.  Lyme disease (9985692640): Onset in the month of 1999 at 49 years  Resolved.  Tobacco user (305.1): Onset on 1968 at 17 years.  Resolved on 2012 at 61 years.   Family History:    Heart disease  Uncle (M)  Father (Blayne, )  Comments:  2010 9:16 AM CDT - Samira Larson MA     Procedure history:    Percutaneous mechanical thrombectomy of vein of lower limb using fluoroscopic guidance (SNOMED CT 3172872992) performed by Shaun Davila MD on 2019 at 69 Years.  Comments:  2019 9:57 AM CDT - Renetta Gerardo  left  Arthroplasty of the knee (SNOMED CT 120438902) on 2019 at 69 Years.  Comments:  2019 9:50 AM CDT - Renetta Gerardo  left  Flexible sigmoidoscopy (SNOMED CT 19943413) on 2017 at 67 Years.  Insertion of arterial stent (SNOMED CT 336678127) on 2012 at 62 Years.  Insertion of arterial  stent (SNOMED CT 259822572) on 7/12/2012 at 62 Years.  Comments:  8/7/2012 11:54 PM CDT - Missy Rubi  Left superficial femoral artery chronic occlusion.  Colectomy on 5/9/2012 at 62 Years.  Comments:  6/1/2012 9:43 PM CDT - Missy Rubi  Hand-assisted laparoscopic total abdominal colectomy.    5/17/2012 3:17 PM CDT - Mir Toribio MD  Numerous adenomatous polyps and one large cecal polyp  Colonoscopy (SNOMED CT 714180272) performed by Mir Toribio MD on 2/2/2012 at 61 Years.  Colonoscopy (SNOMED CT 192373219) on 10/17/2005 at 55 Years.  Flexible sigmoidoscopy (SNOMED CT 59606309) on 9/15/2005 at 55 Years.  Arthroscopy of knee (SNOMED CT 148897323) in 2005 at 55 Years.  Comments:  6/19/2019 9:50 AM CDT - Renetta Gerardo  Right  ACL R Knee in 2005 at 55 Years.  Flexible sigmoidoscopy (SNOMED CT 04466843) on 12/14/2001 at 51 Years.  Flexible sigmoidoscopy (SNOMED CT 51576138) on 12/14/2001 at 51 Years.  Vasectomy (SNOMED CT 26906121).   Social History:        Tobacco Assessment            Current every day smoker, Cigarettes, 20 per day.      Substance Abuse Assessment            Past, Marijuana      Employment and Education Assessment            Retired      Home and Environment Assessment            Marital status: .  Spouse/Partner name: Sherice Cancino.  Lives with Significant other.  Risks in               environment: sometimes wears bike helmet, owns secured gun.      Nutrition and Health Assessment            Type of diet: warfarin.      Exercise and Physical Activity Assessment            Exercise frequency: Never.      Sexual Assessment            Sexually active: No.  Sexual orientation: Heterosexual.        Physical Examination   Vital Signs   6/12/2020 12:52 PM CDT Temperature Tympanic 98.4 DegF    Peripheral Pulse Rate 94 bpm    HR Method Electronic    Systolic Blood Pressure 109 mmHg    Diastolic Blood Pressure 72 mmHg    Mean Arterial Pressure 84 mmHg    BP Method Electronic       Measurements from flowsheet : Measurements   6/12/2020 12:52 PM CDT Height Measured - Standard 72 in    Weight Measured - Standard 193.6 lb    BSA 2.11 m2    Body Mass Index 26.25 kg/m2  HI      General:  Alert and oriented, No acute distress.    Eye:  Pupils are equal, round and reactive to light, Extraocular movements are intact.    HENT:  No pharyngeal erythema.    Neck:  Supple, Non-tender.    Respiratory:  Lungs are clear to auscultation, Respirations are non-labored.    Cardiovascular:  Normal rate, Regular rhythm, No edema.    Gastrointestinal:  Soft, Non-tender, No organomegaly.    Musculoskeletal:  degenerative changes noted.    Integumentary:  No rash.    Neurologic:  Alert, Oriented.       Review / Management   Results review:  Lab results   5/1/2020 1:56 PM CDT INR 2.4   4/24/2020 4:36 PM CDT INR 2.9   4/14/2020 4:42 PM CDT INR 3.0   .       Impression and Plan   Diagnosis     Atherosclerosis (CME09-OT I70.90).     Hypercholesterolemia (MAT50-ZD E78.00).     Hyperlipidemia (OOR38-AR E78.5).     Adenomatous colon polyp (ITJ15-UI D12.6).     Atherosclerosis (NHG20-AY I70.90).     Factor V Leiden (DLJ48-TH D68.51).     History of DVT of lower extremity (UYA21-JY Z86.718).     History of pulmonary embolism (FDZ08-UK Z86.711).     Prediabetes (RVF11-MB R73.03).     PVD (peripheral vascular disease) (SVU88-EP I73.9).     Tobacco user (LIU58-MO Z72.0).     Course:  Progressing as expected.    Plan:  Doing well  1 yr fu, We reviewed Eliquis and risk he will stop his Coumadin and track his INR is started the Eliquis when his INR gets down to 2.  Follow-up with us in a year.  We will get labs on him today including liver function because of the alcohol history discuss smoking cessation.  .    Patient Instructions:       Counseled: Patient, Regarding diagnosis, Regarding treatment, Regarding medications, Verbalized understanding.

## 2022-02-16 NOTE — TELEPHONE ENCOUNTER
---------------------  From: Rosemary Lama CMA (Phone Messages Pool ([id number]_[facility]))   To: INR Pool ( 32224_[facility]);     Sent: 1/12/2020 1:05:19 PM CST  Subject: FW: Inr 1.2           ---------------------  From: WENDI BOWSER  To: [facility]  Sent: 01/11/2020 02:03 p.m. CST  Subject: Inr 1.2  I stopped for two days testing today will start 10 mg daily tonight  INR1.2noted

## 2022-02-16 NOTE — TELEPHONE ENCOUNTER
---------------------  From: Samiar Adler RN   To: WENDI BOWSER    Sent: 5/21/2019 5:35:54 PM CDT  Subject: INR     Benny Oates,    Please continue with 10mg warfarin daily and recheck INR in 1 week.    Thanks,    Samira MARIN RN

## 2022-02-16 NOTE — NURSING NOTE
Anticoagulation Therapy Entered On:  7/15/2019 4:43 PM CDT    Performed On:  7/15/2019 4:40 PM CDT by Samira Adler RN               Warfarin Management   Week 1 Baldemar Dose :   10    Week 1 Monday Dose :   10    Week 1 Tuesday Dose :   10    Week 1 Wednesday Dose :   10    Week 1 Thursday Dose :   10    Week 1 Friday Dose :   10    Week 1 Saturday Dose :   10    Week 1 Dose Total :   70    Planned Duration :   Indefinite   Indication :   DVT, Other: FVLeiden   INR Range :   2.5 - 3.5   INR Therapeutic Range :   No   Warfarin Abnormal Findings :   none.   Samira Adler RN - 7/15/2019 4:40 PM CDT   Warfarin Management and Results Grid   Signs of Thrombolic :   No   Signs of Warfarin Intolerance :   No   Changes in Diet or Alcohol Intake :   No   Changes in Medication or Antibiotics :   No   Unusual Bleeding or Bruising :   No   Rectal Bleeding or Black Stools :   No   Samira Adler RN - 7/15/2019 4:40 PM CDT   Anticoagulation Recheck :   1 week   Warfarin Physician :   David Jenkins MD Special Instructions :   INR = 3.9 per [facility] on 7/15/19 Patient is to take 10 mg warfarin daily and recheck INR in 1 week per [facility]. Patient advised in office.   Samira Adler RN - 7/15/2019 4:40 PM CDT

## 2022-02-16 NOTE — NURSING NOTE
Anticoagulation Therapy Management Entered On:  5/26/2020 8:31 AM CDT    Performed On:  5/26/2020 8:29 AM CDT by Mary Michelle RN               Anticoagulation Visit Assessment   Anticoagulation Indication :   Deep vein thrombosis, Other: Factor V   Anticoagulation Medication Verified :   Yes   Patient on Warfarin :   Yes   Mary Michelle RN - 5/26/2020 8:29 AM CDT   Warfarin   INR Home Monitoring Result :   3.6    Sunday :   10 mg   Monday :   12.5 mg   Tuesday :   10 mg   Wednesday :   10 mg   Thursday :   12.5 mg   Friday :   10 mg   Saturday :   10 mg   Total Dose :   75 mg   Warfarin Pt Reported Previous Week Dose :    Sun Mon Tues Wed Thurs Fri Sat Weekly Total Dose   Week 1                   Week 2                   Week 3                   Week 4                         Patient is taking single or multiple strength tablet(s) :   Multiple strength tab(s)   Multiple Tab Strengths :   5 mg tab, 7.5 mg tab   Sunday :   10 mg   Monday :   12.5 mg   Tuesday :   10 mg   Wednesday :   10 mg   Thursday :   12.5 mg   Friday :   10 mg   Saturday :   10 mg   Warfarin Wk 1 Total Dose :   75 mg   Patient Instructions :   mdINR result on 5/23/2020 = 3.6; per protocol continue warfarin 12.5 mg Mon/Thur and 10 mg ROW; recheck INR in 1 week; portal message sent to patient on 5/26/2020     Mary Michelle RN - 5/26/2020 8:29 AM CDT

## 2022-02-16 NOTE — TELEPHONE ENCOUNTER
---------------------  From: Vitor REES, Samira   To: WENDI BOWSER    Sent: 9/26/2019 5:11:53 PM CDT  Subject: INR     Rajeev Lama INR at 3.3 today. Please stay on 10 mg warfarin daily and recheck INR in 1 week. Have a great week end!    Samira MARIN RN

## 2022-02-16 NOTE — TELEPHONE ENCOUNTER
---------------------  From: Rosemary Lama CMA (Phone Messages Pool ([id number]_[facility]))   To: INR Pool ( 32224_[facility]);     Sent: 10/15/2020 2:37:49 PM CDT  Subject: FW: Inr 2.5           ---------------------  From: WENDI BOWSER  To: [facility]  Sent: 10/15/2020 02:36 p.m. CDT  Subject: Inr 2.5  I finished the Lovenox this morning took 12 1/2 on Monday 10 every other day my INR is 2.5replyed via portal.

## 2022-02-16 NOTE — TELEPHONE ENCOUNTER
---------------------  From: Shazia Lawrence RN   To: MAGUEWENDI    Sent: 8/10/2020 11:34:46 AM CDT  Subject: INR Result     Tez Oates,    Your INR came back at 4.2.   Please don't take any Warfarin today then tomorrow you can resume  your Warfarin at 12.5mg Monday, Thursday and 10mg the rest of the week.    We will have you recheck your INR again in 1 week. If you feel like your home machine is off again next week, please  don't hesitate to call and schedule in clinic. We are more than happy to have you check your INR here in clinic to verify the result.    Please let me know if you have any questions or concerns.    Thanks!    Shazia Lawrence RN

## 2022-02-16 NOTE — TELEPHONE ENCOUNTER
---------------------  From: WENDI BOWSER  [facility]  Sent: 01/18/2020 04:04 p.m. CST  Subject: Inr 1.7  I have been very sick I am taking amoxicillin for bronchitis my INR was 1.7, I have been taking 10 mg daily as prescribedPlease advise, TFS is OC.---------------------  From: Laisha Murillo CMA (Phone Messages Pool ([id number]_[facility]))   To: [employer] Pool (32224_[facility]); Mir Crouch MD;     Sent: 1/20/2020 8:24:53 AM CST  Subject: FW: Inr 1.7---------------------  From: Catrina Edwards MA ([employer] Message Pool (32224_[facility]))   To: Mir Crouch MD;     Sent: 1/20/2020 8:37:21 AM CST  Subject: FW: Inr 1.7see INR card

## 2022-02-16 NOTE — NURSING NOTE
Anticoagulation Therapy Management Entered On:  3/12/2021 3:32 PM CST    Performed On:  3/12/2021 3:31 PM CST by Shazia Lawrence RN               Anticoagulation Visit Assessment   Type of Visit - Anticoagulation :   Telephone   Anticoagulation Indication :   Deep vein thrombosis, Other: Factor V   Anticoagulant Start :   8/1/2012 CDT   Anticoagulant Duration :   Undetermined   Anticoagulation Medication Verified :   Yes   Patient Preferred Contact Method :   Cell   Shazia Lawrence RN - 3/12/2021 3:31 PM CST   Anticoagulation Patient Assessment Grid   Change in Alcohol Consumption :   No   Change in Diet :   No   Change in Medications :   No   Diarrhea :   No   Rectal Bleeding :   No   Signs of Clotting :   No   Signs of Warfarin Intolerance :   No   Unusual Bleeding, Bruising :   No   Upcoming Procedures :   No   Vomiting :   No   Shazia Lawrence RN - 3/12/2021 3:31 PM CST   Patient on Warfarin :   Yes   Shazia Lawrence RN - 3/12/2021 3:31 PM CST   Warfarin   International Normalization Ratio TR :   2.2    Anticoagulant INR Goal Lower :   2.5    Anticoagulant INR Goal Upper :   3.5    Sunday :   10 mg   Monday :   12 mg   Tuesday :   10 mg   Wednesday :   10 mg   Thursday :   10 mg   Friday :   10 mg   Saturday :   10 mg   Total Dose :   72 mg   Warfarin Pt Reported Previous Week Dose :    Sun Mon Tues Wed Thurs Fri Sat Weekly Total Dose   Week 1 10 mg 15 mg 10 mg 10 mg 10 mg 10 mg 10 mg 75 mg   Week 2 10 mg 12.5 mg 10 mg 10 mg 10 mg  mg  mg  mg   Week 3  mg  mg  mg  mg  mg  mg  mg  mg   Week 4  mg  mg  mg  mg  mg  mg  mg  mg         Patient is taking single or multiple strength tablet(s) :   Multiple strength tab(s)   Multiple Tab Strengths :   5 mg tab, 7.5 mg tab   Sunday :   10 mg   Monday :   12.5 mg   Tuesday :   10 mg   Wednesday :   10 mg   Thursday :   10 mg   Friday :   12.5 mg   Saturday :   10 mg   Week 1 Total Dose :   75 mg   Sunday :   10 mg   Monday :   12.5 mg   Tuesday :   10 mg   Wednesday :   10 mg   Thursday :    10 mg   Friday :   10 mg   Saturday :   10 mg   Week 2 Total Dose :   72.5 mg   Warfarin Dosing Acknowledgement :   I have reviewed the patient's warfarin dosing schedule and confirmed its accuracy for today's visit.   Patient Instructions :   INR = 2.2 per mdINR. Per protocol, take boost dose of 12.5mg once then resume 12.5mg Mondays and 10mg ROW. Recheck INR in 1 week. Advised by domingo.      Melinda REES, Shazia - 3/12/2021 3:31 PM CST

## 2022-02-16 NOTE — NURSING NOTE
Anticoagulation Therapy Entered On:  7/11/2019 12:10 PM CDT    Performed On:  7/11/2019 12:09 PM CDT by Shazia Lawrence RN               Warfarin Management   Week 1 Baldemar Dose :   15    Week 1 Monday Dose :   15    Week 1 Tuesday Dose :   15    Week 1 Wednesday Dose :   15    Week 1 Thursday Dose :   15    Week 1 Friday Dose :   15    Week 1 Saturday Dose :   15    Week 1 Dose Total :   105    Planned Duration :   Indefinite   Indication :   Aortic Valve Replacement, Other: FVLeiden   INR Range :   2.5 - 3.5   INR Therapeutic Range :   Yes   Anticoagulation Recheck :   7-13-19   Warfarin Special Instructions :   INR = 2.9 per patient report. Patient to continue 15mg daily of Warfarin and discontinue Lovenox injections. Recheck INR on Saturday 7-13-19 per TFS. Patient advised via pt portal.    Shazia Lawrence RN - 7/11/2019 12:09 PM CDT

## 2022-02-16 NOTE — TELEPHONE ENCOUNTER
---------------------  From: Astrid Estrada LPN (Phone Messages Pool ([id number]_[facility]))   To: INR Pool ( 32224_[facility]);     Sent: 6/18/2021 2:57:20 PM CDT  Subject: CONSUMER MESSAGE FW: INR 1.8           ---------------------  From: WENDI OBWSER  To: [facility]  Sent: 06/18/2021 02:45 p.m. CDT  Subject: INR 1.8  Followed directions and inr is 1.8  Monday/Wednesday/Friday 12.5 , Tuesday/Thursday/Saturday/Sunday [facility]

## 2022-02-16 NOTE — NURSING NOTE
Comprehensive Intake Entered On:  6/24/2021 10:48 AM CDT    Performed On:  6/24/2021 10:42 AM CDT by Grace RAHMAN, Catrina               Summary   Chief Complaint :   suture removal--placed 6/14/2021 at [facility] ER, left leg.  concerned about possible infection, no drainage.   Advance Directive :   Yes   Weight Measured :   189.4 lb(Converted to: 189 lb 6 oz, 85.910 kg)    Height Measured :   72 in(Converted to: 6 ft 0 in, 182.88 cm)    Body Mass Index :   25.68 kg/m2 (HI)    Body Surface Area :   2.09 m2   Systolic Blood Pressure :   112 mmHg   Diastolic Blood Pressure :   71 mmHg   Mean Arterial Pressure :   85 mmHg   Peripheral Pulse Rate :   82 bpm   BP Site :   Right arm   Pulse Site :   Radial artery   BP Method :   Electronic   HR Method :   Electronic   Temperature Tympanic :   98.3 DegF(Converted to: 36.8 DegC)    Oxygen Saturation :   95 %   Catrina Edwards MA - 6/24/2021 10:42 AM CDT   Health Status   Allergies Verified? :   Yes   Medication History Verified? :   Yes   Medical History Verified? :   Yes   Pre-Visit Planning Status :   Completed   Tobacco Use? :   Current every day smoker   Catrina Edwards MA - 6/24/2021 10:42 AM CDT   Consents   Consent for Immunization Exchange :   Consent Granted   Consent for Immunizations to Providers :   Consent Granted   Catrina Edwards MA - 6/24/2021 10:42 AM CDT   Meds / Allergies   (As Of: 6/24/2021 10:48:56 AM CDT)   Allergies (Active)   No Known Medication Allergies  Estimated Onset Date:   Unspecified ; Created By:   Laisha Murillo CMA; Reaction Status:   Active ; Category:   Drug ; Substance:   No Known Medication Allergies ; Type:   Allergy ; Updated By:   Laisha Murillo CMA; Reviewed Date:   4/27/2021 1:35 PM CDT        Medication List   (As Of: 6/24/2021 10:48:56 AM CDT)   Prescription/Discharge Order    atorvastatin  :   atorvastatin ; Status:   Prescribed ; Ordered As Mnemonic:   atorvastatin 20 mg oral tablet ; Simple Display Line:   20 mg, 1 tab(s), Oral,  daily, 90 tab(s), 3 Refill(s) ; Ordering Provider:   David Jenkins MD; Catalog Code:   atorvastatin ; Order Dt/Tm:   4/27/2021 1:21:43 PM CDT          diclofenac topical  :   diclofenac topical ; Status:   Prescribed ; Ordered As Mnemonic:   Voltaren 1% topical gel ; Simple Display Line:   2 gm, Topical, qid, PRN: shoulder pain, 100 gm, 11 Refill(s) ; Ordering Provider:   Freda Massey MD; Catalog Code:   diclofenac topical ; Order Dt/Tm:   5/7/2020 11:15:01 AM CDT          fluticasone nasal  :   fluticasone nasal ; Status:   Prescribed ; Ordered As Mnemonic:   fluticasone 50 mcg/inh nasal spray ; Simple Display Line:   2 spray(s), nasal, daily, for 90 day(s), 3 EA, 3 Refill(s) ; Ordering Provider:   Nick Bello PA-C; Catalog Code:   fluticasone nasal ; Order Dt/Tm:   6/15/2020 10:54:23 AM CDT          sildenafil  :   sildenafil ; Status:   Prescribed ; Ordered As Mnemonic:   Viagra 100 mg oral tablet ; Simple Display Line:   See Instructions, TAKE AS DIRECTED, 8 tab(s), 11 Refill(s) ; Ordering Provider:   David Jenkins MD; Catalog Code:   sildenafil ; Order Dt/Tm:   4/20/2016 12:38:56 PM CDT          warfarin  :   warfarin ; Status:   Prescribed ; Ordered As Mnemonic:   warfarin 5 mg oral tablet ; Simple Display Line:   See Instructions, TAKE 2& 1/2 TABLETS BY MOUTH MONDAY, THURSDAY AND 2 TABLETS REST OF WEEK, 192 tab(s), 6 Refill(s) ; Ordering Provider:   David Jenkins MD; Catalog Code:   warfarin ; Order Dt/Tm:   4/27/2021 1:23:49 PM CDT            Home Meds    acetaminophen  :   acetaminophen ; Status:   Documented ; Ordered As Mnemonic:   acetaminophen ; Simple Display Line:   See Instructions, 325mg-650 mg PRN for pain/fever., 0 Refill(s) ; Catalog Code:   acetaminophen ; Order Dt/Tm:   6/19/2019 3:01:15 PM CDT          aspirin  :   aspirin ; Status:   Documented ; Ordered As Mnemonic:   aspirin 81 mg oral delayed release tablet ; Simple Display Line:   81 mg, 1 tab(s), Oral, daily, 0  Refill(s) ; Catalog Code:   aspirin ; Order Dt/Tm:   4/27/2021 1:08:00 PM CDT            Social History   Social History   (As Of: 6/24/2021 10:48:56 AM CDT)   Alcohol:        Liquor (Hard) (1.5 oz), Daily, 2 drinks/episode average.  3 drinks/episode maximum.   (Last Updated: 5/4/2021 2:08:57 PM CDT by Barbie Uribe)          Tobacco:        Current every day smoker, Cigarettes, 20 per day.   (Last Updated: 4/13/2017 12:38:29 PM CDT by Sarah Li)   10 or more cigarettes (1/2 pack or more)/day in last 30 days, Cigarettes   (Last Updated: 4/27/2021 1:08:35 PM CDT by Laisha Murillo CMA)          Electronic Cigarette/Vaping:        Electronic Cigarette Use: Never.   (Last Updated: 4/27/2021 1:08:39 PM CDT by Laisha Murillo CMA)          Substance Abuse:        Marijuana, 1-2 times per week   (Last Updated: 5/4/2021 2:10:26 PM CDT by Barbie Uribe)          Employment/School:        Retired   (Last Updated: 4/13/2017 12:38:43 PM CDT by Sarah Li)          Home/Environment:        Marital status: .  Spouse/Partner name: Sherice Cancino.  Lives with Significant other.  Living situation: Home/Independent.  Injuries/Abuse/Neglect in household: No.  Feels unsafe at home: No.  Family/Friends available for support: Yes.  Risks in environment: Pool/[place], Stairs, sometimes wears bike helmet, owns secured gun.   (Last Updated: 5/4/2021 2:12:15 PM CDT by Barbie Uribe)          Nutrition/Health:        Type of diet: warfarin.   (Last Updated: 4/13/2017 12:39:21 PM CDT by Sarah Li)   Type of diet: Regular.  Wants to lose weight: Yes.   (Last Updated: 5/4/2021 2:11:00 PM CDT by Barbie Uribe)          Exercise:        Exercise frequency: Daily.  Exercise type: Stretching shoulder.   (Last Updated: 5/4/2021 2:11:21 PM CDT by Barbie Uribe)          Sexual:        Sexually active: No.  Sexual orientation: Heterosexual.   (Last Updated: 4/13/2017 12:39:37 PM CDT by Sarah Li)

## 2022-02-16 NOTE — TELEPHONE ENCOUNTER
---------------------  From: Vitor REES, Samira   To: WENDI BOWSER    Sent: 6/3/2021 4:54:32 PM CDT  Subject: INR     Rajeev Lama INR at 3.2. Please stay on 12.5mg warfarin on Monday, Wednesday, and Friday and 10 mg the rest of the days of the week. Recheck in 1 week. Have a great day!    Thank you,    Samira MARIN RN

## 2022-02-16 NOTE — TELEPHONE ENCOUNTER
---------------------  From: Vitor REES, Samira   To: WENDI BOWSER    Sent: 2/10/2020 3:31:41 PM CST  Subject: INR     Benny Oates,    Please take 7.5mg warfarin daily and recheck your INR in 3 days.     Thank you,    Samira MARIN RN

## 2022-02-16 NOTE — NURSING NOTE
Anticoagulation Therapy Management Entered On:  8/10/2020 11:35 AM CDT    Performed On:  8/10/2020 11:34 AM CDT by Shazia Lawrence RN               Anticoagulation Visit Assessment   Type of Visit - Anticoagulation :   Telephone   Anticoagulation Indication :   Deep vein thrombosis, Other: Factor V   Anticoagulant Start :   8/1/2012 CDT   Anticoagulant Duration :   Undetermined   Anticoagulation Medication Verified :   Yes   Patient Preferred Contact Method :   Cell   Patient on Warfarin :   Yes   Shazia Lawrence RN - 8/10/2020 11:34 AM CDT   Warfarin   Anticoagulant INR Goal Lower :   2.5    Anticoagulant INR Goal Upper :   3.5    Sunday :   10 mg   Monday :   12.5 mg   Tuesday :   10 mg   Wednesday :   10 mg   Thursday :   12.5 mg   Friday :   10 mg   Saturday :   10 mg   Total Dose :   75 mg   Warfarin Pt Reported Previous Week Dose :    Sun Mon Tues Wed Thurs Fri Sat Weekly Total Dose   Week 1 10 mg 12.5 mg 10 mg 10 mg 12.5 mg 10 mg 10 mg 75 mg   Week 2  mg  mg  mg  mg  mg  mg  mg  mg   Week 3  mg  mg  mg  mg  mg  mg  mg  mg   Week 4  mg  mg  mg  mg  mg  mg  mg  mg         Patient is taking single or multiple strength tablet(s) :   Multiple strength tab(s)   Multiple Tab Strengths :   5 mg tab, 7.5 mg tab   Sunday :   10 mg   Monday :   0 mg   Tuesday :   10 mg   Wednesday :   10 mg   Thursday :   12.5 mg   Friday :   10 mg   Saturday :   10 mg   Week 1 Total Dose :   62.5 mg   Patient Instructions :   INR = 4.2 per [facility]. Per protocol, hold Warfarin x1 dose then resume 12.5mg Mon, Thurs and 10mg ROW. Recheck INR in 1 week. Message sent via portal per pt request.      Shazia Lawrence RN - 8/10/2020 11:34 AM CDT

## 2022-02-16 NOTE — TELEPHONE ENCOUNTER
---------------------  From: Vitor REES, Samira   To: WENDI BOWSER    Sent: 11/6/2020 3:40:51 PM CST  Subject: Warfarin         Yes, I agree if your next INR is high we should do that. Your last 3 INRs have been really pretty good on the same dose you are on now. Do you think you will be staying on Tylenol?  Large doses of Tylenol can effect the INR. If you are on 1 - 2 tabs twice daily of Tylenol that may not be the cause of this INR. That is a moderate dose. Small changes in alcohol consumption can have a large effect on INR or of course a reduction in dietary vitamin K. Let me know if you prefer to just go to 10 mg daily.    Thank you,    Samira MARIN RN

## 2022-02-16 NOTE — TELEPHONE ENCOUNTER
---------------------  From: Vitor REES, Samira   To: WENDI BOWSER    Sent: 2/21/2020 12:27:45 PM CST  Subject: INR     Benny Oates,    I received your INR of 2.7 from [facility] today. Please stay on 12.5mg warfarin on Monday  and Thursday , and 10mg the rest of the days of the week. Recheck your INR in 1 week. Let us know if you have changes in health or concerns.     Thank you,    Samira MARIN RN

## 2022-02-16 NOTE — PROGRESS NOTES
Patient:   WENDI BOWSER            MRN: [id number]            FIN: [id number]               Age:   71 years     Sex:  Male     :  1950   Associated Diagnoses:   Encounter for Medicare annual wellness exam; Prediabetes; Lipids abnormal; PVD (Peripheral Vascular Disease); Acquired trigger finger   Author:   David Jenkins MD      Visit Information      Date of Service: 2021 12:42 pm  Performing Location: [facility]  Encounter#: [id number]      Primary Care Provider (PCP):  David Jenkins MD# [id number]      Referring Provider:  David Jenkins MD# [id number]      Chief Complaint   2021 1:09 PM CDT    Patient presents for AWV. Right side pain possible broken ribs- known injury ongoing since March. Left hand second digit trigger finger possible 4-5 months getting worse.     Medicare Initial / Annual Preventative Visit      Well Adult History   Well Adult History             The patient presents for well adult exam.  ADL's and Safety were reviewed.  ___none___ found.  Please see copy of scanned form.    I have reviewed with the patient the completed health risk assessment and health history form and we have agreed on the following plans for identified risk factors. ___none____ found.  Please see copy of scanned form.  Patient is also here for follow-up on his lipids.  Overall he has been doing well.  Struggles with his shoulder since his replacement last fall he wintered in [facility].  He lost a significant other to cancer while down there.  He has developed a trigger finger.  He did hurt his back while he was there and had an adjustment when he was just that he had severe pain in his right lower rib cage.  Slowly getting better happened over a month ago.     .        Review of Systems   Constitutional:  Negative except as documented in history of present illness.    Eye:  See Preventative Services Checklist for Vision/Glaucoma Test dates..     Ear/Nose/Mouth/Throat:  Negative, Hearing is evaluated..    Respiratory:  Negative.    Cardiovascular:  Negative.    Gastrointestinal:  Negative.    Genitourinary:  Negative.    Musculoskeletal:  Negative except as documented in history of present illness.    Integumentary:  Negative except as documented in history of present illness.    Neurologic:  Negative except as documented in history of present illness.    Psychiatric:  PHQ-9 Noted.    See completed Health History for Review of Systems.      Health Status   Allergies:    Allergic Reactions (Selected)  No Known Medication Allergies   Medications:  (Selected)   Prescriptions  Prescribed  Viagra 100 mg oral tablet: See Instructions, Instructions: TAKE AS DIRECTED, # 8 tab(s), 11 Refill(s), Pharmacy: [id number], TAKE AS DIRECTED  Voltaren 1% topical gel: ( 2 gm ), Topical, qid, PRN: shoulder pain, # 100 gm, 11 Refill(s), Type: Maintenance, Pharmacy: [facility] #[id number], 2 gm Topical qid,PRN:shoulder pain  atorvastatin 20 mg oral tablet: = 1 tab(s) ( 20 mg ), Oral, daily, # 90 tab(s), 3 Refill(s), Type: Maintenance, Pharmacy: [facility] #[id number], 1 tab(s) Oral daily, 72, in, 04/27/21 13:09:00 CDT, Height Measured, 195.9, lb, 04/27/21 13:09:00 CDT, Weight Measured  fluticasone 50 mcg/inh nasal spray: = 2 spray(s), nasal, daily, # 3 EA, 3 Refill(s), Type: Maintenance, Pharmacy: [facility] #[id number], 2 spray(s) Nasal daily,x90 day(s), 72, in, 06/12/20 12:52:00 CDT, Height Measured, 193.6, lb, 06/12/20 12:52:00 CDT, Weight Measured  warfarin 5 mg oral tablet: See Instructions, Instructions: TAKE 2& 1/2 TABLETS BY MOUTH MONDAY, THURSDAY AND 2 TABLETS REST OF WEEK, # 192 tab(s), 6 Refill(s), Type: Maintenance, Pharmacy: [facility] #[id number], TAKE 2& 1/2 TABLETS BY MOUTH MONDAY, THURSDAY AND 2 TABLETS...  Documented Medications  Documented  acetaminophen: See Instructions, Instructions: 325mg-650 mg PRN for pain/fever., 0  Refill(s), Type: Maintenance  aspirin 81 mg oral delayed release tablet: = 1 tab(s) ( 81 mg ), Oral, daily, 0 Refill(s), Type: Maintenance,    Medications          *denotes recorded medication          *acetaminophen: See Instructions, 325mg-650 mg PRN for pain/fever., 0 Refill(s).          *aspirin 81 mg oral delayed release tablet: 81 mg, 1 tab(s), Oral, daily, 0 Refill(s).          atorvastatin 20 mg oral tablet: 20 mg, 1 tab(s), Oral, daily, 90 tab(s), 3 Refill(s).          Voltaren 1% topical gel: 2 gm, Topical, qid, PRN: shoulder pain, 100 gm, 11 Refill(s).          fluticasone 50 mcg/inh nasal spray: 2 spray(s), nasal, daily, for 90 day(s), 3 EA, 3 Refill(s).          Viagra 100 mg oral tablet: See Instructions, TAKE AS DIRECTED, 8 tab(s), 11 Refill(s).          warfarin 5 mg oral tablet: See Instructions, TAKE 2& 1/2 TABLETS BY MOUTH MONDAY, THURSDAY AND 2 TABLETS REST OF WEEK, 192 tab(s), 6 Refill(s).       Problem list:    All Problems (Selected)  Adenomatous colon polyp / SNOMED CT 2204438830 / Confirmed  Anticoagulated / SNOMED CT 05433177 / Confirmed  Arthritis of right acromioclavicular joint / SNOMED CT 7662378504 / Confirmed  Factor V Leiden / SNOMED CT 5777741147 / Confirmed  History of DVT of lower extremity / SNOMED CT 9423142068 / Confirmed  History of pulmonary embolism / SNOMED CT 754474707 / Confirmed  History of arterial thrombosis / SNOMED CT 4236756006 / Confirmed  Inpatient stay / SNOMED CT 733750048 / Confirmed  Lipids abnormal / SNOMED CT 003665447 / Confirmed  Obesity / SNOMED CT 1780740920 / Probable  PVD (peripheral vascular disease) / SNOMED CT 4287011160 / Confirmed  Prediabetes / SNOMED CT 0023503868 / Confirmed  Seasonal allergies / SNOMED CT 584716045 / Confirmed  Tobacco user / SNOMED CT 252732386 / Probable      Histories   Past Medical History:    Active  Inpatient stay (885859112): Onset on 6/16/2019 at 69 years.  Comments:  6/19/2019 CDT 9:47 AM CDT - Renetta Gerardo  @  [facility] due to LLE ischemic limb.  Prediabetes (4851473012): Onset on 2013 at 63 years.  Lipids abnormal (051927046): Onset on 10/26/2012 at 62 years.  Adenomatous colon polyp (1455194886): Onset on 2012 at 62 years.  History of DVT of lower extremity (7812823844): Onset on 2012 at 62 years.  PVD (peripheral vascular disease) (2733932397)  Anticoagulated (92144408)  Obesity (3387754346)  Factor V Leiden (1807578815)  History of pulmonary embolism (759154204)  Seasonal allergies (286232301)  Resolved  Inpatient stay (514320512): Onset on 2012 at 62 years.  Resolved.  Comments:  2019 CDT 8:00 AM CDT - Missy Rubi  @[place], [place] - Left superficial femoral artery chronic occlusion  Rupture of anterior cruciate ligament (295860847): Onset in  at 55 years.  Resolved.  Lyme disease (9331614936): Onset in the month of 1999 at 49 years  Resolved.  Tobacco user (305.1): Onset on 1968 at 17 years.  Resolved on 2012 at 61 years.   Family History:    Heart disease  Uncle (M)  Father (Blayne, )  Comments:  2010 9:16 AM CDT - Samira Larson MA     Procedure history:    Percutaneous mechanical thrombectomy of vein of lower limb using fluoroscopic guidance (SNOMED CT 1452924026) [facility] at 69 [facility].  Comments:  2019 9:57 AM CDT - Renetta Gerardo  left  Arthroplasty of the knee (SNOMED CT 045195285) on 2019 at 69 Years.  Comments:  2019 9:50 AM CDT - Renetta Gerardo  left  Flexible sigmoidoscopy (SNOMED CT 17831042) on 2017 at 67 Years.  Insertion of arterial stent (SNOMED CT 938792398) on 2012 at 62 Years.  Insertion of arterial stent (SNOMED CT 331885333) on 2012 at 62 Years.  Comments:  2012 11:54 PM Missy Landin  Left superficial femoral artery chronic occlusion.  Colectomy on 2012 at 62 Years.  Comments:  2012 9:43 PM Missy Landin  Hand-assisted laparoscopic total  abdominal colectomy.    5/17/2012 3:17 PM CDT - Mir Toribio MD  Numerous adenomatous polyps and one large cecal polyp  Colonoscopy (SNOMED CT 369295903) performed by Mir Toribio MD on 2/2/2012 at 61 Years.  Colonoscopy (SNOMED CT 294745690) on 10/17/2005 at 55 Years.  Flexible sigmoidoscopy (SNOMED CT 24610332) on 9/15/2005 at 55 Years.  Arthroscopy of knee (SNOMED CT 703717997) in 2005 at 55 Years.  Comments:  6/19/2019 9:50 AM CDT - Renetta Gerardo  Right  ACL R Knee in 2005 at 55 Years.  Flexible sigmoidoscopy (SNOMED CT 04661956) on 12/14/2001 at 51 Years.  Flexible sigmoidoscopy (SNOMED CT 62568113) on 12/14/2001 at 51 Years.  Vasectomy (SNOMED CT 58222912).   Social History:        Electronic Cigarette/Vaping Assessment            Electronic Cigarette Use: Never.      Tobacco Assessment            Current every day smoker, Cigarettes, 20 per day.            10 or more cigarettes (1/2 pack or more)/day in last 30 days, Cigarettes      Substance Abuse Assessment            Past, Marijuana      Employment and Education Assessment            Retired      Home and Environment Assessment            Marital status: .  Spouse/Partner name: Sherice Cancino.  Lives with Significant other.  Risks in               environment: sometimes wears bike helmet, owns secured gun.      Nutrition and Health Assessment            Type of diet: warfarin.      Exercise and Physical Activity Assessment            Exercise frequency: Never.      Sexual Assessment            Sexually active: No.  Sexual orientation: Heterosexual.        Physical Examination   Exam at Provider Discretion   Vital Signs   4/27/2021 1:09 PM CDT Temperature Tympanic 97.9 DegF    Peripheral Pulse Rate 81 bpm    HR Method Electronic    Systolic Blood Pressure 124 mmHg    Diastolic Blood Pressure 72 mmHg    Mean Arterial Pressure 89 mmHg    BP Site Right arm    BP Method Electronic    Oxygen Saturation 94 %      Measurements from flowsheet :  Measurements   4/27/2021 1:09 PM CDT Height Measured - Standard 72 in    Weight Measured - Standard 195.9 lb    BSA 2.12 m2    Body Mass Index 26.57 kg/m2  HI      General:  Alert and oriented, No acute distress.    HENT:  No pharyngeal erythema.    Neck:  Supple, Non-tender.    Respiratory:  Lungs are clear to auscultation, Respirations are non-labored, right lateral lower chest wall tenderness.    Cardiovascular:  Normal rate, Regular rhythm, No edema.    Gastrointestinal:  Soft, Non-tender.    Musculoskeletal:  degenerative changes noted, trigger finger left hand  .    Integumentary:  No rash.    Neurologic:  Alert, Oriented, No signs of cognitive impairment..    Psychiatric:  Patient's PHQ9 and CAGE questionnaire reviewed and discussed with patient..       Review / Management   Results review:  INCLUDE PHQ 9 0.       Impression and Plan   Diagnosis     Encounter for Medicare annual wellness exam (MIN13-YA Z00.00).     Prediabetes (JUO30-TZ R73.09).     Lipids abnormal (QRG14-GY E78.89).     PVD (Peripheral Vascular Disease) (BQS95-DB I73.9).     Acquired trigger finger (QSA85-EP M65.30).     Course:  Progressing as expected.    Plan:  Please list plan for positive findings, treatment options, risk vs. benefits.  1.  Hyperlipidemia we will increase his atorvastatin  2.  History of severe peripheral vascular disease with history of stenting his legs for claudication  3.  History of factor V Leiden and PE and DVT on Coumadin chronically  4.  History of multiple recurrent adenomatous polyps status post total colectomy [facility]  5.  Nicotine addiction continues to smoke he gets yearly low radiation lung CTs at [facility] not going to quit  6.  Trigger finger we will send him to Ortho hand  7.  Erectile dysfunction uses sildenafil  8.  Osteoarthritis he has Voltaren gel uses as needed  9.  Prediabetes is due for his yearly labs  10 seasonal allergies  uses fluticasone  Rib injury symptomatic care    .    Patient Instructions:       Counseled: Patient, Discussed preventative services including  Screening for AAA (Family history or male 65-70 who has smoked more than 100 cigarettes in a lifetime.), Lipid screening, Diabetes screening, Nutrition, Bone mass, Cancer screening (cervical, breast, colon), Immunizations (flu, Pneumovax, Hep. B, Zostavax), Glaucoma screening and ECG if needed.  Appropriate weight and diet discussed.  Discussed Advance Life Directives.    Preventative services checklist reviewed, updated and copy given to patient.  Please see scanned document.       .

## 2022-02-16 NOTE — NURSING NOTE
Anticoagulation Therapy Management Entered On:  3/26/2021 5:29 PM CDT    Performed On:  3/26/2021 5:26 PM CDT by Samira Adler RN               Anticoagulation Visit Assessment   Anticoagulation Indication :   Deep vein thrombosis, Other: Factor V   Anticoagulant Start :   8/1/2012 CDT   Anticoagulant Duration :   Undetermined   Anticoagulation Medication Verified :   Yes   Patient Preferred Contact Method :   Cell   Patient on Warfarin :   Yes   Samira Adler RN - 3/26/2021 5:26 PM CDT   Warfarin   International Normalization Ratio TR :   2.4    Anticoagulant INR Goal Lower :   2.5    Anticoagulant INR Goal Upper :   3.5    Information Given by :   Patient   Sunday :   10 mg   Monday :   12.5 mg   Tuesday :   10 mg   Wednesday :   10 mg   Thursday :   10 mg   Friday :   12.5 mg   Saturday :   10 mg   Total Dose :   75 mg   Warfarin Pt Reported Previous Week Dose :    Sun Mon Tues Wed Thurs Fri Sat Weekly Total Dose   Week 1 10 mg 12.5 mg 10 mg 10 mg 10 mg 12.5 mg 10 mg 75 mg   Week 2 10 mg 12.5 mg 10 mg 10 mg 10 mg 10 mg 10 mg 72.5 mg   Week 3  mg  mg  mg  mg  mg  mg  mg  mg   Week 4  mg  mg  mg  mg  mg  mg  mg  mg         Patient is taking single or multiple strength tablet(s) :   Multiple strength tab(s)   Multiple Tab Strengths :   5 mg tab, 7.5 mg tab   Sunday :   10 mg   Monday :   12.5 mg   Tuesday :   10 mg   Wednesday :   10 mg   Thursday :   10 mg   Friday :   12.5 mg   Saturday :   10 mg   Week 1 Total Dose :   75 mg   Sunday :   10 mg   Monday :   12.5 mg   Tuesday :   10 mg   Wednesday :   10 mg   Thursday :   10 mg   Friday :   10 mg   Saturday :   10 mg   Week 2 Total Dose :   72.5 mg   Warfarin Dosing Acknowledgement :   I have reviewed the patient's warfarin dosing schedule and confirmed its accuracy for today's visit.   Patient Instructions :   INR =  2.4 per [facility] today. Patient is to stay on 12.5mg warfarin on Mon and Fri and 10 mg the rest of the days of the week Recheck INR in 1 week  per protocol. Direction given via portal.     Samira Adler RN - 3/26/2021 5:26 PM CDT

## 2022-02-16 NOTE — TELEPHONE ENCOUNTER
---------------------  From: Astrid Estrada LPN (Phone Messages Pool ([id number]_[facility]))   To: [place]R [place] ( [id number]_[facility]);     Sent: 8/9/2019 11:11:11 AM CDT  Subject: CONSUMER MESSAGE FW: INR 3.6           ---------------------  From: WENDI BOWSER  To: [facility]  Sent: 08/09/2019 11:07 a.m. CDT  Subject: INR 3.6  10 daily  INR 3.6done

## 2022-02-16 NOTE — NURSING NOTE
Anticoagulation Therapy Management Entered On:  5/15/2020 3:54 PM CDT    Performed On:  5/15/2020 3:52 PM CDT by Mary Michelle RN               Anticoagulation Visit Assessment   Anticoagulation Indication :   Deep vein thrombosis, Other: Factor V   Anticoagulation Medication Verified :   Yes   Patient on Warfarin :   Yes   Mary Michelle RN - 5/15/2020 3:52 PM CDT   Warfarin   Anticoagulant INR Goal Lower :   2.5    Anticoagulant INR Goal Upper :   3.5    Sunday :   10 mg   Monday :   12.5 mg   Tuesday :   10 mg   Wednesday :   10 mg   Thursday :   12.5 mg   Friday :   10 mg   Saturday :   10 mg   Total Dose :   75 mg   Warfarin Pt Reported Previous Week Dose :    Sun Mon Tues Wed Thurs Fri Sat Weekly Total Dose   Week 1                   Week 2                   Week 3                   Week 4                         Sunday :   10 mg   Monday :   12.5 mg   Tuesday :   10 mg   Wednesday :   10 mg   Thursday :   12.5 mg   Friday :   10 mg   Saturday :   10 mg   Warfarin Wk 1 Total Dose :   75 mg   Patient Instructions :   mdINR result of 2.2 received by fax; per protocol continue warfarin 12.5 mg Mon/Thur and 10 mg ROW; recheck INR in 1 week; message sent to patient by portal.     Mary Michelle RN - 5/15/2020 3:52 PM CDT

## 2022-02-16 NOTE — NURSING NOTE
Anticoagulation Therapy Management Entered On:  1/15/2021 10:36 AM CST    Performed On:  1/15/2021 10:35 AM CST by Shazia Lawrence RN               Anticoagulation Visit Assessment   Type of Visit - Anticoagulation :   Telephone   Anticoagulation Indication :   Deep vein thrombosis, Other: Factor V   Anticoagulant Start :   8/1/2012 CDT   Anticoagulant Duration :   Undetermined   Anticoagulation Medication Verified :   Yes   Patient Preferred Contact Method :   Cell   Shazia Lawrence RN - 1/15/2021 10:35 AM CST   Anticoagulation Patient Assessment Grid   Change in Alcohol Consumption :   No   Change in Diet :   No   Change in Medications :   No   Diarrhea :   No   Rectal Bleeding :   No   Signs of Clotting :   No   Signs of Warfarin Intolerance :   No   Unusual Bleeding, Bruising :   No   Upcoming Procedures :   No   Vomiting :   No   Shazia Lawrence RN - 1/15/2021 10:35 AM CST   Patient on Warfarin :   Yes   Shazia Lawrence RN - 1/15/2021 10:35 AM CST   Warfarin   International Normalization Ratio TR :   4.1    Anticoagulant INR Goal Lower :   2.5    Anticoagulant INR Goal Upper :   3.5    Sunday :   10 mg   Monday :   12.5 mg   Tuesday :   10 mg   Wednesday :   10 mg   Thursday :   10 mg   Friday :   12.5 mg   Saturday :   10 mg   Total Dose :   75 mg   Warfarin Pt Reported Previous Week Dose :    Sun Mon Tues Wed Thurs Fri Sat Weekly Total Dose   Week 1 10 mg 10 mg 10 mg 10 mg 10 mg 10 mg 10 mg 70 mg   Week 2  mg  mg  mg  mg  mg  mg  mg  mg   Week 3  mg  mg  mg  mg  mg  mg  mg  mg   Week 4  mg  mg  mg  mg  mg  mg  mg  mg         Patient is taking single or multiple strength tablet(s) :   Multiple strength tab(s)   Multiple Tab Strengths :   5 mg tab, 7.5 mg tab   Sunday :   10 mg   Monday :   12.5 mg   Tuesday :   10 mg   Wednesday :   10 mg   Thursday :   10 mg   Friday :   0 mg   Saturday :   10 mg   Week 1 Total Dose :   62.5 mg   Sunday :   10 mg   Monday :   12.5 mg   Tuesday :   10 mg   Wednesday :   10 mg   Thursday :    10 mg   Patient Instructions :   INR = 4.1 per mcINR. Per protocol, hold Warfarin x1 dose. Then resume 12.5mg Monday and 10mg ROW. Recheck INR in 1 week. Advised by Shazia Figueroa RN - 1/15/2021 10:35 AM CST

## 2022-02-16 NOTE — TELEPHONE ENCOUNTER
---------------------  From: Samuel REES, Frieda ALFARO   To: TFS Message Pool ([id number]_[facility]);     Cc: INR Pool ( [id number]_[facility]);      Sent: 9/29/2020 6:28:59 PM CDT  Subject: Lovenox bridge      Papi  pharmacist from  [employer] called at 1341 and LM stating that bennett is having shoulder surgery 10-8-2020. Pt should be off his warfarin to get his INR down to <1.5 prior to surgery. Patient normally bridges with Lovenox. Please advise Warfarin and Lovenox directions.    Please call Papi at [phone] with directions.---------------------  From: Uma Kay (makeena Message Pool (32224_[facility]))   To: David Jenkins MD;     Sent: 9/30/2020 9:06:35 AM CDT  Subject: FW: Lovenox bridge---------------------  From: David Jenkins MD   To: makeena Message Pool ([id number]_[facility]);     Sent: 9/30/2020 12:28:16 PM CDT  Subject: RE: Lovenox bridge      Stop coumadin 5 days before procedure  Start Lovenox 100mg sq q 12 hours 4 days before procedure  take last dose of lovenox 24 hours before procedure  resume coumadin the day after procedure  resume lovenox every 12 hours after procedure  continue lovenox until inr >2---------------------  From: Uma Kay (makeena Message Pool ([id number]_[facility]))   To: INR Pool ( 32224_[facility]);     Sent: 9/30/2020 12:45:46 PM CDT  Subject: FW: Lovenox bridgeSelvin Talamantes at 1645  Left detailed VM with directions from makeena.

## 2022-02-16 NOTE — NURSING NOTE
Anticoagulation Therapy Entered On:  3/14/2019 1:31 PM CDT    Performed On:  3/14/2019 1:30 PM CDT by Mary Michelle RN               Warfarin Management   Week 1 Baldemar Dose :   10    Week 1 Monday Dose :   10    Week 1 Tuesday Dose :   10    Week 1 Wednesday Dose :   10    Week 1 Thursday Dose :   10    Week 1 Friday Dose :   10    Week 1 Saturday Dose :   10    Week 1 Dose Total :   70    Week 2 Sunday Dose :   10    Week 2 Monday Dose :   10    Week 2 Tuesday Dose :   10    Week 2 Wednesday Dose :   10    Week 2 Thursday Dose :   10    Week 2 Friday Dose :   10    Week 2 Saturday Dose :   10    Week 2 Dose Total :   70    Planned Duration :   Indefinite   Indication :   DVT, Other: FVLeiden   Warfarin Management Comments :   Patient portal notification of home INR result   International Normalization Ratio :   2.6    INR Range :   2.5 - 3.5   INR Therapeutic Range :   Yes   Anticoagulation Recheck :   1 week   Warfarin Special Instructions :   Per protocol continue warfarin 10 mg dialy; recheck 1 week; message sent through patient portal.   Mary Michelle RN - 3/14/2019 1:30 PM CDT

## 2022-02-16 NOTE — TELEPHONE ENCOUNTER
---------------------  From: Vitor REES, Samira   To: WENDI BOWSER    Sent: 6/15/2020 2:47:51 PM CDT    Benny Oates,    Your 3.5 INR result from 6/13/20 was received today via MDINR. Please stay on your current dose of warfarin (12.5mg on Mon, and Thurs and 10mg the rest of the days of the week) Recheck INR in 1 week. Have a great day!    Samira MARIN RN

## 2022-02-16 NOTE — TELEPHONE ENCOUNTER
---------------------  From: Gardenia Castro RN (Phone Messages Pool ([id number]_[facility]))   To: INR Pool ( 32224_[facility]);     Sent: 12/10/2021 12:55:03 PM CST  Subject: FW: INR 3.4           ---------------------  From: WENDI BOWSER  To: [facility]  Sent: 12/10/2021 12:53 p.m. CST  Subject: INR 3.4  10 mg daily 3.4 INR---------------------  From: Samira Adler RN (INR Pool ( 32224_[facility]))   To: WENDI BOWSER    Sent: 12/10/2021 1:02:20 PM CST  Subject: FW: INR 3.4     Benny Oates,     Please continue on the 10mg of warfarin daily and recheck your INR in 1 week. Stay safe and warm!    Thank you,    Samira MARIN RN

## 2022-02-16 NOTE — NURSING NOTE
Anticoagulation Therapy Management Entered On:  10/8/2021 1:34 PM CDT    Performed On:  10/8/2021 1:33 PM CDT by Mary Michelle RN               Anticoagulation Visit Assessment   Anticoagulation Indication :   Deep vein thrombosis, Other: Factor V   Anticoagulant Start :   8/1/2012 CDT   Anticoagulant Duration :   Undetermined   Anticoagulation Medication Verified :   Yes   Patient Preferred Contact Method :   Cell   Mary Michelle RN - 10/8/2021 1:33 PM CDT   Anticoagulation Patient Assessment Grid   Change in Alcohol Consumption :   No   Change in Diet :   No   Change in Medications :   No   Diarrhea :   No   Rectal Bleeding :   No   Signs of Clotting :   No   Signs of Warfarin Intolerance :   No   Unusual Bleeding, Bruising :   No   Upcoming Procedures :   No   Vomiting :   No   Mary Michelle RN - 10/8/2021 1:33 PM CDT   Patient on Warfarin :   Yes   Mary Michelle RN - 10/8/2021 1:33 PM CDT   Warfarin   International Normalization Ratio TR :   4.4    Anticoagulant INR Goal Lower :   2.5    Anticoagulant INR Goal Upper :   3.5    Sunday :   10 mg   Monday :   10 mg   Tuesday :   10 mg   Wednesday :   10 mg   Thursday :   10 mg   Friday :   10 mg   Saturday :   10 mg   Total Dose :   70 mg   Warfarin Pt Reported Previous Week Dose :    Sun Mon Tues Wed Thurs Fri Sat Weekly Total Dose   Week 1 10 mg 10 mg 10 mg 10 mg 10 mg 10 mg 10 mg 70 mg   Week 2 10 mg 10 mg 10 mg 12.5 mg 10 mg 12.5 mg 10 mg 75 mg   Week 3  mg  mg  mg  mg  mg  mg  mg  mg   Week 4  mg  mg  mg  mg  mg  mg  mg  mg         Patient is taking single or multiple strength tablet(s) :   Multiple strength tab(s)   Multiple Tab Strengths :   5 mg tab, 7.5 mg tab   Sunday :   10 mg   Monday :   10 mg   Tuesday :   10 mg   Wednesday :   10 mg   Thursday :   10 mg   Friday :   10 mg   Saturday :   10 mg   Week 1 Total Dose :   70 mg   Sunday :   10 mg   Monday :   10 mg   Tuesday :   10 mg   Wednesday :   12.5 mg   Thursday :   10 mg   Friday :   12.5  mg   Saturday :   10 mg   Week 2 Total Dose :   75 mg   Warfarin Dosing Acknowledgement :   I have reviewed the patient's warfarin dosing schedule and confirmed its accuracy for today's visit.   Patient Instructions :   Home INR = 4.4. Per protocol Hold x2 days then Continue warfarin 10 mg daily. Recheck INR in 1 week. Message sent to pt via portal.     Viviana REES, Mary HERNANDEZ - 10/8/2021 1:33 PM CDT

## 2022-02-16 NOTE — TELEPHONE ENCOUNTER
---------------------  From: Viviana REES, Mary HERNANDEZ   To: MAGUEWENDI WEST    Sent: 5/26/2020 8:29:11 AM CDT  Subject: Warfarin dosing     Good Morning KIMMY Oates received your home INR result from 5/23/20 which was 3.6.    Please continue your warfarin at 12.5 mg Mondays and Thursdays, 10 mg rest of the days.  Check your INR in 1 week.    If this is not your correct warfarin dose, please, let us know so that we may update our records. Also, please reach out if you have any medication or diet changes to report or have any concerns.    Have a wonderful day,    Mary Michelle RN

## 2022-02-16 NOTE — TELEPHONE ENCOUNTER
Entered by Zenia Dial CMA on August 11, 2020 9:57:00 AM CDT  ---------------------  From: Zenia Dial CMA   To: [facility] #[id number]    Sent: 8/11/2020 9:57:00 AM CDT  Subject: Medication Management     ** Not Approved: RESPONDED BY OTHER MEANS **  warfarin (WARFARIN SOD 5MG TABLETS (PEACH))  TAKE 2& 1/2 TABLETS BY MOUTH MONDAY, THURSDAY AND 2 TABLETS REST OF WEEK  Qty:  192 tab(s)        Days Supply:  90        Refills:  0          Substitutions Allowed     Route To Pharmacy - [facility][id number]   Note from Pharmacy:  **Patient requests 90 days supply**  Signed by Zenia Dial CMA            ------------------------------------------  From: [facility] #[id number]  To: David Jenkins MD  Sent: August 11, 2020 9:55:54 AM CDT  Subject: Medication Management  Due: July 28, 2020 10:29:52 PM CDT     ** On Hold Pending Signature **     Dispensed Drug: warfarin (warfarin 5 mg oral tablet), TAKE 2& 1/2 TABLETS BY MOUTH MONDAY, THURSDAY AND 2 TABLETS REST OF WEEK  Quantity: 192 tab(s)  Days Supply: 90  Refills: 0  Substitutions Allowed  Notes from Pharmacy: **Patient requests 90 days supply**  ------------------------------------------

## 2022-02-16 NOTE — TELEPHONE ENCOUNTER
---------------------  From: Samira Adler RN   To: WENDI BOWSER    Sent: 1/13/2020 12:27:02 PM CST  Subject: INR     Benny Oates,    We did receive your message from 1/11/20. Yes 10mg daily and recheck INR in 1 week. Call if any concerns.    Thank you,    Samira MARIN RN

## 2022-02-16 NOTE — NURSING NOTE
Anticoagulation Therapy Management Entered On:  11/26/2021 2:48 PM CST    Performed On:  11/26/2021 2:47 PM CST by Mary Michelle RN               Anticoagulation Visit Assessment   Anticoagulation Indication :   Deep vein thrombosis, Other: Factor V   Anticoagulant Start :   8/1/2012 CDT   Anticoagulant Duration :   Undetermined   Anticoagulation Medication Verified :   Yes   Patient Preferred Contact Method :   Cell   Patient on Warfarin :   Yes   Mary Michelle RN - 11/26/2021 2:47 PM CST   Warfarin   International Normalization Ratio TR :   6.5    Anticoagulant INR Goal Lower :   2.5    Anticoagulant INR Goal Upper :   3.5    Sunday :   10 mg   Monday :   10 mg   Tuesday :   10 mg   Wednesday :   10 mg   Thursday :   10 mg   Friday :   0 mg   Saturday :   0 mg   Total Dose :   50 mg   Warfarin Pt Reported Previous Week Dose :    Sun Mon Tues Wed Thurs Fri Sat Weekly Total Dose   Week 1 10 mg 5 mg 10 mg 10 mg 5 mg 10 mg 10 mg 60 mg   Week 2 10 mg 10 mg 10 mg 12.5 mg 10 mg 12.5 mg 10 mg 75 mg   Week 3  mg  mg  mg  mg  mg  mg  mg  mg   Week 4  mg  mg  mg  mg  mg  mg  mg  mg         Patient is taking single or multiple strength tablet(s) :   Multiple strength tab(s)   Multiple Tab Strengths :   5 mg tab, 7.5 mg tab   Sunday :   10 mg   Monday :   5 mg   Tuesday :   10 mg   Wednesday :   10 mg   Thursday :   5 mg   Friday :   10 mg   Saturday :   10 mg   Week 1 Total Dose :   60 mg   Sunday :   10 mg   Monday :   10 mg   Tuesday :   10 mg   Wednesday :   12.5 mg   Thursday :   10 mg   Friday :   12.5 mg   Saturday :   10 mg   Week 2 Total Dose :   75 mg   Warfarin Dosing Acknowledgement :   I have reviewed the patient's warfarin dosing schedule and confirmed its accuracy for today's visit.   Patient Instructions :   Home INR = 6.5. Per protocol Hold warfarin x2 days then take 5 mg on the third day. Recheck INR 11/29. Message sent to pt through portal.     Mary Michelle RN - 11/26/2021 2:47 PM CST

## 2022-02-16 NOTE — NURSING NOTE
Anticoagulation Therapy Management Entered On:  1/22/2021 5:31 PM CST    Performed On:  1/22/2021 5:28 PM CST by Samira Adler RN               Anticoagulation Visit Assessment   Anticoagulation Indication :   Deep vein thrombosis, Other: Factor V   Anticoagulant Start :   8/1/2012 CDT   Anticoagulant Duration :   Undetermined   Patient Preferred Contact Method :   Cell   Patient on Warfarin :   Yes   Samira Adler RN - 1/22/2021 5:28 PM CST   Warfarin   International Normalization Ratio TR :   2.4    Anticoagulant INR Goal Lower :   2.5    Anticoagulant INR Goal Upper :   3.5    Information Given by :   Other: JODI   Sunday :   10 mg   Monday :   12.5 mg   Tuesday :   10 mg   Wednesday :   10 mg   Thursday :   10 mg   Friday :   10 mg   Saturday :   10 mg   Total Dose :   72.5 mg   Warfarin Pt Reported Previous Week Dose :    Sun Mon Tues Wed Thurs Fri Sat Weekly Total Dose   Week 1 10 mg 12.5 mg 10 mg 10 mg 10 mg 0 mg 10 mg 62.5 mg   Week 2 10 mg 12.5 mg 10 mg 10 mg 10 mg  mg  mg  mg   Week 3  mg  mg  mg  mg  mg  mg  mg  mg   Week 4  mg  mg  mg  mg  mg  mg  mg  mg         Patient is taking single or multiple strength tablet(s) :   Multiple strength tab(s)   Multiple Tab Strengths :   5 mg tab, 7.5 mg tab   Sunday :   10 mg   Monday :   12.5 mg   Tuesday :   10 mg   Wednesday :   10 mg   Thursday :   10 mg   Friday :   10 mg   Saturday :   10 mg   Week 1 Total Dose :   72.5 mg   Patient Instructions :   INR = 2.4 per JODI today Patient is to stay on 12.5 mg warfarin on Mon and 10 mg the rest of the days of the week recheck INR in 1 week per protocol. Patient notified via portal.     Samira Adler RN - 1/22/2021 5:28 PM CST

## 2022-02-16 NOTE — TELEPHONE ENCOUNTER
---------------------  From: Astrid Estrada LPN (Phone Messages Pool ([id number]_[facility]))   To: INR Pool ( [address]24[facility]);     Sent: 12/3/2021 9:40:07 AM CST  Subject: CONSUMER MESSAGE FW: INR 2.3           ---------------------  From: WENDI BOWSER  To: RUST  Sent: 12/03/2021 09:38 a.m. CST  Subject: INR 2.3  I resumed 10 daily my INR is 2.3---------------------  From: Mary Michelle RN (INR Pool ( [id number]_[facility]))   To: WENDI BOWSER    Sent: 12/3/2021 10:12:22 AM CST  Subject: RE: CONSUMER MESSAGE FW: INR 2.3     Tez Oates,    Please continue your Warfarin at 10 mg daily. Recheck your INR in 1 week.    Thank you for doing your INR so early in the day.    Have a great weekend,    Mary Michelle RN

## 2022-02-16 NOTE — TELEPHONE ENCOUNTER
---------------------  From: Viviana REES, Mary HERNANDEZ   To: MAGUE WENDI WEST    Sent: 7/2/2021 10:50:54 AM CDT  Subject: Warfarin dosing     Benny Oates,    I got your INR result of 4.1. Are you on an antibiotic for your leg that you were thinking was infected?    I would like you to not take warfarin today and then resume your usual dose of 12.5 mg M/W/F and 10 mg ROW. Check your INR in 1 week.    Please remember, diet and alcohol affect your INR significantly. With the holiday weekend, many people are having fluctuations in their INR's. I just want you to be aware of signs or symptoms of bleeding.    Have a wonderful July 4th,    Mary Michelle RN

## 2022-02-16 NOTE — TELEPHONE ENCOUNTER
---------------------  From: Viviana REES, Mary HERNANDEZ (INR Pool ( 32224_[facility]))   To: WENDI BOWSER    Cc: INR Pool ( 32224_[facility]);      Sent: 2/3/2020 8:48:18 AM CST  Subject: INR on 2/2/20     Good Morning Иван,    I got your INR result of 4.7 from 2/2/20.    Can you please verify what dose of warfarin you have been taking?    Also, let me know if you have had any signs or symptoms of bleeding or excess bruising?    [place]    I look forward to your response,    Mary Michelle RN

## 2022-02-16 NOTE — TELEPHONE ENCOUNTER
---------------------  From: Nisha Miller CMA (Phone Messages Pool (93924_[facility]))   To: INR Pool ( 32224_[facility]);     Sent: 2/20/2019 11:19:21 AM CST  Subject: FW: INR 2.3           ---------------------  From: WENDI BOWSER  To: [facility] includes the [facility]  Sent: 02/20/2019 11:17 a.m. CST  Subject: INR 2.3  same dosage 10 daily, INR 2.3---------------------  From: Viviana REES, Mary HERNANDEZ (INR Pool ( 32224_[facility]))   To: WENDI BOWSER    Sent: 2/20/2019 1:37:30 PM CST  Subject: RE: INR 2.3       Benny Oates    Thank you for the update on your INR.    Please continue your warfarin at 10 mg daily and recheck in 1 week.    If you have any questions or concerns let me know.    Be safe if you're going outside!    Thank you,    Mary Michelle RN

## 2022-02-16 NOTE — TELEPHONE ENCOUNTER
---------------------  From: Liss Carrero CMA (Phone Messages Pool ([address] [place] [address]))   To: INR Pool ( [address] [place] [address]);     Sent: 2/11/2020 2:01:07 PM CST  Subject: FW: INR           ---------------------  From: WENDI BOWSER  To: [facility]  Sent: 02/11/2020 01:53 p.m. CST  Subject: FW: INR  I have never been below 10 daily and I was at 1.8, I think 7.5 is too low  ---------------------  From: Samira Adler RN  To: WENDI BOWSER  Sent: 2/10/2020 3:31:41 PM CST  Subject: INR       Benny Oates,       Please take 7.5mg warfarin daily and recheck your INR in 3 days.       Thank you,       Samira MARIN RN---------------------  From: Samira Adler RN (INR Pool ( [address] [place] [address]))   To: TFS Message Pool ([address] [place] [address]);     Sent: 2/11/2020 4:01:47 PM CST  Subject: FW: INR---------------------  From: Uma Kay (TFS Message Pool ([address] [place] [address]))   To: David Jenkins MD;     Sent: 2/12/2020 12:57:47 PM CST  Subject: FW: INR?   ?   ---------------------  From: David Jenkins  To: Uma Kay (TFS Message Pool ([address] [place] [address]))  Sent: February 13, 2020 6:01 AM CST  Subject: RE: INR  ???  Check into today and let?s decide dose?---------------------  From: Uma Kay (TFS Message Pool (32224_[facility]))   To: WENDI BOWSER    Sent: 2/13/2020 1:45:57 PM CST  Subject: FW: CORDELL Brunner-  Dr. Jenkins wants you to check your INR today and will decide on dose based on results.   Thank you.

## 2022-02-16 NOTE — NURSING NOTE
Anticoagulation Therapy Management Entered On:  10/22/2021 3:35 PM CDT    Performed On:  10/22/2021 3:29 PM CDT by Samira Adler RN               Anticoagulation Visit Assessment   Anticoagulation Indication :   Deep vein thrombosis, Other: Factor V   Anticoagulant Start :   8/1/2012 CDT   Anticoagulant Duration :   Undetermined   Patient Preferred Contact Method :   Cell   Samira Adler RN - 10/22/2021 3:29 PM CDT   Anticoagulation Patient Assessment Grid   Change in Alcohol Consumption :   No   Change in Diet :   Yes   (Comment: lost 15 lb this summer [Samira Adler RN - 10/22/2021 3:29 PM CDT] )   Change in Medications :   Yes   (Comment: Doxycycline last week 2 days for tick bite [Samira Adler RN - 10/22/2021 3:29 PM CDT] )   Diarrhea :   No   Rectal Bleeding :   No   Signs of Clotting :   No   Signs of Warfarin Intolerance :   No   Unusual Bleeding, Bruising :   No   Upcoming Procedures :   No   Vomiting :   No   Samira Adler RN - 10/22/2021 3:29 PM CDT   Patient on Warfarin :   Yes   Samira Adler RN - 10/22/2021 3:29 PM CDT   Warfarin   International Normalization Ratio TR :   5.1    Anticoagulant INR Goal Lower :   2.5    Anticoagulant INR Goal Upper :   3.5    Information Given by :   Patient   Sunday :   10 mg   Monday :   10 mg   Tuesday :   10 mg   Wednesday :   10 mg   Thursday :   10 mg   Friday :   10 mg   Saturday :   10 mg   Total Dose :   70 mg   Warfarin Pt Reported Previous Week Dose :    Sun Mon Tues Wed Thurs Fri Sat Weekly Total Dose   Week 1 10 mg 10 mg 10 mg 10 mg 10 mg 10 mg 10 mg 70 mg   Week 2 10 mg 10 mg 10 mg 12.5 mg 10 mg 12.5 mg 10 mg 75 mg   Week 3  mg  mg  mg  mg  mg  mg  mg  mg   Week 4  mg  mg  mg  mg  mg  mg  mg  mg         Patient is taking single or multiple strength tablet(s) :   Multiple strength tab(s)   Multiple Tab Strengths :   5 mg tab, 7.5 mg tab   Sunday :   10 mg   Monday :   5 mg   Tuesday :   10 mg   Wednesday :   10 mg   Thursday :   5 mg    Friday :   10 mg   Saturday :   10 mg   Week 1 Total Dose :   60 mg   Warfarin Dosing Acknowledgement :   I have reviewed the patient's warfarin dosing schedule and confirmed its accuracy for today's visit.   Patient Instructions :   INR = 5.1 per MDINR today. Patient is to hold warfarin x 1 day then decrease warfarin to 5mg Mon and Thurs and 10 mg the rest of the days of the week Recheck INR on 10/25/21. Patient advised via call. Decreased weekly dose 14 % per week.      Maci Adler RNbeth - 10/22/2021 3:29 PM CDT

## 2022-02-16 NOTE — NURSING NOTE
The patients monthly home INR testing report from [facility] was received today. All results from the month of July 2019 were received and addressed previously. Report is being sent to [facility] for scanning.

## 2022-02-16 NOTE — PROGRESS NOTES
Patient:   WENDI BOWSER            MRN: [id number]            FIN: [id number]               Age:   68 years     Sex:  Male     :  1950   Associated Diagnoses:   Atherosclerosis; Hypercholesterolemia; Hyperlipidemia; Adenomatous colon polyp; Factor V Leiden; History of DVT of lower extremity; History of pulmonary embolism; Prediabetes; PVD (peripheral vascular disease); Tobacco user   Author:   David Jenkins MD      Visit Information      Date of Service: 10/18/2018 08:38 am  Performing Location: [facility]  Encounter#: [id number]      Chief Complaint   10/18/2018 8:42 AM CDT   Medication check up.        Interval History   chief complaint and symptoms as noted above confirmed with patient phq9, cage reviewed with ptHome inr going well   Taking medications as directed.   Gets annual fu on colon dvts aspvd ct chest at [facility]  Gets viagra in [facility] where he gregory      Review of Systems   Constitutional:  Negative except as documented in history of present illness.    Eye:  Negative.    Ear/Nose/Mouth/Throat:  Negative.    Respiratory:  Negative.    Cardiovascular:  Negative.    Gastrointestinal:  Negative except as documented in history of present illness.    Genitourinary:  Negative.    Musculoskeletal:  Negative.    Integumentary:  Negative.       Health Status   Allergies:    Allergic Reactions (Selected)  No Known Medication Allergies   Medications:  (Selected)   Prescriptions  Prescribed  Alere INRation 2 (PT INR) Test Strip: Alere INRation 2 (PT INR) Test Strip, See Instructions, Instructions: Test INR monthly as directed, Supply, # 1 box(es), 11 Refill(s), Type: Maintenance  Viagra 100 mg oral tablet: See Instructions, Instructions: TAKE AS DIRECTED, # 8 tab(s), 11 Refill(s), Pharmacy: [facility] [id number], TAKE AS DIRECTED  atorvastatin 10 mg oral tablet: = 1 tab(s) ( 10 mg ), Oral, daily, # 30 tab(s), 0 Refill(s), Type: Maintenance, Pharmacy: [facility] [id number], Needs  appt for further refills  fluticasone 50 mcg/inh nasal spray: 2 spray(s), nasal, daily, # 3 EA, 1 Refill(s), Type: Maintenance, Pharmacy: [facility] [id number]  warfarin 5 mg oral tablet: = 2 tab(s) ( 10 mg ), Oral, daily, # 60 tab(s), 0 Refill(s), Type: Maintenance, Pharmacy: [facility] [id number], Needs appt for further refills  Documented Medications  Documented  aspirin 81 mg oral tablet: 1 tab(s) ( 81 mg ), PO, Daily, # 30 tab(s), 0 Refill(s), Type: Maintenance   Problem list:    All Problems  Adenomatous colon polyp / SNOMED CT 3182878402 / Confirmed  Anticoagulated / SNOMED CT 31018991 / Confirmed  Factor V Leiden / SNOMED CT 7998594651 / Confirmed  History of DVT of lower extremity / SNOMED CT 0959867031 / Confirmed  History of pulmonary embolism / SNOMED CT 678507039 / Confirmed  Lipids abnormal / SNOMED CT 706449496 / Confirmed  Obesity / SNOMED CT 0961390761 / Probable  PVD (peripheral vascular disease) / SNOMED CT 9317083886 / Confirmed  Prediabetes / SNOMED CT 7936861618 / Confirmed  Seasonal allergies / SNOMED CT 493044443 / Confirmed  Tobacco user / SNOMED CT 878571087 / Probable  Resolved: *Hospitalized@[facility] - Left superficial femoral artery chronic occlusion  Resolved: Rupture of anterior cruciate ligament / SNOMED CT 861981720  Resolved: Lyme disease / SNOMED CT 5257328332  Resolved: Tobacco user / ICD-9-.1  Canceled: DVT (Deep Venous Thrombosis) / ICD-9-.40  Canceled: History of pulmonary embolism / ICD-9-CM V12.51  Canceled: PE (Pulmonary Embolism) / ICD-9-.19      Histories   Past Medical History:    Active  Prediabetes (5557396262): Onset on 4/19/2013 at 63 years.  Lipids abnormal (591996475): Onset on 10/26/2012 at 62 years.  Adenomatous colon polyp (0083453468): Onset on 4/27/2012 at 62 years.  History of DVT of lower extremity (7396955777): Onset on 4/27/2012 at 62 years.  PVD (peripheral vascular disease) (1643310602)  Anticoagulated (14541208)  Obesity  (4830025650)  Factor V Leiden (5157519650)  History of pulmonary embolism (687692292)  Seasonal allergies (318026707)  Resolved  *Hospitalized@[facility] - Left superficial femoral artery chronic occlusion: Onset on 2012 at 62 years.  Resolved.  Rupture of anterior cruciate ligament (697272214): Onset in  at 55 years.  Resolved.  Lyme disease (8411390269): Onset in the month of 1999 at 49 years  Resolved.  Tobacco user (305.1): Onset on 1968 at 17 years.  Resolved on 2012 at 61 years.   Family History:    Heart disease  Uncle (M)  Father (Blayne, )  Comments:  2010 9:16 AM - Samira Larson MA     Procedure history:    Flexible sigmoidoscopy (SNOMED CT 70837349) on 2017 at 67 Years.  Insertion of arterial stent (SNOMED CT 153732428) on 2012 at 62 Years.  Insertion of arterial stent (SNOMED CT 531729742) on 2012 at 62 Years.  Comments:  2012 11:54 PM - Missy Rubi  Left superficial femoral artery chronic occlusion.  Colectomy on 2012 at 62 Years.  Comments:  2012 9:43 PM - Missy Rubi  Hand-assisted laparoscopic total abdominal colectomy.    2012 3:17 PM - Mir Toribio MD  Numerous adenomatous polyps and one large cecal polyp  Colonoscopy (SNOMED CT 807045679) performed by Mir Toribio MD on 2012 at 61 Years.  Colonoscopy (SNOMED CT 436925358) on 10/17/2005 at 55 Years.  Flexible sigmoidoscopy (SNOMED CT 65545696) on 9/15/2005 at 55 Years.  Arthroscopy of knee (SNOMED CT 463927965) in  at 55 Years.  ACL R Knee in  at 55 Years.  Flexible sigmoidoscopy (SNOMED CT 75730770) on 2001 at 51 Years.  Flexible sigmoidoscopy (SNOMED CT 77260539) on 2001 at 51 Years.  Vasectomy (SNOMED CT 92865997).   Social History:        Tobacco Assessment            Current every day smoker, Cigarettes, 20 per day.      Substance Abuse Assessment            Past, Marijuana      Employment and Education Assessment            Retired      Home  and Environment Assessment            Marital status: .  Spouse/Partner name: Sherice Cancino.  Lives with Significant other.  Risks in               environment: sometimes wears bike helmet, owns secured gun.      Nutrition and Health Assessment            Type of diet: warfarin.      Exercise and Physical Activity Assessment            Exercise frequency: Never.      Sexual Assessment            Sexually active: No.  Sexual orientation: Heterosexual.        Physical Examination   Vital Signs   10/18/2018 8:42 AM CDT Temperature Tympanic 97.9 DegF    Peripheral Pulse Rate 93 bpm    HR Method Electronic    Systolic Blood Pressure 119 mmHg    Diastolic Blood Pressure 76 mmHg    Mean Arterial Pressure 90 mmHg    BP Method Electronic      Measurements from flowsheet : Measurements   10/18/2018 8:42 AM CDT   Weight Measured - Standard                194.6 lb     General:  Alert and oriented, No acute distress.    Eye:  Pupils are equal, round and reactive to light, Extraocular movements are intact.    HENT:  No pharyngeal erythema.    Neck:  Supple, Non-tender.    Respiratory:  Lungs are clear to auscultation, Respirations are non-labored.    Cardiovascular:  Normal rate, Regular rhythm, No edema.    Gastrointestinal:  Soft, Non-tender, No organomegaly.    Musculoskeletal:  degenerative changes noted.    Integumentary:  No rash.    Neurologic:  Alert, Oriented.       Review / Management   Results review:  Lab results   10/9/2018 4:24 PM CDT INR 3.0   10/2/2018 4:45 PM CDT INR 3.8   9/25/2018 3:56 PM CDT INR 3.8   9/19/2018 1:47 PM CDT INR 3.5   9/11/2018 11:22 AM CDT INR 3.9   9/4/2018 1:31 PM CDT INR 3.3   8/29/2018 4:03 PM CDT INR 4.4   8/22/2018 10:54 AM CDT INR 4.5   8/16/2018 7:57 AM CDT INR 2.9   8/8/2018 12:12 PM CDT INR 2.1   7/24/2018 4:04 PM CDT INR 3.3   7/19/2018 12:27 PM CDT INR 2.8   7/11/2018 10:14 AM CDT INR 3.2   .       Impression and Plan   Diagnosis     Atherosclerosis (ZTN69-CG I70.90).      Hypercholesterolemia (FTP79-MX E78.00).     Hyperlipidemia (WMD79-SG E78.5).     Adenomatous colon polyp (AJJ02-PS D12.6).     Atherosclerosis (LCU04-CB I70.90).     Factor V Leiden (SPO31-VA D68.51).     History of DVT of lower extremity (ABO29-YG Z86.718).     History of pulmonary embolism (TDZ97-BR Z86.711).     Prediabetes (NAG05-DV R73.03).     PVD (peripheral vascular disease) (FHH88-WJ I73.9).     Tobacco user (EZX39-GQ Z72.0).     Course:  Progressing as expected.    Plan:  Doing well  1 yr fu.    Patient Instructions:       Counseled: Patient, Regarding diagnosis, Regarding treatment, Regarding medications, Verbalized understanding.

## 2022-02-16 NOTE — TELEPHONE ENCOUNTER
---------------------  From: Samira Adler RN   To: WENDI BOWSER    Sent: 4/24/2020 4:36:45 PM CDT  Subject: INR     Иван,    I received your INR of 2.9 from today. Very nice. Please stay on the 12.5mg  warfarin on Monday and Thursday and 10 mg the rest of the days of the week recheck INR in 1 week. Hope you are well. Let us know if anything has changed with diet, medications or your health.    Thank you,    Samira

## 2022-02-16 NOTE — TELEPHONE ENCOUNTER
---------------------  From: Viviana REES, Mary HERNANDEZ   To: MAGUE WENDI P    Sent: 9/24/2019 2:29:15 PM CDT  Subject: INR testing     Benny Oates,    I got a message that you were hoping to test your INR's every 2 weeks. [facility] requires weekly testing, however, we do have two other companies available who allow 2-4 week testing.    One company is [employer] and the other is [employer].    If you would like us to submit enrollment to one of them and see if we can get you switched over, just let us know.    Hope you are well,    Mary Michelle RN

## 2022-02-16 NOTE — NURSING NOTE
Anticoagulation Therapy Entered On:  10/10/2019 1:49 PM CDT    Performed On:  10/10/2019 1:47 PM CDT by Samira Adler RN               Warfarin Management   Week 1 Baldemar Dose :   10    Week 1 Monday Dose :   10    Week 1 Tuesday Dose :   10    Week 1 Wednesday Dose :   10    Week 1 Thursday Dose :   10    Week 1 Friday Dose :   10    Week 1 Saturday Dose :   10    Week 1 Dose Total :   70    Week 2 Sunday Dose :   10    Week 2 Monday Dose :   10    Week 2 Tuesday Dose :   10    Week 2 Wednesday Dose :   10    Week 2 Thursday Dose :   10    Week 2 Friday Dose :   10    Week 2 Saturday Dose :   10    Week 2 Dose Total :   70    Planned Duration :   Indefinite   Indication :   DVT, Other: FVLeiden   International Normalization Ratio :   3.5    INR Range :   2.5 - 3.5   INR Therapeutic Range :   Yes   Anticoagulation Recheck :   1 week   Warfarin Physician :   David Jenkins MD Special Instructions :   INR = 3.5 per MDINR on 10/10/19 Patient is to stay on 10 mg warfarin daily and recheck INR in 1 week per protocol. Advised via portal.   Samira Adler RN - 10/10/2019 1:47 PM CDT

## 2022-02-16 NOTE — TELEPHONE ENCOUNTER
---------------------  From: Frieda darden LPN (Phone Messages Pool ([id number]))   To: INR Pool ( [facility]);     Sent: 6/8/2020 1:17:49 PM CDT  Subject: FW: INR           ---------------------  From: WENDI BOWSER  To: [facility]  Sent: 06/08/2020 12:45 p.m. CDT  Subject: FW: INR  I am wondering if Dr. Hagan skin change my medication from warfarin to one that doesn t have to be monitored. The home testing company is saying that they will be starting to pay the full amount which is over $300 a month  ---------------------  From: Samira Adler RN  To: WENDI BOWSER  Sent: 6/8/2020 12:05:12 PM CDT  Subject: INR       Benny Oates,       Received your INR of 3.2 from 6/6/20. Very nice. Please stay on your same dose, we have this as 12.5mg warfarin on Monday and Thursday and 10mg the rest of the days of the week. Recheck your INR in 1 week. Let us know if you have changes in medications or diet or other factors. Please be seen if you have any symptoms of bruising or bleeding.       Thank you,       Samira MARIN RN---------------------  From: Samira Adler RN (INR Pool ( [id number]))   To: [facility] Message Pool ([address] [place] [address]);     Sent: 6/8/2020 5:13:36 PM CDT  Subject: FW: INR---------------------  From: Christel Best CMA (TFS Message Pool ([address] [place] [address]))   To: David Jenkins MD;     Sent: 6/9/2020 8:14:43 AM CDT  Subject: FW: INR---------------------  From: David Jenkins MD   To: [facility] Pool ([address]_[place] [facility] [place]);     Sent: 6/9/2020 8:17:58 AM CDT  Subject: RE: INR     We can make the change but needs in clinic visit with me later this week with some additional labs to make sure it is ok to start one of the other blood thinners    Please call himTez Oates,    Please set up a in clinic appointment with Dr. Jenkins to discuss labs/alternative blood thinner medications.     Thank you---------------------  From:  Christel Best CMA ([facility] Message Pool ([address]_[facility]))   To: WENDI BOWSER    Sent: 6/9/2020 8:22:25 AM CDT  Subject: RE: INR

## 2022-02-16 NOTE — NURSING NOTE
Anticoagulation Therapy Entered On:  1/16/2019 4:39 PM CST    Performed On:  1/16/2019 4:38 PM CST by Mary Michelle RN               Warfarin Management   Week 1 Baldemar Dose :   10    Week 1 Monday Dose :   10    Week 1 Tuesday Dose :   10    Week 1 Wednesday Dose :   10    Week 1 Thursday Dose :   10    Week 1 Friday Dose :   10    Week 1 Saturday Dose :   10    Week 1 Dose Total :   70    Week 2 Sunday Dose :   10    Week 2 Monday Dose :   10    Week 2 Tuesday Dose :   10    Week 2 Wednesday Dose :   10    Week 2 Thursday Dose :   10    Week 2 Friday Dose :   10    Week 2 Saturday Dose :   10    Week 2 Dose Total :   70    Planned Duration :   Indefinite   Indication :   DVT, Other: FVLeiden   Warfarin Management Comments :   Message from patient through portal   International Normalization Ratio :   3.2    INR Range :   2.5 - 3.5   INR Therapeutic Range :   Yes   Mary Michelle RN - 1/16/2019 4:38 PM CST   Warfarin Management and Results Grid   Signs of Thrombolic :   No   Signs of Warfarin Intolerance :   No   Changes in Diet or Alcohol Intake :   No   Changes in Medication or Antibiotics :   No   Unusual Bleeding or Bruising :   No   Rectal Bleeding or Black Stools :   No   Vitamin K Food Handout :   No   Heart Valve Replacement :   No   Mary Michelle RN - 1/16/2019 4:38 PM CST   Anticoagulation Recheck :   1 week   Warfarin Special Instructions :   Per protocol continue warfarin 10 mg daily, recheck 1 week, message sent to pt through portal with directions.   Mary Michelle RN - 1/16/2019 4:38 PM CST

## 2022-02-16 NOTE — NURSING NOTE
Anticoagulation Therapy Management Entered On:  7/17/2020 5:23 PM CDT    Performed On:  7/17/2020 5:21 PM CDT by Mary Michelle RN               Anticoagulation Visit Assessment   Type of Visit - Anticoagulation :   Telephone   Anticoagulation Indication :   Deep vein thrombosis, Other: Factor V   Anticoagulant Start :   8/1/2012 CDT   Anticoagulant Duration :   Undetermined   Anticoagulation Medication Verified :   Yes   Patient Preferred Contact Method :   Cell   Mary Michelle RN - 7/17/2020 5:21 PM CDT   Anticoagulation Patient Assessment Grid   Change in Alcohol Consumption :   No   Change in Diet :   No   Change in Medications :   No   Diarrhea :   No   Rectal Bleeding :   No   Signs of Clotting :   No   Signs of Warfarin Intolerance :   No   Unusual Bleeding, Bruising :   Yes   (Comment: Had bloody nose/bruising but nothing that lasted or was concerning [Mary Michelle RN - 7/17/2020 5:21 PM CDT] )   Upcoming Procedures :   No   Vomiting :   No   Mary Michelle RN - 7/17/2020 5:21 PM CDT   Patient on Warfarin :   Yes   Mary Michelle RN - 7/17/2020 5:21 PM CDT   Warfarin   Anticoagulant INR Goal Lower :   2.5    Anticoagulant INR Goal Upper :   3.5    Sunday :   10 mg   Monday :   12.5 mg   Tuesday :   10 mg   Wednesday :   10 mg   Thursday :   12.5 mg   Friday :   0 mg   Saturday :   10 mg   Total Dose :   65 mg   Warfarin Pt Reported Previous Week Dose :    Sun Mon Tues Wed Thurs Fri Sat Weekly Total Dose   Week 1 10 mg 12.5 mg 10 mg 10 mg 12.5 mg 10 mg 10 mg 75 mg   Week 2  mg  mg  mg  mg  mg  mg  mg  mg   Week 3  mg  mg  mg  mg  mg  mg  mg  mg   Week 4  mg  mg  mg  mg  mg  mg  mg  mg         Patient is taking single or multiple strength tablet(s) :   Multiple strength tab(s)   Multiple Tab Strengths :   5 mg tab, 7.5 mg tab   Sunday :   10 mg   Monday :   12.5 mg   Tuesday :   10 mg   Wednesday :   10 mg   Thursday :   12.5 mg   Friday :   10 mg   Saturday :   10 mg   Week 1 Total Dose :   75 mg    Patient Instructions :   Urgent venous INR = 3.9; per protocol hold warfarin x1 day then resume 12.5 mg Mon/Thur and 10 mg ROW; recheck INR in 1 week. Pt notified by phone.     Viviana REES, Mary HERNANDEZ - 7/17/2020 5:21 PM CDT

## 2022-02-16 NOTE — NURSING NOTE
Received patient's monthly home INR report. Patient contacted earlier in month regarding INR results. Report sent to [facility] for scanning.

## 2022-02-16 NOTE — TELEPHONE ENCOUNTER
---------------------  From: Viviana REES, Mary HERNANDEZ   To: WENDI BOWSER    Sent: 6/25/2021 11:43:51 AM CDT  Subject: INR     Benny Oates,    Thanks for the INR result today.    Keep doing what you're doing and check the INR in a week again, please.    Have a great day,    Mary Michelle RN

## 2022-02-16 NOTE — TELEPHONE ENCOUNTER
---------------------  From: Astrid Estrada LPN (Phone Messages Pool ([address]_[facility]))   To: INR Pool ( [address]_[facility]);     Sent: 9/20/2019 3:30:06 PM CDT  Subject: CONSUMER MESSAGE FW: INR 2.9           ---------------------  From: WENDI BOWSER  To: [facility]  Sent: 09/20/2019 03:26 p.m. CDT  Subject: INR 2.9  Same dosage 10 daily  INR 2.9  Can we go by-weekly, it has been pretty steadydone

## 2022-02-16 NOTE — NURSING NOTE
Anticoagulation Therapy Management Entered On:  9/18/2020 4:48 PM CDT    Performed On:  9/18/2020 4:47 PM CDT by Frieda Baker RN               Anticoagulation Visit Assessment   Type of Visit - Anticoagulation :   Telephone   Anticoagulation Indication :   Deep vein thrombosis, Other: Factor V   Anticoagulant Start :   8/1/2012 CDT   Anticoagulant Duration :   Undetermined   Anticoagulation Medication Verified :   Yes   Patient Preferred Contact Method :   Cell   Frieda Baker RN - 9/18/2020 4:47 PM CDT   Anticoagulation Patient Assessment Grid   Change in Alcohol Consumption :   No   Change in Diet :   No   Change in Medications :   No   Diarrhea :   No   Rectal Bleeding :   No   Signs of Clotting :   No   Signs of Warfarin Intolerance :   No   Unusual Bleeding, Bruising :   No   Upcoming Procedures :   No   Vomiting :   No   Frieda Baker RN - 9/18/2020 4:47 PM CDT   Patient on Warfarin :   Yes   Frieda Baker RN - 9/18/2020 4:47 PM CDT   Warfarin   Anticoagulant INR Goal Lower :   2.5    INR POC :   3.6    Anticoagulant INR Goal Upper :   3.5    Sunday :   10 mg   Monday :   12.5 mg   Tuesday :   10 mg   Wednesday :   10 mg   Thursday :   10 mg   Friday :   10 mg   Saturday :   10 mg   Total Dose :   72.5 mg   Warfarin Pt Reported Previous Week Dose :    Sun Mon Tues Wed Thurs Fri Sat Weekly Total Dose   Week 1 10 mg 12.5 mg 10 mg 10 mg 10 mg 10 mg 10 mg 72.5 mg   Week 2  mg  mg  mg  mg  mg  mg  mg  mg   Week 3  mg  mg  mg  mg  mg  mg  mg  mg   Week 4  mg  mg  mg  mg  mg  mg  mg  mg         Patient is taking single or multiple strength tablet(s) :   Multiple strength tab(s)   Multiple Tab Strengths :   5 mg tab, 7.5 mg tab   Sunday :   10 mg   Monday :   12.5 mg   Tuesday :   10 mg   Wednesday :   10 mg   Thursday :   10 mg   Friday :   10 mg   Saturday :   10 mg   Week 1 Total Dose :   72.5 mg   Patient Instructions :   MDINR= 3.6  COntinue warfarin 12.5mg Monday and 10mg ROW. Recheck in 1 week.   Per protocol.  Portal message sent.     Samuel REES, Frieda K - 9/18/2020 4:47 PM CDT

## 2022-02-16 NOTE — TELEPHONE ENCOUNTER
---------------------  From: Nevaeh Pan (Phone Messages Pool ([address]_[facility]))   To: INR Pool ( [address]);     Sent: 9/25/2020 10:13:29 AM CDT  Subject: critical INR     Phone Message    PCP: TFS    Time of Call: 1011    Phone Number: [phone]    Returned call at: n/a    Note: Received call from MD LANDA with Critical INR of 6.6. This was drawn today. Please advise.Pt already coming in for venous draw.

## 2022-02-16 NOTE — NURSING NOTE
Anticoagulation Therapy Entered On:  4/5/2019 1:50 PM CDT    Performed On:  4/5/2019 1:49 PM CDT by Mary Michelle RN               Warfarin Management   Week 1 Baldemar Dose :   10    Week 1 Monday Dose :   10    Week 1 Tuesday Dose :   10    Week 1 Wednesday Dose :   10    Week 1 Thursday Dose :   10    Week 1 Friday Dose :   10    Week 1 Saturday Dose :   10    Week 1 Dose Total :   70    Week 2 Sunday Dose :   10    Week 2 Monday Dose :   10    Week 2 Tuesday Dose :   10    Week 2 Wednesday Dose :   10    Week 2 Thursday Dose :   10    Week 2 Friday Dose :   10    Week 2 Saturday Dose :   10    Week 2 Dose Total :   70    Planned Duration :   Indefinite   Indication :   DVT, Other: FVLeiden   Warfarin Management Comments :   Patient portal message   International Normalization Ratio :   6.2    INR Range :   2.5 - 3.5   INR Therapeutic Range :   No   Mary Michelle RN - 4/5/2019 1:49 PM CDT   Warfarin Management and Results Grid   Signs of Thrombolic :   No   Signs of Warfarin Intolerance :   No   Changes in Diet or Alcohol Intake :   No   Changes in Medication or Antibiotics :   No   Unusual Bleeding or Bruising :   No   Rectal Bleeding or Black Stools :   No   Vitamin K Food Handout :   No   Heart Valve Replacement :   No   Mary Michelle RN - 4/5/2019 1:49 PM CDT   Anticoagulation Recheck :   2 days   Warfarin Special Instructions :   Per TFS hold warfarin x 2 days; recheck INR on Sunday. Discussed with patient; he will hold Sat/Sun doses and check INR at home on Monday. Message sent to TFS. Denied s/s of bleeding.   Mary Michelle RN - 4/5/2019 1:49 PM CDT

## 2022-02-16 NOTE — NURSING NOTE
Anticoagulation Therapy Entered On:  12/3/2019 2:21 PM CST    Performed On:  12/3/2019 2:20 PM CST by Mary Michelle RN               Warfarin Management   Week 1 Sunday Dose :   10    Week 1 Monday Dose :   10    Week 1 Tuesday Dose :   10    Week 1 Wednesday Dose :   10    Week 1 Thursday Dose :   10    Week 1 Friday Dose :   10    Week 1 Saturday Dose :   10    Week 1 Dose Total :   70    Week 2 Sunday Dose :   10    Week 2 Monday Dose :   10    Week 2 Tuesday Dose :   10    Week 2 Wednesday Dose :   10    Week 2 Thursday Dose :   10    Week 2 Friday Dose :   10    Week 2 Saturday Dose :   10    Week 2 Dose Total :   70    Planned Duration :   Indefinite   Indication :   DVT, Other: FVLeiden   Warfarin Management Comments :   mdINR   International Normalization Ratio :   2.3    INR Range :   2.5 - 3.5   INR Therapeutic Range :   No   Anticoagulation Recheck :   1 week   Warfarin Special Instructions :   Per JDL continue warfarin 10 mg daily; recheck INR in 1 week; pt notified by portal message.   Mary Michelle RN - 12/3/2019 2:20 PM CST

## 2022-02-16 NOTE — TELEPHONE ENCOUNTER
---------------------  From: Gardenia Castro RN (Phone Messages Pool ([address]_[facility]))   To: INR Pool ( [address]_[facility]);     Sent: 9/24/2021 11:47:10 AM CDT  Subject: FW: INR 4.0           ---------------------  From: WENDI BOWSER  To: [facility]  Sent: 09/24/2021 11:46 a.m. CDT  Subject: INR 4.0  Same dosage 10 daily INR 4.0done

## 2022-02-16 NOTE — NURSING NOTE
Anticoagulation Therapy Entered On:  12/24/2019 10:44 AM CST    Performed On:  12/24/2019 10:43 AM CST by Mary Michelle RN               Warfarin Management   Week 1 Sunday Dose :   10    Week 1 Monday Dose :   12.5    Week 1 Tuesday Dose :   10    Week 1 Wednesday Dose :   10    Week 1 Thursday Dose :   12.5    Week 1 Friday Dose :   10    Week 1 Saturday Dose :   10    Week 1 Dose Total :   75    Week 2 Sunday Dose :   10    Week 2 Monday Dose :   12.5    Week 2 Tuesday Dose :   10    Week 2 Wednesday Dose :   10    Week 2 Thursday Dose :   12.5    Week 2 Friday Dose :   10    Week 2 Saturday Dose :   10    Week 2 Dose Total :   75    Planned Duration :   Indefinite   Indication :   DVT, Other: FVLeiden   Warfarin Management Comments :   mdINR result received from 12/23/19   International Normalization Ratio :   1.8    INR Range :   2.5 - 3.5   INR Therapeutic Range :   No   Anticoagulation Recheck :   1 week   Warfarin Special Instructions :   Per [facility] continue warfarin 12.5 mg Mon/Thur and 10 mg ROW; recheck INR in 1 week. Message sent to patient via portal and  left on listed number.   Mary Michelle RN - 12/24/2019 10:43 AM CST

## 2022-02-16 NOTE — NURSING NOTE
Anticoagulation Therapy Management Entered On:  9/28/2020 3:58 PM CDT    Performed On:  9/28/2020 3:57 PM CDT by Shazia Lawrence RN               Anticoagulation Visit Assessment   Type of Visit - Anticoagulation :   Telephone   Anticoagulation Indication :   Deep vein thrombosis, Other: Factor V   Anticoagulant Start :   8/1/2012 CDT   Anticoagulant Duration :   Undetermined   Anticoagulation Medication Verified :   Yes   Patient Preferred Contact Method :   Cell   Patient on Warfarin :   Yes   Shazia Lawrence RN - 9/28/2020 3:57 PM CDT   Warfarin   International Normalization Ratio TR :   1.7    Anticoagulant INR Goal Lower :   2.5    Anticoagulant INR Goal Upper :   3.5    Sunday :   0 mg   Monday :   12.5 mg   Tuesday :   10 mg   Wednesday :   10 mg   Thursday :   10 mg   Friday :   10 mg   Saturday :   0 mg   Total Dose :   52.5 mg   Warfarin Pt Reported Previous Week Dose :    Sun Mon Tues Wed Thurs Fri Sat Weekly Total Dose   Week 1 10 mg 12.5 mg 10 mg 10 mg 10 mg 10 mg 10 mg 72.5 mg   Week 2  mg  mg  mg  mg  mg  mg  mg  mg   Week 3  mg  mg  mg  mg  mg  mg  mg  mg   Week 4  mg  mg  mg  mg  mg  mg  mg  mg         Patient is taking single or multiple strength tablet(s) :   Multiple strength tab(s)   Multiple Tab Strengths :   5 mg tab, 7.5 mg tab   Sunday :   10 mg   Monday :   12.5 mg   Tuesday :   10 mg   Wednesday :   10 mg   Thursday :   10 mg   Friday :   10 mg   Saturday :   10 mg   Week 1 Total Dose :   72.5 mg   Patient Instructions :   INR = 1.7 per mdINR. Per protocol, take 12.5mg Monday and 10mg ROW. Recheck INR in 1 week. Message sent via portal per pt request.      Shazia Lawrence RN - 9/28/2020 3:57 PM CDT

## 2022-02-16 NOTE — NURSING NOTE
Anticoagulation Therapy Entered On:  2/7/2020 11:42 AM CST    Performed On:  2/7/2020 11:39 AM CST by Samira Adler RN               Warfarin Management   Week 1 Sunday Dose :   10    Week 1 Monday Dose :   12.5    Week 1 Tuesday Dose :   10    Week 1 Wednesday Dose :   10    Week 1 Thursday Dose :   12.5    Week 1 Friday Dose :   10    Week 1 Saturday Dose :   10    Week 1 Dose Total :   75    Week 2 Sunday Dose :   10    Week 2 Monday Dose :   12.5    Week 2 Tuesday Dose :   10    Week 2 Wednesday Dose :   10    Week 2 Thursday Dose :   12.5    Week 2 Friday Dose :   10    Week 2 Saturday Dose :   10    Week 2 Dose Total :   75    Planned Duration :   Indefinite   Indication :   DVT, Other: Factor V   International Normalization Ratio :   5.9    INR Range :   2.5 - 3.5   INR Therapeutic Range :   No   Warfarin Abnormal Findings :   In [place] eating differently also home testing in high humidity   Samira Adler RN - 2/7/2020 11:39 AM CST   Warfarin Management and Results Grid   Changes in Diet or Alcohol Intake :   Yes   Changes in Medication or Antibiotics :   No   Unusual Bleeding or Bruising :   No   Rectal Bleeding or Black Stools :   No   Samira Adler RN - 2/7/2020 11:39 AM CST   Anticoagulation Recheck :   2/9/20   Warfarin Physician :   David Jenkins MD Special Instructions :   INR = 5.9 per MD INR on 2/7/20 Patient is to hold warfarin for 2 days and recheck INR on 2/9/20 per TFS Patient advised via call to cell and agrees.   Samira Adler RN - 2/7/2020 11:39 AM CST

## 2022-02-16 NOTE — TELEPHONE ENCOUNTER
"---------------------  From: Viviana REES, Mary HERNANDEZ (INR Pool ( [id number]_[facility]))   To: MAGUE WENDI WEST    Sent: 12/24/2019 10:42:31 AM CST  Subject: INR result and Warfarin dosing     Benny Oates,    I got your INR result of 1.8 from yesterday. Dr. Warren would like you to continue your warfarin at 12.5 mg Monday and Thursday and 10 mg all other days of the week.    Please check your INR in 1 week and be mindful of signs or symptoms of clot due to your blood being \"thicker\" right now. You should also avoid foods high in Vitamin K.    I see you have 7.5 mg warfarin tabs. I know at one time you said you had 5 mg tabs left from previous prescriptions. Because of the prescribed dose of warfarin, I am concerned you are not getting the correct amount if you are only using 7.5 mg tabs. Do you want a prescription sent in for 5 mg or 10 mg tabs?    I hope you have a wonderful day. Please, let us know if you need anything.    Sincerely,    Mary Michelle RN  "

## 2022-02-16 NOTE — NURSING NOTE
Anticoagulation Therapy Management Entered On:  9/17/2021 11:31 AM CDT    Performed On:  9/17/2021 11:26 AM CDT by Samira Adler RN               Anticoagulation Visit Assessment   Anticoagulation Indication :   Deep vein thrombosis, Other: Factor V   Anticoagulant Start :   8/1/2012 CDT   Anticoagulant Duration :   Undetermined   Anticoagulation Medication Verified :   Yes   Patient Preferred Contact Method :   Cell   Patient on Warfarin :   Yes   Samira Adler RN - 9/17/2021 11:26 AM CDT   Warfarin   International Normalization Ratio TR :   3.5    Anticoagulant INR Goal Lower :   2.5    Anticoagulant INR Goal Upper :   3.5    Information Given by :   Patient   Sunday :   10 mg   Monday :   10 mg   Tuesday :   10 mg   Wednesday :   10 mg   Thursday :   10 mg   Friday :   10 mg   Saturday :   10 mg   Total Dose :   70 mg   Warfarin Pt Reported Previous Week Dose :    Sun Mon Tues Wed Thurs Fri Sat Weekly Total Dose   Week 1 10 mg 10 mg 10 mg 10 mg 10 mg 10 mg 10 mg 70 mg   Week 2 10 mg 10 mg 10 mg 12.5 mg 10 mg 12.5 mg 10 mg 75 mg   Week 3  mg  mg  mg  mg  mg  mg  mg  mg   Week 4  mg  mg  mg  mg  mg  mg  mg  mg         Patient is taking single or multiple strength tablet(s) :   Multiple strength tab(s)   Multiple Tab Strengths :   5 mg tab, 7.5 mg tab   Sunday :   10 mg   Monday :   10 mg   Tuesday :   10 mg   Wednesday :   10 mg   Thursday :   10 mg   Friday :   10 mg   Saturday :   10 mg   Week 1 Total Dose :   70 mg   Warfarin Dosing Acknowledgement :   I have reviewed the patient's warfarin dosing schedule and confirmed its accuracy for today's visit.   Patient Instructions :   INR = 3.5 per patient via home test today. Patient is to stay on 10 mg warfarin daily and recheck the INR in 1 week. Patient advised via portal.     Samira Adler RN - 9/17/2021 11:26 AM CDT

## 2022-02-16 NOTE — TELEPHONE ENCOUNTER
---------------------  From: Viviana REES, Mary HERNANDEZ   To: WENDI BOWSER    Sent: 4/5/2019 8:54:30 AM CDT  Subject: INR reminder     Good morning Иван,    I am reaching out to you because we have not received any INR results for you since March 14, 2019. I wanted to make sure you are still testing INR's at home and that everything is ok.    Please let me know if you need anything, if there have been any changes, and what you most recent INR has been.    Thank you kindly,    Mary Michelle RN

## 2022-02-16 NOTE — TELEPHONE ENCOUNTER
---------------------  From: WENDI BOWSER  To: [facility]  Sent: 08/10/2020 09:23 a.m. CDT  Subject: FW: INR Results  I tried calling but I got your voicemail too, I will come in right now for another INR draw  ---------------------  From: Shazia Lawrence RN  To: WENDI BOWSER  Sent: 8/10/2020 9:15:54 AM CDT  Subject: INR Results  ???  Hi Иван,  ???  I received a call from Kapil this morning with your INR result of 5.3.  I will be calling you - per clinic policy, we need you to come in and have an urgent venous INR drawn.  ???  Shazia Lawrence RN---------------------  From: Laisha Murillo CMA (Phone Messages Pool ([id number]_[facility]))   To: INR Pool ( [id number]_[facility]);     Sent: 8/10/2020 10:41:14 AM CDT  Subject: FW: INR Results

## 2022-02-16 NOTE — TELEPHONE ENCOUNTER
---------------------  From: Liss Carrero CMA (Phone Messages Pool ([id number]_[facility]))   To: INR [place] ( [id number]_[facility]);     Sent: 5/7/2021 10:50:37 AM CDT  Subject: FW: INT 3.5           ---------------------  From: WENDI BOWSER  To: [facility]  Sent: 05/07/2021 10:43 a.m. CDT  Subject: INT 3.5  12 1/2 on Monday Wednesday and Friday Jose Alfredo other days INR is 3.5done

## 2022-02-16 NOTE — TELEPHONE ENCOUNTER
---------------------  From: Patty Alan CMA (Phone Messages Pool ([id number]_[facility]))   To: INR Pool ( [id number]_[facility]);     Sent: 8/20/2021 4:55:36 PM CDT  Subject: FW: INR. 3.8           ---------------------  From: WENDI BOWSER  To: [facility]  Sent: 08/20/2021 01:42 p.m. CDT  Subject: INR. 3.8  10 daily, 3.8 inr

## 2022-02-16 NOTE — TELEPHONE ENCOUNTER
---------------------  From: WENDI BOWSER  To: [facility]  Sent: 06/25/2021 11:18 a.m. CDT  Subject: INR 2.6  Same dosage 12.5 Monday Wednesday Friday, 10 Tuesday Thursday Saturday SundayPlease advise.---------------------  From: Laisha Murillo CMA (Phone Messages Pool ([id number]_[facility]))   To: INR Pool ( [id number]_[facility]);     Cc: David Jenkins MD;      Sent: 6/25/2021 11:19:27 AM CDT  Subject: FW: INR 2.6Addressed

## 2022-02-16 NOTE — TELEPHONE ENCOUNTER
---------------------  From: Shazia Lawrence RN (Phone Messages Pool ([id number]_[facility]))   To: INR Pool ( [id number]_[facility]);     Sent: 3/19/2021 11:48:34 AM CDT  Subject: FW: INR 2.3           ---------------------  From: WENDI BOWSER  To: [facility]  Sent: 03/19/2021 11:47 a.m. CDT  Subject: INR 2.3  I added the other half of the pill so I took 12 1/2 two nights and Ten the other nightscontacted via portal

## 2022-02-16 NOTE — TELEPHONE ENCOUNTER
---------------------  From: Shazia Lawrence RN (Phone Messages Pool ([id number]_[facility]))   To: INR Pool ( [id number]_[facility]);     Sent: 2/10/2020 10:46:18 AM CST  Subject: FW: Inr 1.8           ---------------------  From: WENDI BOWSER  To: [facility]  Sent: 02/10/2020 10:45 a.m. CST  Subject: Inr 1.8  I missed 2 nights as directed, 10 daily, Inr 1.8Patient contacted

## 2022-02-16 NOTE — NURSING NOTE
Anticoagulation Therapy Management Entered On:  3/5/2021 3:04 PM CST    Performed On:  3/5/2021 3:03 PM CST by Mary Michelle RN               Anticoagulation Visit Assessment   Anticoagulation Indication :   Deep vein thrombosis, Other: Factor V   Anticoagulant Start :   8/1/2012 CDT   Anticoagulant Duration :   Undetermined   Anticoagulation Medication Verified :   Yes   Patient Preferred Contact Method :   Cell   Patient on Warfarin :   Yes   Mary Michelle RN - 3/5/2021 3:03 PM CST   Warfarin   International Normalization Ratio TR :   4.0    Anticoagulant INR Goal Lower :   2.5    Anticoagulant INR Goal Upper :   3.5    Sunday :   10 mg   Monday :   12.5 mg   Tuesday :   10 mg   Wednesday :   10 mg   Thursday :   10 mg   Friday :   10 mg   Saturday :   10 mg   Total Dose :   72.5 mg   Warfarin Pt Reported Previous Week Dose :    Sun Mon Tues Wed Thurs Fri Sat Weekly Total Dose   Week 1 10 mg 12.5 mg 10 mg 10 mg 10 mg 10 mg 10 mg 72.5 mg   Week 2  mg  mg  mg  mg  mg  mg  mg  mg   Week 3  mg  mg  mg  mg  mg  mg  mg  mg   Week 4  mg  mg  mg  mg  mg  mg  mg  mg         Patient is taking single or multiple strength tablet(s) :   Multiple strength tab(s)   Multiple Tab Strengths :   5 mg tab, 7.5 mg tab   Sunday :   10 mg   Monday :   12.5 mg   Tuesday :   10 mg   Wednesday :   10 mg   Thursday :   10 mg   Friday :   10 mg   Saturday :   10 mg   Week 1 Total Dose :   72.5 mg   Sunday :   10 mg   Monday :   12.5 mg   Tuesday :   10 mg   Wednesday :   10 mg   Thursday :   10 mg   Friday :   10 mg   Saturday :   10 mg   Week 2 Total Dose :   72.5 mg   Warfarin Dosing Acknowledgement :   I have reviewed the patient's warfarin dosing schedule and confirmed its accuracy for today's visit.   Patient Instructions :   Home INR = 4.0; per protocol Continue warfarin 12.5mg Mondays and 10mg ROW. Recheck INR in 1 week. Message sent through portal.     Mary Michelle RN - 3/5/2021 3:03 PM CST

## 2022-02-16 NOTE — NURSING NOTE
Anticoagulation Therapy Management Entered On:  1/8/2021 4:10 PM CST    Performed On:  1/8/2021 4:08 PM CST by Courtney Swartz               Anticoagulation Visit Assessment   Anticoagulation Indication :   Deep vein thrombosis, Other: Factor V   Anticoagulant Start :   8/1/2012 CDT   Anticoagulant Duration :   Undetermined   Anticoagulation Medication Verified :   Yes   Patient Preferred Contact Method :   Cell   Courtney Swartz - 1/8/2021 4:08 PM CST   Anticoagulation Patient Assessment Grid   Change in Alcohol Consumption :   No   Change in Diet :   No   Change in Medications :   No   Diarrhea :   No   Rectal Bleeding :   No   Signs of Clotting :   No   Signs of Warfarin Intolerance :   No   Unusual Bleeding, Bruising :   No   Upcoming Procedures :   No   Vomiting :   No   Courtney Swartz - 1/8/2021 4:08 PM CST   Patient on Warfarin :   Yes   Courtney Swartz - 1/8/2021 4:08 PM CST   Warfarin   International Normalization Ratio TR :   2.0    Anticoagulant INR Goal Lower :   2.5    Anticoagulant INR Goal Upper :   3.5    Sunday :   10 mg   Monday :   12.5 mg   Tuesday :   10 mg   Wednesday :   10 mg   Thursday :   10 mg   Friday :   10 mg   Saturday :   10 mg   Total Dose :   72.5 mg   Warfarin Pt Reported Previous Week Dose :    Sun Mon Tues Wed Thurs Fri Sat Weekly Total Dose   Week 1 10 mg 10 mg 10 mg 10 mg 10 mg 10 mg 10 mg 70 mg   Week 2  mg  mg  mg  mg  mg  mg  mg  mg   Week 3  mg  mg  mg  mg  mg  mg  mg  mg   Week 4  mg  mg  mg  mg  mg  mg  mg  mg         Patient is taking single or multiple strength tablet(s) :   Multiple strength tab(s)   Multiple Tab Strengths :   5 mg tab, 7.5 mg tab   Sunday :   10 mg   Monday :   12.5 mg   Tuesday :   10 mg   Wednesday :   10 mg   Thursday :   10 mg   Friday :   12.5 mg   Saturday :   10 mg   Week 1 Total Dose :   75 mg   Patient Instructions :   home INR 2.0. Per protocol. 12.5mg M/F, 10mg ROW. Recheck 1 week. Notified via portal.      [place]idan  Courtney - 1/8/2021 4:08 PM CST

## 2022-02-16 NOTE — NURSING NOTE
Anticoagulation Therapy Management Entered On:  6/29/2020 3:50 PM CDT    Performed On:  6/29/2020 3:44 PM CDT by Frieda Baker LPN               Anticoagulation Visit Assessment   Anticoagulation Indication :   Deep vein thrombosis, Other: Factor V   Anticoagulant Start :   8/1/2012 CDT   Anticoagulant Duration :   Undetermined   Anticoagulation Medication Verified :   Yes   Frieda Baker LPN 6/29/2020 3:44 PM CDT   Anticoagulation Patient Assessment Grid   Change in Alcohol Consumption :   No   Change in Diet :   No   Change in Medications :   No   Diarrhea :   No   Rectal Bleeding :   No   Signs of Clotting :   No   Signs of Warfarin Intolerance :   No   Unusual Bleeding, Bruising :   Yes   (Comment: Nose bleeds [Frieda Baker LPN 6/29/2020 3:44 PM CDT] )   Upcoming Procedures :   No   Vomiting :   No   Frieda Baker LPN - 6/29/2020 3:44 PM CDT   Patient on Warfarin :   Yes   Frieda Baker LPN 6/29/2020 3:44 PM CDT   Warfarin   Anticoagulant INR Goal Lower :   2.5    Anticoagulant INR Goal Upper :   3.5    INR Home Monitoring Result :   1.8    Sunday :   10 mg   Monday :   12.5 mg   Tuesday :   10 mg   Wednesday :   10 mg   Thursday :   12.5 mg   Friday :   10 mg   Saturday :   10 mg   Total Dose :   75 mg   Warfarin Pt Reported Previous Week Dose :    Sun Mon Tues Wed Thurs Fri Sat Weekly Total Dose   Week 1 10 mg 12.5 mg 10 mg 10 mg 12.5 mg 10 mg 10 mg 75 mg   Week 2  mg  mg  mg  mg  mg  mg  mg  mg   Week 3  mg  mg  mg  mg  mg  mg  mg  mg   Week 4  mg  mg  mg  mg  mg  mg  mg  mg         Patient is taking single or multiple strength tablet(s) :   Multiple strength tab(s)   Multiple Tab Strengths :   5 mg tab, 7.5 mg tab   Sunday :   10 mg   Monday :   12.5 mg   Tuesday :   10 mg   Wednesday :   10 mg   Thursday :   12.5 mg   Friday :   10 mg   Saturday :   10 mg   Week 1 Total Dose :   75 mg   Patient Instructions :   JODI 6- = 1.8    Pt states he has been having nose bleeds.     Pt to continue  12.5mg M,Th and 10mg ROW, recheck in 1 week.     Message sent through portal. Advised to seek medical attention if increased nose bleeds or duration of >45min.      Frieda Baker LPN - 6/29/2020 3:44 PM CDT

## 2022-02-16 NOTE — TELEPHONE ENCOUNTER
---------------------  From: Vitor REES, Samira   To: MAGUE WENDI P    Sent: 3/30/2020 10:18:34 AM CDT  Subject: INR     Benny Иван,    I received your INR of 2.5 today from MD INR done on 3/28. Please stay on the same dosage and recheck your INR in 1 week. If you have any changes in medication or health please contact us. I have your current dose as 12.5mg Mon and Thurs and 10 mg the rest of the days of the week. Stay Healthy.     Thank you,    Samira MARIN RN

## 2022-02-16 NOTE — TELEPHONE ENCOUNTER
---------------------  From: Shazia Lawrence RN (Phone Messages Pool ([id number]_[facility]))   To: WENDI BOWSER    Cc: INR Pool ( [id number]_[facility]);      Sent: 10/1/2020 9:13:01 AM CDT  Subject: General Message     Tez Oates,    We received contact from [facility] regarding your upcoming shoulder surgery and the need for Lovenox bridging.   Below is the instructions that Dr. Jenkins gave regarding stopping your Warfarin and starting the Lovenox injections.   I have also sent the Lovenox to [facility].     Stop coumadin 5 days before procedure  Start Lovenox 100mg subcutaneous every 12 hours 4 days before procedure  take last dose of lovenox 24 hours before procedure  resume coumadin the day after procedure  resume lovenox every 12 hours after procedure  continue lovenox until inr >2    Please let us know if you have any questions or concerns.    Thanks!  Shazia HUBER RN

## 2022-02-16 NOTE — NURSING NOTE
Medicare Visit Entered On:  4/27/2021 1:13 PM CDT    Performed On:  4/27/2021 1:09 PM CDT by Laisha Murillo CMA               Summary   Chief Complaint :   Patient presents for AWV. Right side pain possible broken ribs- known injury ongoing since March. Left hand second digit trigger finger possible 4-5 months getting worse.    Weight Measured :   195.9 lb(Converted to: 195 lb 14 oz, 88.859 kg)    Height Measured :   72 in(Converted to: 6 ft 0 in, 182.88 cm)    Body Mass Index :   26.57 kg/m2 (HI)    Body Surface Area :   2.12 m2   Systolic Blood Pressure :   124 mmHg   Diastolic Blood Pressure :   72 mmHg   Mean Arterial Pressure :   89 mmHg   Peripheral Pulse Rate :   81 bpm   BP Site :   Right arm   BP Method :   Electronic   HR Method :   Electronic   Temperature Tympanic :   97.9 DegF(Converted to: 36.6 DegC)    Oxygen Saturation :   94 %   Laisha Murillo CMA - 4/27/2021 1:09 PM CDT   Health Status   Allergies Verified? :   Yes   Medication History Verified? :   Yes   Pre-Visit Planning Status :   Completed   Tobacco Use? :   Current every day smoker   Tobacco Cessation Review :   Not ready to quit, Advised to quit   Laisha Murillo CMA - 4/27/2021 1:09 PM CDT   Consents   Consent for Immunization Exchange :   Consent Granted   Consent for Immunizations to Providers :   Consent Granted   Laisha Murillo CMA - 4/27/2021 1:09 PM CDT   Meds / Allergies   (As Of: 4/27/2021 1:13:34 PM CDT)   Allergies (Active)   No Known Medication Allergies  Estimated Onset Date:   Unspecified ; Created By:   Laisha Murillo CMA; Reaction Status:   Active ; Category:   Drug ; Substance:   No Known Medication Allergies ; Type:   Allergy ; Updated By:   Laisha Murillo CMA; Reviewed Date:   4/27/2021 1:09 PM CDT        Medication List   (As Of: 4/27/2021 1:13:34 PM CDT)   Prescription/Discharge Order    atorvastatin  :   atorvastatin ; Status:   Prescribed ; Ordered As Mnemonic:   atorvastatin 10 mg oral tablet ; Simple  Display Line:   10 mg, 1 tab(s), Oral, daily, 90 tab(s), 3 Refill(s) ; Ordering Provider:   David Jenkins MD; Catalog Code:   atorvastatin ; Order Dt/Tm:   6/12/2020 3:18:42 PM CDT          diclofenac topical  :   diclofenac topical ; Status:   Prescribed ; Ordered As Mnemonic:   Voltaren 1% topical gel ; Simple Display Line:   2 gm, Topical, qid, PRN: shoulder pain, 100 gm, 11 Refill(s) ; Ordering Provider:   Freda Massey MD; Catalog Code:   diclofenac topical ; Order Dt/Tm:   5/7/2020 11:15:01 AM CDT          fluticasone nasal  :   fluticasone nasal ; Status:   Prescribed ; Ordered As Mnemonic:   fluticasone 50 mcg/inh nasal spray ; Simple Display Line:   2 spray(s), nasal, daily, for 90 day(s), 3 EA, 3 Refill(s) ; Ordering Provider:   Nick Bello PA-C; Catalog Code:   fluticasone nasal ; Order Dt/Tm:   6/15/2020 10:54:23 AM CDT          sildenafil  :   sildenafil ; Status:   Prescribed ; Ordered As Mnemonic:   Viagra 100 mg oral tablet ; Simple Display Line:   See Instructions, TAKE AS DIRECTED, 8 tab(s), 11 Refill(s) ; Ordering Provider:   David Jenkins MD; Catalog Code:   sildenafil ; Order Dt/Tm:   4/20/2016 12:38:56 PM CDT          warfarin  :   warfarin ; Status:   Prescribed ; Ordered As Mnemonic:   warfarin 5 mg oral tablet ; Simple Display Line:   See Instructions, TAKE 2& 1/2 TABLETS BY MOUTH MONDAY, THURSDAY AND 2 TABLETS REST OF WEEK, 192 tab(s), 0 Refill(s) ; Ordering Provider:   David Jenkins MD; Catalog Code:   warfarin ; Order Dt/Tm:   11/2/2020 10:04:13 AM [facility]            Home Meds    acetaminophen  :   acetaminophen ; Status:   Documented ; Ordered As Mnemonic:   acetaminophen ; Simple Display Line:   See Instructions, 325mg-650 mg PRN for pain/fever., 0 Refill(s) ; Catalog Code:   acetaminophen ; Order Dt/Tm:   6/19/2019 3:01:15 PM CDT          aspirin  :   aspirin ; Status:   Documented ; Ordered As Mnemonic:   aspirin 81 mg oral delayed release tablet ; Simple Display  Line:   81 mg, 1 tab(s), Oral, daily, 0 Refill(s) ; Catalog Code:   aspirin ; Order Dt/Tm:   4/27/2021 1:08:00 PM CDT          gabapentin  :   gabapentin ; Status:   Documented ; Ordered As Mnemonic:   gabapentin 100 mg oral capsule ; Simple Display Line:   100 mg, 1 cap(s), Oral, hs, 0 Refill(s) ; Catalog Code:   gabapentin ; Order Dt/Tm:   6/21/2019 10:40:48 AM CDT            Geriatric Depression Screening   Geriatric Depression Satisfied Life :   Yes   Geriatric Depression Dropped Activities :   No   Geriatric Depression Life Empty :   No   Geriatric Depression Bored :   No   Geriatric Depression Good Spirits :   Yes   Geriatric Depression Afraid Bad Things :   No   Geriatric Depression Feel Happy :   Yes   Geriatric Depression Feel Helpless :   No   Geriatric Depression Prefer to Stay Home :   Yes   Geriatric Depression Memory Problems :   No   Geriatric Depression Feel Worthless :   No   Geriatric Depression Situation Hopeless :   No   Geriatric Depression Others Better Off :   No   Geriatric Depression Full of Energy :   No   Laisha Murillo CMA - 4/27/2021 1:09 PM CDT   Home Safety Screen   Emergency Numbers Kept/Updated :   No   Aware of Smoking Dangers :   Yes   Smoke Alarms/Fire Extinguisher Available :   Yes   Household Members Fire Safety Knowledge :   Yes   Firearms Unloaded and Secure :   Yes   Floor Rugs Removed or Fastened :   No   Mats in Bathtub/Shower :   Yes   Stairway Rails or Banisters :   Yes   Outdoor Clutter Safety :   Yes   Indoor Clutter Safety :   Yes   Electrical Cord Safety :   Yes   Laisha Murillo CMA 4/27/2021 1:09 PM CDT   Advance Directives   Advance Directive :   Yes   Lasiha Murillo CMA - 4/27/2021 1:09 PM CDT   Bowling Fall Risk   History of Fall in Last 3 Months Bowling :   No   Laisha Murillo CMA - 4/27/2021 1:09 PM CDT   Functional Assessment   Focused Functional Assessment Grid   Bathing :   Independent (2)   Dressing :   Independent (2)   Toileting :   Independent (2)    Transferring Bed or Chair :   Independent (2)   Continence :   Independent (2)   Feeding :   Independent (2)   Laisha Murillo CMA - 4/27/2021 1:09 PM CDT   ADL Index Score :   12    Capable of Shopping :   Yes   Capable of Walking :   Yes   Capable of Housekeeping :   Yes   Capable of Managing Medications :   Yes   Capable of Handling Finances :   Yes   Laisha Murillo CMA - 4/27/2021 1:09 PM CDT

## 2022-02-16 NOTE — TELEPHONE ENCOUNTER
---------------------  From: Rosemary Lama CMA (Phone Messages Pool (32224_[facility]))   To: INR Pool ( 32224_[facility]);     Sent: 4/23/2021 12:26:41 PM CDT  Subject: FW: INR 2.1           ---------------------  From: WENDI BOWSER  To: [facility]  Sent: 04/23/2021 12:24 p.m. CDT  Subject: INR 2.1  12.5 Monday and Friday, 10 other days  INR 2.1Done

## 2022-02-16 NOTE — TELEPHONE ENCOUNTER
---------------------  From: Gardenia Castro RN (Phone Messages Pool (00324_[facility]))   To: INR Pool ( 60524_[facility]);     Sent: 10/8/2021 1:04:01 PM CDT  Subject: FW: INR 4.4           ---------------------  From: WENDI BOWSER  To: [facility]  Sent: 10/08/2021 12:55 p.m. CDT  Subject: INR 4.4  Same dosage 10 daily INR 4.4---------------------  From: Viviana REES, Mary HERNANDEZ (INR Pool ( 49424_[facility]))   To: WENDI BOWSER    Sent: 10/8/2021 1:33:09 PM CDT  Subject: RE: INR 4.4     Benny Oates,    Please hold your warfarin x2 days then resume 10 mg daily. Please let me know if you have any concerns with bleeding or bruising OR if you have had any med or diet changes.    Thank you,    Mary Michelle RN

## 2022-02-16 NOTE — NURSING NOTE
Anticoagulation Therapy Management Entered On:  6/15/2021 8:00 AM CDT    Performed On:  6/14/2021 7:58 AM CDT by Shazia Lawrence RN               Anticoagulation Visit Assessment   Type of Visit - Anticoagulation :   Telephone   Anticoagulation Indication :   Deep vein thrombosis, Other: Factor V   Anticoagulant Start :   8/1/2012 CDT   Anticoagulant Duration :   Undetermined   Anticoagulation Medication Verified :   Yes   Patient Preferred Contact Method :   Cell   Shazia Lawrence RN - 6/15/2021 7:58 AM CDT   Shazia Lawrence RN - 6/15/2021 7:58 AM CDT   Anticoagulation Patient Assessment Grid   Change in Alcohol Consumption :   No   Change in Diet :   No   Shazia Lawrence RN - 6/15/2021 7:58 AM CDT   Change in Medications :   Yes   (Comment: Warfarin held x3 days [Shazia Lawrence RN - 6/15/2021 8:02 AM CDT] )   Shazia Lawrence RN - 6/15/2021 8:02 AM CDT     Diarrhea :   No   Rectal Bleeding :   No   Signs of Clotting :   No   Signs of Warfarin Intolerance :   No   Unusual Bleeding, Bruising :   No   Upcoming Procedures :   No   Vomiting :   No   Patient on Warfarin :   Yes   Shazia Lawrence RN - 6/15/2021 7:58 AM CDT   Warfarin   International Normalization Ratio TR :   1.3    Anticoagulant INR Goal Lower :   2.5    Anticoagulant INR Goal Upper :   3.5    Sunday :   0 mg   Monday :   12.5 mg   Tuesday :   10 mg   Wednesday :   12.5 mg   Thursday :   10 mg   Friday :   0 mg   Saturday :   0 mg   Total Dose :   45 mg   Warfarin Pt Reported Previous Week Dose :    Sun Mon Tues Wed Thurs Fri Sat Weekly Total Dose   Week 1 0 mg 0 mg 0 mg 0 mg 0 mg 0 mg 0 mg 0 mg   Week 2 10 mg 12.5 mg 10 mg 10 mg 10 mg 10 mg 10 mg 72.5 mg   Week 3  mg  mg  mg  mg  mg  mg  mg  mg   Week 4  mg  mg  mg  mg  mg  mg  mg  mg         Patient is taking single or multiple strength tablet(s) :   Multiple strength tab(s)   Multiple Tab Strengths :   5 mg tab, 7.5 mg tab   Sunday :   10 mg   Monday :   12.5 mg   Tuesday :   10 mg   Wednesday :   12.5 mg   Thursday :   10 mg    Friday :   12.5 mg   Saturday :   10 mg   Week 1 Total Dose :   77.5 mg   Sunday :   10 mg   Monday :   12.5 mg   Tuesday :   10 mg   Wednesday :   12.5 mg   Thursday :   10 mg   Friday :   12.5 mg   Saturday :   10 mg   Week 2 Total Dose :   77.5 mg   Warfarin Dosing Acknowledgement :   I have reviewed the patient's warfarin dosing schedule and confirmed its accuracy for today's visit.   Patient Instructions :   INR = 1.3 per mdINR. Per protocol, resume Warfarin 12.5mg Mon/Wed/Fri and 10mg ROW. Recheck INR 6/18/21. Advised via portal.      Melinda REES, Shazia - 6/15/2021 7:58 AM CDT

## 2022-02-16 NOTE — TELEPHONE ENCOUNTER
---------------------  From: Mary Michelle RN (Phone Messages Pool (32224_[facility]))   To: INR Pool ( 32224_[facility]);     Sent: 2/13/2020 11:18:09 AM CST  Subject: FW: Inr  1.9           ---------------------  From: WENDI BOWSER  To: [facility]  Sent: 02/13/2020 11:14 a.m. CST  Subject: Inr 1.9  I stayed at 10 daily, since Tuesday, INR 1.9.---------------------  From: Shazia Lawrence RN (INR Pool ( 32224_[facility]))   To: WENDI BOWSER    Sent: 2/13/2020 12:06:10 PM CST  Subject: RE: Inr  1.9     Dr. Alice Navas reviewed your INR results and would like you to take 12.5mg Warfarin on Monday and Thursday, 10mg the rest of the week.  Recheck your INR in 1 week.  Please let us know if you have any further questions your concerns.    Have a great rest of the day!    Shazia HUBER RN

## 2022-02-16 NOTE — TELEPHONE ENCOUNTER
Entered by Mary Michelle RN on July 23, 2021 1:43:29 PM CDT  noted      ---------------------  From: Nevaeh Pan (Phone Messages Pool (32224_[facility]))   To: INR [place] ( [address]24_[facility]);     Sent: 7/23/2021 12:47:14 PM CDT  Subject: FW: INR 2.6           ---------------------  From: WENDI BOWSER  To: [facility]  Sent: 07/23/2021 12:41 p.m. CDT  Subject: INR 2.6  I skipped last Friday s dose and went to 10 mg daily my INR was 2.6

## 2022-02-16 NOTE — TELEPHONE ENCOUNTER
---------------------  From: Astrid Estrada LPN (Phone Messages Pool (32224_[facility]))   To: INR Pool ( 32224_[facility]);     Sent: 4/30/2021 9:54:43 AM CDT  Subject: CONSUMER MESSAGE FW: INR 2.7           ---------------------  From: WENDI BOWSER  To: [facility]  Sent: 04/30/2021 09:50 a.m. CDT  Subject: INR 2.7  12.5 Monday Wednesday Friday 10 other daysnoted

## 2022-02-16 NOTE — TELEPHONE ENCOUNTER
---------------------  From: Astrid Estrada LPN (Phone Messages Pool (32224_[facility]))   To: INR Pool ( [address]);     Sent: 11/19/2021 3:35:40 PM CST  Subject: CONSUMER MESSAGE FW: INR 3.4           ---------------------  From: WENDI BOWSER  To: [facility]  Sent: 11/19/2021 03:29 p.m. CST  Subject: INR 3.4  Same dosage, 10 daily INR 3.4---------------------  From: Mary Michelle RN (INR Pool ( 32224_[facility]))   To: WENDI BOWSER    Sent: 11/19/2021 3:51:20 PM CST  Subject: RE: CONSUMER MESSAGE FW: INR 3.4     Tez Oates    Thank you for getting your INR to me this afternoon.    Go ahead and continue your warfarin 10 mg daily and check your INR in 1 week.    Have a happy Thanksgiving,    Mary Michelle RN

## 2022-02-16 NOTE — TELEPHONE ENCOUNTER
Entered by Zenia Dial CMA on August 11, 2020 10:08:21 AM CDT  ---------------------  From: Zenia Dial CMA   To: [facility] #[id number]    Sent: 8/11/2020 10:08:21 AM CDT  Subject: Medication Management     ** Not Approved: Patient has requested refill too soon **  atorvastatin (ATORVASTATIN 10MG TABLETS)  TAKE 1 TABLET BY MOUTH DAILY  Qty:  90 tab(s)        Days Supply:  90        Refills:  0          Substitutions Allowed     Route To Pharmacy - [facility] #[id number]   Signed by Zenia Dial CMA            ------------------------------------------  From: [facility] #[id number]  To: David Jenkins MD  Sent: August 11, 2020 10:06:49 AM CDT  Subject: Medication Management  Due: July 28, 2020 10:28:08 PM CDT     ** On Hold Pending Signature **     Dispensed Drug: atorvastatin (atorvastatin 10 mg oral tablet), TAKE 1 TABLET BY MOUTH DAILY  Quantity: 90 tab(s)  Days Supply: 90  Refills: 0  Substitutions Allowed  Notes from Pharmacy:  ------------------------------------------patient given year supply on 6/12/2020

## 2022-02-16 NOTE — TELEPHONE ENCOUNTER
---------------------  From: Samira Adler RN (Phone Messages Pool (32224_[facility]))   To: INR Pool ( 32224_[facility]);     Sent: 11/15/2021 3:10:38 PM CST  Subject: FW: INR 2.5           ---------------------  From: WENDI BOWSER  To: [facility]  Sent: 11/15/2021 02:30 p.m. CST  Subject: INR 2.5  I just took my INR as requested,  INR is 2.5---------------------  From: Mary Michelle RN (INR Pool ( 32224_[facility]))   To: WENDI BOWSER    Sent: 11/15/2021 3:12:36 PM CST  Subject: RE: INR 2.5     Tez Oates,    Thank you so much for rechecking your INR.    Stay on your Warfarin 10 mg daily and please check your INR as usual on Friday before 4pm.    Have a great day,    Mary Michelle RN

## 2022-02-16 NOTE — TELEPHONE ENCOUNTER
---------------------  From: Gardenia Castro RN (Phone Messages Pool (32224_[facility]))   To: INR Pool ( 32224_[facility]);     Sent: 10/22/2021 11:23:29 AM CDT  Subject: CONSUMER MESSAGE  FW: INR 5.1           ---------------------  From: WENDI BOWSER  To: [facility]  Sent: 10/22/2021 11:20 a.m. CDT  Subject: INR 5.1  10 daily INR 5.1done

## 2022-02-16 NOTE — NURSING NOTE
Anticoagulation Therapy Entered On:  5/1/2020 1:57 PM CDT    Performed On:  5/1/2020 1:56 PM CDT by Mary Michelle RN               Warfarin Management   Week 1 Sunday Dose :   10    Week 1 Monday Dose :   12.5    Week 1 Tuesday Dose :   10    Week 1 Wednesday Dose :   10    Week 1 Thursday Dose :   12.5    Week 1 Friday Dose :   10    Week 1 Saturday Dose :   10    Week 1 Dose Total :   75    Week 2 Sunday Dose :   10    Week 2 Monday Dose :   12.5    Week 2 Tuesday Dose :   10    Week 2 Wednesday Dose :   10    Week 2 Thursday Dose :   12.5    Week 2 Friday Dose :   10    Week 2 Saturday Dose :   10    Week 2 Dose Total :   75    Planned Duration :   Indefinite   Indication :   DVT, Other: Factor V   Warfarin Management Comments :   Mary JoR   International Normalization Ratio :   2.4    INR Range :   2.5 - 3.5   INR Therapeutic Range :   No   Anticoagulation Recheck :   1 week   Warfarin Special Instructions :   Per protocol continue warfarin 12.5 mg Mon/Thur and 10 mg ROW; recheck INR in 1 week; message sent through patient portal.   Mary Michelle RN - 5/1/2020 1:56 PM CDT

## 2022-02-16 NOTE — TELEPHONE ENCOUNTER
---------------------  From: David Jenkins MD   To: MAGUE WENDI DODSON    Sent: 6/14/2020 11:00:27 AM CDT  Subject: Patient Message - Results Notification        All labs acceptable.  Please let us know you received this message by either selecting Forward or Reply at the top.  Thank you    Results:  Date Result Name Ind Value Ref Range   6/12/2020 1:20 PM Sodium Level  138 mmol/L (135 - 146)   6/12/2020 1:20 PM Potassium Level  4.2 mmol/L (3.5 - 5.3)   6/12/2020 1:20 PM Glucose Level ((H)) 101 mg/dL (65 - 99)   6/12/2020 1:20 PM Creatinine Level  0.97 mg/dL (0.70 - 1.25)   6/12/2020 1:20 PM AST/SGOT  18 unit/L (10 - 35)   6/12/2020 1:20 PM ALT/SGPT  15 unit/L (9 - 46)   6/12/2020 1:20 PM Hgb A1c  5.6 ( - <5.7)   6/12/2020 1:20 PM Cholesterol  160 mg/dL ( - <200)   6/12/2020 1:20 PM HDL  43 mg/dL (> OR = 40 - )   6/12/2020 1:20 PM LDL  90    6/12/2020 1:20 PM Triglyceride ((H)) 175 mg/dL ( - <150)   6/12/2020 1:22 PM Coronavirus SARS-CoV-2 (COVID-19) Ab IgG  NEGATIVE

## 2022-02-16 NOTE — TELEPHONE ENCOUNTER
---------------------  From: Viviana REES, Mary HERNANDEZ (Phone Messages Pool (32224_[facility]))   To: INR Pool ( 32224_[facility]);     Sent: 8/11/2020 8:46:09 AM CDT  Subject: FW: Warfarin prescriptive           ---------------------  From: WENDI BOWSER  To: [facility]  Sent: 08/11/2020 08:11 a.m. CDT  Subject: Warfarin prescriptive  My warfarin needs approval from Dr. Bentley darden, please have the order filled or 5 mg tablets so that I can split one to get the 12.5 dosage---------------------  From: Frieda Baker RN (INR Pool ( 32224_[facility]))   To: Phone Messages Pool (32224_[facility]); WENDI BOWSER    Sent: 8/11/2020 9:40:00 AM CDT  Subject: RE: Warfarin prescriptive     Good Morning Иван,   A new prescription of Warfarin 5mg tabs was sent to [facility] for you.   Frieda Baker RNsmartinez via portal.

## 2022-02-16 NOTE — TELEPHONE ENCOUNTER
---------------------  From: WENDI BOWSER  To: "[employer] Message Pool ([address] [place] [address])  Sent: 10/09/2020 02:04 p.m. CDT  Subject: RE: General Message warfarin bridge  Thanks  ???  Addendum by Donna Celestin CMA on October 09, 2020 2:02:47 PM CDT  ---------------------  From: Donna Celestin CMA (TFS Message Pool ([address] [place] [address]))  To: WENDI BOWSER  Sent: 10/9/2020 2:02:47 PM CDT  Subject: RE: General Message warfarin bridge  ???  Dr. Jenkins says no  ???  Addendum by David Jenkins MD on October 09, 2020 2:00:51 PM CDT  ---------------------  From: David Jenkins MD  To: "[employer] Message Pool ([address] [place] [address]);  Sent: 10/9/2020 2:00:51 PM CDT  Subject: RE: General Message warfarin bridge  ???  not with warfarin  ---------------------  From: Donna Celestin CMA ("[employer] Message Pool ([address] [place] [address]))  To: David Jenkins MD;  Sent: 10/9/2020 1:58:06 PM CDT  Subject: FW: General Message warfarin bridge  ???  ???  ???  ???  ---------------------  From: WENDI BOWSER  To: "[employer] Message Pool ([address] [place] [address])  Sent: 10/09/2020 01:40 p.m. CDT  Subject: RE: General Message warfarin bridge  I am taking the aceta Federman, they told me the ibuprofen would be OK to take also for swelling. In the past I have not been able to take ibuprofen because it affects my INR., My question is can I take the ibuprofen in addition to the aceta Federman  ???  Addendum by Donna Celestin CMA on October 09, 2020 1:24:40 PM CDT  ---------------------  From: Donna Celestin CMA (TFS Message Pool (32224_[facility]))  To: WENDI BOWSER  Sent: 10/9/2020 1:24:40 PM CDT  Subject: RE: General Message warfarin bridge  ???  Dr. Jenkins said it is ok to use Tylenol post op instead of Ibuprofen.  ???  Addendum by David Jenkins MD on October 09, 2020 12:25:27 PM CDT  ---------------------  From: David Jenkins MD  To: "[employer] Message Pool (32224_[facility]);  Sent: 10/9/2020 12:25:27 PM CDT  Subject: RE:  General Message warfarin bridge  ???  yes  ???  Addendum by Donna Celestin CMA on October 09, 2020 12:15:32 PM CDT  ---------------------  From: Donna Celestin CMA (TFS Message Pool ([address] [place] [address]))  To: David Jenkins MD;  Sent: 10/9/2020 12:15:32 PM CDT  Subject: FW: General Message warfarin bridge  ???  Addendum by Shazia Lawrence RN on October 09, 2020 11:35:39 AM CDT  ---------------------  From: Shazia Lawrence RN (INR Pool ( [address] [place] [address]))  To: TFS Message Pool ([address] [place] [address]);  Sent: 10/9/2020 11:35:39 AM CDT  Subject: FW: General Message warfarin bridge  ???  Please advise.  ???  Pt had rotator cuff repair yesterday. OK for Tylenol post-op instead of the Ibuprofen?  ---------------------  From: Astrid Estrada LPN (Phone Messages Pool ([address] [place] [address]))  To: INR Pool ( [address] [place] [address]);  Sent: 10/9/2020 10:53:50 AM CDT  Subject: FW: General Message warfarin bridge  ???  ???  ???  ???  ---------------------  From: WENDI BOWSER  To: [facility]  Sent: 10/09/2020 10:45 a.m. CDT  Subject: RE: General Message warfarin bridge  They took my INR yesterday before surgery it was .99 they gave me ibuprofen to take 200 mg tablets and I?m supposed to take three tablets every eight hours for five days. Won?t that mess up my INR? I did my injection last night and today I will do my injections twice I?ve already done one this morning and I?ll do another one tonight and resume the warfarin  ---------------------  From: Shazia Lawrence RN (Phone Messages Pool (32224_[facility]))  To: WENDI BOWSER  Sent: 10/1/2020 9:42:27 AM CDT  Subject: RE: General Message warfarin bridge  ???  Tez Oates -  ???  Yes. You will be taking both Warfarin and Lovenox after the procedure. Once your INR is above 2, then the Lovenox will be stopped and you will continue your Warfarin. It will be important to check your INR within the week after surgery (either 10/12 or  10/13).  Also, please let us know if you end up needing a refill of the Lovenox - we can send that in to pharmacy if needed.  ???  Thanks,  ???  Shazia HUBER RN  ???  ???  ---------------------  From: WENDI BOWSER  To: [facility]  Sent: 10/01/2020 09:17 a.m. CDT  Subject: RE: General Message warfarin bridge  So after the procedure I take the warfarin and the  Lovenox shots?  ---------------------  From: Shazia Lawrence RN (Phone Messages Pool (89924_[facility]))  To: WENDI BOWSER  Cc: INR Pool ( 32224_[facility]);  Sent: 10/1/2020 9:13:01 AM CDT  Subject: General Message  ???  Hi Иван,  ???  We received contact from [facility] regarding your upcoming shoulder surgery and the need for Lovenox bridging.  Below is the instructions that Dr. Jenkins gave regarding stopping your Warfarin and starting the Lovenox injections.  I have also sent the Lovenox to [facility].  ???  Stop coumadin 5 days before procedure  Start Lovenox 100mg subcutaneous every 12 hours 4 days before procedure  take last dose of lovenox 24 hours before procedure  resume coumadin the day after procedure  resume lovenox every 12 hours after procedure  continue lovenox until inr >2  ???  Please let us know if you have any questions or concerns.  ???  Thanks!  Shazia HUBER RN

## 2022-02-16 NOTE — NURSING NOTE
Anticoagulation Therapy Management Entered On:  7/6/2020 10:47 AM CDT    Performed On:  7/6/2020 10:46 AM CDT by Shazia Lawrence RN               Anticoagulation Visit Assessment   Type of Visit - Anticoagulation :   Telephone   Anticoagulation Indication :   Deep vein thrombosis, Other: Factor V   Anticoagulant Start :   8/1/2012 CDT   Anticoagulant Duration :   Undetermined   Anticoagulation Medication Verified :   Yes   Patient on Warfarin :   Yes   Shazia Lawrence RN - 7/6/2020 10:46 AM CDT   Warfarin   International Normalization Ratio TR :   3.4    Anticoagulant INR Goal Lower :   2.5    Anticoagulant INR Goal Upper :   3.5    Sunday :   0 mg   Monday :   12.5 mg   Tuesday :   10 mg   Wednesday :   10 mg   Thursday :   12.5 mg   Friday :   10 mg   Saturday :   0 mg   Total Dose :   55 mg   Warfarin Pt Reported Previous Week Dose :    Sun Mon Tues Wed Thurs Fri Sat Weekly Total Dose   Week 1 0 mg 0 mg 0 mg 0 mg 0 mg 0 mg 0 mg 0 mg   Week 2  mg  mg  mg  mg  mg  mg  mg  mg   Week 3  mg  mg  mg  mg  mg  mg  mg  mg   Week 4  mg  mg  mg  mg  mg  mg  mg  mg         Patient is taking single or multiple strength tablet(s) :   Multiple strength tab(s)   Multiple Tab Strengths :   5 mg tab, 7.5 mg tab   Sunday :   10 mg   Monday :   12.5 mg   Tuesday :   10 mg   Wednesday :   10 mg   Thursday :   12.5 mg   Friday :   10 mg   Saturday :   10 mg   Week 1 Total Dose :   75 mg   Patient Instructions :   INR = 3.4 per mdINR. Per protocol, pt to resume Cdnbqumn07.5mg Mon, Thurs and 10mg ROW. Recheck INR in 1 week. Advised via pt portal.      Shazia Lawrence RN - 7/6/2020 10:46 AM CDT

## 2022-02-16 NOTE — TELEPHONE ENCOUNTER
Entered by Laisha Murillo CMA on April 14, 2020 4:46:27 PM CDT  Please advise.      ---------------------  From: WENDI BOWSER  To: [facility]  Sent: 04/14/2020 04:43 p.m. CDT  Subject: Anti body tests  I am quite sure I had the coronavirus in late January while I was in [facility]. I would like to be on a list for testing when available. Please let me know if this is possible.---------------------  From: Laisha Murillo CMA (Phone Messages Pool (11530_[facility]))   To: Juan Foley PA-C;     Sent: 4/14/2020 4:46:48 PM CDT  Subject: FW: Anti body tests---------------------  From: Juan Foley PA-C   To: Meagan Sol RN;     Sent: 4/14/2020 4:55:59 PM CDT  Subject: FW: Anti body tests           Addendum by Laisha Murillo CMA on April 14, 2020 4:46:48 PM CDT  ---------------------  From: Laisha Murillo CMA (Phon

## 2022-02-16 NOTE — TELEPHONE ENCOUNTER
---------------------  From: Astrid Estrada LPN (Phone Messages Pool (32224_[facility]))   To: INR [place] ( [address]24_[facility]);     Sent: 9/3/2021 4:26:29 PM CDT  Subject: CONSUMER MESSAGE FW: INR 2.7           ---------------------  From: WENDI BOWSER  To: [facility]  Sent: 09/03/2021 04:25 p.m. CDT  Subject: INR 2.7  Same dosage 10 mg daily, I skipped last Friday my INR is 2.7Noted

## 2022-02-16 NOTE — TELEPHONE ENCOUNTER
---------------------  From: Shazia Lawrence RN (INR Pool ( [address]_[facility]))   To: WENDI BOWSER    Sent: 3/1/2021 11:32:16 AM CST  Subject: RE: INR 2.6     Иван -     We have not been receiving results from them, so we appreciate you keeping us informed.  I have to reach out to them later today regarding other things so I will ask them again about why this may be.    Thanks,  Shazia HUBER RN      ---------------------  From: WENDI BOWSER  To: INR Pool ( [address]_[facility])  Sent: 03/01/2021 11:29 a.m. CST  Subject: RE: INR 2.6  Have you been receiving notice from mdINR?       Addendum by Shazia Lawrence RN on March 01, 2021 8:17:54 AM CST  ---------------------  From: Shazia Lawrence RN (INR Pool ( [address]))  To: WENDI BOWSER  Sent: 3/1/2021 8:17:54 AM CST  Subject: RE: INR 2.6       Hi Иван -       Thank you for sending in your results!  Continue your Warfarin 12.5mg Mondays and 10mg the rest of days - recheck your INR again next week.       Let us know if you have any questions or concerns.       Have a great day!       Shazia HUBER RN  ---------------------  From: Samira Adler RN (Phone Messages Pool ([id number]))  To: INR Pool ( [facility]24[facility]);  Sent: 3/1/2021 8:08:51 AM CST  Subject: FW: INR 2.6                      ---------------------  From: WENDI BOWSER  To: [facility]  Sent: 02/28/2021 11:30 a.m. CST  Subject: INR 2.6  Same dosage 10 daily 12.5 Monday

## 2022-02-16 NOTE — TELEPHONE ENCOUNTER
---------------------  From: Shazia Lawrence RN (Phone Messages Pool (90290_[facility]))   To: WENDI BOWSER    Sent: 10/1/2020 9:42:27 AM CDT  Subject: RE: General Message warfarin bridge     Tez Oates -     Yes. You will be taking both Warfarin and Lovenox after the procedure. Once your INR is above 2, then the Lovenox will be stopped and you will continue your Warfarin. It will be important to check your INR within the week after surgery (either 10/12 or 10/13).   Also, please let us know if you end up needing a refill of the Lovenox - we can send that in to pharmacy if needed.    Thanks,    Shazia HUBER RN      ---------------------  From: WENDI BOWSER  To: [facility]  Sent: 10/01/2020 09:17 a.m. CDT  Subject: RE: General Message warfarin bridge  So after the procedure I take the warfarin and the  Lovenox shots?  ---------------------  From: Shazia Lawrence RN (Phone Messages Pool (30991_[facility]))  To: WENDI BOWSER  Cc: INR Pool ( [id number]);  Sent: 10/1/2020 9:13:01 AM CDT  Subject: General Message       Tez Oates,       We received contact from [employer] regarding your upcoming shoulder surgery and the need for Lovenox bridging.  Below is the instructions that Dr. Jenkins gave regarding stopping your Warfarin and starting the Lovenox injections.  I have also sent the Lovenox to [facility].       Stop coumadin 5 days before procedure  Start Lovenox 100mg subcutaneous every 12 hours 4 days before procedure  take last dose of lovenox 24 hours before procedure  resume coumadin the day after procedure  resume lovenox every 12 hours after procedure  continue lovenox until inr >2       Please let us know if you have any questions or concerns.       Thanks!  Shazia HUBER RN

## 2022-02-16 NOTE — TELEPHONE ENCOUNTER
---------------------  From: Vitor REES, Samira   To: [facility]    Sent: 8/27/2019 9:10:47 AM CDT  Subject: INR     Benny Oates,    Received your 3.4 INR from 8/26/19 this morning. Please continue 10mg warfarin daily and recheck the INR in 1 week.    Thank you and have a great week,    Samira MARIN RN

## 2022-02-16 NOTE — TELEPHONE ENCOUNTER
---------------------  From: Astrid Estrada LPN (Phone Messages Pool (32224_[facility]))   To: INR Pool ( [address]24_[facility]);     Sent: 11/6/2020 3:48:23 PM CST  Subject: FW: Warfarin           ---------------------  From: WENDI BOWSER  To: [facility]  Sent: 11/06/2020 03:43 p.m. CST  Subject: FW: Warfarin  I ll skip tonight and resume normal  ---------------------  From: Samira Adler RN  To: WENDI BOWSER  Sent: 11/6/2020 3:40:51 PM CST  Subject: Warfarin                 Yes, I agree if your next INR is high we should do that. Your last 3 INRs have been really pretty good on the same dose you are on now. Do you think you will be staying on Tylenol?  Large doses of Tylenol can effect the INR. If you are on 1 - 2 tabs twice daily of Tylenol that may not be the cause of this INR. That is a moderate dose. Small changes in alcohol consumption can have a large effect on INR or of course a reduction in dietary vitamin K. Let me know if you prefer to just go to 10 mg daily.       Thank you,       Samira Stein

## 2022-02-16 NOTE — TELEPHONE ENCOUNTER
---------------------  From: Samira Adler RN (Phone Messages Pool ([address]24_[facility]))   To: INR Pool ( [address]_[facility]);     Sent: 2/8/2021 11:36:21 AM CST  Subject: FW: Inr 2.2           ---------------------  From: WENDI BOWSER  To: [facility]  Sent: 02/08/2021 11:29 a.m. CST  Subject: Inr 2.2  Same dosage 2.2 INR---------------------  From: Shazia Lawrence RN (INR Pool ( [address]_[facility]))   To: WENDI BOWSER    Sent: 2/8/2021 11:47:36 AM CST  Subject: RE: Inr 2.2     Hi Иван -    Any changes in diet or medication that would cause your INR to be low?  Please take 15mg Warfarin today then after today resume the 12.5mg Mondays and 10mg the rest of the week.   Recheck your INR next week.    Thanks,  Shazia HUBER RN

## 2022-02-18 ENCOUNTER — COMMUNICATION - RIVER FALLS (OUTPATIENT)
Dept: FAMILY MEDICINE | Facility: CLINIC | Age: 72
End: 2022-02-18
Payer: COMMERCIAL

## 2022-02-18 LAB — INR (EXTERNAL): 2.8 (ref 0.9–1.1)

## 2022-02-25 ENCOUNTER — COMMUNICATION - RIVER FALLS (OUTPATIENT)
Dept: FAMILY MEDICINE | Facility: CLINIC | Age: 72
End: 2022-02-25
Payer: COMMERCIAL

## 2022-02-26 ENCOUNTER — TRANSFERRED RECORDS (OUTPATIENT)
Dept: HEALTH INFORMATION MANAGEMENT | Facility: CLINIC | Age: 72
End: 2022-02-26
Payer: COMMERCIAL

## 2022-02-26 LAB
INR (EXTERNAL): 3.3 (ref 0.9–1.1)
INR (EXTERNAL): 3.3 (ref 2.5–3.5)

## 2022-03-02 VITALS
SYSTOLIC BLOOD PRESSURE: 123 MMHG | WEIGHT: 191 LBS | HEART RATE: 101 BPM | DIASTOLIC BLOOD PRESSURE: 79 MMHG | BODY MASS INDEX: 25.9 KG/M2

## 2022-03-02 NOTE — NURSING NOTE
Anticoagulation Therapy Management Entered On:  1/21/2022 12:30 PM CST    Performed On:  1/21/2022 12:28 PM CST by Samira Adler RN               Anticoagulation Visit Assessment   Anticoagulation Indication :   Deep vein thrombosis, Other: Factor V   Anticoagulant Start :   8/1/2012 CDT   Anticoagulant Duration :   Undetermined   Anticoagulation Medication Verified :   Yes   Patient Preferred Contact Method :   Cell   Patient on Warfarin :   Yes   Samira Adler RN - 1/21/2022 12:28 PM CST   Warfarin   Anticoagulant INR Goal Lower :   2.5    Anticoagulant INR Goal Upper :   3.5    Sunday :   10 mg   Monday :   15 mg   Tuesday :   15 mg   Wednesday :   10 mg   Thursday :   10 mg   Friday :   10 mg   Saturday :   10 mg   Total Dose :   80 mg   Warfarin Pt Reported Previous Week Dose :    Sun Mon Tues Wed Thurs Fri Sat Weekly Total Dose   Week 1 10 mg 10 mg 10 mg 10 mg 10 mg 10 mg 10 mg 70 mg   Week 2 10 mg 10 mg 10 mg 12.5 mg 10 mg 10 mg 10 mg 72.5 mg   Week 3  mg  mg  mg  mg  mg  mg  mg  mg   Week 4  mg  mg  mg  mg  mg  mg  mg  mg         Patient is taking single or multiple strength tablet(s) :   Multiple strength tab(s)   Multiple Tab Strengths :   5 mg tab, 7.5 mg tab   Sunday :   10 mg   Monday :   10 mg   Tuesday :   10 mg   Wednesday :   10 mg   Thursday :   10 mg   Friday :   10 mg   Saturday :   10 mg   Week 1 Total Dose :   70 mg   Warfarin Dosing Acknowledgement :   I have reviewed the patient's warfarin dosing schedule and confirmed its accuracy for today's visit.   Patient Instructions :   INR = 2.5 per MDINR today. Patient is to stay on 10 mg warfarin daily and recheck INR in 1 week.  Directions sent to patient via portal.     Samira Adler RN - 1/21/2022 12:28 PM CST

## 2022-03-02 NOTE — NURSING NOTE
Anticoagulation Therapy Management Entered On:  12/31/2021 2:10 PM CST    Performed On:  12/31/2021 2:09 PM CST by Samira Adler RN               Anticoagulation Visit Assessment   Anticoagulation Indication :   Deep vein thrombosis, Other: Factor V   Anticoagulant Start :   8/1/2012 CDT   Anticoagulant Duration :   Undetermined   Anticoagulation Medication Verified :   Yes   Patient Preferred Contact Method :   Cell   Samira Adler RN - 12/31/2021 2:09 PM CST   Anticoagulation Patient Assessment Grid   Change in Alcohol Consumption :   Yes   (Comment: increased alcohol [Samira Adler RN - 12/31/2021 2:09 PM CST] )   Change in Diet :   No   Change in Medications :   No   Diarrhea :   No   Rectal Bleeding :   No   Signs of Clotting :   No   Signs of Warfarin Intolerance :   No   Unusual Bleeding, Bruising :   No   Upcoming Procedures :   No   Vomiting :   No   Samira Alder RN - 12/31/2021 2:09 PM CST   Patient on Warfarin :   Yes   Samira Adler RN - 12/31/2021 2:09 PM CST   Warfarin   Anticoagulant INR Goal Lower :   2.5    Anticoagulant INR Goal Upper :   3.5    Information Given by :   Patient   Sunday :   10 mg   Monday :   10 mg   Tuesday :   10 mg   Wednesday :   10 mg   Thursday :   10 mg   Friday :   10 mg   Saturday :   10 mg   Total Dose :   70 mg   Warfarin Pt Reported Previous Week Dose :    Sun Mon Tues Wed Thurs Fri Sat Weekly Total Dose   Week 1 10 mg 10 mg 10 mg 10 mg 10 mg 10 mg 10 mg 70 mg   Week 2 10 mg 10 mg 10 mg 12.5 mg 10 mg 12.5 mg 10 mg 75 mg   Week 3  mg  mg  mg  mg  mg  mg  mg  mg   Week 4  mg  mg  mg  mg  mg  mg  mg  mg         Patient is taking single or multiple strength tablet(s) :   Multiple strength tab(s)   Multiple Tab Strengths :   5 mg tab, 7.5 mg tab   Sunday :   10 mg   Monday :   10 mg   Tuesday :   10 mg   Wednesday :   10 mg   Thursday :   10 mg   Friday :   10 mg   Saturday :   10 mg   Week 1 Total Dose :   70 mg   Sunday :   10 mg   Monday :   10 mg    Tuesday :   10 mg   Wednesday :   12.5 mg   Thursday :   10 mg   Friday :   10 mg   Saturday :   10 mg   Week 2 Total Dose :   72.5 mg   Warfarin Dosing Acknowledgement :   I have reviewed the patient's warfarin dosing schedule and confirmed its accuracy for today's visit.   Patient Instructions :   INR = 5.3 per [facility] today. Patient is to hold warfarin on Sat and Sun and recheck INR on Monday. Advised in visit with TFS.     Vitor REES, Samira - 12/31/2021 2:09 PM CST

## 2022-03-02 NOTE — TELEPHONE ENCOUNTER
---------------------  From: Samira Adler RN (Phone Messages Pool ([address]_[facility]))   To: INR Pool ( [address]24_[facility]);     Sent: 2/18/2022 3:32:08 PM CST  Subject: FW: Warfarin dosing           ---------------------  From: WENDI BOWSER  To: [facility]  Sent: 02/18/2022 03:28 p.m. CST  Subject: FW: Warfarin dosing  10daily all done with medication  ---------------------  From: Mary Michelle RN  To: WENDI BOWSER  Sent: 2/18/2022 2:28:48 PM CST  Subject: Warfarin dosing       Benny Oates,       If you are still on antibiotics and other medications for your upper respiratory infection, we should keep your warfarin dose lower with two days of 7.5 mg and 5 days of 10 mg.       If you are done with all of the medications for your infection, you can go back to your warfarin 10 mg daily.       Please message me back and let me know what is going on with the medications and what your dosing will be.       Thank you,       Emil Butler

## 2022-03-02 NOTE — TELEPHONE ENCOUNTER
---------------------  From: Juliane Obrien RN (Phone Messages Pool ([address]124_[facility]))   To: INR Pool ( [address]24_[facility]); WENDI BOWSER    Sent: 2/11/2022 2:40:10 PM CST  Subject: Consumer message: FW: INR 3.5     Wendi Zapata,     Your message is being forwarded to the INR nurse.     Thank you,  Juliane OSUNA RN      ---------------------  From: WENDI BOWSER  To: [facility]  Sent: 02/11/2022 02:06 p.m. CST  Subject: INR 3.5  10 daily INR 3.5---------------------  From: Mary Michelle RN (INR Pool ( [address]_[facility]))   To: WENDI BOWSER    Sent: 2/11/2022 3:08:18 PM CST  Subject: RE: Consumer message: FW: INR 3.5     Benny Oates,    You can continue your Warfarin at 10 mg daily. Please be mindful of what you are eating and drinking so you don't get too high of an INR again.    Let me know if you have any questions or concerns.    Mary Michelle RN    FYI: We will not be using this messaging application for much longer. We are switching to [employer]. You may have already gotten an update about this. I just wanted to make sure you are aware and can prepare.

## 2022-03-02 NOTE — TELEPHONE ENCOUNTER
---------------------  From: WENDI BOWSER  To: INR Pool ( [address]_[facility])  Sent: 02/11/2022 05:37 p.m. CST  Subject: RE: Consumer message: FW: INR 3.5  Yes I will check it Monday  ---------------------  From: Mary Michelle RN (INR Pool ( [address]))  To: WENDI BOWSER  Sent: 2/11/2022 4:15:47 PM CST  Subject: RE: Consumer message: FW: INR 3.5  ???  Thank you Иван. Yes, I appreciate you letting me know.  It would be a good idea to check your INR on Monday then to make sure those meds don't cause a drastic change in your INR.  Are you able to check your INR on 2/14/22?  ???  Mary Michelle RN  ???  ???  ???  ---------------------  From: WENDI BOWSER  To: INR Pool ( [address]_[facility])  Sent: 02/11/2022 03:17 p.m. CST  Subject: RE: Consumer message: FW: INR 3.5  I should?ve told you that I currently have an upper respiratory infection and am taking  Azitromicin and ambroxol and Sudagrip  ???  Addendum by Mary Michelle RN on February 11, 2022 3:08:18 PM CST  ---------------------  From: Mary Michelle RN (INR Pool ( [address]))  To: WENDI BOWSER  Sent: 2/11/2022 3:08:18 PM CST  Subject: RE: Consumer message: FW: INR 3.5  ???  Benny Oates,  ???  You can continue your Warfarin at 10 mg daily. Please be mindful of what you are eating and drinking so you don?t get too high of an INR again.  ???  Let me know if you have any questions or concerns.  ???  Mary Viviana, RN  ???  FYI: We will not be using this messaging application for much longer. We are switching to [employer]. You may have already gotten an update about this. I just wanted to make sure you are aware and can prepare.  ---------------------  From: Juliane Obrien RN (Phone Messages Pool ([address]224_[facility]))  To: INR Pool ( [address]24_[facility]); WENDI BOWSER  Sent: 2/11/2022 2:40:10 PM CST  Subject: Consumer message: FW: INR 3.5  ???  Wendi Zapata,  ???  Your message is being forwarded to the INR  nurse.  ???  Thank you,  Juliane OSUNA RN  ???  ???  ---------------------  From: WENDI BOWSER  To: [facility]  Sent: 02/11/2022 02:06 p.m. CST  Subject: INR 3.5  10 daily INR 3.5

## 2022-03-02 NOTE — TELEPHONE ENCOUNTER
---------------------  From: Juliane Obrien RN (Phone Messages Pool ([address]52_[facility]))   To: INR Pool ( [address]);     Sent: 1/3/2022 3:04:46 PM CST  Subject: Consumer message: FW: INR 2.1           ---------------------  From: WENDI BOWSER  To: [facility]  Sent: 01/03/2022 02:50 p.m. CST  Subject: INR 2.1  I skipped dosage on Friday Saturday and Sunday I am at 2.1, assume my resume the 10 daily?---------------------  From: Mary Michelle RN (INR Pool ( [address]_[facility]))   To: WENDI BOWSER    Cc: David Jenkins MD;      Sent: 1/3/2022 3:32:42 PM CST  Subject: RE: Consumer message: FW: INR 2.1     Benny Oates,    I do not want you to go back to 10 mg daily of warfarin. For the past several months you have shot well over your therapeutic INR range after less than 2 weeks on that dose.    I would like you to take your warfarin as follows, please:    10 mg on Mondays, Wednesdays, Fridays  7.5 mg all other days of the week.    I know you are in [facility], so the diet may change how we have to dose you as well, but I don't want your INR to jump up to the 5's and 6's while you are away.    Please check your INR in 1 week or sooner if you have any concerns.    Happy New Year,    Mary Michelle RN

## 2022-03-02 NOTE — TELEPHONE ENCOUNTER
---------------------  From: Gardenia Castro RN (Phone Messages Pool ([address]_[facility]))   To: INR Pool ( [address]);     Sent: 1/7/2022 10:54:54 AM CST  Subject: CONSUMER MESSAGE  FW: INR 5.2, 6.2           ---------------------  From: WENDI BOWSER  To: [facility]  Sent: 01/07/2022 10:54 a.m. CST  Subject: INR 5.2, 6.2  DOSAGE 10 Monday Wednesday Friday, 7 1/2 Tuesday Thursday Saturday and Sunday. I tested twice first one was 5.2 the second one was 6.2---------------------  From: Mary Michelle RN (INR Pool ( [address]))   To: WENDI BOWSER    Cc: David Jenkins MD;      Sent: 1/7/2022 11:12:45 AM CST  Subject: CONSUMER MESSAGE: INR 5.2     Tez Oates,    I'm going to give the benefit of the doubt and go with result of 5.2 for your INR. I would like you to not take warfarin x2 days then take 7.5 mg daily. This is a 12.5% decrease in your weekly dose, which is recommended per our protocol.    I would like to assume you have had no signs or symptoms of bleeding or excessive bruising. However, if you have, please, let me know or be seen by a medical professional as soon as possible.    Let me know if you have any questions or concerns,    Mary Michelle RN---------------------  From: Mary Michelle RN (INR Pool ( [address]))   To: WENDI BOWSER    Cc: David Jenkins MD;      Sent: 1/7/2022 11:15:02 AM CST  Subject: CONSUMER MESSAGE: INR 5.2     Иван    P.SValeria Please check your INR in 5-7 days.    Thanks,    Mary Michelle RN

## 2022-03-02 NOTE — TELEPHONE ENCOUNTER
---------------------  From: Gardenia Castro RN (Phone Messages Pool ([address]324_[facility]))   To: INR Pool ( [address]24_[facility]);     Sent: 1/17/2022 10:21:20 AM CST  Subject: CONSUMER MESSAGE  FW: INR 1.8           ---------------------  From: WENDI BOWSER  To: [facility]  Sent: 01/17/2022 10:13 a.m. CST  Subject: INR 1.8  Dosage 10 daily INR 1.8---------------------  From: Viviana REES, aMry HERNANDEZ (INR Pool ( [address]24_[facility]))   To: WENDI BOWSER    Sent: 1/17/2022 11:16:49 AM CST  Subject: RE: CONSUMER MESSAGE  FW: INR 1.8     Good morning Иван,    Please take warfarin 15 mg today and tomorrow, then resume 10 mg daily. Recheck INR 1/21/22 before 4pm CST.    Let me know if you have any questions or concerns.    Thank you,    Mary Michelle RN

## 2022-03-02 NOTE — TELEPHONE ENCOUNTER
---------------------  From: Vitor REES, Samira   To: WENDI BOWSER    Sent: 1/21/2022 12:27:56 PM CST  Subject: INR     Benny Oates,     Good to see that INR of 2.5 today. Please stay on 10mg warfarin daily and recheck INR in 1 week. Take care of your self.    Thank you,    Samira MARIN RN

## 2022-03-02 NOTE — TELEPHONE ENCOUNTER
---------------------  From: Cammie Hinkle CMA   To: INR Pool ( [address]24_[facility]);     Sent: 12/31/2021 3:00:58 PM CST  Subject: [facility] INR result     INR = 5.3  Protime = 47.1    TFS message: Let him know INR 5.3. Hold tomorrow and Sunday. Test INR Monday. Likely ok to resume then if less than 4.     I have not contacted pt.Call to patient advised as per TFS.

## 2022-03-02 NOTE — TELEPHONE ENCOUNTER
---------------------  From: Juliane Obrien RN (Phone Messages Pool (32224_[facility]))   To: INR Pool ( [address]24_[facility]);     Sent: 2/25/2022 1:36:04 PM CST  Subject: Consumer message: FW: INR 3.3           ---------------------  From: WENDI BOWSER  To: [facility]  Sent: 02/25/2022 01:33 p.m. CST  Subject: INR 3.3  10daily INR 3.3noted

## 2022-03-02 NOTE — TELEPHONE ENCOUNTER
---------------------  From: Astrid Estrada LPN (Phone Messages Pool ([address] [place] [address]))   To: INR Pool ( [address] [place] [address]);     Sent: 1/12/2022 11:20:33 AM CST  Subject: CONSUMER MESSAGE FW: INR 1.5           ---------------------  From: WENDI BOWSER  To: [facility]  Sent: 01/12/2022 11:10 a.m. CST  Subject: INR 1.5  7/12 daily INR 1.5---------------------  From: Mary Michelle RN (INR Pool ( [facility]24[facility]))   To: David Jenkins MD;     Sent: 1/12/2022 11:28:30 AM CST  Subject: FW: CONSUMER MESSAGE FW: INR 1.5     Patient has not had a single therapeutic INR since 12/10/21. With increased alcohol intake and significant diet change in [facility] warfarin dosing has not been successful with combating inconsistencies.    Pt INR today is 1.5 (critical). Pt has 5 mg warfarin tabs. He has been taking 7.5 mg daily x1 week due to result of 5.2 on 1/7/22. Below are the most recent dosing instructions based on protocol.    Please review and advise on dosing instructions.    (Inserted Image. Unable to display)---------------------  From: David Jenkins MD   To: INR Pool ( [address]);     Sent: 1/12/2022 11:33:19 AM CST  Subject: RE: CONSUMER MESSAGE FW: INR 1.5     coumadin 10 mg daily inr in 5 daysMessage sent to pt by portal.

## 2022-03-02 NOTE — TELEPHONE ENCOUNTER
---------------------  From: Mary Michelle RN (INR Pool ( [id number]))   To: WENDI BOWSER    Sent: 2/11/2022 4:15:47 PM CST  Subject: RE: Consumer message: FW: INR 3.5     Thank you Иван. Yes, I appreciate you letting me know.  It would be a good idea to check your INR on Monday then to make sure those meds don't cause a drastic change in your INR.  Are you able to check your INR on 2/14/22?    Mary Michelle RN        ---------------------  From: WENDI BOWSER  To: INR Pool ( [id number])  Sent: 02/11/2022 03:17 p.m. CST  Subject: RE: Consumer message: FW: INR 3.5  I should ve told you that I currently have an upper respiratory infection and am taking  Azitromicin and ambroxol and Sudagrip       Addendum by Mary Michelle RN on February 11, 2022 3:08:18 PM CST  ---------------------  From: Mary Michelle RN (INR Pool ( [id number]))  To: WENDI BOWSER  Sent: 2/11/2022 3:08:18 PM CST  Subject: RE: Consumer message: FW: INR 3.5       Benny Oates,       You can continue your Warfarin at 10 mg daily. Please be mindful of what you are eating and drinking so you don t get too high of an INR again.       Let me know if you have any questions or concerns.       Mary Michelle RN       FYI: We will not be using this messaging application for much longer. We are switching to [employer]. You may have already gotten an update about this. I just wanted to make sure you are aware and can prepare.  ---------------------  From: Juliane Obrien RN (Phone Messages Pool ([address]))  To: INR Pool ( [id number]); WENDI BOWSER  Sent: 2/11/2022 2:40:10 PM CST  Subject: Consumer message: FW: INR 3.5       Wendi Zapata,       Your message is being forwarded to the INR nurse.       Thank you,  Juliane OSUNA RN            ---------------------  From: WENDI BOWSER  To: [facility]  Sent: 02/11/2022 02:06 p.m. CST  Subject: INR 3.5  10 daily INR 3.5

## 2022-03-02 NOTE — TELEPHONE ENCOUNTER
---------------------  From: Astrid Estrada LPN (Phone Messages Pool ([address]_[facility]))   To: INR Pool ( [address]_[facility]);     Sent: 12/31/2021 10:30:32 AM CST  Subject: FW: Inr           ---------------------  From: WENDI BOWSER  To: [facility]  Sent: 12/31/2021 10:23 a.m. CST  Subject: Inr  I missed my dosage last night I took it this morning, I checked my INR twice, the first one was 7.6, the second one was eight. I don t know what s going onINR in clinic today. TFS appt today. Patient wants to leave for [place] tomorrow. Has been on tylenol 5 -6 tabs per day due to recent illness. Covid negative. Denies symptoms of bleeding.

## 2022-03-02 NOTE — TELEPHONE ENCOUNTER
---------------------  From: Gardenia Castro RN (Phone Messages Pool ([address]_[facility]))   To: INR Pool ( [address]_[facility]);     Sent: 1/21/2022 11:22:35 AM CST  Subject: CONSUMER MESSAGE  FW: INR 2.5           ---------------------  From: WENDI BOWSER  To: [facility]  Sent: 01/21/2022 11:06 a.m. CST  Subject: INR 2.5  Dosage as prescribed, INR 2.5done

## 2022-03-02 NOTE — TELEPHONE ENCOUNTER
---------------------  From: Viviana REES, Mary HERNANDEZ (Phone Messages Pool ([address]_[facility]))   To: INR Pool ( [address]_[facility]);     Sent: 2/18/2022 9:55:13 AM CST  Subject: FW: INR 2.8           ---------------------  From: WENDI BOWSER  To: [facility]  Sent: 02/18/2022 09:44 a.m. CST  Subject: INR 2.8  Dosage as prescribed, INR 2.8[facility] address

## 2022-03-02 NOTE — TELEPHONE ENCOUNTER
---------------------  From: Juliane Obrien RN (Phone Messages Pool ([address]_[facility]))   To: INR Pool ( [address]_[facility]);     Sent: 2/4/2022 8:30:47 AM CST  Subject: Consumer message: FW: INR 2.9           ---------------------  From: WENDI BOWSER  To: [facility]  Sent: 02/04/2022 07:08 a.m. CST  Subject: INR 2.9  Same dosage [place] INR 2.9---------------------  From: Mary Michelle RN (INR Pool ( [address]_[facility]))   To: WENDI BOWSER    Sent: 2/4/2022 9:04:23 AM CST  Subject: RE: Consumer message: FW: INR 2.9     Benny Oates    Thank you for the update on your INR.    You can continue your Warfarin at 10 mg daily. Please check your INR in 1 week.    Have a great day,    Mary Michelle RN

## 2022-03-02 NOTE — TELEPHONE ENCOUNTER
---------------------  From: Viviana REES, Mary HERNANDEZ   To: WENDI BOWSER    Sent: 1/12/2022 11:44:31 AM CST  Subject: Warfarin     Benny Oates,    Dr. Jenkins would like you to take 10 mg Warfarin daily, recheck INR 1/17/22.    Please be mindful of keeping alcohol and diet consistent. Please pay attention to any sign of clot (pain, shortness of breath, dizziness, severe headache, etc.)    Please let me know if you have any questions or concerns.    Thanks,    Mary Michelle, RN

## 2022-03-02 NOTE — TELEPHONE ENCOUNTER
---------------------  From: Trista Boyd CMA (Phone Messages Pool ([address]_[facility]))   To: INR Pool ( [address]_[facility]);     Sent: 2/14/2022 3:19:45 PM CST  Subject: FW: INR 4.3           ---------------------  From: WENDI BOWSER  To: [facility]  Sent: 02/14/2022 03:08 p.m. CST  Subject: INR 4.3  Same dosage of 10 daily, yesterday was my last day of medication, IMR 4.3.  My machine is acting funny I hope that is a good reading---------------------  From: Viviana REES, Mary HERNANDEZ (INR Pool ( [address]_[facility]))   To: WENDI BOWSER    Sent: 2/14/2022 3:54:56 PM CST  Subject: RE: INR 4.3     Benny Oates,    I expected your INR to go up because of the meds you are on for your infection. Can you please take 7.5 mg of warfarin today and tomorrow then check you INR again on Wednesday? I don't want to cut your dose too much, but we don't want you going up in the 8's again either.    If there are issues with your machine you should be able to call he  (the number should be on the back of the machine) and talk to them about it to see if there is something that needs to be fixed.    Let me know if you have any questions or concerns.    Thank you for your communication,    Mary Michelle RN

## 2022-03-02 NOTE — TELEPHONE ENCOUNTER
---------------------  From: Viviana REES, Mary HERNANDEZ   To: MAGUE WENDI WEST    Sent: 2/18/2022 2:28:48 PM CST  Subject: Warfarin dosing     Benny Oates,    If you are still on antibiotics and other medications for your upper respiratory infection, we should keep your warfarin dose lower with two days of 7.5 mg and 5 days of 10 mg.    If you are done with all of the medications for your infection, you can go back to your warfarin 10 mg daily.    Please message me back and let me know what is going on with the medications and what your dosing will be.    Thank you,    Mary Michelle RN

## 2022-03-02 NOTE — NURSING NOTE
Anticoagulation Therapy Management Entered On:  2/11/2022 3:09 PM CST    Performed On:  2/11/2022 3:08 PM CST by Mary Michelle RN               Anticoagulation Visit Assessment   Anticoagulation Indication :   Deep vein thrombosis, Other: Factor V   Anticoagulant Start :   8/1/2012 CDT   Anticoagulant Duration :   Undetermined   Anticoagulation Medication Verified :   Yes   Patient Preferred Contact Method :   Cell   Patient on Warfarin :   Yes   Mary Michelle RN - 2/11/2022 3:08 PM CST   Warfarin   International Normalization Ratio TR :   3.5    Anticoagulant INR Goal Lower :   2.5    Anticoagulant INR Goal Upper :   3.5    Sunday :   10 mg   Monday :   10 mg   Tuesday :   10 mg   Wednesday :   10 mg   Thursday :   10 mg   Friday :   10 mg   Saturday :   10 mg   Total Dose :   70 mg   Warfarin Pt Reported Previous Week Dose :    Sun Mon Tues Wed Thurs Fri Sat Weekly Total Dose   Week 1 10 mg 10 mg 10 mg 10 mg 10 mg 10 mg 10 mg 70 mg   Week 2 10 mg 10 mg 10 mg 12.5 mg 10 mg 10 mg 10 mg 72.5 mg   Week 3  mg  mg  mg  mg  mg  mg  mg  mg   Week 4  mg  mg  mg  mg  mg  mg  mg  mg         Patient is taking single or multiple strength tablet(s) :   Multiple strength tab(s)   Multiple Tab Strengths :   5 mg tab, 7.5 mg tab   Sunday :   10 mg   Monday :   10 mg   Tuesday :   10 mg   Wednesday :   10 mg   Thursday :   10 mg   Friday :   10 mg   Saturday :   10 mg   Week 1 Total Dose :   70 mg   Sunday :   10 mg   Monday :   10 mg   Tuesday :   10 mg   Wednesday :   12.5 mg   Thursday :   10 mg   Friday :   10 mg   Saturday :   10 mg   Week 2 Total Dose :   72.5 mg   Warfarin Dosing Acknowledgement :   I have reviewed the patient's warfarin dosing schedule and confirmed its accuracy for today's visit.   Patient Instructions :   Home INR = 3.5. Per protocol continue warfarin 10 mg daily. Recheck INR in 1 week. Message sent through pt portal.     Mary Michelle RN - 2/11/2022 3:08 PM CST

## 2022-03-02 NOTE — TELEPHONE ENCOUNTER
---------------------  From: Viviana REES, Mary HERNANDEZ (INR Pool ( [address]_[facility]))   To: David Jenkins MD;     Sent: 1/14/2022 9:42:29 AM CST  Subject: Subtherapeutic INR     Reported INR last night = 1.5.    Pt was advised to take warfarin 10 mg daily beginning 1/12/22 and to check INR on 1/17/22 per TFS.    I will advise pt continue warfarin 10 mg daily and still check INR on 1/17/22.

## 2022-03-02 NOTE — NURSING NOTE
Anticoagulation Therapy Management Entered On:  2/14/2022 3:56 PM CST    Performed On:  2/14/2022 3:55 PM CST by Mary Michelle RN               Anticoagulation Visit Assessment   Anticoagulation Indication :   Deep vein thrombosis, Other: Factor V   Anticoagulant Start :   8/1/2012 CDT   Anticoagulant Duration :   Undetermined   Anticoagulation Medication Verified :   Yes   Patient Preferred Contact Method :   Cell   Mary Michelle RN - 2/14/2022 3:55 PM CST   Anticoagulation Patient Assessment Grid   Change in Alcohol Consumption :   No   Change in Diet :   No   Change in Medications :   Yes   (Comment: URI meds [Mary Michelle RN - 2/14/2022 3:55 PM CST] )   Diarrhea :   No   Rectal Bleeding :   No   Signs of Clotting :   No   Signs of Warfarin Intolerance :   No   Unusual Bleeding, Bruising :   No   Upcoming Procedures :   No   Vomiting :   No   Mary Michelle RN - 2/14/2022 3:55 PM CST   Patient on Warfarin :   Yes   Mary Michelle RN - 2/14/2022 3:55 PM CST   Warfarin   International Normalization Ratio TR :   4.3    Anticoagulant INR Goal Lower :   2.5    Anticoagulant INR Goal Upper :   3.5    Sunday :   10 mg   Monday :   7.5 mg   Tuesday :   7.5 mg   Wednesday :   10 mg   Thursday :   10 mg   Friday :   10 mg   Saturday :   10 mg   Total Dose :   65 mg   Warfarin Pt Reported Previous Week Dose :    Sun Mon Tues Wed Thurs Fri Sat Weekly Total Dose   Week 1 10 mg 10 mg 10 mg 10 mg 10 mg 10 mg 10 mg 70 mg   Week 2 10 mg 10 mg 10 mg 12.5 mg 10 mg 10 mg 10 mg 72.5 mg   Week 3  mg  mg  mg  mg  mg  mg  mg  mg   Week 4  mg  mg  mg  mg  mg  mg  mg  mg         Patient is taking single or multiple strength tablet(s) :   Multiple strength tab(s)   Multiple Tab Strengths :   5 mg tab, 7.5 mg tab   Sunday :   10 mg   Monday :   10 mg   Tuesday :   10 mg   Wednesday :   10 mg   Thursday :   10 mg   Friday :   10 mg   Saturday :   10 mg   Week 1 Total Dose :   70 mg   Sunday :   10 mg   Monday :   10 mg   Tuesday :    10 mg   Wednesday :   12.5 mg   Thursday :   10 mg   Friday :   10 mg   Saturday :   10 mg   Week 2 Total Dose :   72.5 mg   Warfarin Dosing Acknowledgement :   I have reviewed the patient's warfarin dosing schedule and confirmed its accuracy for today's visit.   Patient Instructions :   Home INR = 4.3. Per protocol pt was advised by portal message to take warfarin 7.5 mg daily x2 days then recheck INR on 2/16/22.     Viviana REES, Mary HERNANDEZ - 2/14/2022 3:55 PM CST

## 2022-03-02 NOTE — NURSING NOTE
Anticoagulation Therapy Management Entered On:  1/3/2022 3:33 PM CST    Performed On:  1/3/2022 3:33 PM CST by Mary Michelle RN               Anticoagulation Visit Assessment   Anticoagulation Indication :   Deep vein thrombosis, Other: Factor V   Anticoagulant Start :   8/1/2012 CDT   Anticoagulant Duration :   Undetermined   Anticoagulation Medication Verified :   Yes   Patient Preferred Contact Method :   Cell   Mary Michelle RN - 1/3/2022 3:33 PM CST   Anticoagulation Patient Assessment Grid   Change in Alcohol Consumption :   No   Change in Diet :   Yes   (Comment: In [place] [Mary Michelle RN - 1/3/2022 3:33 PM CST] )   Change in Medications :   No   Diarrhea :   No   Rectal Bleeding :   No   Signs of Clotting :   No   Signs of Warfarin Intolerance :   No   Unusual Bleeding, Bruising :   No   Upcoming Procedures :   No   Vomiting :   No   Mary Michelle RN - 1/3/2022 3:33 PM CST   Patient on Warfarin :   Yes   Mary Michelle RN - 1/3/2022 3:33 PM CST   Warfarin   International Normalization Ratio TR :   2.1    Anticoagulant INR Goal Lower :   2.5    Anticoagulant INR Goal Upper :   3.5    Sunday :   7.5 mg   Monday :   10 mg   Tuesday :   7.5 mg   Wednesday :   10 mg   Thursday :   7.5 mg   Friday :   10 mg   Saturday :   7.5 mg   Total Dose :   60 mg   Warfarin Pt Reported Previous Week Dose :    Sun Mon Tues Wed Thurs Fri Sat Weekly Total Dose   Week 1 10 mg 10 mg 10 mg 10 mg 10 mg 10 mg 10 mg 70 mg   Week 2 10 mg 10 mg 10 mg 12.5 mg 10 mg 12.5 mg 10 mg 75 mg   Week 3  mg  mg  mg  mg  mg  mg  mg  mg   Week 4  mg  mg  mg  mg  mg  mg  mg  mg         Patient is taking single or multiple strength tablet(s) :   Multiple strength tab(s)   Multiple Tab Strengths :   5 mg tab, 7.5 mg tab   Sunday :   10 mg   Monday :   10 mg   Tuesday :   10 mg   Wednesday :   10 mg   Thursday :   10 mg   Friday :   10 mg   Saturday :   10 mg   Week 1 Total Dose :   70 mg   Sunday :   10 mg   Monday :   10 mg   Tuesday :   10  mg   Wednesday :   12.5 mg   Thursday :   10 mg   Friday :   10 mg   Saturday :   10 mg   Week 2 Total Dose :   72.5 mg   Warfarin Dosing Acknowledgement :   I have reviewed the patient's warfarin dosing schedule and confirmed its accuracy for today's visit.   Patient Instructions :   Home INR = 2.1. Per protocol Resume Warfarin 10 mg Mon/Wed/Fri and 7.5 mg ROW. Recheck INR in 1 week. This is a 14% decrease in weekly dose. Pt notified by portal message.     Viviana REES, Mary HERNANDEZ - 1/3/2022 3:33 PM CST

## 2022-03-02 NOTE — TELEPHONE ENCOUNTER
---------------------  From: Viviana REES, Mary HERNANDEZ   To: WENDI BOWSER    Sent: 1/14/2022 9:44:49 AM CST  Subject: Warfarin dosing     Иван,    I got your INR result from last night of 1.5.    Please continue your warfarin at 10 mg daily per Dr. Jenkins's directions from Wednesday.    Check your INR on Monday, January 17th.    Please let us know if you have any questions or concerns,    Mary Michelle RN

## 2022-03-02 NOTE — TELEPHONE ENCOUNTER
---------------------  From: Samira Adler RN (Phone Messages Pool ([id number]_[facility]))   To: INR Pool ( [address]_[facility]);     Sent: 1/28/2022 1:08:11 PM CST  Subject: FW: INR 2.8           ---------------------  From: WENDI BOWSER  To: [facility]  Sent: 01/28/2022 12:39 p.m. CST  Subject: INR 2.8  Same dosage, 10 daily  INR 2.8---------------------  From: Mary Michelle RN (INR Pool ( [address]_[facility]))   To: WENDI BOWSER    Sent: 1/28/2022 1:37:21 PM CST  Subject: RE: INR 2.8     Benny Oates,    Thank you for the update on your INR. You can continue your Warfarin at 10 mg daily and please recheck your INR in 1 week.    Have a great day,    Mary Michelle RN

## 2022-03-02 NOTE — NURSING NOTE
Comprehensive Intake Entered On:  12/31/2021 11:37 AM CST    Performed On:  12/31/2021 11:32 AM CST by Cammie Hinkle CMA               Summary   Chief Complaint :   Missed warfarin dosage last night and took it this morning, checked INR twice at home, the first one was 7.6, the second one was 8. INR nurse advised appt.   Advance Directive :   Yes   Weight Measured :   191 lb(Converted to: 191 lb 0 oz, 86.636 kg)    Systolic Blood Pressure :   123 mmHg   Diastolic Blood Pressure :   79 mmHg   Mean Arterial Pressure :   94 mmHg   Peripheral Pulse Rate :   101 bpm (HI)    BP Site :   Right arm   Pulse Site :   Radial artery   BP Method :   Electronic   HR Method :   Electronic   Cammie Hinkle CMA - 12/31/2021 11:32 AM CST   Health Status   Allergies Verified? :   Yes   Medication History Verified? :   Yes   Pre-Visit Planning Status :   Not completed   Cammie Hinkle CMA - 12/31/2021 11:32 AM CST   Meds / Allergies   (As Of: 12/31/2021 11:37:39 AM CST)   Allergies (Active)   No Known Medication Allergies  Estimated Onset Date:   Unspecified ; Created By:   Laisha Murillo CMA; Reaction Status:   Active ; Category:   Drug ; Substance:   No Known Medication Allergies ; Type:   Allergy ; Updated By:   Laisha Murillo CMA; Reviewed Date:   11/22/2021 10:11 AM CST        Medication List   (As Of: 12/31/2021 11:37:39 AM CST)   Prescription/Discharge Order    albuterol  :   albuterol ; Status:   Prescribed ; Ordered As Mnemonic:   albuterol 90 mcg/inh inhalation aerosol ; Simple Display Line:   2 puff(s), Inhale, qid, PRN: shortness of breath or wheezing, 1 EA, 1 Refill(s) ; Ordering Provider:   David Jenkins MD; Catalog Code:   albuterol ; Order Dt/Tm:   8/13/2021 1:14:09 PM CDT          atorvastatin  :   atorvastatin ; Status:   Prescribed ; Ordered As Mnemonic:   atorvastatin 20 mg oral tablet ; Simple Display Line:   20 mg, 1 tab(s), Oral, daily, 90 tab(s), 3 Refill(s) ; Ordering Provider:   Alice GAMBLE  David; Catalog Code:   atorvastatin ; Order Dt/Tm:   4/27/2021 1:21:43 PM CDT          diclofenac topical  :   diclofenac topical ; Status:   Prescribed ; Ordered As Mnemonic:   Voltaren 1% topical gel ; Simple Display Line:   2 gm, Topical, qid, PRN: shoulder pain, 100 gm, 11 Refill(s) ; Ordering Provider:   Freda Massey MD; Catalog Code:   diclofenac topical ; Order Dt/Tm:   5/7/2020 11:15:01 AM CDT          fluticasone nasal  :   fluticasone nasal ; Status:   Prescribed ; Ordered As Mnemonic:   fluticasone 50 mcg/inh nasal spray ; Simple Display Line:   2 spray(s), nasal, daily, for 90 day(s), 3 EA, 3 Refill(s) ; Ordering Provider:   Ace DILLARD, Nick MARIN; Catalog Code:   fluticasone nasal ; Order Dt/Tm:   6/15/2020 10:54:23 AM CDT          sildenafil  :   sildenafil ; Status:   Prescribed ; Ordered As Mnemonic:   Viagra 100 mg oral tablet ; Simple Display Line:   See Instructions, TAKE AS DIRECTED, 8 tab(s), 11 Refill(s) ; Ordering Provider:   David Jenkins MD; Catalog Code:   sildenafil ; Order Dt/Tm:   4/20/2016 12:38:56 PM CDT          warfarin  :   warfarin ; Status:   Prescribed ; Ordered As Mnemonic:   warfarin 5 mg oral tablet ; Simple Display Line:   See Instructions, TAKE TWO AND ONE-HALF TABLETS BY MOUTH ON MONDAY AND THURSDAY. TAKE 2 TABLETS DAILY THE REST OF THE WEEK., 192 tab(s), 0 Refill(s) ; Ordering Provider:   David Jenkins MD; Catalog Code:   warfarin ; Order Dt/Tm:   7/6/2021 3:06:58 PM CDT            Home Meds    acetaminophen  :   acetaminophen ; Status:   Documented ; Ordered As Mnemonic:   acetaminophen ; Simple Display Line:   See Instructions, 325mg-650 mg PRN for pain/fever., 0 Refill(s) ; Catalog Code:   acetaminophen ; Order Dt/Tm:   6/19/2019 3:01:15 PM CDT

## 2022-03-04 ENCOUNTER — DOCUMENTATION ONLY (OUTPATIENT)
Dept: ANTICOAGULATION | Facility: CLINIC | Age: 72
End: 2022-03-04
Payer: COMMERCIAL

## 2022-03-04 ENCOUNTER — TRANSFERRED RECORDS (OUTPATIENT)
Dept: HEALTH INFORMATION MANAGEMENT | Facility: CLINIC | Age: 72
End: 2022-03-04
Payer: COMMERCIAL

## 2022-03-04 DIAGNOSIS — D68.51 FACTOR V LEIDEN MUTATION (H): Primary | ICD-10-CM

## 2022-03-04 DIAGNOSIS — Z86.718 PERSONAL HISTORY OF DVT (DEEP VEIN THROMBOSIS): ICD-10-CM

## 2022-03-04 LAB — INR (EXTERNAL): 2.6 (ref 2.5–3.5)

## 2022-03-04 NOTE — PROGRESS NOTES
ANTICOAGULATION  MANAGEMENT-Home Monitor Managed by Exception    Kannan Wolf 72 year old male is on warfarin with therapeutic INR result. (Goal INR 2.5-3.5)    No results for input(s): INR in the last 168 hours.      Previous INR was Therapeutic    Medication, diet, health changes since last INR:chart reviewed; none identified    Contacted within the last 12 weeks by phone on 2/14/2022      CHRISTINE     aKnnan was NOT contacted regarding therapeutic result today per home monitoring policy manage by exception agreement.   Current warfarin dose is to be continued:     Summary  As of 3/4/2022    Full warfarin instructions:  10 mg every day   Next INR check:  3/11/2022           ?   Mary Michelle RN  Anticoagulation Clinic  3/4/2022    _______________________________________________________________________     Anticoagulation Episode Summary     Current INR goal:  2.5-3.5   TTR:  100.0 % (1 wk)   Target end date:  Indefinite   Send INR reminders to:  [facility] [place]    Indications    Factor V Leiden mutation (H) [D68.51]  Personal history of DVT (deep vein thrombosis) [Z86.718]           Comments:  Manage by exception         Anticoagulation Care Providers     Provider Role Specialty Phone number    David Jenkins MD Referring Family Medicine [phone]

## 2022-03-11 ENCOUNTER — TRANSFERRED RECORDS (OUTPATIENT)
Dept: HEALTH INFORMATION MANAGEMENT | Facility: CLINIC | Age: 72
End: 2022-03-11
Payer: COMMERCIAL

## 2022-03-11 ENCOUNTER — DOCUMENTATION ONLY (OUTPATIENT)
Dept: ANTICOAGULATION | Facility: CLINIC | Age: 72
End: 2022-03-11
Payer: COMMERCIAL

## 2022-03-11 DIAGNOSIS — Z86.718 PERSONAL HISTORY OF DVT (DEEP VEIN THROMBOSIS): ICD-10-CM

## 2022-03-11 DIAGNOSIS — D68.51 FACTOR V LEIDEN MUTATION (H): Primary | ICD-10-CM

## 2022-03-11 LAB — INR (EXTERNAL): 2.8 (ref 0.9–1.1)

## 2022-03-11 NOTE — PROGRESS NOTES
ANTICOAGULATION  MANAGEMENT-Home Monitor Managed by Exception    Kannan Wolf 72 year old male is on warfarin with therapeutic INR result. (Goal INR 2.5-3.5)    Recent labs: (last 7 days)     03/11/22  1307   INR 2.8*         Previous INR was Therapeutic    Medication, diet, health changes since last INR:chart reviewed; none identified    Contacted within the last 12 weeks by phone on 2/14/22      CHRISTINE     Kannan was NOT contacted regarding therapeutic result today per home monitoring policy manage by exception agreement.   Current warfarin dose is to be continued:     Summary  As of 3/11/2022    Full warfarin instructions:  10 mg every day   Next INR check:  3/18/2022           ?   Pat Lott RN  Anticoagulation Clinic  3/11/2022    _______________________________________________________________________     Anticoagulation Episode Summary     Current INR goal:  2.5-3.5   TTR:  100.0 % (3 wk)   Target end date:  Indefinite   Send INR reminders to:  [facility] [place]    Indications    Factor V Leiden mutation (H) [D68.51]  Personal history of DVT (deep vein thrombosis) [Z86.718]           Comments:  Manage by exception         Anticoagulation Care Providers     Provider Role Specialty Phone number    David Jenkins MD Referring Family Medicine [phone]          Pat Mcgrath RN, BSN, PHN  Anticoagulation Nurse  [phone]      Alert and oriented to person, place and time

## 2022-03-18 ENCOUNTER — ANTICOAGULATION THERAPY VISIT (OUTPATIENT)
Dept: FAMILY MEDICINE | Facility: CLINIC | Age: 72
End: 2022-03-18
Payer: COMMERCIAL

## 2022-03-18 ENCOUNTER — TRANSFERRED RECORDS (OUTPATIENT)
Dept: HEALTH INFORMATION MANAGEMENT | Facility: CLINIC | Age: 72
End: 2022-03-18
Payer: COMMERCIAL

## 2022-03-18 DIAGNOSIS — D68.51 FACTOR V LEIDEN MUTATION (H): Primary | ICD-10-CM

## 2022-03-18 DIAGNOSIS — Z86.718 PERSONAL HISTORY OF DVT (DEEP VEIN THROMBOSIS): ICD-10-CM

## 2022-03-18 LAB — INR (EXTERNAL): 3.5 (ref 0.9–1.1)

## 2022-03-18 NOTE — PROGRESS NOTES
ANTICOAGULATION  MANAGEMENT-Home Monitor Managed by Exception    Kannan Wolf 72 year old male is on warfarin with therapeutic INR result. (Goal INR 2.5-3.5)    Recent labs: (last 7 days)     03/18/22  1016   INR 3.5*         Previous INR was Therapeutic    Medication, diet, health changes since last INR:chart reviewed; none identified    Contacted within the last 12 weeks by phone on 2/14/22      CHRISTINE     Kannan was NOT contacted regarding therapeutic result today per home monitoring policy manage by exception agreement.   Current warfarin dose is to be continued:     Summary  As of 3/18/2022    Full warfarin instructions:  10 mg every day   Next INR check:  3/25/2022           ?   Miracle Salgado, RN  Anticoagulation Clinic  3/18/2022    _______________________________________________________________________     Anticoagulation Episode Summary     Current INR goal:  2.5-3.5   TTR:  100.0 % (4 wk)   Target end date:  Indefinite   Send INR reminders to:  [facility] [place]    Indications    Factor V Leiden mutation (H) [D68.51]  Personal history of DVT (deep vein thrombosis) [Z86.718]           Comments:  JODI Home Meter // Manage by exception         Anticoagulation Care Providers     Provider Role Specialty Phone number    David Jenkins MD Referring Family Medicine [phone]

## 2022-03-25 ENCOUNTER — ANTICOAGULATION THERAPY VISIT (OUTPATIENT)
Dept: ANTICOAGULATION | Facility: CLINIC | Age: 72
End: 2022-03-25
Payer: COMMERCIAL

## 2022-03-25 ENCOUNTER — TRANSFERRED RECORDS (OUTPATIENT)
Dept: HEALTH INFORMATION MANAGEMENT | Facility: CLINIC | Age: 72
End: 2022-03-25
Payer: COMMERCIAL

## 2022-03-25 DIAGNOSIS — Z86.718 PERSONAL HISTORY OF DVT (DEEP VEIN THROMBOSIS): ICD-10-CM

## 2022-03-25 DIAGNOSIS — D68.51 FACTOR V LEIDEN MUTATION (H): Primary | ICD-10-CM

## 2022-03-25 LAB — INR (EXTERNAL): 2.3 (ref 0.9–1.1)

## 2022-03-25 NOTE — PROGRESS NOTES
ANTICOAGULATION MANAGEMENT     Kannan Wolf 72 year old male is on warfarin with subtherapeutic INR result. (Goal INR 2.5-3.5)    Recent labs: (last 7 days)     03/25/22  1608   INR 2.3*       ASSESSMENT       Source(s): Chart Review    Previous INR was Therapeutic last 2(+) visits    Medication, diet, health changes since last INR chart reviewed; none identified           PLAN     Unable to reach Иван today.    Left message to take a booster dose of warfarin,  12.5 mg tonight. Request call back for assessment. and cont. 10mg over the weekend.   Mychart sent with dosing    Follow up required to assess for changes     Pat Lott RN  Anticoagulation Clinic  3/25/2022      Pat Mcgrath RN, BSN, PHN  Anticoagulation Nurse  [phone]

## 2022-03-28 NOTE — PROGRESS NOTES
ANTICOAGULATION MANAGEMENT     Kannan Wolf 72 year old male is on warfarin with therapeutic INR result. (Goal INR 2.5-3.5)    Recent labs: (last 7 days)     03/25/22  1608   INR 2.3*       ASSESSMENT       Source(s): Chart Review and Patient/Caregiver Call       Warfarin doses taken: Warfarin taken as instructed    Diet: No new diet changes identified    New illness, injury, or hospitalization: No    Medication/supplement changes: None noted    Signs or symptoms of bleeding or clotting: No    Previous INR: Therapeutic last visit; previously outside of goal range    Additional findings: None       PLAN     Recommended plan for no diet, medication or health factor changes affecting INR     Dosing Instructions: Booster dose then continue your current warfarin dose with next INR in 1 week       Summary  As of 3/25/2022    Full warfarin instructions:  3/25: 12.5 mg; Otherwise 10 mg every day   Next INR check:  4/1/2022             Sent [employer] message with dosing and follow up instructions    Patient to recheck with home meter    Education provided: Contact [phone]  with any changes, questions or concerns.     Plan made per [facility] anticoagulation protocol    Pat Lott RN  Anticoagulation Clinic  3/28/2022    _______________________________________________________________________     Anticoagulation Episode Summary     Current INR goal:  2.5-3.5   TTR:  96.6 % (1.2 mo)   Target end date:  Indefinite   [facility]    Indications    Factor V Leiden mutation (H) [D68.51]  Personal history of DVT (deep vein thrombosis) [Z86.718]           Comments:  MDINR Home Meter // Manage by exception         Anticoagulation Care Providers     Provider Role Specialty Phone number    David Jenkins MD Referring Family Medicine [phone]            Pat Mcgrath, RN, BSN, PHN  Anticoagulation Nurse  [phone]

## 2022-04-01 ENCOUNTER — TRANSFERRED RECORDS (OUTPATIENT)
Dept: HEALTH INFORMATION MANAGEMENT | Facility: CLINIC | Age: 72
End: 2022-04-01
Payer: COMMERCIAL

## 2022-04-01 ENCOUNTER — ANTICOAGULATION THERAPY VISIT (OUTPATIENT)
Dept: FAMILY MEDICINE | Facility: CLINIC | Age: 72
End: 2022-04-01
Payer: COMMERCIAL

## 2022-04-01 DIAGNOSIS — Z86.718 PERSONAL HISTORY OF DVT (DEEP VEIN THROMBOSIS): ICD-10-CM

## 2022-04-01 DIAGNOSIS — D68.51 FACTOR V LEIDEN MUTATION (H): Primary | ICD-10-CM

## 2022-04-01 LAB — INR (EXTERNAL): 2.4 (ref 0.9–1.1)

## 2022-04-01 NOTE — PROGRESS NOTES
ANTICOAGULATION MANAGEMENT     Kannan DODSON Maryann 72 year old male is on warfarin with subtherapeutic INR result. (Goal INR 2.5-3.5)    Recent labs: (last 7 days)     04/01/22  0911   INR 2.4*       ASSESSMENT       Source(s): Chart Review    Previous INR was Subtherapeutic    Medication, diet, health changes since last INR chart reviewed; none identified           PLAN     Unable to reach Иван today.    Left message to take 12.5 mg tonight (Fri) and 10mg Sat and Sun. Request call back for assessment.    Follow up required to confirm warfarin dose taken and assess for changes and discuss dosing instructions and confirm understanding of instructions     Also sent a Xhalet message to patient.     Tracking calendar is tentatively updated with 7.1% dose increase, may need further adjustment based on patient assessment.     Maricruz Cano RN  Anticoagulation Clinic  4/1/2022

## 2022-04-04 NOTE — PROGRESS NOTES
ANTICOAGULATION MANAGEMENT     Kannan Wolf 72 year old male is on warfarin with subtherapeutic INR result. (Goal INR 2.5-3.5)    Recent labs: (last 7 days)     04/01/22  0911   INR 2.4*       ASSESSMENT       Source(s): Chart Review and MyChart message from patient       Warfarin doses taken: Warfarin taken as instructed    Diet: No new diet changes identified    New illness, injury, or hospitalization: No    Medication/supplement changes: None noted    Signs or symptoms of bleeding or clotting: No    Previous INR: Subtherapeutic    Additional findings: None       PLAN     Recommended plan for no diet, medication or health factor changes affecting INR     Dosing Instructions: Increase your warfarin dose (7% change) with next INR in 1 week       Summary  As of 4/1/2022    Full warfarin instructions:  12.5 mg every Tue, Fri; 10 mg all other days   Next INR check:  4/8/2022             Detailed voice message left for Kannan with dosing instructions and follow up date.   Sent MyChart message with dosing and follow up instructions    Patient to recheck with home meter    Education provided: Goal range and significance of current result    Plan made per [facility] anticoagulation protocol    Maricruz Cano, RN  Anticoagulation Clinic  4/4/2022    _______________________________________________________________________     Anticoagulation Episode Summary     Current INR goal:  2.5-3.5   TTR:  81.3 % (1.4 mo)   Target end date:  Indefinite   Send INR reminders to:  [facility]    Indications    Factor V Leiden mutation (H) [D68.51]  Personal history of DVT (deep vein thrombosis) [Z86.718]           Comments:  MDINGRETA Home Meter // Manage by exception         Anticoagulation Care Providers     Provider Role Specialty Phone number    David Jenkins MD Referring Family Medicine [phone]

## 2022-04-11 ENCOUNTER — ANTICOAGULATION THERAPY VISIT (OUTPATIENT)
Dept: ANTICOAGULATION | Facility: CLINIC | Age: 72
End: 2022-04-11
Payer: COMMERCIAL

## 2022-04-11 ENCOUNTER — TRANSFERRED RECORDS (OUTPATIENT)
Dept: HEALTH INFORMATION MANAGEMENT | Facility: CLINIC | Age: 72
End: 2022-04-11
Payer: COMMERCIAL

## 2022-04-11 DIAGNOSIS — Z86.718 PERSONAL HISTORY OF DVT (DEEP VEIN THROMBOSIS): ICD-10-CM

## 2022-04-11 DIAGNOSIS — D68.51 FACTOR V LEIDEN MUTATION (H): Primary | ICD-10-CM

## 2022-04-11 LAB — INR (EXTERNAL): 1.9 (ref 0.9–1.1)

## 2022-04-11 NOTE — PROGRESS NOTES
ANTICOAGULATION MANAGEMENT     Kannan DODSON Maryann 72 year old male is on warfarin with subtherapeutic INR result. (Goal INR 2.5-3.5)    Recent labs: (last 7 days)     04/11/22  1237   INR 1.9*       ASSESSMENT       Source(s): Chart Review and Patient/Caregiver Call       Warfarin doses taken: Warfarin taken as instructed    Diet: No new diet changes identified    New illness, injury, or hospitalization: chronic diarrhea, not new    Medication/supplement changes: None noted    Signs or symptoms of bleeding or clotting: No    Previous INR: Subtherapeutic    Additional findings: Patient recently returned from [facility]       PLAN     Recommended plan for no diet, medication or health factor changes affecting INR     Dosing Instructions: booster dose then Increase your warfarin dose (10% change) with next INR in 1 week       Summary  As of 4/11/2022    Full warfarin instructions:  4/11: 15 mg; Otherwise 10 mg every Sun, Thu; 12.5 mg all other days   Next INR check:  4/18/2022             Telephone call with Kannan who verbalizes understanding and agrees to plan    Patient to recheck with home meter    Education provided: Goal range and significance of current result    Plan made per [facility] anticoagulation protocol    Wilfred Nickerson RN  Anticoagulation Clinic  4/11/2022    _______________________________________________________________________     Anticoagulation Episode Summary     Current INR goal:  2.5-3.5   TTR:  65.6 % (1.7 mo)   Target end date:  Indefinite   Send INR reminders to:  [facility]    Indications    Factor V Leiden mutation (H) [D68.51]  Personal history of DVT (deep vein thrombosis) [Z86.718]           Comments:  MDINR Home Meter // Manage by exception         Anticoagulation Care Providers     Provider Role Specialty Phone number    David Jenkins MD Referring Family Medicine [phone]

## 2022-04-18 ENCOUNTER — ANTICOAGULATION THERAPY VISIT (OUTPATIENT)
Dept: ANTICOAGULATION | Facility: CLINIC | Age: 72
End: 2022-04-18
Payer: COMMERCIAL

## 2022-04-18 DIAGNOSIS — Z86.718 PERSONAL HISTORY OF DVT (DEEP VEIN THROMBOSIS): ICD-10-CM

## 2022-04-18 DIAGNOSIS — D68.51 FACTOR V LEIDEN MUTATION (H): Primary | ICD-10-CM

## 2022-04-18 LAB — INR (EXTERNAL): 3.9 (ref 0.9–1.1)

## 2022-04-18 NOTE — PROGRESS NOTES
ANTICOAGULATION MANAGEMENT     Kannan DODSON Emanueldwayne 72 year old male is on warfarin with supratherapeutic INR result. (Goal INR 2.5-3.5)    Recent labs: (last 7 days)     04/18/22  1041   INR 3.9*       ASSESSMENT       Source(s): Chart Review and Patient/Caregiver Call       Warfarin doses taken: Warfarin taken as instructed    Diet: No new diet changes identified    New illness, injury, or hospitalization: No    Medication/supplement changes: None noted    Signs or symptoms of bleeding or clotting: No    Previous INR: Subtherapeutic    Additional findings: Patient is back from [facility] and eating less greens. He was on 10mg daily prior to going to [facility].       PLAN     Recommended plan for temporary change(s) affecting INR     Dosing Instructions: partial hold then decrease your warfarin dose (15% change) with next INR in 1 week       Summary  As of 4/18/2022    Full warfarin instructions:  4/18: 7.5 mg; Otherwise 10 mg every day   Next INR check:  4/25/2022             Telephone call with Kannan who verbalizes understanding and agrees to plan    Patient to recheck with home meter    Education provided: Goal range and significance of current result    Plan made per [facility] anticoagulation protocol    Wilfred Nickerson RN  Anticoagulation Clinic  4/18/2022    _______________________________________________________________________     Anticoagulation Episode Summary     Current INR goal:  2.5-3.5   TTR:  63.8 % (2 mo)   Target end date:  Indefinite   Send INR reminders to:  [facility]    Indications    Factor V Leiden mutation (H) [D68.51]  Personal history of DVT (deep vein thrombosis) [Z86.718]           Comments:  MDINR Home Meter // Manage by exception         Anticoagulation Care Providers     Provider Role Specialty Phone number    David Jenkins MD Referring Family Medicine [phone]

## 2022-04-25 ENCOUNTER — ANTICOAGULATION THERAPY VISIT (OUTPATIENT)
Dept: ANTICOAGULATION | Facility: CLINIC | Age: 72
End: 2022-04-25
Payer: COMMERCIAL

## 2022-04-25 ENCOUNTER — TRANSFERRED RECORDS (OUTPATIENT)
Dept: HEALTH INFORMATION MANAGEMENT | Facility: CLINIC | Age: 72
End: 2022-04-25
Payer: COMMERCIAL

## 2022-04-25 DIAGNOSIS — Z86.718 PERSONAL HISTORY OF DVT (DEEP VEIN THROMBOSIS): ICD-10-CM

## 2022-04-25 DIAGNOSIS — D68.51 FACTOR V LEIDEN MUTATION (H): Primary | ICD-10-CM

## 2022-04-25 LAB — INR (EXTERNAL): 2.4 (ref 0.9–1.1)

## 2022-04-25 NOTE — PROGRESS NOTES
ANTICOAGULATION MANAGEMENT     Kannan DODSON Cudwayne 72 year old male is on warfarin with subtherapeutic INR result. (Goal INR 2.5-3.5)    Recent labs: (last 7 days)     04/25/22  0000   INR 2.4*       ASSESSMENT       Source(s): Chart Review and Patient/Caregiver Call       Warfarin doses taken: Warfarin taken as instructed    Diet: No new diet changes identified    New illness, injury, or hospitalization: No    Medication/supplement changes: None noted    Signs or symptoms of bleeding or clotting: No    Previous INR: Supratherapeutic  Additional findings: Patient scheduled for MOHS procedure 4/27. Per patient provider does not recommend hold or bridging. INR is to be less than 3.0 .     PLAN     Recommended plan for no diet, medication or health factor changes affecting INR     Dosing Instructions: continue your current warfarin dose with next INR in 1 week       Summary  As of 4/25/2022    Full warfarin instructions:  10 mg every day   Next INR check:  5/2/2022             Telephone call with Kannan who verbalizes understanding and agrees to plan.    Patient to recheck with home meter    Education provided: Please call back if any changes to your diet, medications or how you've been taking warfarin, Goal range and significance of current result and Importance of notifying clinic for changes in medications; a sooner lab recheck maybe needed.    Plan made per [facility] anticoagulation protocol    India Bautista RN  Anticoagulation Clinic  4/25/2022    _______________________________________________________________________     Anticoagulation Episode Summary     Current INR goal:  2.5-3.5   TTR:  64.1 % (2.2 mo)   Target end date:  Indefinite   Send INR reminders to:  [facility]    Indications    Factor V Leiden mutation (H) [D68.51]  Personal history of DVT (deep vein thrombosis) [Z86.718]           Comments:  JODI Home Meter // Manage by exception         Anticoagulation Care Providers     Provider Role Specialty  Phone number    David Jenkins MD [facility] [phone]

## 2022-04-25 NOTE — PROGRESS NOTES
ANTICOAGULATION MANAGEMENT     Kannan DODSON Emanueldwayne 72 year old male is on warfarin with subtherapeutic INR result. (Goal INR 2.5-3.5)    Recent labs: (last 7 days)     04/25/22  0000   INR 2.4*       ASSESSMENT       Source(s): Chart Review    Previous INR was Supratherapeutic    Medication, diet, health changes since last INR chart reviewed; none identified           PLAN     Unable to reach Kannan today.    Left message requesting patient return call to [facility] at [phone] for INR follow up.    Follow up required to confirm warfarin dose taken and assess for changes, discuss out of range result  and discuss dosing instructions and confirm understanding of instructions    India Bautista RN  Anticoagulation Clinic  4/25/2022

## 2022-04-27 ENCOUNTER — TRANSFERRED RECORDS (OUTPATIENT)
Dept: HEALTH INFORMATION MANAGEMENT | Facility: CLINIC | Age: 72
End: 2022-04-27
Payer: COMMERCIAL

## 2022-05-02 ENCOUNTER — ANTICOAGULATION THERAPY VISIT (OUTPATIENT)
Dept: ANTICOAGULATION | Facility: CLINIC | Age: 72
End: 2022-05-02
Payer: COMMERCIAL

## 2022-05-02 ENCOUNTER — TRANSFERRED RECORDS (OUTPATIENT)
Dept: HEALTH INFORMATION MANAGEMENT | Facility: CLINIC | Age: 72
End: 2022-05-02
Payer: COMMERCIAL

## 2022-05-02 ENCOUNTER — MYC MEDICAL ADVICE (OUTPATIENT)
Dept: ANTICOAGULATION | Facility: CLINIC | Age: 72
End: 2022-05-02
Payer: COMMERCIAL

## 2022-05-02 DIAGNOSIS — D68.51 FACTOR V LEIDEN MUTATION (H): Primary | ICD-10-CM

## 2022-05-02 DIAGNOSIS — Z86.718 PERSONAL HISTORY OF DVT (DEEP VEIN THROMBOSIS): ICD-10-CM

## 2022-05-02 LAB — INR (EXTERNAL): 2.2 (ref 0.9–1.1)

## 2022-05-02 NOTE — PROGRESS NOTES
ANTICOAGULATION MANAGEMENT     Kanann DODSON Cudwayne 72 year old male is on warfarin with subtherapeutic INR result. (Goal INR 2.5-3.5)    Recent labs: (last 7 days)     05/02/22  0000   INR 2.2*       ASSESSMENT       Source(s): Chart Review and Patient/Caregiver Call       Warfarin doses taken: Warfarin taken as instructed    Diet: Increased greens/vitamin K in diet; plans to resume previous intake    New illness, injury, or hospitalization: No    Medication/supplement changes: None noted    Signs or symptoms of bleeding or clotting: No    Previous INR: Subtherapeutic    Additional findings: None       PLAN     Recommended plan for no diet, medication or health factor changes affecting INR     Dosing Instructions: Increase your warfarin dose (10.7% change) with next INR in 1 week       Summary  As of 5/2/2022    Full warfarin instructions:  12.5 mg every Mon, Wed, Fri; 10 mg all other days   Next INR check:  5/9/2022             Telephone call with Kannan who verbalizes understanding and agrees to plan    Patient to recheck with home meter    Education provided: Importance of consistent vitamin K intake    Plan made per [facility] anticoagulation protocol    Juliane Martin, RN  Anticoagulation Clinic  5/2/2022    _______________________________________________________________________     Anticoagulation Episode Summary     Current INR goal:  2.5-3.5   TTR:  57.9 % (2.4 mo)   Target end date:  Indefinite   [facility]    Indications    Factor V Leiden mutation (H) [D68.51]  Personal history of DVT (deep vein thrombosis) [Z86.718]           Comments:  MDINR Home Meter // Manage by exception         Anticoagulation Care Providers     Provider Role Specialty Phone number    David Jenkins MD Referring Family Medicine [phone]

## 2022-05-09 ENCOUNTER — ANTICOAGULATION THERAPY VISIT (OUTPATIENT)
Dept: ANTICOAGULATION | Facility: CLINIC | Age: 72
End: 2022-05-09
Payer: COMMERCIAL

## 2022-05-09 ENCOUNTER — TRANSFERRED RECORDS (OUTPATIENT)
Dept: HEALTH INFORMATION MANAGEMENT | Facility: CLINIC | Age: 72
End: 2022-05-09
Payer: COMMERCIAL

## 2022-05-09 DIAGNOSIS — D68.51 FACTOR V LEIDEN MUTATION (H): Primary | ICD-10-CM

## 2022-05-09 DIAGNOSIS — Z86.718 PERSONAL HISTORY OF DVT (DEEP VEIN THROMBOSIS): ICD-10-CM

## 2022-05-09 LAB — INR (EXTERNAL): 3.8 (ref 0.9–1.1)

## 2022-05-09 NOTE — PROGRESS NOTES
ANTICOAGULATION MANAGEMENT     Kannan DODSON Cudwayne 72 year old male is on warfarin with supratherapeutic INR result. (Goal INR 2.5-3.5)    Recent labs: (last 7 days)     05/09/22  1105   INR 3.8*       ASSESSMENT       Source(s): Chart Review and Patient/Caregiver Call       Warfarin doses taken: Warfarin taken as instructed    Diet: No new diet changes identified    New illness, injury, or hospitalization: No    Medication/supplement changes: None noted    Signs or symptoms of bleeding or clotting: No    Previous INR: Subtherapeutic    Additional findings: None       PLAN     Recommended plan for no diet, medication or health factor changes affecting INR     Dosing Instructions: decrease your warfarin dose (6.5% change) with next INR in 1 week       Summary  As of 5/9/2022    Full warfarin instructions:  12.5 mg every Wed; 10 mg all other days   Next INR check:  5/16/2022             Telephone call with Kannan who verbalizes understanding and agrees to plan    Patient to recheck with home meter    Education provided: None required    Plan made per [facility] anticoagulation protocol    Kevin Razo RN  Anticoagulation Clinic  5/9/2022    _______________________________________________________________________     Anticoagulation Episode Summary     Current INR goal:  2.5-3.5   TTR:  58.4 % (2.7 mo)   Target end date:  Indefinite   Send INR reminders to:  [facility]    Indications    Factor V Leiden mutation (H) [D68.51]  Personal history of DVT (deep vein thrombosis) [Z86.718]           Comments:  JODI Home Meter // Manage by exception         Anticoagulation Care Providers     Provider Role Specialty Phone number    David Jenkins MD Referring Family Medicine [phone]

## 2022-05-16 ENCOUNTER — ANTICOAGULATION THERAPY VISIT (OUTPATIENT)
Dept: ANTICOAGULATION | Facility: CLINIC | Age: 72
End: 2022-05-16
Payer: COMMERCIAL

## 2022-05-16 ENCOUNTER — TRANSFERRED RECORDS (OUTPATIENT)
Dept: HEALTH INFORMATION MANAGEMENT | Facility: CLINIC | Age: 72
End: 2022-05-16
Payer: COMMERCIAL

## 2022-05-16 DIAGNOSIS — Z86.718 PERSONAL HISTORY OF DVT (DEEP VEIN THROMBOSIS): ICD-10-CM

## 2022-05-16 DIAGNOSIS — D68.51 FACTOR V LEIDEN MUTATION (H): Primary | ICD-10-CM

## 2022-05-16 LAB — INR (EXTERNAL): 2.2 (ref 0.9–1.1)

## 2022-05-16 NOTE — PROGRESS NOTES
ANTICOAGULATION MANAGEMENT     Kannan DODSON Maryann 72 year old male is on warfarin with subtherapeutic INR result. (Goal INR 2.5-3.5)    Recent labs: (last 7 days)     05/16/22  1241   INR 2.2*       ASSESSMENT       Source(s): Chart Review and Patient/Caregiver Call       Warfarin doses taken: Missed dose(s) may be affecting INR    Diet: No new diet changes identified    New illness, injury, or hospitalization: No    Medication/supplement changes: None noted    Signs or symptoms of bleeding or clotting: No    Previous INR: Supratherapeutic    Additional findings: None       PLAN     Recommended plan for temporary change(s) affecting INR     Dosing Instructions: booster dose then continue your current warfarin dose with next INR in one week.       Summary  As of 5/16/2022    Full warfarin instructions:  5/16: 20 mg; Otherwise 12.5 mg every Wed; 10 mg all other days   Next INR check:  5/23/2022             Telephone call with Kannan who verbalizes understanding and agrees to plan    Patient to recheck with home meter    Education provided: Goal range and significance of current result    Plan made per [facility] anticoagulation protocol    Wilfred Nickerson RN  Anticoagulation Clinic  5/16/2022    _______________________________________________________________________     Anticoagulation Episode Summary     Current INR goal:  2.5-3.5   TTR:  58.7 % (2.9 mo)   Target end date:  Indefinite   Send INR reminders to:  [facility]    Indications    Factor V Leiden mutation (H) [D68.51]  Personal history of DVT (deep vein thrombosis) [Z86.718]           Comments:  MDINGRETA Home Meter // Manage by exception         Anticoagulation Care Providers     Provider Role Specialty Phone number    David Jenkins MD Referring Family Medicine [phone]

## 2022-05-16 NOTE — TELEPHONE ENCOUNTER
Spoke with Иван on the phone to clarify INR goal range.   When asked if INR goal has always been 2.5-3.5, he was uncertain.    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 Of note, patient is heterozygous for Factor V.    As there is no clear documentation concerning the higher goal range of 2.5-3.5, will route to PCP to see if he would like Иван to continue at 2.5-3.5 or to reduce goal range to 2-3.

## 2022-05-23 ENCOUNTER — TRANSFERRED RECORDS (OUTPATIENT)
Dept: HEALTH INFORMATION MANAGEMENT | Facility: CLINIC | Age: 72
End: 2022-05-23
Payer: COMMERCIAL

## 2022-05-23 ENCOUNTER — TRANSFERRED RECORDS (OUTPATIENT)
Dept: HEALTH INFORMATION MANAGEMENT | Facility: CLINIC | Age: 72
End: 2022-05-23

## 2022-05-23 ENCOUNTER — TELEPHONE (OUTPATIENT)
Dept: ANTICOAGULATION | Facility: CLINIC | Age: 72
End: 2022-05-23
Payer: COMMERCIAL

## 2022-05-23 ENCOUNTER — DOCUMENTATION ONLY (OUTPATIENT)
Dept: ANTICOAGULATION | Facility: CLINIC | Age: 72
End: 2022-05-23
Payer: COMMERCIAL

## 2022-05-23 DIAGNOSIS — Z86.718 PERSONAL HISTORY OF DVT (DEEP VEIN THROMBOSIS): ICD-10-CM

## 2022-05-23 DIAGNOSIS — D68.51 FACTOR V LEIDEN MUTATION (H): Primary | ICD-10-CM

## 2022-05-23 LAB — INR (EXTERNAL): 2.9 (ref 0.9–1.1)

## 2022-05-23 NOTE — PROGRESS NOTES
ANTICOAGULATION MANAGEMENT     Kannan Wolf 72 year old male is on warfarin with therapeutic INR result. (Goal INR 2.5-3.5)    Recent labs: (last 7 days)     05/23/22  1344   INR 2.9*       ASSESSMENT       Source(s): Chart Review and Patient/Caregiver Call     No changes per chart review.     PLAN     Recommended plan for no diet, medication or health factor changes affecting INR     Dosing Instructions: continue your current warfarin dose with next INR in 1 week       Summary  As of 5/23/2022    Full warfarin instructions:  12.5 mg every Wed; 10 mg all other days   Next INR check:  5/30/2022             Detailed voice message left for Kannan with dosing instructions and follow up date.     Patient to recheck with home meter    Education provided: Please call back if any changes to your diet, medications or how you've been taking warfarin, Goal range and significance of current result and Resume manage by exception with home monitor. Continue to submit INR results to home monitor company.You will only be called when your result is out of range. Please call and notify [facility] if new medication started, dose missed, signs or symptoms of bleeding or clotting, or a surgery/procedure is scheduled.    Plan made per [facility] anticoagulation protocol    Wilfred Nickerson RN  Anticoagulation Clinic  5/23/2022    _______________________________________________________________________     Anticoagulation Episode Summary     Current INR goal:  2.5-3.5   TTR:  58.6 % (3.1 mo)   Target end date:  Indefinite   Send INR reminders to:  [facility]    Indications    Factor V Leiden mutation (H) [D68.51]  Personal history of DVT (deep vein thrombosis) [Z86.718]           Comments:  JODI Home Meter // Manage by exception         Anticoagulation Care Providers     Provider Role Specialty Phone number    David Jenkins MD Referring Family Medicine [phone]

## 2022-05-23 NOTE — PROGRESS NOTES
Иван called back, confirmed no changes except that he had a little kale in some soup this past week. He will resume 12.5mg Wed; 10 AOD.     OK to resume managing by exception.

## 2022-05-23 NOTE — TELEPHONE ENCOUNTER
Reason for Call:  Other returning call    Detailed comments: pt returning Wilfred's call regarding INR. Pt states it is 2.9, and to please leave directions in voicemail as pt is working today    Phone Number Patient can be reached at: Cell number on file:    Telephone Information:   Mobile [phone]       Best Time: Anytime    Can we leave a detailed message on this number? YES    Call taken on 5/23/2022 at 1:54 PM by Medina Mcknight

## 2022-05-29 ENCOUNTER — HEALTH MAINTENANCE LETTER (OUTPATIENT)
Age: 72
End: 2022-05-29

## 2022-05-29 ASSESSMENT — ENCOUNTER SYMPTOMS
DYSURIA: 0
CHILLS: 0
DIARRHEA: 1
HEMATOCHEZIA: 0
PALPITATIONS: 0
SHORTNESS OF BREATH: 0
WEAKNESS: 0
HEARTBURN: 0
HEADACHES: 0
COUGH: 1
ARTHRALGIAS: 1
CONSTIPATION: 0
SORE THROAT: 0
DIZZINESS: 0
EYE PAIN: 0
FEVER: 0
PARESTHESIAS: 0
NAUSEA: 0
NERVOUS/ANXIOUS: 0
JOINT SWELLING: 0
FREQUENCY: 0
HEMATURIA: 0
MYALGIAS: 1
ABDOMINAL PAIN: 0

## 2022-05-29 ASSESSMENT — ACTIVITIES OF DAILY LIVING (ADL): CURRENT_FUNCTION: NO ASSISTANCE NEEDED

## 2022-05-30 ENCOUNTER — TRANSFERRED RECORDS (OUTPATIENT)
Dept: HEALTH INFORMATION MANAGEMENT | Facility: CLINIC | Age: 72
End: 2022-05-30
Payer: COMMERCIAL

## 2022-05-31 ENCOUNTER — DOCUMENTATION ONLY (OUTPATIENT)
Dept: ANTICOAGULATION | Facility: CLINIC | Age: 72
End: 2022-05-31
Payer: COMMERCIAL

## 2022-05-31 ENCOUNTER — OFFICE VISIT (OUTPATIENT)
Dept: FAMILY MEDICINE | Facility: CLINIC | Age: 72
End: 2022-05-31
Payer: COMMERCIAL

## 2022-05-31 VITALS
TEMPERATURE: 97.5 F | RESPIRATION RATE: 16 BRPM | DIASTOLIC BLOOD PRESSURE: 77 MMHG | SYSTOLIC BLOOD PRESSURE: 131 MMHG | HEIGHT: 72 IN | HEART RATE: 72 BPM | WEIGHT: 184 LBS | BODY MASS INDEX: 24.92 KG/M2

## 2022-05-31 DIAGNOSIS — J44.9 CHRONIC OBSTRUCTIVE PULMONARY DISEASE, UNSPECIFIED COPD TYPE (H): ICD-10-CM

## 2022-05-31 DIAGNOSIS — F17.200 TOBACCO DEPENDENCE: ICD-10-CM

## 2022-05-31 DIAGNOSIS — Z86.711 HISTORY OF PULMONARY EMBOLISM: ICD-10-CM

## 2022-05-31 DIAGNOSIS — D68.51 FACTOR V LEIDEN MUTATION (H): ICD-10-CM

## 2022-05-31 DIAGNOSIS — R73.03 PREDIABETES: ICD-10-CM

## 2022-05-31 DIAGNOSIS — K76.0 NAFLD (NONALCOHOLIC FATTY LIVER DISEASE): ICD-10-CM

## 2022-05-31 DIAGNOSIS — N52.9 ERECTILE DYSFUNCTION, UNSPECIFIED ERECTILE DYSFUNCTION TYPE: ICD-10-CM

## 2022-05-31 DIAGNOSIS — I70.90 ARTERIAL OCCLUSIVE DISEASE: ICD-10-CM

## 2022-05-31 DIAGNOSIS — Z00.00 ENCOUNTER FOR MEDICARE ANNUAL WELLNESS EXAM: Primary | ICD-10-CM

## 2022-05-31 DIAGNOSIS — M19.019 ACROMIOCLAVICULAR JOINT ARTHRITIS, UNSPECIFIED LATERALITY: ICD-10-CM

## 2022-05-31 DIAGNOSIS — E87.5 HYPERKALEMIA: ICD-10-CM

## 2022-05-31 DIAGNOSIS — D68.51 FACTOR V LEIDEN MUTATION (H): Primary | ICD-10-CM

## 2022-05-31 DIAGNOSIS — Z86.718 PERSONAL HISTORY OF DVT (DEEP VEIN THROMBOSIS): ICD-10-CM

## 2022-05-31 DIAGNOSIS — J30.1 SEASONAL ALLERGIC RHINITIS DUE TO POLLEN: ICD-10-CM

## 2022-05-31 DIAGNOSIS — Z86.718 H/O DEEP VENOUS THROMBOSIS: ICD-10-CM

## 2022-05-31 DIAGNOSIS — D12.6 ADENOMATOUS POLYP OF COLON, UNSPECIFIED PART OF COLON: ICD-10-CM

## 2022-05-31 DIAGNOSIS — E78.89 LIPIDS ABNORMAL: ICD-10-CM

## 2022-05-31 PROBLEM — J30.2 SEASONAL ALLERGIC RHINITIS: Status: ACTIVE | Noted: 2022-05-31

## 2022-05-31 PROBLEM — R00.0 SINUS TACHYCARDIA: Status: RESOLVED | Noted: 2019-06-13 | Resolved: 2022-05-31

## 2022-05-31 PROBLEM — E66.9 OBESITY: Status: ACTIVE | Noted: 2022-05-31

## 2022-05-31 LAB
ALBUMIN SERPL-MCNC: 3.6 G/DL (ref 3.4–5)
ALP SERPL-CCNC: 71 U/L (ref 40–150)
ALT SERPL W P-5'-P-CCNC: 40 U/L (ref 0–70)
ANION GAP SERPL CALCULATED.3IONS-SCNC: <1 MMOL/L (ref 3–14)
AST SERPL W P-5'-P-CCNC: 33 U/L (ref 0–45)
BASOPHILS # BLD AUTO: 0.1 10E3/UL (ref 0–0.2)
BASOPHILS NFR BLD AUTO: 1 %
BILIRUB SERPL-MCNC: 0.2 MG/DL (ref 0.2–1.3)
BUN SERPL-MCNC: 21 MG/DL (ref 7–30)
CALCIUM SERPL-MCNC: 9.4 MG/DL (ref 8.5–10.1)
CHLORIDE BLD-SCNC: 109 MMOL/L (ref 94–109)
CHOLEST SERPL-MCNC: 134 MG/DL
CO2 SERPL-SCNC: 30 MMOL/L (ref 20–32)
CREAT SERPL-MCNC: 1 MG/DL (ref 0.66–1.25)
EOSINOPHIL # BLD AUTO: 0.1 10E3/UL (ref 0–0.7)
EOSINOPHIL NFR BLD AUTO: 2 %
ERYTHROCYTE [DISTWIDTH] IN BLOOD BY AUTOMATED COUNT: 14.3 % (ref 10–15)
FASTING STATUS PATIENT QL REPORTED: NORMAL
GFR SERPL CREATININE-BSD FRML MDRD: 80 ML/MIN/1.73M2
GLUCOSE BLD-MCNC: 88 MG/DL (ref 70–99)
HBA1C MFR BLD: 5.7 % (ref 0–5.6)
HCT VFR BLD AUTO: 45.8 % (ref 40–53)
HDLC SERPL-MCNC: 49 MG/DL
HGB BLD-MCNC: 15.5 G/DL (ref 13.3–17.7)
IMM GRANULOCYTES # BLD: 0 10E3/UL
IMM GRANULOCYTES NFR BLD: 0 %
INR (EXTERNAL): 2.7 (ref 0.9–1.1)
LDLC SERPL CALC-MCNC: 55 MG/DL
LYMPHOCYTES # BLD AUTO: 2.1 10E3/UL (ref 0.8–5.3)
LYMPHOCYTES NFR BLD AUTO: 26 %
MCH RBC QN AUTO: 33.3 PG (ref 26.5–33)
MCHC RBC AUTO-ENTMCNC: 33.8 G/DL (ref 31.5–36.5)
MCV RBC AUTO: 99 FL (ref 78–100)
MONOCYTES # BLD AUTO: 0.7 10E3/UL (ref 0–1.3)
MONOCYTES NFR BLD AUTO: 9 %
NEUTROPHILS # BLD AUTO: 5.2 10E3/UL (ref 1.6–8.3)
NEUTROPHILS NFR BLD AUTO: 63 %
NONHDLC SERPL-MCNC: 85 MG/DL
PLATELET # BLD AUTO: 195 10E3/UL (ref 150–450)
POTASSIUM BLD-SCNC: 5.8 MMOL/L (ref 3.4–5.3)
PROT SERPL-MCNC: 7.5 G/DL (ref 6.8–8.8)
RBC # BLD AUTO: 4.65 10E6/UL (ref 4.4–5.9)
SODIUM SERPL-SCNC: 139 MMOL/L (ref 133–144)
TRIGL SERPL-MCNC: 149 MG/DL
WBC # BLD AUTO: 8.2 10E3/UL (ref 4–11)

## 2022-05-31 PROCEDURE — 99214 OFFICE O/P EST MOD 30 MIN: CPT | Mod: 25 | Performed by: FAMILY MEDICINE

## 2022-05-31 PROCEDURE — 85025 COMPLETE CBC W/AUTO DIFF WBC: CPT | Mod: QW | Performed by: FAMILY MEDICINE

## 2022-05-31 PROCEDURE — 99397 PER PM REEVAL EST PAT 65+ YR: CPT | Performed by: FAMILY MEDICINE

## 2022-05-31 PROCEDURE — 83036 HEMOGLOBIN GLYCOSYLATED A1C: CPT | Performed by: FAMILY MEDICINE

## 2022-05-31 PROCEDURE — 80053 COMPREHEN METABOLIC PANEL: CPT | Performed by: FAMILY MEDICINE

## 2022-05-31 PROCEDURE — 36415 COLL VENOUS BLD VENIPUNCTURE: CPT | Performed by: FAMILY MEDICINE

## 2022-05-31 PROCEDURE — 80061 LIPID PANEL: CPT | Performed by: FAMILY MEDICINE

## 2022-05-31 RX ORDER — FLUTICASONE PROPIONATE 50 MCG
1 SPRAY, SUSPENSION (ML) NASAL DAILY PRN
Qty: 1 G | Refills: 11 | Status: SHIPPED | OUTPATIENT
Start: 2022-05-31

## 2022-05-31 RX ORDER — ATORVASTATIN CALCIUM 20 MG/1
20 TABLET, FILM COATED ORAL DAILY
COMMUNITY
Start: 2022-04-13 | End: 2022-05-31

## 2022-05-31 RX ORDER — DOXYCYCLINE HYCLATE 100 MG
TABLET ORAL
COMMUNITY
Start: 2021-10-11 | End: 2022-05-31

## 2022-05-31 RX ORDER — ALBUTEROL SULFATE 90 UG/1
2 AEROSOL, METERED RESPIRATORY (INHALATION) EVERY 6 HOURS PRN
Qty: 18 G | Refills: 4 | Status: SHIPPED | OUTPATIENT
Start: 2022-05-31

## 2022-05-31 RX ORDER — SILDENAFIL CITRATE 20 MG/1
TABLET ORAL
Qty: 50 TABLET | Refills: 11 | Status: SHIPPED | OUTPATIENT
Start: 2022-05-31

## 2022-05-31 RX ORDER — ATORVASTATIN CALCIUM 20 MG/1
20 TABLET, FILM COATED ORAL DAILY
Qty: 90 TABLET | Refills: 1 | Status: SHIPPED | OUTPATIENT
Start: 2022-05-31 | End: 2022-12-26

## 2022-05-31 RX ORDER — SILDENAFIL CITRATE 20 MG/1
TABLET ORAL
COMMUNITY
Start: 2021-12-31 | End: 2022-05-31

## 2022-05-31 ASSESSMENT — ENCOUNTER SYMPTOMS
DYSURIA: 0
WEAKNESS: 0
HEMATURIA: 0
MYALGIAS: 1
SORE THROAT: 0
EYE PAIN: 0
DIARRHEA: 1
HEADACHES: 0
CONSTIPATION: 0
COUGH: 1
HEARTBURN: 0
DIZZINESS: 0
PARESTHESIAS: 0
CHILLS: 0
HEMATOCHEZIA: 0
FREQUENCY: 0
NERVOUS/ANXIOUS: 0
SHORTNESS OF BREATH: 0
PALPITATIONS: 0
JOINT SWELLING: 0
NAUSEA: 0
ABDOMINAL PAIN: 0
ARTHRALGIAS: 1
FEVER: 0

## 2022-05-31 ASSESSMENT — ACTIVITIES OF DAILY LIVING (ADL): CURRENT_FUNCTION: NO ASSISTANCE NEEDED

## 2022-05-31 NOTE — LETTER
June 1, 2022      Kannan DODSON Maryann  [address]  [facility] [address]        Dear ,    We are writing to inform you of your test results.    Test results indicate you may require additional follow up, see comment below.  Your potassium level is elevated.  Likely an error but needs to be rechecked in 1 month.  Please call to set up a lab only visit    Resulted Orders   Comprehensive metabolic panel   Result Value Ref Range    Sodium 139 133 - 144 mmol/L    Potassium 5.8 (H) 3.4 - 5.3 mmol/L    Chloride 109 94 - 109 mmol/L    Carbon Dioxide (CO2) 30 20 - 32 mmol/L    Anion Gap <1 (L) 3 - 14 mmol/L    Urea Nitrogen 21 7 - 30 mg/dL    Creatinine 1.00 0.66 - 1.25 mg/dL    Calcium 9.4 8.5 - 10.1 mg/dL    Glucose 88 70 - 99 mg/dL    Alkaline Phosphatase 71 40 - 150 U/L    AST 33 0 - 45 U/L    ALT 40 0 - 70 U/L    Protein Total 7.5 6.8 - 8.8 g/dL    Albumin 3.6 3.4 - 5.0 g/dL    Bilirubin Total 0.2 0.2 - 1.3 mg/dL    GFR Estimate 80 >60 mL/min/1.73m2      Comment:      Effective December 21, 2021 eGFRcr in adults is calculated using the 2021 CKD-EPI creatinine equation which includes age and gender (Yane et al., NEJ, DOI: 10.1056/DORYeo6134013)   Lipid panel reflex to direct LDL Fasting   Result Value Ref Range    Cholesterol 134 <200 mg/dL    Triglycerides 149 <150 mg/dL    Direct Measure HDL 49 >=40 mg/dL    LDL Cholesterol Calculated 55 <=100 mg/dL    Non HDL Cholesterol 85 <130 mg/dL    Patient Fasting > 8hrs? Unknown     Narrative    Cholesterol  Desirable:  <200 mg/dL    Triglycerides  Normal:  Less than 150 mg/dL  Borderline High:  150-199 mg/dL  High:  200-499 mg/dL  Very High:  Greater than or equal to 500 mg/dL    Direct Measure HDL  Female:  Greater than or equal to 50 mg/dL   Male:  Greater than or equal to 40 mg/dL    LDL Cholesterol  Desirable:  <100mg/dL  Above Desirable:  100-129 mg/dL   Borderline High:  130-159 mg/dL   High:  160-189 mg/dL   Very High:  >= 190 mg/dL    Non HDL  Cholesterol  Desirable:  130 mg/dL  Above Desirable:  130-159 mg/dL  Borderline High:  160-189 mg/dL  High:  190-219 mg/dL  Very High:  Greater than or equal to 220 mg/dL   Hemoglobin A1c   Result Value Ref Range    Hemoglobin A1C 5.7 (H) 0.0 - 5.6 %      Comment:      Normal <5.7%   Prediabetes 5.7-6.4%    Diabetes 6.5% or higher     Note: Adopted from ADA consensus guidelines.   CBC with platelets and differential   Result Value Ref Range    WBC Count 8.2 4.0 - 11.0 10e3/uL    RBC Count 4.65 4.40 - 5.90 10e6/uL    Hemoglobin 15.5 13.3 - 17.7 g/dL    Hematocrit 45.8 40.0 - 53.0 %    MCV 99 78 - 100 fL    MCH 33.3 (H) 26.5 - 33.0 pg    MCHC 33.8 31.5 - 36.5 g/dL    RDW 14.3 10.0 - 15.0 %    Platelet Count 195 150 - 450 10e3/uL    % Neutrophils 63 %    % Lymphocytes 26 %    % Monocytes 9 %    % Eosinophils 2 %    % Basophils 1 %    % Immature Granulocytes 0 %    Absolute Neutrophils 5.2 1.6 - 8.3 10e3/uL    Absolute Lymphocytes 2.1 0.8 - 5.3 10e3/uL    Absolute Monocytes 0.7 0.0 - 1.3 10e3/uL    Absolute Eosinophils 0.1 0.0 - 0.7 10e3/uL    Absolute Basophils 0.1 0.0 - 0.2 10e3/uL    Absolute Immature Granulocytes 0.0 <=0.4 10e3/uL       If you have any questions or concerns, please call the clinic at the number listed above.       Sincerely,      David Jenkins MD

## 2022-05-31 NOTE — PATIENT INSTRUCTIONS
Patient Education   Personalized Prevention Plan  You are due for the preventive services outlined below.  Your care team is available to assist you in scheduling these services.  If you have already completed any of these items, please share that information with your care team to update in your medical record.  Health Maintenance Due   Topic Date Due     ANNUAL REVIEW OF HM ORDERS  Never done     Cholesterol Lab  07/17/2017     Zoster (Shingles) Vaccine (3 of 3) 11/04/2019     COVID-19 Vaccine (3 - Booster for Pfizer series) 10/05/2021       Understanding USDA MyPlate  The USDA has guidelines to help you make healthy food choices. These are called MyPlate. MyPlate shows the food groups that make up healthy meals using the image of a place setting. Before you eat, think about the healthiest choices for what to put on your plate or in your cup or bowl. To learn more about building a healthy plate, visit www.Jackpocketmyplate.gov.    The food groups    Fruits. Any fruit or 100% fruit juice counts as part of the Fruit Group. Fruits may be fresh, canned, frozen, or dried, and may be whole, cut-up, or pureed. Make 1/2 of your plate fruits and vegetables.    Vegetables. Any vegetable or 100% vegetable juice counts as a member of the Vegetable Group. Vegetables may be fresh, frozen, canned, or dried. They can be served raw or cooked and may be whole, cut-up, or mashed. Make 1/2 of your plate fruits and vegetables.    Grains. All foods made from grains are part of the Grains Group. These include wheat, rice, oats, cornmeal, and barley. Grains are often used to make foods such as bread, pasta, oatmeal, cereal, tortillas, and grits. Grains should be no more than 1/4 of your plate. At least half of your grains should be whole grains.    Protein. This group includes meat, poultry, seafood, beans and peas, eggs, processed soy products (such as tofu), nuts (including nut butters), and seeds. Make protein choices no more than 1/4  of your plate. Meat and poultry choices should be lean or low fat.    Dairy. The Dairy Group includes all fluid milk products and foods made from milk that contain calcium, such as yogurt and cheese. (Foods that have little calcium, such as cream, butter, and cream cheese, are not part of this group.) Most dairy choices should be low-fat or fat-free.    Oils. Oils aren't a food group, but they do contain essential nutrients. However it's important to watch your intake of oils. These are fats that are liquid at room temperature. They include canola, corn, olive, soybean, vegetable, and sunflower oil. Foods that are mainly oil include mayonnaise, certain salad dressings, and soft margarines. You likely already get your daily oil allowance from the foods you eat.  Things to limit  Eating healthy also means limiting these things in your diet:       Salt (sodium). Many processed foods have a lot of sodium. To keep sodium intake down, eat fresh vegetables, meats, poultry, and seafood when possible. Purchase low-sodium, reduced-sodium, or no-salt-added food products at the store. And don't add salt to your meals at home. Instead, season them with herbs and spices such as dill, oregano, cumin, and paprika. Or try adding flavor with lemon or lime zest and juice.    Saturated fat. Saturated fats are most often found in animal products such as beef, pork, and chicken. They are often solid at room temperature, such as butter. To reduce your saturated fat intake, choose leaner cuts of meat and poultry. And try healthier cooking methods such as grilling, broiling, roasting, or baking. For a simple lower-fat swap, use plain nonfat yogurt instead of mayonnaise when making potato salad or macaroni salad.    Added sugars. These are sugars added to foods. They are in foods such as ice cream, candy, soda, fruit drinks, sports drinks, energy drinks, cookies, pastries, jams, and syrups. Cut down on added sugars by sharing sweet treats  with a family member or friend. You can also choose fruit for dessert, and drink water or other unsweetened beverages.     StayWell last reviewed this educational content on 6/1/2020 2000-2021 [employer]. All rights reserved. This information is not intended as a substitute for professional medical care. Always follow your healthcare professional's instructions.          Signs of Hearing Loss      Hearing much better with one ear can be a sign of hearing loss.   Hearing loss is a problem shared by many people. In fact, it is one of the most common health problems, particularly as people age. Most people age 65 and older have some hearing loss. By age 80, almost everyone does. Hearing loss often occurs slowly over the years. So you may not realize your hearing has gotten worse.  Have your hearing checked  Call your healthcare provider if you:    Have to strain to hear normal conversation    Have to watch other people s faces very carefully to follow what they re saying    Need to ask people to repeat what they ve said    Often misunderstand what people are saying    Turn the volume of the television or radio up so high that others complain    Feel that people are mumbling when they re talking to you    Find that the effort to hear leaves you feeling tired and irritated    Notice, when using the phone, that you hear better with one ear than the other  Needcheck last reviewed this educational content on 1/1/2020 2000-2021 [employer]. All rights reserved. This information is not intended as a substitute for professional medical care. Always follow your healthcare professional's instructions.

## 2022-05-31 NOTE — PROGRESS NOTES
ANTICOAGULATION  MANAGEMENT-Home Monitor Managed by Exception    Kannan Wolf 72 year old male is on warfarin with therapeutic INR result. (Goal INR 2.5-3.5)    Recent labs: (last 7 days)     05/30/22  0915   INR 2.7*         Previous INR was Therapeutic    Medication, diet, health changes since last INR:chart reviewed; none identified    Contacted within the last 12 weeks by phone on 5/23/22      CHRISTINE     Kannan was NOT contacted regarding therapeutic result today per home monitoring policy manage by exception agreement.   Current warfarin dose is to be continued:     Summary  As of 5/31/2022    Full warfarin instructions:  12.5 mg every Wed; 10 mg all other days   Next INR check:  6/6/2022           ?   Sherice Sams RN  Anticoagulation Clinic  5/31/2022    _______________________________________________________________________     Anticoagulation Episode Summary     Current INR goal:  2.5-3.5   TTR:  61.4 % (3.4 mo)   Target end date:  Indefinite   Send INR reminders to:  [facility]    Indications    Factor V Leiden mutation (H) [D68.51]  Personal history of DVT (deep vein thrombosis) [Z86.718]           Comments:  JODI Home Meter // Manage by exception         Anticoagulation Care Providers     Provider Role Specialty Phone number    David Jenkins MD Referring Family Medicine [phone]

## 2022-05-31 NOTE — PROGRESS NOTES
"SUBJECTIVE:   Kannan Wolf is a 72 year old male who presents for Preventive Visit.      Overall has been doing well.  Continues to live independently.  [place] in [facility].  He gets PFTs and lung cancer screening every year at the end of his [place].  No claudication symptoms no bleeding issues    Patient has been advised of split billing requirements and indicates understanding: No  Are you in the first 12 months of your Medicare coverage?  No    Healthy Habits:     In general, how would you rate your overall health?  Good    Frequency of exercise:  2-3 days/week    Duration of exercise:  15-30 minutes    Do you usually eat at least 4 servings of fruit and vegetables a day, include whole grains    & fiber and avoid regularly eating high fat or \"junk\" foods?  No    Taking medications regularly:  Yes    Barriers to taking medications:  None    Medication side effects:  Not applicable    Ability to successfully perform activities of daily living:  No assistance needed    Home Safety:  No safety concerns identified    Hearing Impairment:  Difficulty following a conversation in a noisy restaurant or crowded room, feel that people are mumbling or not speaking clearly, difficulty following dialogue in the theater, difficult to understand a speaker at a public meeting or Holiness service and need to ask people to speak up or repeat themselves    In the past 6 months, have you been bothered by leaking of urine?  No    In general, how would you rate your overall mental or emotional health?  Excellent      PHQ-2 Total Score: 0    Additional concerns today:  No    Do you feel safe in your environment? Yes    Have you ever done Advance Care Planning? (For example, a Health Directive, POLST, or a discussion with a medical provider or your loved ones about your wishes): No, advance care planning information given to patient to review.  Patient declined advance care planning discussion at this time.      Right Ear:      1000 " Hz RESPONSE- on Level: 40 db (Conditioning sound)   1000 Hz: RESPONSE- on Level: tone not heard   2000 Hz: RESPONSE- on Level: tone not heard   4000 Hz: RESPONSE- on Level: tone not heard    Left Ear:      4000 Hz: RESPONSE- on Level:   TONE NOT HEARD   2000 Hz: RESPONSE- on Level: tone not heard   1000 Hz: RESPONSE- on Level:   20 db     500 Hz: RESPONSE- on Level:   20 db     Right Ear:    500 Hz: RESPONSE- on Level:   20 db     Hearing Acuity: REFER-pT DECLINES ANY further eval at this time    Hearing Assessment: abnormal--Pt declines any further eval at this time   Fall risk  Fallen 2 or more times in the past year?: No  Any fall with injury in the past year?: Yes    Cognitive Screening   1) Repeat 3 items (Leader, Season, Table)    2) Clock draw: NORMAL  3) 3 item recall: Recalls 3 objects  Results: 3 items recalled: COGNITIVE IMPAIRMENT LESS LIKELY    Mini-CogTM Copyright S Steven. Licensed by the author for use in [facility]; reprinted with permission (soob@[facility]). All rights reserved.          Reviewed and updated as needed this visit by clinical staff   Tobacco  Allergies  Meds                Reviewed and updated as needed this visit by Provider                   Social History     Tobacco Use     Smoking status: Current Every Day Smoker     Packs/day: 1.00     Years: 50.00     Pack years: 50.00     Types: Cigarettes     Smokeless tobacco: Never Used   Substance Use Topics     Alcohol use: Yes     Alcohol/week: 0.0 standard drinks     Comment: 3-4 light beers per day     If you drink alcohol do you typically have >3 drinks per day or >7 drinks per week? Yes      Alcohol Use 5/29/2022   Prescreen: >3 drinks/day or >7 drinks/week? Yes   AUDIT SCORE  5               Current providers sharing in care for this patient include:   Patient Care Team:  David Jenkins MD as PCP - General  Agapito Watson MD as MD (Colon and Rectal Surgery)  David Jenkins MD as Assigned PCP    The  following health maintenance items are reviewed in Epic and correct as of today:  Health Maintenance Due   Topic Date Due     ANNUAL REVIEW OF HM ORDERS  Never done     ADVANCE CARE PLANNING  Never done     LIPID  07/17/2017     ZOSTER IMMUNIZATION (3 of 3) 11/04/2019     COVID-19 Vaccine (3 - Booster for Pfizer series) 10/05/2021     MEDICARE ANNUAL WELLNESS VISIT  04/27/2022       Patient Active Problem List   Diagnosis     Colon polyps     Arterial occlusive disease     PVD (peripheral vascular disease) (H)     Lung nodule     NAFLD (nonalcoholic fatty liver disease)     Blood clot due to device, implant, or graft     History of pulmonary embolism     H/O deep venous thrombosis     Lower limb ischemia     Status post total left knee replacement     Tobacco dependence     S/P shoulder surgery     Factor V Leiden mutation (H)     Personal history of DVT (deep vein thrombosis)     Adenomatous polyp of colon     Anticoagulant effect     Lipids abnormal     Obesity     Prediabetes     Acromioclavicular joint arthritis     History of arterial thrombosis     Seasonal allergic rhinitis     Current Outpatient Medications   Medication     albuterol (PROAIR HFA/PROVENTIL HFA/VENTOLIN HFA) 108 (90 Base) MCG/ACT inhaler     atorvastatin (LIPITOR) 20 MG tablet     diclofenac (VOLTAREN) 1 % topical gel     fluticasone (FLONASE) 50 MCG/ACT nasal spray     sildenafil (REVATIO) 20 MG tablet     WARFARIN SODIUM     No current facility-administered medications for this visit.     Past Surgical History:   Procedure Laterality Date     ARTHROSCOPY SHOULDER ROTATOR CUFF REPAIR, BICEP TENODESIS REPAIR Right 10/8/2020    Procedure: right shoulder arthroscopic rotator cuff repair, arthroscopic subacromial decompression, biceps tenotomy;  Surgeon: Ryan Good MD;  [place] [facility] [place]     [place]OTAL KNEE ARTHROPLASTY Left 06/2019    @ [facility]     LAPAROSCOPIC ASSISTED COLECTOMY  5/9/2012    Procedure:LAPAROSCOPIC ASSISTED COLECTOMY;  "Hand Assisted Laparoscopic Total Abdominal Colectomy Ileorectal anastomosis. ; Surgeon:COLLINS JAVIER; Location:[facility] OR     SIGMOIDOSCOPY FLEXIBLE  5/21/2012    Procedure:SIGMOIDOSCOPY FLEXIBLE; Surgeon:EMMANUEL LEDBETTER; Location:[facility] GI     THROMBECTOMY LOWER EXTREMITY Left 06/2019    @ [facility] following knee arthroplasty @ [facility]     VASCULAR SURGERY      Has multiple femoral arterial stents     ZZC REPAIR CRUCIATE LIGAMENT,KNEE       bilateral knees             Review of Systems   Constitutional: Negative for chills and fever.   HENT: Positive for congestion and hearing loss. Negative for ear pain and sore throat.    Eyes: Negative for pain and visual disturbance.   Respiratory: Positive for cough. Negative for shortness of breath.    Cardiovascular: Negative for chest pain, palpitations and peripheral edema.   Gastrointestinal: Positive for diarrhea. Negative for abdominal pain, constipation, heartburn, hematochezia and nausea.   Genitourinary: Positive for impotence. Negative for dysuria, frequency, genital sores, hematuria and urgency.   Musculoskeletal: Positive for arthralgias and myalgias. Negative for joint swelling.   Skin: Negative for rash.   Neurological: Negative for dizziness, weakness, headaches and paresthesias.   Psychiatric/Behavioral: Negative for mood changes. The patient is not nervous/anxious.          OBJECTIVE:   /77 (BP Location: Right arm, Patient Position: Sitting, Cuff Size: Adult Regular)   Pulse 72   Temp 97.5  F (36.4  C) (Tympanic)   Resp 16   Ht 1.816 m (5' 11.5\")   Wt 83.5 kg (184 lb)   BMI 25.31 kg/m   Estimated body mass index is 25.31 kg/m  as calculated from the following:    Height as of this encounter: 1.816 m (5' 11.5\").    Weight as of this encounter: 83.5 kg (184 lb).  Physical Exam  GENERAL: healthy, alert and no distress  NECK: no adenopathy, no asymmetry, masses, or scars and thyroid normal to palpation  RESP: lungs clear to auscultation - no rales, rhonchi " "or wheezes  CV: regular rate and rhythm, normal S1 S2, no S3 or S4, no murmur, click or rub, no peripheral edema and peripheral pulses strong  ABDOMEN: soft, nontender, no hepatosplenomegaly, no masses and bowel sounds normal  MS: no gross musculoskeletal defects noted, no edema        ASSESSMENT / PLAN:   (Z00.00) Encounter for Medicare annual wellness exam  (primary encounter diagnosis)  Comment: Overall doing well advance directives on file  Plan:     (E78.89) Lipids abnormal  Comment: Controlled  Plan: Lipid panel reflex to direct LDL Fasting            (K76.0) NAFLD (nonalcoholic fatty liver disease)  Comment: Needs a recheck of liver function  Plan: CBC with Platelets & Differential,         Comprehensive metabolic panel            (R73.03) Prediabetes  Comment:   Plan: Hemoglobin A1c            (D68.51) Factor V Leiden mutation (H)  Comment: Anticoagulated chronically  Plan:     (Z86.718) H/O deep venous thrombosis  Comment:   Plan:     (I70.90) Arterial occlusive disease  Comment: Status post surgery  Plan:     (F17.200) Tobacco dependence  Comment: Get yearly lung cancer screening and PFTs at [facility]  Plan:     (Z86.711) History of pulmonary embolism  Comment: Anticoagulated  Plan:     (D12.6) Adenomatous polyp of colon, unspecified part of colon  Comment: Up-to-date on colonoscopy  Plan:             COUNSELING:  Reviewed preventive health counseling, as reflected in patient instructions    Estimated body mass index is 25.31 kg/m  as calculated from the following:    Height as of this encounter: 1.816 m (5' 11.5\").    Weight as of this encounter: 83.5 kg (184 lb).        He reports that he has been smoking cigarettes. He has a 50.00 pack-year smoking history. He has never used smokeless tobacco.  Tobacco Cessation Action Plan:   Information offered: Patient not interested at this time      Appropriate preventive services were discussed with this patient, including applicable screening as " appropriate for cardiovascular disease, diabetes, osteopenia/osteoporosis, and glaucoma.  As appropriate for age/gender, discussed screening for colorectal cancer, prostate cancer, breast cancer, and cervical cancer. Checklist reviewing preventive services available has been given to the patient.    Reviewed patients plan of care and provided an AVS. The Basic Care Plan (routine screening as documented in Health Maintenance) for Kannan meets the Care Plan requirement. This Care Plan has been established and reviewed with the Patient.    Counseling Resources:  ATP IV Guidelines  Pooled Cohorts Equation Calculator  Breast Cancer Risk Calculator  Breast Cancer: Medication to Reduce Risk  FRAX Risk Assessment  ICSI Preventive Guidelines  Dietary Guidelines for Americans, 2010  USDA's MyPlate  ASA Prophylaxis  Lung CA Screening    David Jenkins MD  [facility]    Identified Health Risks:

## 2022-06-06 ENCOUNTER — TRANSFERRED RECORDS (OUTPATIENT)
Dept: HEALTH INFORMATION MANAGEMENT | Facility: CLINIC | Age: 72
End: 2022-06-06

## 2022-06-06 ENCOUNTER — ANTICOAGULATION THERAPY VISIT (OUTPATIENT)
Dept: ANTICOAGULATION | Facility: CLINIC | Age: 72
End: 2022-06-06
Payer: COMMERCIAL

## 2022-06-06 DIAGNOSIS — D68.51 FACTOR V LEIDEN MUTATION (H): Primary | ICD-10-CM

## 2022-06-06 DIAGNOSIS — Z86.718 PERSONAL HISTORY OF DVT (DEEP VEIN THROMBOSIS): ICD-10-CM

## 2022-06-06 LAB — INR (EXTERNAL): 4 (ref 2.5–3.5)

## 2022-06-06 NOTE — PROGRESS NOTES
ANTICOAGULATION MANAGEMENT     Kannan DODSON Maryann 72 year old male is on warfarin with supratherapeutic INR result. (Goal INR 2.5-3.5)    Recent labs: (last 7 days)     06/06/22  1608   INR 4*       ASSESSMENT       Source(s): Chart Review    Previous INR was Therapeutic last 2(+) visits    Medication, diet, health changes since last INR chart reviewed; aspirin discontinued at last office visit 5/31, gabapentin/ibu/oxycodone/doxycycline were all discontinued as well           PLAN     Unable to reach Kannan today.    Left message to take reduced dose of warfarin, 5 mg tonight. Request call back for assessment.    Follow up required to confirm warfarin dose taken and assess for changes    Donna Grey RN  Anticoagulation Clinic  6/6/2022

## 2022-06-07 ENCOUNTER — MYC MEDICAL ADVICE (OUTPATIENT)
Dept: ANTICOAGULATION | Facility: CLINIC | Age: 72
End: 2022-06-07
Payer: COMMERCIAL

## 2022-06-07 DIAGNOSIS — Z86.718 PERSONAL HISTORY OF DVT (DEEP VEIN THROMBOSIS): ICD-10-CM

## 2022-06-07 DIAGNOSIS — D68.51 FACTOR V LEIDEN MUTATION (H): Primary | ICD-10-CM

## 2022-06-07 NOTE — TELEPHONE ENCOUNTER
ANTICOAGULATION MANAGEMENT     Kannan DODSON Maryann 72 year old male is on warfarin with supratherapeutic INR result. (Goal INR 2.5-3.5 )    Recent labs: (last 7 days)     06/06/22  1608   INR 4*       ASSESSMENT       Source(s): Chart Review, Patient/Caregiver Call and myChart       Warfarin doses taken: Warfarin taken as instructed and Pt did not get message last night to take reduced dose.    Diet: No new diet changes identified    New illness, injury, or hospitalization: No    Medication/supplement changes: None noted    Signs or symptoms of bleeding or clotting: No    Previous INR: Therapeutic last 2(+) visits    Additional findings: None       PLAN     Recommended plan for no diet, medication or health factor changes affecting INR     Dosing Instructions: partial hold then decrease your warfarin dose (7% change) with next INR in 6 days       Summary  As of 6/7/2022    Full warfarin instructions:  6/7: 5 mg; Otherwise 7.5 mg every Sat; 10 mg all other days   Next INR check:  6/13/2022             Telephone call with Kannan who verbalizes understanding and agrees to plan    Patient to recheck with home meter    Education provided: Goal range and significance of current result    Plan made per [facility] anticoagulation protocol    Wilfred iNckerson RN  Anticoagulation Clinic  6/7/2022    _______________________________________________________________________     Anticoagulation Episode Summary     Current INR goal:  2.5-3.5   TTR:  61.4 % (3.6 mo)   Target end date:  Indefinite   [facility]    Indications    Factor V Leiden mutation (H) [D68.51]  Personal history of DVT (deep vein thrombosis) [Z86.718]           Comments:  MDINGRETA Home Meter // Manage by exception         Anticoagulation Care Providers     Provider Role Specialty Phone number    David Jenkins MD Referring Family Medicine [phone]

## 2022-06-07 NOTE — PROGRESS NOTES
ANTICOAGULATION MANAGEMENT          PLAN     Recommended plan for no diet, medication or health factor changes affecting INR     Dosing Instructions: continue your current warfarin dose as long as you took 5 mg warfarin on 6/6/22 with next INR in 1 week       Summary  As of 6/6/2022    Full warfarin instructions:  6/6: 5 mg; Otherwise 12.5 mg every Wed; 10 mg all other days   Next INR check:  6/13/2022             Detailed voice message left for Kannan with dosing instructions and follow up date. [employer] message sent also.    Patient to recheck with home meter    Education provided: Please call back if any changes to your diet, medications or how you've been taking warfarin, Goal range and significance of current result, Importance of following up at instructed interval, Monitoring for bleeding signs and symptoms and Contact [phone] with any changes, questions or concerns.     Plan made per [facility] anticoagulation protocol    Mary Michelle RN  Anticoagulation Clinic  6/7/2022    _______________________________________________________________________     Anticoagulation Episode Summary     Current INR goal:  2.5-3.5   TTR:  61.4 % (3.6 mo)   Target end date:  Indefinite   Send INR reminders to:  [facility]    Indications    Factor V Leiden mutation (H) [D68.51]  Personal history of DVT (deep vein thrombosis) [Z86.718]           Comments:  JODI Home Meter // Manage by exception         Anticoagulation Care Providers     Provider Role Specialty Phone number    David Jenkins MD Referring Family Medicine [phone]

## 2022-06-13 ENCOUNTER — TRANSFERRED RECORDS (OUTPATIENT)
Dept: HEALTH INFORMATION MANAGEMENT | Facility: CLINIC | Age: 72
End: 2022-06-13

## 2022-06-13 ENCOUNTER — ANTICOAGULATION THERAPY VISIT (OUTPATIENT)
Dept: ANTICOAGULATION | Facility: CLINIC | Age: 72
End: 2022-06-13
Payer: COMMERCIAL

## 2022-06-13 DIAGNOSIS — D68.51 FACTOR V LEIDEN MUTATION (H): Primary | ICD-10-CM

## 2022-06-13 DIAGNOSIS — Z86.718 PERSONAL HISTORY OF DVT (DEEP VEIN THROMBOSIS): ICD-10-CM

## 2022-06-13 LAB — INR (EXTERNAL): 2.6 (ref 2.5–3.5)

## 2022-06-13 NOTE — PROGRESS NOTES
ANTICOAGULATION MANAGEMENT     Kannan DODSON Maryann 72 year old male is on warfarin with therapeutic INR result. (Goal INR 2.5-3.5)    Recent labs: (last 7 days)     06/13/22  1439   INR 2.6       ASSESSMENT       Source(s): Chart Review and Patient/Caregiver Call       Warfarin doses taken: Warfarin taken as instructed    Diet: No new diet changes identified    New illness, injury, or hospitalization: No    Medication/supplement changes: None noted    Signs or symptoms of bleeding or clotting: No    Previous INR: Supratherapeutic    Additional findings: None       PLAN     Recommended plan for no diet, medication or health factor changes affecting INR     Dosing Instructions: continue your current warfarin dose with next INR in 1 week       Summary  As of 6/13/2022    Full warfarin instructions:  7.5 mg every Sat; 10 mg all other days   Next INR check:  6/20/2022             Telephone call with Kannan who verbalizes understanding and agrees to plan and who agrees to plan and repeated back plan correctly    Patient to recheck with home meter    Education provided: Please call back if any changes to your diet, medications or how you've been taking warfarin    Plan made per [facility] anticoagulation protocol    Mary Albarran, RN  Anticoagulation Clinic  6/13/2022    _______________________________________________________________________     Anticoagulation Episode Summary     Current INR goal:  2.5-3.5   TTR:  61.5 % (3.8 mo)   Target end date:  Indefinite   Send INR reminders to:  [facility]    Indications    Factor V Leiden mutation (H) [D68.51]  Personal history of DVT (deep vein thrombosis) [Z86.718]           Comments:  MDINGRETA Home Meter // Manage by exception         Anticoagulation Care Providers     Provider Role Specialty Phone number    David Jenkins MD Referring Family Medicine [phone]

## 2022-06-20 ENCOUNTER — ANTICOAGULATION THERAPY VISIT (OUTPATIENT)
Dept: ANTICOAGULATION | Facility: CLINIC | Age: 72
End: 2022-06-20
Payer: COMMERCIAL

## 2022-06-20 ENCOUNTER — TRANSFERRED RECORDS (OUTPATIENT)
Dept: HEALTH INFORMATION MANAGEMENT | Facility: CLINIC | Age: 72
End: 2022-06-20

## 2022-06-20 DIAGNOSIS — D68.51 FACTOR V LEIDEN MUTATION (H): Primary | ICD-10-CM

## 2022-06-20 DIAGNOSIS — Z86.718 PERSONAL HISTORY OF DVT (DEEP VEIN THROMBOSIS): ICD-10-CM

## 2022-06-20 LAB — INR (EXTERNAL): 3.3 (ref 0.9–1.1)

## 2022-06-20 NOTE — PROGRESS NOTES
ANTICOAGULATION  MANAGEMENT-Home Monitor Managed by Exception    Kannanluis e Wolf 72 year old male is on warfarin with therapeutic INR result. (Goal INR 2.5-3.5)    Recent labs: (last 7 days)     06/20/22  1554   INR 3.3*         Previous INR was Therapeutic    Medication, diet, health changes since last INR:chart reviewed; none identified    Contacted within the last 12 weeks by phone on 06/13/2022      CHRISTINE     Kannan was NOT contacted regarding therapeutic result today per home monitoring policy manage by exception agreement.   Current warfarin dose is to be continued:     Summary  As of 6/20/2022    Full warfarin instructions:  7.5 mg every Sat; 10 mg all other days   Next INR check:  6/27/2022           ?   Wilfred Nickerson RN  Anticoagulation Clinic  6/20/2022    _______________________________________________________________________     Anticoagulation Episode Summary     Current INR goal:  2.5-3.5   TTR:  63.6 % (4.1 mo)   Target end date:  Indefinite   Send INR reminders to:  [facility]    Indications    Factor V Leiden mutation (H) [D68.51]  Personal history of DVT (deep vein thrombosis) [Z86.718]           Comments:  [employer] Home [employer]eter // Manage by exception         Anticoagulation Care Providers     Provider Role Specialty Phone number    David Jenkins MD Referring Family Medicine [phone]

## 2022-06-27 ENCOUNTER — ANTICOAGULATION THERAPY VISIT (OUTPATIENT)
Dept: ANTICOAGULATION | Facility: CLINIC | Age: 72
End: 2022-06-27

## 2022-06-27 ENCOUNTER — TRANSFERRED RECORDS (OUTPATIENT)
Dept: HEALTH INFORMATION MANAGEMENT | Facility: CLINIC | Age: 72
End: 2022-06-27

## 2022-06-27 DIAGNOSIS — D68.51 FACTOR V LEIDEN MUTATION (H): Primary | ICD-10-CM

## 2022-06-27 DIAGNOSIS — Z86.718 PERSONAL HISTORY OF DVT (DEEP VEIN THROMBOSIS): ICD-10-CM

## 2022-06-27 LAB — INR (EXTERNAL): 2.3 (ref 0.9–1.1)

## 2022-06-27 NOTE — PROGRESS NOTES
ANTICOAGULATION MANAGEMENT     Kannan DODSON Maryann 72 year old male is on warfarin with subtherapeutic INR result. (Goal INR 2.5-3.5)    Recent labs: (last 7 days)     06/27/22  1546   INR 2.3*       ASSESSMENT       Source(s): Chart Review and Patient/Caregiver Call       Warfarin doses taken: Less warfarin taken than planned which may be affecting INR    Diet: No new diet changes identified    New illness, injury, or hospitalization: No    Medication/supplement changes: None noted    Signs or symptoms of bleeding or clotting: No    Previous INR: Therapeutic last 2(+) visits    Additional findings: None       PLAN     Recommended plan for temporary change(s) affecting INR     Dosing Instructions: booster dose then continue your current warfarin dose with next INR in 1 week       Summary  As of 6/27/2022    Full warfarin instructions:  6/27: 15 mg; Otherwise 7.5 mg every Sat; 10 mg all other days   Next INR check:  7/5/2022             Telephone call with Kannan who verbalizes understanding and agrees to plan    Patient to recheck with home meter    Education provided: Please call back if any changes to your diet, medications or how you've been taking warfarin, Monitoring for bleeding signs and symptoms and Monitoring for clotting signs and symptoms    Plan made per [facility] anticoagulation protocol    Sherice Sams RN  Anticoagulation Clinic  6/27/2022    _______________________________________________________________________     Anticoagulation Episode Summary     Current INR goal:  2.5-3.5   TTR:  64.5 % (4.3 mo)   Target end date:  Indefinite   Send INR reminders to:  [facility]    Indications    Factor V Leiden mutation (H) [D68.51]  Personal history of DVT (deep vein thrombosis) [Z86.718]           Comments:  [employer] Home [employer]eter // Manage by exception         Anticoagulation Care Providers     Provider Role Specialty Phone number    David Jenkins MD Referring Family Medicine [phone]

## 2022-07-04 ENCOUNTER — TRANSFERRED RECORDS (OUTPATIENT)
Dept: HEALTH INFORMATION MANAGEMENT | Facility: CLINIC | Age: 72
End: 2022-07-04

## 2022-07-04 LAB — INR (EXTERNAL): 3.7 (ref 0.9–1.1)

## 2022-07-05 ENCOUNTER — ANTICOAGULATION THERAPY VISIT (OUTPATIENT)
Dept: ANTICOAGULATION | Facility: CLINIC | Age: 72
End: 2022-07-05

## 2022-07-05 DIAGNOSIS — Z86.718 PERSONAL HISTORY OF DVT (DEEP VEIN THROMBOSIS): ICD-10-CM

## 2022-07-05 DIAGNOSIS — D68.51 FACTOR V LEIDEN MUTATION (H): Primary | ICD-10-CM

## 2022-07-05 NOTE — PROGRESS NOTES
ANTICOAGULATION MANAGEMENT     Kannan DODSON Cudd 72 year old male is on warfarin with supratherapeutic INR result. (Goal INR 2.5-3.5)    Recent labs: (last 7 days)     07/04/22  0000   INR 3.7*       ASSESSMENT       Source(s): Chart Review and Patient/Caregiver Call       Warfarin doses taken: Warfarin taken as instructed    Diet: No new diet changes identified    New illness, injury, or hospitalization: No    Medication/supplement changes: None noted    Signs or symptoms of bleeding or clotting: No    Previous INR: Subtherapeutic    Additional findings: None       PLAN     Recommended plan for no diet, medication or health factor changes affecting INR     Dosing Instructions: decrease your warfarin dose (3.7% change) with next INR in 1 week       Summary  As of 7/5/2022    Full warfarin instructions:  7.5 mg every Tue, Sat; 10 mg all other days   Next INR check:  7/11/2022             Telephone call with Kannan who verbalizes understanding and agrees to plan    Patient to recheck with home meter    Education provided: Importance of consistent vitamin K intake    Plan made per [facility] anticoagulation protocol    Carmen CROOKS RN  Anticoagulation Clinic  7/5/2022    _______________________________________________________________________     Anticoagulation Episode Summary     Current INR goal:  2.5-3.5   TTR:  64.7 % (4.5 mo)   Target end date:  Indefinite   Send INR reminders to:  [facility]    Indications    Factor V Leiden mutation (H) [D68.51]  Personal history of DVT (deep vein thrombosis) [Z86.718]           Comments:  [employer] Home Meter // Manage by exception         Anticoagulation Care Providers     Provider Role Specialty Phone number    David Jenkins MD Referring Family Medicine [phone]

## 2022-07-07 ENCOUNTER — TRANSFERRED RECORDS (OUTPATIENT)
Dept: HEALTH INFORMATION MANAGEMENT | Facility: CLINIC | Age: 72
End: 2022-07-07

## 2022-07-07 DIAGNOSIS — Z86.718 H/O DEEP VENOUS THROMBOSIS: ICD-10-CM

## 2022-07-07 DIAGNOSIS — D68.51 FACTOR V LEIDEN MUTATION (H): ICD-10-CM

## 2022-07-07 DIAGNOSIS — D68.51 FACTOR V LEIDEN MUTATION (H): Primary | ICD-10-CM

## 2022-07-07 RX ORDER — WARFARIN SODIUM 5 MG/1
TABLET ORAL
Qty: 170 TABLET | Refills: 1 | Status: SHIPPED | OUTPATIENT
Start: 2022-07-07 | End: 2022-07-08

## 2022-07-07 NOTE — TELEPHONE ENCOUNTER
ANTICOAGULATION MANAGEMENT:  Medication Refill    Anticoagulation Summary  As of 7/5/2022    Warfarin maintenance plan:  7.5 mg (5 mg x 1.5) every Tue, Sat; 10 mg (5 mg x 2) all other days   Next INR check:  7/11/2022   Target end date:  Indefinite    Indications    Factor V Leiden mutation (H) [D68.51]  Personal history of DVT (deep vein thrombosis) [Z86.718]             Anticoagulation Care Providers     Provider Role Specialty Phone number    David Jenkins MD Referring Family Medicine [phone]          Visit with referring provider/group within last year: Yes    [facility] referral signed within last year: Yes    Kannan meets all criteria for refill (current [facility] referral, office visit with referring provider/group in last year, lab monitoring up to date or not exceeding 2 weeks overdue). Rx instructions and quantity supplied updated to match patient's current dosing plan. Warfarin 90 day supply with 1 refill granted per [facility] protocol     Maricruz Cano RN  Anticoagulation Clinic

## 2022-07-08 RX ORDER — WARFARIN SODIUM 5 MG/1
TABLET ORAL
Qty: 180 TABLET | Refills: 1 | Status: SHIPPED | OUTPATIENT
Start: 2022-07-08 | End: 2022-12-26

## 2022-07-08 NOTE — TELEPHONE ENCOUNTER
ANTICOAGULATION MANAGEMENT:  Medication Refill    Anticoagulation Summary  As of 7/5/2022    Warfarin maintenance plan:  7.5 mg (5 mg x 1.5) every Tue, Sat; 10 mg (5 mg x 2) all other days   Next INR check:  7/11/2022   Target end date:  Indefinite    Indications    Factor V Leiden mutation (H) [D68.51]  Personal history of DVT (deep vein thrombosis) [Z86.718]             Anticoagulation Care Providers     Provider Role Specialty Phone number    David Jenkins MD Referring Family Medicine [phone]          Visit with referring provider/group within last year: Yes    [facility] referral signed within last year: Yes    Kannan meets all criteria for refill (current [facility] referral, office visit with referring provider/group in last year, lab monitoring up to date or not exceeding 2 weeks overdue). Rx instructions and quantity supplied updated to match patient's current dosing plan. Warfarin 90 day supply with 1 refill granted per [facility] protocol     Nickie Black RN  Anticoagulation Clinic

## 2022-07-11 NOTE — LETTER
5/16/2018       RE: Kannan Wolf  [address] [place] [address]     Dear Colleague,    Thank you for referring your patient, Kannan Wolf, to the [facility] at [facility]. Please see a copy of my visit note below.        Interventional Radiology Clinic Visit    Date of this visit: 5/23/2018    Kannan Wlof presents for consultation for evaluation of leg pain    Primary Physician: David Jenkins        History Of Present Illness:    Kannan Wolf is a 68 year old male with a diagnosis of peripheral arterial disease status post bilateral SFA stenting about 5 years ago. He is doing very well from a symptomatic standpoint, at this point, he does not have lifestyle limiting claudication. He has performed extremely well with personal conditioning and exercise and walks 3-4 miles a day with mild tolerable discomfort, worse in his right leg.     He is primarily visiting me because he was encouraged by his orthopedic surgeon to see a vascular specialist prior to planned left knee replacement.     He does not have any other chief complaints currently. He is doing well with no real functional limitations.     Review of Systems:    10 Point ROS is positive for what is described in the HPI, otherwise negative.    Past Medical/Surgical History:    Past Medical History:   Diagnosis Date     ACL tear      Arthritis     osteoarthritis     Blood clot in the legs     +PE 2009  also on vein from intestine     Blood clotting disorder (H) 2009     Chronic diarrhea     result of total colectomy     Head injury 1960    multiple     Hearing problem 2000     IBS (irritable bowel syndrome)      Liver disease 2013     Migraines 1989    none for a few years     Personal history of colonic polyps 2009     Polyps, colonic      Pulmonary emboli (H) 2009     Skin disease 1970    eczema     Sleep apnea      Past Surgical History:   Procedure Laterality Date      C REPAIR CRUCIATE LIGAMENT,KNEE       bilateral knees     LAPAROSCOPIC ASSISTED COLECTOMY  5/9/2012    Procedure:LAPAROSCOPIC ASSISTED COLECTOMY; Hand Assisted Laparoscopic Total Abdominal Colectomy Ileorectal anastomosis. ; Surgeon:COLLINS JAVIER; Location:[facility] OR     SIGMOIDOSCOPY FLEXIBLE  5/21/2012    Procedure:SIGMOIDOSCOPY FLEXIBLE; Surgeon:EMMANUEL LEDBETTER; Location:[facility]     VASCULAR SURGERY      lower ext angiogram     Current Medications:    Current Outpatient Prescriptions   Medication Sig Dispense Refill     aspirin 81 MG tablet Take 81 mg by mouth daily       atorvastatin (LIPITOR) 10 MG tablet Take 10 mg by mouth daily       WARFARIN SODIUM By mouth alternating 12.5mg and 10mg or as directed by coumadin clinic       Allergies:    Review of patient's allergies indicates no known allergies.    Family History:    Family History   Problem Relation Age of Onset     Anesthesia Reaction Other      No history     Colon Polyps Other      no hx     Crohn Disease Other      no hx     Ulcerative Colitis Other      no hx     Cancer - colorectal Other      no hx     CANCER Other      no hx     CEREBROVASCULAR DISEASE Mother      multiple     Colon Cancer Other      uncle     Substance Abuse Sister      Substance Abuse Brother      Substance Abuse Sister      Social History:    Social History     Social History     Marital status:      Spouse name: N/A     Number of children: N/A     Years of education: N/A     Social History Main Topics     Smoking status: Current Every Day Smoker     Packs/day: 1.00     Years: 50.00     Types: Cigarettes     Smokeless tobacco: Never Used     Alcohol use 0.0 oz/week     0 Standard drinks or equivalent per week      Comment: 3-4 light beers per day     Drug use: No     Sexual activity: Not Currently     Partners: Female     Birth control/ protection: Male Surgical     Other Topics Concern     None     Social History Narrative    The patient has a 50 pk yr tobacco hx.   He has ongoing active use.  Alcohol use is 21 alcoholic drinks per week.  He has marijuana use.         He previously worked as .          The patient is .  Has 3 children.         Physical Exam:    BP (!) 142/94  Pulse 81     GENERAL APPEARANCE: alert, nad  HEENT: nc/at  RESP: cta  CARDIOVASCULAR: rrr  VASCULAR: +2 dpa and pta b/l. No ulcers. Normal cap refill.    Laboratory Studies:    Imaging:     I reviewed the bilateral resting ABIs and arterial duplex which reveals patent left SFA stents and occluded right SFA stent. Right NYDIA is 0.8 and Left is 1.19.     ASSESSMENT/PLAN:      Kannan Wolf is a 68 year old male with well compensated peripheral arterial disease, Compton category 1, mild claudication without lifestyle limitation, under great medical control and patient has great motivation and walks a few miles everyday. Non-smoker as per patient.     I see no restrictions to proceeding with his arthroplasty from a healing standpoint. I did discuss since I am unaware of how they position him during the surgery, that having stent grafts in the native arteries does have some mild risk of compression related ischemia, but the stents in place have memory and are unlikely to be deformed, they will bounce back open.     He did mention something about a history of DVT. As per the patient, he was worked up for a thrombophilia and was never found to have hypercoagulability traits, such as Factor V. Therefore he is average risk for DVT and in my opinion, does not warrant caval filtration for PE prophylaxis. If he were to develop PE in the post op setting, then this would be the appropriate indication for a filter at that time.     Plan:  No intervention necessary, clinical consultation completed. F/u prn if claudication worsens and he desires treatment.    A total of 30 minutes was spent in care for the patient, of which >50% was spent in counseling and co-ordination of care.     It was a  pleasure seeing the patient.     Signed,    Timoteo Foster M.D.  Department of Interventional Radiology  [facility]    CC  Patient Care Team:  David Jenkins as PCP - General  Agapito Watson MD as MD (Colon and Rectal Surgery)  SELF, REFERRED   NULL

## 2022-07-12 ENCOUNTER — TRANSFERRED RECORDS (OUTPATIENT)
Dept: HEALTH INFORMATION MANAGEMENT | Facility: CLINIC | Age: 72
End: 2022-07-12

## 2022-07-12 LAB — INR (EXTERNAL): 2.7 (ref 0.9–1.1)

## 2022-07-13 ENCOUNTER — ANTICOAGULATION THERAPY VISIT (OUTPATIENT)
Dept: ANTICOAGULATION | Facility: CLINIC | Age: 72
End: 2022-07-13

## 2022-07-13 DIAGNOSIS — D68.51 FACTOR V LEIDEN MUTATION (H): Primary | ICD-10-CM

## 2022-07-13 DIAGNOSIS — Z86.718 PERSONAL HISTORY OF DVT (DEEP VEIN THROMBOSIS): ICD-10-CM

## 2022-07-13 NOTE — PROGRESS NOTES
ANTICOAGULATION MANAGEMENT     Kannan DODSON Emanueldwayne 72 year old male is on warfarin with therapeutic INR result. (Goal INR 2.5-3.5)    Recent labs: (last 7 days)     07/12/22  0800   INR 2.7*       ASSESSMENT       Source(s): Chart Review and Patient/Caregiver Call       Warfarin doses taken: Warfarin taken as instructed    Diet: No new diet changes identified    New illness, injury, or hospitalization: No    Medication/supplement changes: None noted    Signs or symptoms of bleeding or clotting: No    Previous INR: Supratherapeutic    Additional findings: None       PLAN     Recommended plan for no diet, medication or health factor changes affecting INR     Dosing Instructions: continue your current warfarin dose with next INR in 1 week       Summary  As of 7/13/2022    Full warfarin instructions:  7.5 mg every Tue, Sat; 10 mg all other days   Next INR check:  7/19/2022             Telephone call with Kannan who verbalizes understanding and agrees to plan    Patient to recheck with home meter    Education provided: Resume manage by exception with home monitor. Continue to submit INR results to home monitor company.You will only be called when your result is out of range. Please call and notify [facility] if new medication started, dose missed, signs or symptoms of bleeding or clotting, or a surgery/procedure is scheduled. and Contact [phone]  with any changes, questions or concerns.     Plan made per [facility] anticoagulation protocol    Pat Lott RN  Anticoagulation Clinic  7/13/2022    _______________________________________________________________________     Anticoagulation Episode Summary     Current INR goal:  2.5-3.5   TTR:  65.6 % (4.8 mo)   Target end date:  Indefinite   Send INR reminders to:  [facility]    Indications    Factor V Leiden mutation (H) [D68.51]  Personal history of DVT (deep vein thrombosis) [Z86.718]           Comments:  [employer] Home Meter // Manage by exception         Anticoagulation Care  Providers     Provider Role Specialty Phone number    David Jenkins MD Referring Family Medicine [phone]

## 2022-07-19 ENCOUNTER — ANTICOAGULATION THERAPY VISIT (OUTPATIENT)
Dept: ANTICOAGULATION | Facility: CLINIC | Age: 72
End: 2022-07-19

## 2022-07-19 ENCOUNTER — TRANSFERRED RECORDS (OUTPATIENT)
Dept: HEALTH INFORMATION MANAGEMENT | Facility: CLINIC | Age: 72
End: 2022-07-19

## 2022-07-19 DIAGNOSIS — Z86.718 PERSONAL HISTORY OF DVT (DEEP VEIN THROMBOSIS): ICD-10-CM

## 2022-07-19 DIAGNOSIS — D68.51 FACTOR V LEIDEN MUTATION (H): Primary | ICD-10-CM

## 2022-07-19 LAB — INR (EXTERNAL): 3.4 (ref 0.9–1.1)

## 2022-07-19 NOTE — PROGRESS NOTES
ANTICOAGULATION  MANAGEMENT-Home Monitor Managed by Exception    Kannanluis e Wolf 72 year old male is on warfarin with therapeutic INR result. (Goal INR 2.5-3.5)    Recent labs: (last 7 days)     07/19/22  0000   INR 3.4*         Previous INR was Therapeutic    Medication, diet, health changes since last INR:chart reviewed; none identified    Contacted within the last 12 weeks by phone on 07/13/2022      CHRISTINE     Kannan was NOT contacted regarding therapeutic result today per home monitoring policy manage by exception agreement.   Current warfarin dose is to be continued:     Summary  As of 7/19/2022    Full warfarin instructions:  7.5 mg every Tue, Sat; 10 mg all other days   Next INR check:  7/26/2022           ?   Juliane Martin, RN  Anticoagulation Clinic  7/19/2022    _______________________________________________________________________     Anticoagulation Episode Summary     Current INR goal:  2.5-3.5   TTR:  67.1 % (5 mo)   Target end date:  Indefinite   Send INR reminders to:  [facility]    Indications    Factor V Leiden mutation (H) [D68.51]  Personal history of DVT (deep vein thrombosis) [Z86.718]           Comments:  [employer] Home Meter // Manage by exception         Anticoagulation Care Providers     Provider Role Specialty Phone number    David Jenkins MD Referring Family Medicine [phone]

## 2022-07-26 ENCOUNTER — ANTICOAGULATION THERAPY VISIT (OUTPATIENT)
Dept: ANTICOAGULATION | Facility: CLINIC | Age: 72
End: 2022-07-26

## 2022-07-26 ENCOUNTER — TRANSFERRED RECORDS (OUTPATIENT)
Dept: HEALTH INFORMATION MANAGEMENT | Facility: CLINIC | Age: 72
End: 2022-07-26

## 2022-07-26 DIAGNOSIS — Z86.718 PERSONAL HISTORY OF DVT (DEEP VEIN THROMBOSIS): ICD-10-CM

## 2022-07-26 DIAGNOSIS — D68.51 FACTOR V LEIDEN MUTATION (H): Primary | ICD-10-CM

## 2022-07-26 LAB — INR (EXTERNAL): 2.2 (ref 0.8–1.1)

## 2022-07-26 NOTE — PROGRESS NOTES
ANTICOAGULATION MANAGEMENT     Kannan DODSON Maryann 72 year old male is on warfarin with subtherapeutic INR result. (Goal INR 2.5-3.5)    Recent labs: (last 7 days)     07/26/22  0000   INR 2.2*       ASSESSMENT       Source(s): Chart Review and Patient/Caregiver Call       Warfarin doses taken: Warfarin taken as instructed    Diet: No new diet changes identified    New illness, injury, or hospitalization: No    Medication/supplement changes: None noted    Signs or symptoms of bleeding or clotting: No    Previous INR: Therapeutic last 2(+) visits    Additional findings: None       PLAN     Recommended plan for no diet, medication or health factor changes affecting INR     Dosing Instructions: booster dose then continue your current warfarin dose with next INR in 1 week. Patient does not want to increase weekly dose, just do boost dose and see what happens.      Summary  As of 7/26/2022    Full warfarin instructions:  7/26: 10 mg; Otherwise 7.5 mg every Tue, Sat; 10 mg all other days   Next INR check:  8/2/2022             Telephone call with Kannan who verbalizes understanding and agrees to plan    Patient to recheck with home meter    Education provided: Contact [phone]  with any changes, questions or concerns.     Plan made per [facility] anticoagulation protocol    Mary Michelle RN  Anticoagulation Clinic  7/26/2022    _______________________________________________________________________     Anticoagulation Episode Summary     Current INR goal:  2.5-3.5   TTR:  67.4 % (5.2 mo)   Target end date:  Indefinite   Send INR reminders to:  [facility]    Indications    Factor V Leiden mutation (H) [D68.51]  Personal history of DVT (deep vein thrombosis) [Z86.718]           Comments:  [employer] Home Meter // Manage by exception         Anticoagulation Care Providers     Provider Role Specialty Phone number    David Jenkins MD Referring Family Medicine [phone]

## 2022-08-02 ENCOUNTER — ANTICOAGULATION THERAPY VISIT (OUTPATIENT)
Dept: ANTICOAGULATION | Facility: CLINIC | Age: 72
End: 2022-08-02

## 2022-08-02 DIAGNOSIS — Z86.718 PERSONAL HISTORY OF DVT (DEEP VEIN THROMBOSIS): ICD-10-CM

## 2022-08-02 DIAGNOSIS — D68.51 FACTOR V LEIDEN MUTATION (H): Primary | ICD-10-CM

## 2022-08-02 LAB — INR (EXTERNAL): 2.2 (ref 0.9–1.1)

## 2022-08-02 NOTE — PROGRESS NOTES
ANTICOAGULATION MANAGEMENT     Kannan DODSON Emanueldwayne 72 year old male is on warfarin with subtherapeutic INR result. (Goal INR 2.5-3.5)    Recent labs: (last 7 days)     08/02/22  0000   INR 2.2*       ASSESSMENT       Source(s): Chart Review and Patient/Caregiver Call       Warfarin doses taken: Warfarin taken as instructed    Diet: No new diet changes identified    New illness, injury, or hospitalization: No    Medication/supplement changes: None noted    Signs or symptoms of bleeding or clotting: No    Previous INR: Subtherapeutic    Additional findings: None       PLAN     Recommended plan for no diet, medication or health factor changes affecting INR     Dosing Instructions: Increase your warfarin dose (7.7% change) with next INR in 1 week, patient has concerns that this will make INR go above range so under protocol increase done. Shared clinical decision made with the patient.    Summary  As of 8/2/2022    Full warfarin instructions:  10 mg every day   Next INR check:  8/9/2022             Telephone call with Kannan who verbalizes understanding and agrees to plan    Patient to recheck with home meter    Education provided: None required    Plan made per [facility] anticoagulation protocol, see note above.    Juliane Martin, RN  Anticoagulation Clinic  8/2/2022    _______________________________________________________________________     Anticoagulation Episode Summary     Current INR goal:  2.5-3.5   TTR:  64.5 % (5.5 mo)   Target end date:  Indefinite   Send INR reminders to:  [facility]    Indications    Factor V Leiden mutation (H) [D68.51]  Personal history of DVT (deep vein thrombosis) [Z86.718]           Comments:  [employer] Home Meter // Manage by exception         Anticoagulation Care Providers     Provider Role Specialty Phone number    David Jenkins MD Referring Family Medicine [phone]

## 2022-08-03 ENCOUNTER — TRANSFERRED RECORDS (OUTPATIENT)
Dept: HEALTH INFORMATION MANAGEMENT | Facility: CLINIC | Age: 72
End: 2022-08-03

## 2022-08-04 ENCOUNTER — DOCUMENTATION ONLY (OUTPATIENT)
Dept: ANTICOAGULATION | Facility: CLINIC | Age: 72
End: 2022-08-04

## 2022-08-04 DIAGNOSIS — D68.51 FACTOR V LEIDEN MUTATION (H): Primary | ICD-10-CM

## 2022-08-04 DIAGNOSIS — Z86.718 PERSONAL HISTORY OF DVT (DEEP VEIN THROMBOSIS): ICD-10-CM

## 2022-08-04 LAB — INR (EXTERNAL): 2.5 (ref 0.9–1.1)

## 2022-08-04 NOTE — PROGRESS NOTES
Incoming fax from [facility]    Result date: 08/03/2022 1440 EST  INR: 2.5    Accidentally opened a D.O. encounter.

## 2022-08-04 NOTE — PROGRESS NOTES
ANTICOAGULATION MANAGEMENT     Kannan DODSON Maryann 72 year old male is on warfarin with therapeutic INR result. (Goal INR 2.5-3.5)    Recent labs: (last 7 days)     08/03/22  0000   INR 2.5*       ASSESSMENT       Source(s): Chart Review and Patient/Caregiver Call       Warfarin doses taken: Warfarin taken as instructed    Diet: No new diet changes identified    New illness, injury, or hospitalization: No    Medication/supplement changes: None noted    Signs or symptoms of bleeding or clotting: No    Previous INR: Subtherapeutic    Additional findings: None       PLAN     Recommended plan for no diet, medication or health factor changes affecting INR     Dosing Instructions: Continue your current warfarin dose with next INR in 1 week       Summary  As of 8/4/2022    Full warfarin instructions:  10 mg every day   Next INR check:  8/9/2022             Telephone call with Kannan who verbalizes understanding and agrees to plan    Patient to recheck with home meter    Education provided: Please call back if any changes to your diet, medications or how you've been taking warfarin    Plan made per [facility] anticoagulation protocol    Donna Grey, RN  Anticoagulation Clinic  8/4/2022    _______________________________________________________________________     Anticoagulation Episode Summary     Current INR goal:  2.5-3.5   TTR:  64.2 % (5.5 mo)   Target end date:  Indefinite   Send INR reminders to:  [facility]    Indications    Factor V Leiden mutation (H) [D68.51]  Personal history of DVT (deep vein thrombosis) [Z86.718]           Comments:  [employer] Home Meter // Manage by exception         Anticoagulation Care Providers     Provider Role Specialty Phone number    David Jenkins MD Referring Family Medicine [phone]

## 2022-08-09 ENCOUNTER — DOCUMENTATION ONLY (OUTPATIENT)
Dept: ANTICOAGULATION | Facility: CLINIC | Age: 72
End: 2022-08-09

## 2022-08-09 ENCOUNTER — TRANSFERRED RECORDS (OUTPATIENT)
Dept: HEALTH INFORMATION MANAGEMENT | Facility: CLINIC | Age: 72
End: 2022-08-09

## 2022-08-09 DIAGNOSIS — Z86.718 PERSONAL HISTORY OF DVT (DEEP VEIN THROMBOSIS): ICD-10-CM

## 2022-08-09 DIAGNOSIS — D68.51 FACTOR V LEIDEN MUTATION (H): Primary | ICD-10-CM

## 2022-08-09 LAB — INR (EXTERNAL): 3.1 (ref 0.9–1.1)

## 2022-08-09 NOTE — PROGRESS NOTES
ANTICOAGULATION  MANAGEMENT-Home Monitor Managed by Exception    Kannan Wolf 72 year old male is on warfarin with therapeutic INR result. (Goal INR 2.5-3.5)    Recent labs: (last 7 days)     08/09/22  1000   INR 3.1*         Previous INR was Therapeutic    Medication, diet, health changes since last INR:chart reviewed; none identified    Contacted within the last 12 weeks by phone on 8/2/22      CHRISTINE     Kannan was NOT contacted regarding therapeutic result today per home monitoring policy manage by exception agreement.   Current warfarin dose is to be continued:     Summary  As of 8/9/2022    Full warfarin instructions:  10 mg every day   Next INR check:  8/16/2022           ?   Sherice Sams RN  Anticoagulation Clinic  8/9/2022    _______________________________________________________________________     Anticoagulation Episode Summary     Current INR goal:  2.5-3.5   TTR:  65.6 % (5.7 mo)   Target end date:  Indefinite   Send INR reminders to:  [facility]    Indications    Factor V Leiden mutation (H) [D68.51]  Personal history of DVT (deep vein thrombosis) [Z86.718]           Comments:  [employer] Home Meter // Manage by exception         Anticoagulation Care Providers     Provider Role Specialty Phone number    David Jenkins MD Referring Family Medicine [phone]

## 2022-08-16 ENCOUNTER — TRANSFERRED RECORDS (OUTPATIENT)
Dept: HEALTH INFORMATION MANAGEMENT | Facility: CLINIC | Age: 72
End: 2022-08-16

## 2022-08-16 ENCOUNTER — DOCUMENTATION ONLY (OUTPATIENT)
Dept: ANTICOAGULATION | Facility: CLINIC | Age: 72
End: 2022-08-16

## 2022-08-16 DIAGNOSIS — D68.51 FACTOR V LEIDEN MUTATION (H): Primary | ICD-10-CM

## 2022-08-16 DIAGNOSIS — Z86.718 PERSONAL HISTORY OF DVT (DEEP VEIN THROMBOSIS): ICD-10-CM

## 2022-08-16 LAB — INR (EXTERNAL): 2.7 (ref 0.9–1.1)

## 2022-08-16 NOTE — PROGRESS NOTES
ANTICOAGULATION  MANAGEMENT-Home Monitor Managed by Exception    Kannan Wolf 72 year old male is on warfarin with therapeutic INR result. (Goal INR 2.5-3.5)    Recent labs: (last 7 days)     08/16/22  1120   INR 2.7*         Previous INR was Therapeutic    Medication, diet, health changes since last INR:chart reviewed; none identified    Contacted within the last 12 weeks by phone on 8/2/22      CHRISTINE     Kannan was NOT contacted regarding therapeutic result today per home monitoring policy manage by exception agreement.   Current warfarin dose is to be continued:     Summary  As of 8/16/2022    Full warfarin instructions:  10 mg every day   Next INR check:  8/23/2022           ?   Sherice Sams RN  Anticoagulation Clinic  8/16/2022    _______________________________________________________________________     Anticoagulation Episode Summary     Current INR goal:  2.5-3.5   TTR:  66.9 % (6 mo)   Target end date:  Indefinite   Send INR reminders to:  [facility]    Indications    Factor V Leiden mutation (H) [D68.51]  Personal history of DVT (deep vein thrombosis) [Z86.718]           Comments:  JODI Home Meter // Manage by exception         Anticoagulation Care Providers     Provider Role Specialty Phone number    David Jenkins MD Referring Family Medicine [phone]

## 2022-08-23 ENCOUNTER — TRANSFERRED RECORDS (OUTPATIENT)
Dept: HEALTH INFORMATION MANAGEMENT | Facility: CLINIC | Age: 72
End: 2022-08-23

## 2022-08-23 LAB — INR (EXTERNAL): 2.1 (ref 0.9–1.1)

## 2022-08-24 ENCOUNTER — ANTICOAGULATION THERAPY VISIT (OUTPATIENT)
Dept: ANTICOAGULATION | Facility: CLINIC | Age: 72
End: 2022-08-24

## 2022-08-24 DIAGNOSIS — Z86.718 PERSONAL HISTORY OF DVT (DEEP VEIN THROMBOSIS): ICD-10-CM

## 2022-08-24 DIAGNOSIS — D68.51 FACTOR V LEIDEN MUTATION (H): Primary | ICD-10-CM

## 2022-08-24 NOTE — PROGRESS NOTES
ANTICOAGULATION MANAGEMENT     Kannan DODSON Maryann 72 year old male is on warfarin with subtherapeutic INR result. (Goal INR 2.5-3.5)    Recent labs: (last 7 days)     08/23/22  0000   INR 2.1*       ASSESSMENT       Source(s): Chart Review and Patient/Caregiver Call       Warfarin doses taken: Warfarin taken as instructed    Diet: Increased greens/vitamin K in diet; plans to resume previous intake    New illness, injury, or hospitalization: No    Medication/supplement changes: glucosamine for leg pain    Signs or symptoms of bleeding or clotting: No    Previous INR: Therapeutic last 2(+) visits    Additional findings: None       PLAN     Recommended plan for temporary change(s) affecting INR     Dosing Instructions: booster dose then continue your current warfarin dose with next INR in 1 week       Summary  As of 8/24/2022    Full warfarin instructions:  8/24: 15 mg; Otherwise 10 mg every day   Next INR check:  8/30/2022             Telephone call with Kannan who verbalizes understanding and agrees to plan    Patient to recheck with home meter    Education provided: Importance of consistent vitamin K intake    Plan made per [facility] anticoagulation protocol    Juliane Martin RN  Anticoagulation Clinic  8/24/2022    _______________________________________________________________________     Anticoagulation Episode Summary     Current INR goal:  2.5-3.5   TTR:  65.6 % (6.2 mo)   Target end date:  Indefinite   Send INR reminders to:  [facility]    Indications    Factor V Leiden mutation (H) [D68.51]  Personal history of DVT (deep vein thrombosis) [Z86.718]           Comments:  MDINR Home Meter // Manage by exception         Anticoagulation Care Providers     Provider Role Specialty Phone number    David Jenkins MD Referring Family Medicine [phone]

## 2022-08-30 ENCOUNTER — TRANSFERRED RECORDS (OUTPATIENT)
Dept: HEALTH INFORMATION MANAGEMENT | Facility: CLINIC | Age: 72
End: 2022-08-30

## 2022-08-31 ENCOUNTER — ANTICOAGULATION THERAPY VISIT (OUTPATIENT)
Dept: ANTICOAGULATION | Facility: CLINIC | Age: 72
End: 2022-08-31

## 2022-08-31 DIAGNOSIS — Z86.718 PERSONAL HISTORY OF DVT (DEEP VEIN THROMBOSIS): ICD-10-CM

## 2022-08-31 DIAGNOSIS — D68.51 FACTOR V LEIDEN MUTATION (H): Primary | ICD-10-CM

## 2022-08-31 LAB — INR (EXTERNAL): 2.8 (ref 0.9–1.1)

## 2022-08-31 NOTE — PROGRESS NOTES
ANTICOAGULATION MANAGEMENT     Kannan Wolf 72 year old male is on warfarin with therapeutic INR result. (Goal INR 2.5-3.5)    Recent labs: (last 7 days)     08/31/22  0000   INR 2.8*       ASSESSMENT       Source(s): Chart Review    Previous INR was Subtherapeutic    Medication, diet, health changes since last INR chart reviewed; none identified           PLAN     Recommended plan for no diet, medication or health factor changes affecting INR     Dosing Instructions: Continue your current warfarin dose with next INR in 1 week       Summary  As of 8/31/2022    Full warfarin instructions:  10 mg every day   Next INR check:  9/7/2022             Detailed voice message left for Kannan with dosing instructions and follow up date.     Patient to recheck with home meter    Education provided: Please call back if any changes to your diet, medications or how you've been taking warfarin    Plan made per [facility] anticoagulation protocol    Taurus Rg RN  Anticoagulation Clinic  8/31/2022    _______________________________________________________________________     Anticoagulation Episode Summary     Current INR goal:  2.5-3.5   TTR:  64.7 % (6.4 mo)   Target end date:  Indefinite   Send INR reminders to:  [facility]    Indications    Factor V Leiden mutation (H) [D68.51]  Personal history of DVT (deep vein thrombosis) [Z86.718]           Comments:  [employer] Home Meter // Manage by exception         Anticoagulation Care Providers     Provider Role Specialty Phone number    David Jenkins MD Referring Family Medicine [phone]

## 2022-09-06 ENCOUNTER — TRANSFERRED RECORDS (OUTPATIENT)
Dept: HEALTH INFORMATION MANAGEMENT | Facility: CLINIC | Age: 72
End: 2022-09-06

## 2022-09-06 ENCOUNTER — DOCUMENTATION ONLY (OUTPATIENT)
Dept: ANTICOAGULATION | Facility: CLINIC | Age: 72
End: 2022-09-06

## 2022-09-06 DIAGNOSIS — D68.51 FACTOR V LEIDEN MUTATION (H): Primary | ICD-10-CM

## 2022-09-06 DIAGNOSIS — Z86.718 PERSONAL HISTORY OF DVT (DEEP VEIN THROMBOSIS): ICD-10-CM

## 2022-09-06 LAB — INR (EXTERNAL): 3.4 (ref 0.9–1.1)

## 2022-09-06 NOTE — PROGRESS NOTES
ANTICOAGULATION  MANAGEMENT-Home Monitor Managed by Exception    Kannan Wolf 72 year old male is on warfarin with therapeutic INR result. (Goal INR 2.5-3.5)    Recent labs: (last 7 days)     09/06/22  1200   INR 3.4*         Previous INR was Therapeutic    Medication, diet, health changes since last INR:chart reviewed; none identified    Contacted within the last 12 weeks by phone on 8/31/22      CHRISTINE     Kannan was NOT contacted regarding therapeutic result today per home monitoring policy manage by exception agreement.   Current warfarin dose is to be continued:     Summary  As of 9/6/2022    Full warfarin instructions:  10 mg every day   Next INR check:  9/13/2022           ?   Miracle Salgado, RN  Anticoagulation Clinic  9/6/2022    _______________________________________________________________________     Anticoagulation Episode Summary     Current INR goal:  2.5-3.5   TTR:  65.9 % (6.7 mo)   Target end date:  Indefinite   Send INR reminders to:  [facility]    Indications    Factor V Leiden mutation (H) [D68.51]  Personal history of DVT (deep vein thrombosis) [Z86.718]           Comments:  [facility] Home Meter // Manage by exception         Anticoagulation Care Providers     Provider Role Specialty Phone number    David Jenkins MD Referring Family Medicine [phone]

## 2022-09-13 ENCOUNTER — DOCUMENTATION ONLY (OUTPATIENT)
Dept: ANTICOAGULATION | Facility: CLINIC | Age: 72
End: 2022-09-13

## 2022-09-13 ENCOUNTER — TRANSFERRED RECORDS (OUTPATIENT)
Dept: HEALTH INFORMATION MANAGEMENT | Facility: CLINIC | Age: 72
End: 2022-09-13

## 2022-09-13 DIAGNOSIS — Z86.718 PERSONAL HISTORY OF DVT (DEEP VEIN THROMBOSIS): ICD-10-CM

## 2022-09-13 DIAGNOSIS — D68.51 FACTOR V LEIDEN MUTATION (H): Primary | ICD-10-CM

## 2022-09-13 LAB — INR (EXTERNAL): 2.7 (ref 0.9–1.1)

## 2022-09-13 NOTE — PROGRESS NOTES
ANTICOAGULATION  MANAGEMENT-Home Monitor Managed by Exception    Kannan Wolf 72 year old male is on warfarin with therapeutic INR result. (Goal INR 2.5-3.5)    Recent labs: (last 7 days)     09/13/22  1010   INR 2.7*         Previous INR was Therapeutic    Medication, diet, health changes since last INR:chart reviewed; none identified    Contacted within the last 12 weeks by phone on 8/31/22      CHRISTINE     Kannan was NOT contacted regarding therapeutic result today per home monitoring policy manage by exception agreement.   Current warfarin dose is to be continued:     Summary  As of 9/13/2022    Full warfarin instructions:  10 mg every day   Next INR check:  9/20/2022           ?   Sherice Sams RN  Anticoagulation Clinic  9/13/2022    _______________________________________________________________________     Anticoagulation Episode Summary     Current INR goal:  2.5-3.5   TTR:  67.0 % (6.9 mo)   Target end date:  Indefinite   Send INR reminders to:  [facility]    Indications    Factor V Leiden mutation (H) [D68.51]  Personal history of DVT (deep vein thrombosis) [Z86.718]           Comments:  [facility]I Home Meter // Manage by exception         Anticoagulation Care Providers     Provider Role Specialty Phone number    David Jenkins MD Referring Family Medicine [phone]

## 2022-09-20 ENCOUNTER — DOCUMENTATION ONLY (OUTPATIENT)
Dept: ANTICOAGULATION | Facility: CLINIC | Age: 72
End: 2022-09-20

## 2022-09-20 DIAGNOSIS — D68.51 FACTOR V LEIDEN MUTATION (H): Primary | ICD-10-CM

## 2022-09-20 DIAGNOSIS — Z86.718 PERSONAL HISTORY OF DVT (DEEP VEIN THROMBOSIS): ICD-10-CM

## 2022-09-20 LAB — INR (EXTERNAL): 2.5 (ref 0.9–1.1)

## 2022-09-20 NOTE — PROGRESS NOTES
ANTICOAGULATION  MANAGEMENT-Home Monitor Managed by Exception    Kannan Wolf 72 year old male is on warfarin with therapeutic INR result. (Goal INR 2.5-3.5)    Recent labs: (last 7 days)     09/20/22  1017   INR 2.5*         Previous INR was Therapeutic    Medication, diet, health changes since last INR:chart reviewed; none identified    Contacted within the last 12 weeks by phone on 8/31/22      CHRISTINE     Kannan was NOT contacted regarding therapeutic result today per home monitoring policy manage by exception agreement.   Current warfarin dose is to be continued:     Summary  As of 9/20/2022    Full warfarin instructions:  10 mg every day   Next INR check:  9/27/2022           ?   Sherice Sams RN  Anticoagulation Clinic  9/20/2022    _______________________________________________________________________     Anticoagulation Episode Summary     Current INR goal:  2.5-3.5   TTR:  68.1 % (7.1 mo)   Target end date:  Indefinite   Send INR reminders to:  [facility]    Indications    Factor V Leiden mutation (H) [D68.51]  Personal history of DVT (deep vein thrombosis) [Z86.718]           Comments:  [employer] Home Meter // Manage by exception         Anticoagulation Care Providers     Provider Role Specialty Phone number    David Jenkins MD Referring Family Medicine [phone]

## 2022-09-27 ENCOUNTER — TRANSFERRED RECORDS (OUTPATIENT)
Dept: HEALTH INFORMATION MANAGEMENT | Facility: CLINIC | Age: 72
End: 2022-09-27

## 2022-09-27 ENCOUNTER — NURSE TRIAGE (OUTPATIENT)
Dept: NURSING | Facility: CLINIC | Age: 72
End: 2022-09-27

## 2022-09-27 ENCOUNTER — ANTICOAGULATION THERAPY VISIT (OUTPATIENT)
Dept: ANTICOAGULATION | Facility: CLINIC | Age: 72
End: 2022-09-27

## 2022-09-27 ENCOUNTER — VIRTUAL VISIT (OUTPATIENT)
Dept: FAMILY MEDICINE | Facility: CLINIC | Age: 72
End: 2022-09-27
Payer: COMMERCIAL

## 2022-09-27 DIAGNOSIS — Z86.718 PERSONAL HISTORY OF DVT (DEEP VEIN THROMBOSIS): ICD-10-CM

## 2022-09-27 DIAGNOSIS — D68.51 FACTOR V LEIDEN MUTATION (H): Primary | ICD-10-CM

## 2022-09-27 DIAGNOSIS — M79.662 PAIN OF LEFT LOWER LEG: Primary | ICD-10-CM

## 2022-09-27 LAB — INR (EXTERNAL): 2.1 (ref 0.9–1.1)

## 2022-09-27 PROCEDURE — 99213 OFFICE O/P EST LOW 20 MIN: CPT | Mod: 95 | Performed by: FAMILY MEDICINE

## 2022-09-27 NOTE — PROGRESS NOTES
Kannan is a 72 year old who is being evaluated via a billable video visit.      How would you like to obtain your AVS? MyChart  If the video visit is dropped, the invitation should be resent by: Text to cell phone: [phone]  Will anyone else be joining your video visit? No          Assessment & Plan     Pain of left lower leg  Patient with pain in his left calf just tenderness no redness.  Given his history of having clots with INR is below 2.5 we will have him go to the ER for further evaluation.                   No follow-ups on file.    David Jenkins MD  [facility]    Subjective   Kannan is a 72 year old presenting for the following health issues: concerned about a possible blood clot in left calf, no discoloration, foot is cold, warm to touch, hx blood clts, symptoms the past few days, INR 2.1 today, also tested positive for COVID yesterday - symptoms started 9/24/22  Covid Concern and Deep Vein Thrombosis      HPI           Review of Systems         Objective           Vitals:  No vitals were obtained today due to virtual visit.    Physical Exam   GENERAL: Healthy, alert and no distress  PSYCH: Mentation appears normal, affect normal/bright, judgement and insight intact, normal speech and appearance well-groomed.                Telephone  Telephone start time 11:00    Type of service:  Telephone  Telephone end time 1105  Originating Location (pt. Location):  Home    Distant Location (provider location):  [facility]     Platform used for [employer] for telephone call

## 2022-09-27 NOTE — TELEPHONE ENCOUNTER
Have him do a video visit with me actually instead of going to the ER we can do that this morning at 1120

## 2022-09-27 NOTE — PROGRESS NOTES
ANTICOAGULATION MANAGEMENT     Kannan WEST Wolf 72 year old male is on warfarin with subtherapeutic INR result. (Goal INR 2.5-3.5)    INR   Date Value Ref Range Status   10/08/2020 0.99 0.86 - 1.14 Final     INR (External)   Date Value Ref Range Status   09/27/2022 2.1 (A) 0.9 - 1.1 Final        Signs or symptoms of bleeding or clotting: Yes: See nurse triage encounter from 09/27/22    Additional findings: patient has video appt with PCP at 1120 today - patient was advised ED due to history of blood clots and subtherapeutic INR today - patient also tested positive for COVID yesterday (9/26/22) - patient currently in ED [place] will follow up with patient once discharged - INR reminder set to critical for follow up from [facility] RN tomorrow.     Pta Lott RN  Anticoagulation Clinic  9/27/2022

## 2022-09-28 ENCOUNTER — ANTICOAGULATION THERAPY VISIT (OUTPATIENT)
Dept: ANTICOAGULATION | Facility: CLINIC | Age: 72
End: 2022-09-28

## 2022-09-28 DIAGNOSIS — Z86.718 PERSONAL HISTORY OF DVT (DEEP VEIN THROMBOSIS): ICD-10-CM

## 2022-09-28 DIAGNOSIS — D68.51 FACTOR V LEIDEN MUTATION (H): Primary | ICD-10-CM

## 2022-09-28 NOTE — PROGRESS NOTES
ANTICOAGULATION MANAGEMENT     Kannan WEST Wolf 72 year old male is on warfarin with subtherapeutic INR result. (Goal INR 2.5-3.5)    Recent labs: (last 7 days)     09/27/22  1013   INR 2.1*       ASSESSMENT       Source(s): Chart Review and Patient/Caregiver Call       Warfarin doses taken: Warfarin taken as instructed    Diet: No new diet changes identified    New illness, injury, or hospitalization: Yes: ED 9/27 for clot like symptoms - nothing was seen on ultrasound advised to monitor area     Medication/supplement changes: None noted    Signs or symptoms of bleeding or clotting: Yes: See encounter with triage from 9/28- unchanged today per patient     Previous INR: Therapeutic last 2(+) visits    Additional findings: None       PLAN     Recommended plan for temporary change(s) affecting INR     Dosing Instructions: booster dose then continue your current warfarin dose with next INR in 1 week       Summary  As of 9/28/2022    Full warfarin instructions:  9/28: 15 mg; Otherwise 10 mg every day   Next INR check:  10/4/2022             Telephone call with Kannan who verbalizes understanding and agrees to plan and who agrees to plan and repeated back plan correctly    Patient to recheck with home meter    Education provided: Goal range and significance of current result, Importance of therapeutic range, Monitoring for clotting signs and symptoms and When to seek medical attention/emergency care    Plan made per [facility] anticoagulation protocol    Pat Lott RN  Anticoagulation Clinic  9/28/2022    _______________________________________________________________________     Anticoagulation Episode Summary     Current INR goal:  2.5-3.5   TTR:  66.0 % (7.4 mo)   Target end date:  Indefinite   Send INR reminders to:  [facility] MONITOR[facility]    Indications    Factor V Leiden mutation (H) [D68.51]  Personal history of DVT (deep vein thrombosis) [Z86.448]           Comments:  BENR Home Meter // Manage by exception          Anticoagulation Care Providers     Provider Role Specialty Phone number    David Jenkins MD Referring Family Medicine [phone]

## 2022-10-03 ENCOUNTER — HEALTH MAINTENANCE LETTER (OUTPATIENT)
Age: 72
End: 2022-10-03

## 2022-10-04 ENCOUNTER — ANTICOAGULATION THERAPY VISIT (OUTPATIENT)
Dept: ANTICOAGULATION | Facility: CLINIC | Age: 72
End: 2022-10-04

## 2022-10-04 ENCOUNTER — TRANSFERRED RECORDS (OUTPATIENT)
Dept: HEALTH INFORMATION MANAGEMENT | Facility: CLINIC | Age: 72
End: 2022-10-04

## 2022-10-04 DIAGNOSIS — D68.51 FACTOR V LEIDEN MUTATION (H): Primary | ICD-10-CM

## 2022-10-04 DIAGNOSIS — Z86.718 PERSONAL HISTORY OF DVT (DEEP VEIN THROMBOSIS): ICD-10-CM

## 2022-10-04 LAB — INR (EXTERNAL): 3.6 (ref 0.9–1.1)

## 2022-10-04 NOTE — PROGRESS NOTES
ANTICOAGULATION MANAGEMENT     Kannan DODSON Cudwayne 72 year old male is on warfarin with supratherapeutic INR result. (Goal INR 2.5-3.5)    Recent labs: (last 7 days)     10/04/22  1323   INR 3.6*       ASSESSMENT       Source(s): Chart Review and Patient/Caregiver Call       Warfarin doses taken: Warfarin taken as instructed and Boost dose given last week    Diet: No new diet changes identified    New illness, injury, or hospitalization: No    Medication/supplement changes: None noted    Signs or symptoms of bleeding or clotting: No    Previous INR: Supratherapeutic    Additional findings: None       PLAN     Recommended plan for no diet, medication or health factor changes affecting INR     Dosing Instructions: Continue your current warfarin dose with next INR in 1 week       Summary  As of 10/4/2022    Full warfarin instructions:  10 mg every day   Next INR check:  10/11/2022             Detailed voice message left for Kannan with dosing instructions and follow up date.   Sent HomeCon message with dosing and follow up instructions    Patient to recheck with home meter    Education provided: Please call back if any changes to your diet, medications or how you've been taking warfarin, Monitoring for bleeding signs and symptoms and Monitoring for clotting signs and symptoms    Plan made per [facility] anticoagulation protocol    Miracle Salgado, RN  Anticoagulation Clinic  10/4/2022    _______________________________________________________________________     Anticoagulation Episode Summary     Current INR goal:  2.5-3.5   TTR:  65.9 % (7.6 mo)   Target end date:  Indefinite   Send INR reminders to:  [facility] HOME MONITORING    Indications    Factor V Leiden mutation (H) [D68.51]  Personal history of DVT (deep vein thrombosis) [Z86.718]           Comments:  MDINR Home Meter // Manage by exception         Anticoagulation Care Providers     Provider Role Specialty Phone number    David Jenkins MD Referring Family Medicine  [phone]

## 2022-10-11 ENCOUNTER — TRANSFERRED RECORDS (OUTPATIENT)
Dept: HEALTH INFORMATION MANAGEMENT | Facility: CLINIC | Age: 72
End: 2022-10-11

## 2022-10-11 LAB — INR (EXTERNAL): 4.2 (ref 0.9–1.1)

## 2022-10-12 ENCOUNTER — ANTICOAGULATION THERAPY VISIT (OUTPATIENT)
Dept: ANTICOAGULATION | Facility: CLINIC | Age: 72
End: 2022-10-12

## 2022-10-12 DIAGNOSIS — Z86.718 PERSONAL HISTORY OF DVT (DEEP VEIN THROMBOSIS): ICD-10-CM

## 2022-10-12 DIAGNOSIS — D68.51 FACTOR V LEIDEN MUTATION (H): Primary | ICD-10-CM

## 2022-10-12 NOTE — PROGRESS NOTES
ANTICOAGULATION MANAGEMENT     Kannan DODSON Cudwayne 72 year old male is on warfarin with supratherapeutic INR result. (Goal INR 2.5-3.5)    Recent labs: (last 7 days)     10/12/22  0000   INR 4.2*       ASSESSMENT       Source(s): Chart Review and Patient/Caregiver Call       Warfarin doses taken: Warfarin taken as instructed    Diet: No new diet changes identified    New illness, injury, or hospitalization: Yes: Covid 2 weeks ago monday    Medication/supplement changes: Sudafed for congestion    Signs or symptoms of bleeding or clotting: Yes: small cuts from working with hands    Previous INR: Supratherapeutic    Additional findings: None       PLAN     Recommended plan for temporary change(s) affecting INR     Dosing Instructions: partial hold then decrease your warfarin dose (3.6% change) with next INR in 1 week       Summary  As of 10/12/2022    Full warfarin instructions:  10/12: 5 mg; Otherwise 7.5 mg every Sat; 10 mg all other days   Next INR check:  10/18/2022             Telephone call with Kannan who verbalizes understanding and agrees to plan    Patient to recheck with home meter    Education provided: None required    Plan made with [facility] Pharmacist Zofia Martin, RN  Anticoagulation Clinic  10/12/2022    _______________________________________________________________________     Anticoagulation Episode Summary     Current INR goal:  2.5-3.5   TTR:  64.1 % (7.8 mo)   Target end date:  Indefinite   Send INR reminders to:  [facility] HOME MONITORING    Indications    Factor V Leiden mutation (H) [D68.51]  Personal history of DVT (deep vein thrombosis) [Z86.718]           Comments:  MDINR Home Meter // Manage by exception         Anticoagulation Care Providers     Provider Role Specialty Phone number    David Jenkins MD Referring Family Medicine [phone]

## 2022-10-18 ENCOUNTER — ANTICOAGULATION THERAPY VISIT (OUTPATIENT)
Dept: ANTICOAGULATION | Facility: CLINIC | Age: 72
End: 2022-10-18

## 2022-10-18 ENCOUNTER — TRANSFERRED RECORDS (OUTPATIENT)
Dept: HEALTH INFORMATION MANAGEMENT | Facility: CLINIC | Age: 72
End: 2022-10-18

## 2022-10-18 DIAGNOSIS — Z86.718 PERSONAL HISTORY OF DVT (DEEP VEIN THROMBOSIS): ICD-10-CM

## 2022-10-18 DIAGNOSIS — D68.51 FACTOR V LEIDEN MUTATION (H): Primary | ICD-10-CM

## 2022-10-18 LAB — INR (EXTERNAL): 2.4 (ref 0.9–1.1)

## 2022-10-18 NOTE — PROGRESS NOTES
ANTICOAGULATION MANAGEMENT     Kannan DODSON Cudwayne 72 year old male is on warfarin with subtherapeutic INR result. (Goal INR 2.5-3.5)    Recent labs: (last 7 days)     10/18/22  0000   INR 2.4*       ASSESSMENT       Source(s): Chart Review and Patient/Caregiver Call       Warfarin doses taken: Warfarin taken as instructed - partial hold last week due to supratherapeutic INR could be impacting today's result    Diet: No new diet changes identified    New illness, injury, or hospitalization: No    Medication/supplement changes: None noted    Signs or symptoms of bleeding or clotting: No    Previous INR: Supratherapeutic    Additional findings: None       PLAN     Recommended plan for temporary change(s) affecting INR     Dosing Instructions: Continue your current warfarin dose with next INR in 1 week       Summary  As of 10/18/2022    Full warfarin instructions:  7.5 mg every Sat; 10 mg all other days   Next INR check:  10/25/2022             Telephone call with Kannan who verbalizes understanding and agrees to plan    Patient to recheck with home meter    Education provided: Goal range and significance of current result, Importance of therapeutic range and Importance of following up at instructed interval    Plan made with [facility] Pharmacist Zofia Rg, RN  Anticoagulation Clinic  10/18/2022    _______________________________________________________________________     Anticoagulation Episode Summary     Current INR goal:  2.5-3.5   TTR:  63.8 % (8 mo)   Target end date:  Indefinite   Send INR reminders to:  [facility] HOME MONITORING    Indications    Factor V Leiden mutation (H) [D68.51]  Personal history of DVT (deep vein thrombosis) [Z86.718]           Comments:  MDINR Home Meter // Manage by exception         Anticoagulation Care Providers     Provider Role Specialty Phone number    David Jenkins MD Referring Family Medicine [phone]

## 2022-10-19 DIAGNOSIS — R91.8 LUNG NODULES: Primary | ICD-10-CM

## 2022-10-25 ENCOUNTER — TRANSFERRED RECORDS (OUTPATIENT)
Dept: HEALTH INFORMATION MANAGEMENT | Facility: CLINIC | Age: 72
End: 2022-10-25

## 2022-10-25 ENCOUNTER — ANTICOAGULATION THERAPY VISIT (OUTPATIENT)
Dept: ANTICOAGULATION | Facility: CLINIC | Age: 72
End: 2022-10-25

## 2022-10-25 DIAGNOSIS — Z86.718 PERSONAL HISTORY OF DVT (DEEP VEIN THROMBOSIS): ICD-10-CM

## 2022-10-25 DIAGNOSIS — D68.51 FACTOR V LEIDEN MUTATION (H): Primary | ICD-10-CM

## 2022-10-25 LAB — INR (EXTERNAL): 2.3 (ref 0.9–1.1)

## 2022-10-25 NOTE — PROGRESS NOTES
ANTICOAGULATION MANAGEMENT     Kannan DODSON Cudwayne 72 year old male is on warfarin with subtherapeutic INR result. (Goal INR 2.5-3.5)    Recent labs: (last 7 days)     10/25/22  0000   INR 2.3*       ASSESSMENT       Source(s): Chart Review and Patient/Caregiver Call       Warfarin doses taken: Warfarin taken as instructed    Diet: No new diet changes identified    New illness, injury, or hospitalization: No, exposure to Covid again last Saturday    Medication/supplement changes: None noted    Signs or symptoms of bleeding or clotting: No    Previous INR: Subtherapeutic    Additional findings: None       PLAN     Recommended plan for no diet, medication or health factor changes affecting INR     Dosing Instructions: booster dose then Increase your warfarin dose (3.7% change) with next INR in 1 week       Summary  As of 10/25/2022    Full warfarin instructions:  10/25: 15 mg; Otherwise 10 mg every day; Starting 10/25/2022   Next INR check:  11/1/2022             Telephone call with Kannan who verbalizes understanding and agrees to plan    Patient to recheck with home meter    Education provided:     None required    Plan made with [facility] Pharmacist Zofia Martin, RN  Anticoagulation Clinic  10/25/2022    _______________________________________________________________________     Anticoagulation Episode Summary     Current INR goal:  2.5-3.5   TTR:  62.0 % (8.3 mo)   Target end date:  Indefinite   Send INR reminders to:  [facility] HOME MONITORING    Indications    Factor V Leiden mutation (H) [D68.51]  Personal history of DVT (deep vein thrombosis) [Z86.718]           Comments:  MDINGRETA Home Meter // Manage by exception         Anticoagulation Care Providers     Provider Role Specialty Phone number    David Jenkins MD Referring Family Medicine [phone]

## 2022-10-31 ENCOUNTER — ANCILLARY PROCEDURE (OUTPATIENT)
Dept: CT IMAGING | Facility: CLINIC | Age: 72
End: 2022-10-31
Attending: CLINICAL NURSE SPECIALIST
Payer: COMMERCIAL

## 2022-10-31 DIAGNOSIS — R91.8 LUNG NODULES: ICD-10-CM

## 2022-10-31 PROCEDURE — 71250 CT THORAX DX C-: CPT | Mod: GC | Performed by: RADIOLOGY

## 2022-11-01 ENCOUNTER — ANTICOAGULATION THERAPY VISIT (OUTPATIENT)
Dept: ANTICOAGULATION | Facility: CLINIC | Age: 72
End: 2022-11-01

## 2022-11-01 ENCOUNTER — TRANSFERRED RECORDS (OUTPATIENT)
Dept: HEALTH INFORMATION MANAGEMENT | Facility: CLINIC | Age: 72
End: 2022-11-01

## 2022-11-01 DIAGNOSIS — Z86.718 PERSONAL HISTORY OF DVT (DEEP VEIN THROMBOSIS): ICD-10-CM

## 2022-11-01 DIAGNOSIS — D68.51 FACTOR V LEIDEN MUTATION (H): Primary | ICD-10-CM

## 2022-11-01 LAB — INR (EXTERNAL): 4.8 (ref 0.9–1.1)

## 2022-11-01 NOTE — PROGRESS NOTES
ANTICOAGULATION MANAGEMENT     Kannan Wolf 72 year old male is on warfarin with supratherapeutic INR result. (Goal INR 2.5-3.5)    Recent labs: (last 7 days)     11/01/22  1214   INR 4.8*       ASSESSMENT       Source(s): Chart Review and Patient/Caregiver Call       Warfarin doses taken: Booster dose(s) recently taken which may be affecting INR    Diet: No new diet changes identified    New illness, injury, or hospitalization: Yes: sinus congestion    Medication/supplement changes: taking Sudafed    Signs or symptoms of bleeding or clotting: No    Previous INR: Subtherapeutic    Additional findings: Noted that recent supratherapeutic INR dropped to 2.4 the following week with a moderate partial hold       PLAN     Recommended plan for temporary change(s) affecting INR     Dosing Instructions: partial hold then continue your current warfarin dose with next INR in 1 week       Summary  As of 11/1/2022    Full warfarin instructions:  11/1: 5 mg; Otherwise 10 mg every day; Starting 11/1/2022   Next INR check:  11/8/2022             Telephone call with Kannan who verbalizes understanding and agrees to plan    Patient to recheck with home meter    Education provided:     Goal range and lab monitoring: goal range and significance of current result    Symptom monitoring: monitoring for bleeding signs and symptoms    Plan made per [facility] anticoagulation protocol    Maricruz Cano RN  Anticoagulation Clinic  11/1/2022    _______________________________________________________________________     Anticoagulation Episode Summary     Current INR goal:  2.5-3.5   TTR:  61.0 % (8.5 mo)   Target end date:  Indefinite   Send INR reminders to:  [facility] HOME MONITORING    Indications    Factor V Leiden mutation (H) [D68.51]  Personal history of DVT (deep vein thrombosis) [Z86.718]           Comments:  OJDI Home Meter // Manage by exception         Anticoagulation Care Providers     Provider Role Specialty Phone number     David Jenkins MD [facility] Medicine [phone]

## 2022-11-08 ENCOUNTER — TRANSFERRED RECORDS (OUTPATIENT)
Dept: HEALTH INFORMATION MANAGEMENT | Facility: CLINIC | Age: 72
End: 2022-11-08

## 2022-11-08 ENCOUNTER — ANTICOAGULATION THERAPY VISIT (OUTPATIENT)
Dept: ANTICOAGULATION | Facility: CLINIC | Age: 72
End: 2022-11-08

## 2022-11-08 DIAGNOSIS — Z86.718 PERSONAL HISTORY OF DVT (DEEP VEIN THROMBOSIS): ICD-10-CM

## 2022-11-08 DIAGNOSIS — D68.51 FACTOR V LEIDEN MUTATION (H): Primary | ICD-10-CM

## 2022-11-08 LAB — INR (EXTERNAL): 2 (ref 0.9–1.1)

## 2022-11-08 NOTE — PROGRESS NOTES
ANTICOAGULATION MANAGEMENT     Kannan DODSON Maryann 72 year old male is on warfarin with subtherapeutic INR result. (Goal INR 2.5-3.5)    Recent labs: (last 7 days)     11/08/22  0000   INR 2.0*       ASSESSMENT       Source(s): Chart Review and Patient/Caregiver Call       Warfarin doses taken: Missed dose(s) may be affecting INR    Diet: No new diet changes identified    New illness, injury, or hospitalization: Congestion still present, slowly improving compared to last week    Medication/supplement changes: None noted    Signs or symptoms of bleeding or clotting: No    Previous INR: Supratherapeutic    Additional findings: None       PLAN     Recommended plan for temporary change(s) affecting INR     Dosing Instructions: booster dose then continue your current warfarin dose with next INR in 1 week       Summary  As of 11/8/2022    Full warfarin instructions:  11/8: 12.5 mg; Otherwise 10 mg every day; Starting 11/8/2022   Next INR check:  11/15/2022             Telephone call with Kannan who verbalizes understanding and agrees to plan    Patient to recheck with home meter    Education provided:     Goal range and lab monitoring: goal range and significance of current result    Plan made per [facility] anticoagulation protocol    Wilfred Nickerson RN  Anticoagulation Clinic  11/8/2022    _______________________________________________________________________     Anticoagulation Episode Summary     Current INR goal:  2.5-3.5   TTR:  60.3 % (8.7 mo)   Target end date:  Indefinite   Send INR reminders to:  ANTICOAG HOME MONITORING    Indications    Factor V Leiden mutation (H) [D68.51]  Personal history of DVT (deep vein thrombosis) [Z86.718]           Comments:  [employer] Home Meter // Manage by exception         Anticoagulation Care Providers     Provider Role Specialty Phone number    David Jenkins MD Referring Family Medicine [phone]

## 2022-11-15 ENCOUNTER — TRANSFERRED RECORDS (OUTPATIENT)
Dept: HEALTH INFORMATION MANAGEMENT | Facility: CLINIC | Age: 72
End: 2022-11-15

## 2022-11-15 ENCOUNTER — TELEPHONE (OUTPATIENT)
Dept: FAMILY MEDICINE | Facility: CLINIC | Age: 72
End: 2022-11-15

## 2022-11-15 ENCOUNTER — ANTICOAGULATION THERAPY VISIT (OUTPATIENT)
Dept: ANTICOAGULATION | Facility: CLINIC | Age: 72
End: 2022-11-15

## 2022-11-15 DIAGNOSIS — Z86.718 PERSONAL HISTORY OF DVT (DEEP VEIN THROMBOSIS): ICD-10-CM

## 2022-11-15 DIAGNOSIS — D68.51 FACTOR V LEIDEN MUTATION (H): Primary | ICD-10-CM

## 2022-11-15 LAB — INR (EXTERNAL): 3.9 (ref 0.9–1.1)

## 2022-11-15 NOTE — PROGRESS NOTES
ANTICOAGULATION MANAGEMENT     Kannan Wolf 72 year old male is on warfarin with supratherapeutic INR result. (Goal INR 2.5-3.5)    Recent labs: (last 7 days)     11/15/22  1356   INR 3.9*       ASSESSMENT       Source(s): Chart Review    Previous INR was Subtherapeutic    Medication, diet, health changes since last INR chart reviewed; none identified           PLAN     Unable to reach Иван today via phone.    Left message to continue current dose of warfarin 10 mg tonight and eat large serving of greens. Request call back for assessment.    [employer] sent with instructions.     Follow up required to assess for changes , discuss out of range result  and     Pat Lott RN  Anticoagulation Clinic  11/15/2022

## 2022-11-15 NOTE — TELEPHONE ENCOUNTER
Reason for Call:  Other returning call    Detailed comments: Patient returning call from inr nurse    Phone Number Patient can be reached at: Cell number on file:    Telephone Information:   Mobile [phone]       Best Time: Anytime    Can we leave a detailed message on this number? YES    Call taken on 11/15/2022 at 2:04 PM by Dilma Patel

## 2022-11-15 NOTE — PROGRESS NOTES
ANTICOAGULATION MANAGEMENT     Kannan DODSON Cudwayne 72 year old male is on warfarin with supratherapeutic INR result. (Goal INR 2.5-3.5)    Recent labs: (last 7 days)     11/15/22  1356   INR 3.9*       ASSESSMENT       Source(s): Chart Review and Patient/Caregiver Call       Warfarin doses taken: Warfarin taken as instructed    Diet: No new diet changes identified    New illness, injury, or hospitalization: No    Medication/supplement changes: None noted    Signs or symptoms of bleeding or clotting: No    Previous INR: Subtherapeutic    Additional findings: None       PLAN     Recommended plan for no diet, medication or health factor changes affecting INR     Dosing Instructions: Continue your current warfarin dose and will eat large helping of broccoli tonight with next INR in 1 week       Summary  As of 11/15/2022    Full warfarin instructions:  10 mg every day; Starting 11/15/2022   Next INR check:  11/22/2022             Telephone call with Kannan who verbalizes understanding and agrees to plan and who agrees to plan and repeated back plan correctly    Patient to recheck with home meter    Education provided:     Contact [phone]  with any changes, questions or concerns.     Plan made per [facility] anticoagulation protocol    Pat Lott RN  Anticoagulation Clinic  11/15/2022    _______________________________________________________________________     Anticoagulation Episode Summary     Current INR goal:  2.5-3.5   TTR:  60.2 % (9 mo)   Target end date:  Indefinite   Send INR reminders to:  [facility] HOME [facility]IT[facility]    Indications    Factor V Leiden mutation (H) [D68.51]  Personal history of DVT (deep vein thrombosis) [Z86.718]           Comments:  [employer]IN[employer] Home Meter // Manage by exception         Anticoagulation Care Providers     Provider Role Specialty Phone number    David Jenkins MD Referring Family Medicine [phone]

## 2022-11-17 ENCOUNTER — TRANSFERRED RECORDS (OUTPATIENT)
Dept: HEALTH INFORMATION MANAGEMENT | Facility: CLINIC | Age: 72
End: 2022-11-17

## 2022-11-22 ENCOUNTER — ANTICOAGULATION THERAPY VISIT (OUTPATIENT)
Dept: ANTICOAGULATION | Facility: CLINIC | Age: 72
End: 2022-11-22

## 2022-11-22 ENCOUNTER — TRANSFERRED RECORDS (OUTPATIENT)
Dept: HEALTH INFORMATION MANAGEMENT | Facility: CLINIC | Age: 72
End: 2022-11-22

## 2022-11-22 DIAGNOSIS — Z86.718 PERSONAL HISTORY OF DVT (DEEP VEIN THROMBOSIS): ICD-10-CM

## 2022-11-22 DIAGNOSIS — D68.51 FACTOR V LEIDEN MUTATION (H): Primary | ICD-10-CM

## 2022-11-22 LAB — INR (EXTERNAL): 3.3 (ref 0.9–1.1)

## 2022-11-22 NOTE — PROGRESS NOTES
ANTICOAGULATION MANAGEMENT     Kannan Wolf 72 year old male is on warfarin with therapeutic INR result. (Goal INR 2.5-3.5)    Recent labs: (last 7 days)     11/22/22  0000   INR 3.3*       ASSESSMENT       Source(s): Chart Review and Patient/Caregiver Call       Warfarin doses taken: Warfarin taken as instructed    Diet: No new diet changes identified-patient had one large serving of broccoli    New illness, injury, or hospitalization: No    Medication/supplement changes: None noted    Signs or symptoms of bleeding or clotting: No    Previous INR: Supratherapeutic    Additional findings: None       PLAN     Recommended plan for no diet, medication or health factor changes affecting INR     Dosing Instructions: Continue your current warfarin dose with next INR in 1 week       Summary  As of 11/22/2022    Full warfarin instructions:  10 mg every day; Starting 11/22/2022   Next INR check:  11/29/2022             Telephone call with Kannan who verbalizes understanding and agrees to plan    Patient to recheck with home meter    Education provided:     Dietary considerations: importance of consistent vitamin K intake    Resume manage by exception with home monitor. Continue to submit INR results to home monitor company.You will only be called when your result is out of range. Please call and notify [facility] if new medication started, dose missed, signs or symptoms of bleeding or clotting, or a surgery/procedure is scheduled.    Plan made per [facility] anticoagulation protocol    Carmen Leal RN  Anticoagulation Clinic  11/22/2022    _______________________________________________________________________     Anticoagulation Episode Summary     Current INR goal:  2.5-3.5   TTR:  59.6 % (9.2 mo)   Target end date:  Indefinite   Send INR reminders to:  [facility] HOME [facility]IT[facility]    Indications    Factor V Leiden mutation (H) [D68.51]  Personal history of DVT (deep vein thrombosis) [Z86.718]           Comments:  [employer] Home Meter  // Manage by exception         Anticoagulation Care Providers     Provider Role Specialty Phone number    David Jenkins MD Referring Family Medicine [phone]

## 2022-11-29 ENCOUNTER — TRANSFERRED RECORDS (OUTPATIENT)
Dept: HEALTH INFORMATION MANAGEMENT | Facility: CLINIC | Age: 72
End: 2022-11-29

## 2022-11-29 ENCOUNTER — DOCUMENTATION ONLY (OUTPATIENT)
Dept: ANTICOAGULATION | Facility: CLINIC | Age: 72
End: 2022-11-29

## 2022-11-29 DIAGNOSIS — Z86.718 PERSONAL HISTORY OF DVT (DEEP VEIN THROMBOSIS): ICD-10-CM

## 2022-11-29 DIAGNOSIS — D68.51 FACTOR V LEIDEN MUTATION (H): Primary | ICD-10-CM

## 2022-11-29 LAB — INR (EXTERNAL): 2.5 (ref 0.9–1.1)

## 2022-11-29 NOTE — PROGRESS NOTES
ANTICOAGULATION  MANAGEMENT-Home Monitor Managed by Exception    Kannan Wolf 72 year old male is on warfarin with therapeutic INR result. (Goal INR 2.5-3.5)    Recent labs: (last 7 days)     11/29/22  1455   INR 2.5*         Previous INR was Therapeutic    Medication, diet, health changes since last INR:chart reviewed; none identified    Contacted within the last 12 weeks by phone on 11/22/22      CHRISTINE     Kannan was NOT contacted regarding therapeutic result today per home monitoring policy manage by exception agreement.   Current warfarin dose is to be continued:     Summary  As of 11/29/2022    Full warfarin instructions:  10 mg every day; Starting 11/29/2022   Next INR check:  12/6/2022           ?   Miracle Salgado RN  Anticoagulation Clinic  11/29/2022    _______________________________________________________________________     Anticoagulation Episode Summary     Current INR goal:  2.5-3.5   TTR:  60.6 % (9.5 mo)   Target end date:  Indefinite   Send INR reminders to:  ANTICOAG HOME MONITORING    Indications    Factor V Leiden mutation (H) [D68.51]  Personal history of DVT (deep vein thrombosis) [Z86.718]           Comments:  [employer] Home Meter // Manage by exception         Anticoagulation Care Providers     Provider Role Specialty Phone number    David Jenkins MD Referring Family Medicine [phone]

## 2022-12-06 ENCOUNTER — ANTICOAGULATION THERAPY VISIT (OUTPATIENT)
Dept: ANTICOAGULATION | Facility: CLINIC | Age: 72
End: 2022-12-06

## 2022-12-06 ENCOUNTER — TRANSFERRED RECORDS (OUTPATIENT)
Dept: HEALTH INFORMATION MANAGEMENT | Facility: CLINIC | Age: 72
End: 2022-12-06

## 2022-12-06 DIAGNOSIS — D68.51 FACTOR V LEIDEN MUTATION (H): Primary | ICD-10-CM

## 2022-12-06 DIAGNOSIS — Z86.718 PERSONAL HISTORY OF DVT (DEEP VEIN THROMBOSIS): ICD-10-CM

## 2022-12-06 LAB — INR (EXTERNAL): 4.1 (ref 0.9–1.1)

## 2022-12-06 NOTE — PROGRESS NOTES
ANTICOAGULATION MANAGEMENT     Kannan DODSON Maryann 72 year old male is on warfarin with supratherapeutic INR result. (Goal INR 2.5-3.5)    Recent labs: (last 7 days)     12/06/22  0000   INR 4.1*       ASSESSMENT       Source(s): Chart Review and Patient/Caregiver Call       Warfarin doses taken: Warfarin taken as instructed    Diet: Change in alcohol intake may be affecting INR. patient had some alcohol over the weekend -green intake is sporadic    New illness, injury, or hospitalization: No    Medication/supplement changes: None noted    Signs or symptoms of bleeding or clotting: No    Previous INR: Therapeutic last 2(+) visits    Additional findings: None       PLAN     Recommended plan for temporary change(s) affecting INR     Dosing Instructions: partial hold then continue your current warfarin dose with next INR in 1 week       Summary  As of 12/6/2022    Full warfarin instructions:  12/6: 7.5 mg; Otherwise 10 mg every day; Starting 12/6/2022   Next INR check:  12/13/2022             Telephone call with Kannan who verbalizes understanding and agrees to plan    Patient to recheck with home meter    Education provided:     Healthy lifestyle considerations: limit alcohol intake to no more than 1 to 2 drinks in 24 hours if you choose to drink alcohol    Plan made per [facility] anticoagulation protocol    Carmen Leal RN  Anticoagulation Clinic  12/6/2022    _______________________________________________________________________     Anticoagulation Episode Summary     Current INR goal:  2.5-3.5   TTR:  60.6 % (9.7 mo)   Target end date:  Indefinite   Send INR reminders to:  ANTICOAG HOME MONITORING    Indications    Factor V Leiden mutation (H) [D68.51]  Personal history of DVT (deep vein thrombosis) [Z86.718]           Comments:  [employer] Home Meter // Manage by exception         Anticoagulation Care Providers     Provider Role Specialty Phone number    David Jenkins MD Referring Family Medicine [phone]

## 2022-12-13 ENCOUNTER — ANTICOAGULATION THERAPY VISIT (OUTPATIENT)
Dept: ANTICOAGULATION | Facility: CLINIC | Age: 72
End: 2022-12-13

## 2022-12-13 ENCOUNTER — TRANSFERRED RECORDS (OUTPATIENT)
Dept: HEALTH INFORMATION MANAGEMENT | Facility: CLINIC | Age: 72
End: 2022-12-13

## 2022-12-13 DIAGNOSIS — Z86.718 PERSONAL HISTORY OF DVT (DEEP VEIN THROMBOSIS): ICD-10-CM

## 2022-12-13 DIAGNOSIS — D68.51 FACTOR V LEIDEN MUTATION (H): Primary | ICD-10-CM

## 2022-12-13 LAB — INR (EXTERNAL): 2.4 (ref 0.9–1.1)

## 2022-12-13 NOTE — PROGRESS NOTES
ANTICOAGULATION MANAGEMENT     Kannan Wolf 72 year old male is on warfarin with therapeutic INR result. (Goal INR 2.5-3.5)    Recent labs: (last 7 days)     12/13/22  1555   INR 2.4*       ASSESSMENT       Source(s): Chart Review and Patient/Caregiver Call       Warfarin doses taken: Warfarin taken as instructed    Diet: No new diet changes identified    New illness, injury, or hospitalization: No    Medication/supplement changes: None noted    Signs or symptoms of bleeding or clotting: No    Previous INR: Supratherapeutic    Additional findings: None       PLAN     Recommended plan for no diet, medication or health factor changes affecting INR     Dosing Instructions: Continue your current warfarin dose with next INR in 1 week       Summary  As of 12/13/2022    Full warfarin instructions:  10 mg every day; Starting 12/13/2022   Next INR check:  12/20/2022             Sent [employer] message with dosing and follow up instructions    Patient to recheck with home meter    Education provided:     Please call back if any changes to your diet, medications or how you've been taking warfarin    Resume manage by exception with home monitor. Continue to submit INR results to home monitor company.You will only be called when your result is out of range. Please call and notify [facility] if new medication started, dose missed, signs or symptoms of bleeding or clotting, or a surgery/procedure is scheduled.    Plan made per [facility] anticoagulation protocol    Miracle Salgado RN  Anticoagulation Clinic  12/13/2022    _______________________________________________________________________     Anticoagulation Episode Summary     Current INR goal:  2.5-3.5   TTR:  60.6 % (9.9 mo)   Target end date:  Indefinite   Send INR reminders to:  ANTICOAG HOME MONITORING    Indications    Factor V Leiden mutation (H) [D68.51]  Personal history of DVT (deep vein thrombosis) [Z86.718]           Comments:  [employer] Home Meter // Manage by exception          Anticoagulation Care Providers     Provider Role Specialty Phone number    David Jenkins MD Referring Family Medicine [phone]

## 2022-12-20 ENCOUNTER — TRANSFERRED RECORDS (OUTPATIENT)
Dept: HEALTH INFORMATION MANAGEMENT | Facility: CLINIC | Age: 72
End: 2022-12-20

## 2022-12-20 ENCOUNTER — ANTICOAGULATION THERAPY VISIT (OUTPATIENT)
Dept: ANTICOAGULATION | Facility: CLINIC | Age: 72
End: 2022-12-20

## 2022-12-20 DIAGNOSIS — D68.51 FACTOR V LEIDEN MUTATION (H): Primary | ICD-10-CM

## 2022-12-20 DIAGNOSIS — Z86.718 PERSONAL HISTORY OF DVT (DEEP VEIN THROMBOSIS): ICD-10-CM

## 2022-12-20 LAB — INR (EXTERNAL): 2.8 (ref 0.9–1.1)

## 2022-12-20 NOTE — PROGRESS NOTES
ANTICOAGULATION MANAGEMENT     Kannan WEST Wolf 72 year old male is on warfarin with therapeutic INR result. (Goal INR 2.5-3.5)    Recent labs: (last 7 days)     12/20/22  1100   INR 2.8*       ASSESSMENT       Source(s): Chart Review and Patient/Caregiver Call       Warfarin doses taken: Warfarin taken as instructed    Diet: No new diet changes identified    New illness, injury, or hospitalization: No    Medication/supplement changes: None noted    Signs or symptoms of bleeding or clotting: Yes: one bloody nose last night that patient was able to stop. Discussed proper technique to stop bloody nose, and educated on using a humidifier and nasal saline spray. Patient will continue to monitor and will call back with further concerns.    Previous INR: Subtherapeutic    Additional findings: None       PLAN     Recommended plan for no diet, medication or health factor changes affecting INR     Dosing Instructions: Continue your current warfarin dose with next INR in 1 week       Summary  As of 12/20/2022    Full warfarin instructions:  10 mg every day; Starting 12/20/2022   Next INR check:  12/27/2022             Telephone call with Kannan who verbalizes understanding and agrees to plan    Patient to recheck with home meter    Education provided:     Please call back if any changes to your diet, medications or how you've been taking warfarin    Resume manage by exception with home monitor. Continue to submit INR results to home monitor company.You will only be called when your result is out of range. Please call and notify [facility] if new medication started, dose missed, signs or symptoms of bleeding or clotting, or a surgery/procedure is scheduled.    Plan made per [facility] anticoagulation protocol    Sherice Sams RN  Anticoagulation Clinic  12/20/2022    _______________________________________________________________________     Anticoagulation Episode Summary     Current INR goal:  2.5-3.5   TTR:  60.9 % (10.2 mo)   Target end  date:  Indefinite   Send INR reminders to:  ANTICOAG HOME MONITORING    Indications    Factor V Leiden mutation (H) [D68.51]  Personal history of DVT (deep vein thrombosis) [Z86.718]           Comments:  MDINR Home Meter // Manage by exception         Anticoagulation Care Providers     Provider Role Specialty Phone number    David Jenkins MD Referring Family Medicine [phone]

## 2022-12-26 DIAGNOSIS — Z86.718 H/O DEEP VENOUS THROMBOSIS: ICD-10-CM

## 2022-12-26 DIAGNOSIS — D68.51 FACTOR V LEIDEN MUTATION (H): ICD-10-CM

## 2022-12-26 DIAGNOSIS — E78.89 LIPIDS ABNORMAL: ICD-10-CM

## 2022-12-26 RX ORDER — ATORVASTATIN CALCIUM 20 MG/1
TABLET, FILM COATED ORAL
Qty: 90 TABLET | Refills: 1 | Status: SHIPPED | OUTPATIENT
Start: 2022-12-26 | End: 2023-07-28

## 2022-12-26 RX ORDER — WARFARIN SODIUM 5 MG/1
TABLET ORAL
Qty: 180 TABLET | Refills: 0 | Status: SHIPPED | OUTPATIENT
Start: 2022-12-26 | End: 2023-04-03

## 2022-12-26 NOTE — TELEPHONE ENCOUNTER
Prescription approved per [facility] Refill Protocol.    NEGRITA Escudero [facility]

## 2022-12-26 NOTE — TELEPHONE ENCOUNTER
ANTICOAGULATION MANAGEMENT:  Medication Refill    Anticoagulation Summary  As of 12/20/2022    Warfarin maintenance plan:  10 mg (5 mg x 2) every day   Next INR check:  12/27/2022   Target end date:  Indefinite    Indications    Factor V Leiden mutation (H) [D68.51]  Personal history of DVT (deep vein thrombosis) [Z86.718]             Anticoagulation Care Providers     Provider Role Specialty Phone number    David Jenkins MD Referring Family Medicine [phone]          Visit with referring provider/group within last year: Yes    [facility] referral signed within last year: Yes    Kannan meets all criteria for refill (current [facility] referral, office visit with referring provider/group in last year, lab monitoring up to date or not exceeding 2 weeks overdue). Rx instructions and quantity supplied updated to match patient's current dosing plan. 90 day supply with 0 refills granted per [facility] protocol     Mary Michelle RN  Anticoagulation Clinic

## 2022-12-27 ENCOUNTER — TRANSFERRED RECORDS (OUTPATIENT)
Dept: HEALTH INFORMATION MANAGEMENT | Facility: CLINIC | Age: 72
End: 2022-12-27

## 2022-12-27 ENCOUNTER — MYC MEDICAL ADVICE (OUTPATIENT)
Dept: ANTICOAGULATION | Facility: CLINIC | Age: 72
End: 2022-12-27

## 2022-12-27 ENCOUNTER — ANTICOAGULATION THERAPY VISIT (OUTPATIENT)
Dept: ANTICOAGULATION | Facility: CLINIC | Age: 72
End: 2022-12-27

## 2022-12-27 DIAGNOSIS — Z86.718 PERSONAL HISTORY OF DVT (DEEP VEIN THROMBOSIS): ICD-10-CM

## 2022-12-27 DIAGNOSIS — D68.51 FACTOR V LEIDEN MUTATION (H): Primary | ICD-10-CM

## 2022-12-27 LAB — INR (EXTERNAL): 2.3 (ref 0.9–1.1)

## 2022-12-27 NOTE — PROGRESS NOTES
ANTICOAGULATION MANAGEMENT     Kannan Wolf 72 year old male is on warfarin with subtherapeutic INR result. (Goal INR 2.5-3.5)    Recent labs: (last 7 days)     12/27/22  1302   INR 2.3*       ASSESSMENT       Source(s): Chart Review and Patient/Caregiver Call       Warfarin doses taken: Missed dose(s) may be affecting INR    Diet: No new diet changes identified    New illness, injury, or hospitalization: No    Medication/supplement changes: None noted    Signs or symptoms of bleeding or clotting: No    Previous INR: Therapeutic last visit; previously outside of goal range    Additional findings: patient took missed dose next morning       PLAN     Recommended plan for temporary change(s) affecting INR     Dosing Instructions: booster dose then continue your current warfarin dose with next INR in 1 week       Summary  As of 12/27/2022    Full warfarin instructions:  12/27: 12.5 mg; Otherwise 10 mg every day   Next INR check:  1/3/2023             Sent [employer] message with dosing and follow up instructions    Contact [phone]  to schedule and with any changes, questions or concerns.     Education provided:     None required    Plan made per [facility] anticoagulation protocol    Pat Lott RN  Anticoagulation Clinic  12/27/2022    _______________________________________________________________________     Anticoagulation Episode Summary     Current INR goal:  2.5-3.5   TTR:  61.0 % (10.4 mo)   Target end date:  Indefinite   Send INR reminders to:  ANTICOAG HOME MONITORING    Indications    Factor V Leiden mutation (H) [D68.51]  Personal history of DVT (deep vein thrombosis) [Z86.718]           Comments:  MDINR Home Meter // Manage by exception         Anticoagulation Care Providers     Provider Role Specialty Phone number    David Jenkins MD Referring Family Medicine [phone]

## 2022-12-27 NOTE — PROGRESS NOTES
ANTICOAGULATION MANAGEMENT     Kannan DODSON Cudd 72 year old male is on warfarin with subtherapeutic INR result. (Goal INR 2.5-3.5)    Recent labs: (last 7 days)     12/27/22  1302   INR 2.3*       ASSESSMENT       Source(s): Chart Review    Previous INR was Therapeutic last visit; previously outside of goal range    Medication, diet, health changes since last INR chart reviewed; none identified     Attempted to speak with patient x3 phone calls and call drops soon after patient answers - will send [employer]           PLAN     Recommended plan for no diet, medication or health factor changes affecting INR     Dosing Instructions: booster dose then continue your current warfarin dose with next INR in 1 week       Summary  As of 12/27/2022    Full warfarin instructions:  10 mg every day   Next INR check:  1/3/2023             Sent [employer] message with dosing and follow up instructions    Patient to recheck with home meter    Education provided:     Contact [phone]  with any changes, questions or concerns.     Plan made per [facility] anticoagulation protocol    Pat Lott RN  Anticoagulation Clinic  12/27/2022    _______________________________________________________________________     Anticoagulation Episode Summary     Current INR goal:  2.5-3.5   TTR:  61.0 % (10.4 mo)   Target end date:  Indefinite   Send INR reminders to:  ANTICOAG HOME MONITORING    Indications    Factor V Leiden mutation (H) [D68.51]  Personal history of DVT (deep vein thrombosis) [Z86.718]           Comments:  [employer] Home Meter // Manage by exception         Anticoagulation Care Providers     Provider Role Specialty Phone number    David Jenkins MD Referring Family Medicine [phone]

## 2023-01-03 ENCOUNTER — TRANSFERRED RECORDS (OUTPATIENT)
Dept: HEALTH INFORMATION MANAGEMENT | Facility: CLINIC | Age: 73
End: 2023-01-03

## 2023-01-03 LAB — INR (EXTERNAL): 2.3 (ref 0.9–1.1)

## 2023-01-04 ENCOUNTER — ANTICOAGULATION THERAPY VISIT (OUTPATIENT)
Dept: ANTICOAGULATION | Facility: CLINIC | Age: 73
End: 2023-01-04

## 2023-01-04 DIAGNOSIS — Z86.718 PERSONAL HISTORY OF DVT (DEEP VEIN THROMBOSIS): ICD-10-CM

## 2023-01-04 DIAGNOSIS — D68.51 FACTOR V LEIDEN MUTATION (H): Primary | ICD-10-CM

## 2023-01-04 NOTE — PROGRESS NOTES
ANTICOAGULATION MANAGEMENT     Kannan DODSON Maryann 72 year old male is on warfarin with subtherapeutic INR result. (Goal INR 2.5-3.5)    Recent labs: (last 7 days)     01/03/23  0000   INR 2.3*       ASSESSMENT       Source(s): Chart Review    Previous INR was Subtherapeutic    Medication, diet, health changes since last INR chart reviewed; none identified           PLAN     Unable to reach Иван today.    Left message to take a booster dose of warfarin,  15 mg tonight. Request call back for assessment. My chart message sent    Follow up required to discuss out of range result     Carmen Leal, RN  Anticoagulation Clinic  1/4/2023

## 2023-01-05 ENCOUNTER — TELEPHONE (OUTPATIENT)
Dept: FAMILY MEDICINE | Facility: CLINIC | Age: 73
End: 2023-01-05

## 2023-01-05 NOTE — TELEPHONE ENCOUNTER
Patient Returning Call    Reason for call:  Patient returning Myra from Anticoagulation's call    Information relayed to patient:  N/A    Patient has additional questions:  Yes    What are your questions/concerns:  Returning anticoagulation call    Could we send this information to you in [employer] or would you prefer to receive a phone call?:   Patient would prefer a phone call   Okay to leave a detailed message?: Yes at Home number on file [phone] (home)

## 2023-01-05 NOTE — PROGRESS NOTES
ANTICOAGULATION MANAGEMENT     Kannan Wolf 72 year old male is on warfarin with subtherapeutic INR result. (Goal INR 2.5-3.5)    Recent labs: (last 7 days)     01/03/23  0000   INR 2.3*       ASSESSMENT       Source(s): Chart Review    Previous INR was Subtherapeutic-patient had missed a dose in previous week and had less warfarin overall    Medication, diet, health changes since last INR chart reviewed; none identified           PLAN     Recommended plan for temporary change(s) affecting INR     Dosing Instructions: Continue your current warfarin dose with next INR in 6 days       Summary  As of 1/4/2023    Full warfarin instructions:  1/4: 15 mg; Otherwise 10 mg every day   Next INR check:  1/10/2023             Sent [employer] message with dosing and follow up instructions  No answer with second call in the evening and unable to leave message    Patient to recheck with home meter    Education provided:     Please call back if any changes to your diet, medications or how you've been taking warfarin    Taking warfarin: Importance of taking warfarin as instructed    Plan made per [facility] anticoagulation protocol    Carmen Leal, RN  Anticoagulation Clinic  1/5/2023    _______________________________________________________________________     Anticoagulation Episode Summary     Current INR goal:  2.5-3.5   TTR:  59.7 % (10.6 mo)   Target end date:  Indefinite   Send INR reminders to:  ANTICOAG HOME MONITORING    Indications    Factor V Leiden mutation (H) [D68.51]  Personal history of DVT (deep vein thrombosis) [Z86.718]           Comments:  [employer] Home Meter // Manage by exception         Anticoagulation Care Providers     Provider Role Specialty Phone number    David Jenkins MD Referring Family Medicine [phone]

## 2023-01-05 NOTE — PROGRESS NOTES
"ANTICOAGULATION MANAGEMENT     Kannan Wolf 72 year old male is on warfarin with subtherapeutic INR result. (Goal INR 2.5-3.5)    Recent labs: (last 7 days)     01/03/23  0000   INR 2.3*       ASSESSMENT       Source(s): Chart Review and Patient/Caregiver Call       Warfarin doses taken: Warfarin taken as instructed and self-adjusted dose after seeing result    Diet: Increased greens/vitamin K in diet; ongoing change -patient is in [place] and \"everything is green here\"    New illness, injury, or hospitalization: No    Medication/supplement changes: None noted    Signs or symptoms of bleeding or clotting: No    Previous INR: Subtherapeutic    Additional findings: None       PLAN     Recommended plan for ongoing change(s) affecting INR     Dosing Instructions: booster dose then Increase your warfarin dose (7.1% change) with next INR in 5 days.  Patient had self-adjusted dose so changed calendar to reflect the dose change as effective next week starting on Sunday.       Summary  As of 1/4/2023    Full warfarin instructions:  1/4: 12.5 mg; 1/5: 10 mg; Otherwise 12.5 mg every Sun, Thu; 10 mg all other days   Next INR check:  1/10/2023             Telephone call with Kannan who verbalizes understanding and agrees to plan  Sent [employer] message with dosing and follow up instructions    Patient to recheck with home meter    Education provided:     Dietary considerations: importance of consistent vitamin K intake and impact of vitamin K foods on INR    Plan made per [facility] anticoagulation protocol    Carmen Leal, RN  Anticoagulation Clinic  1/5/2023    _______________________________________________________________________     Anticoagulation Episode Summary     Current INR goal:  2.5-3.5   TTR:  59.7 % (10.6 mo)   Target end date:  Indefinite   Send INR reminders to:  ANTICOAG HOME MONITORING    Indications    Factor V Leiden mutation (H) [D68.51]  Personal history of DVT (deep vein thrombosis) [Z86.738]           " Comments:  MDINR Home Meter // Manage by exception         Anticoagulation Care Providers     Provider Role Specialty Phone number    David Jenkins MD Referring Family Medicine [phone]

## 2023-01-10 ENCOUNTER — ANTICOAGULATION THERAPY VISIT (OUTPATIENT)
Dept: ANTICOAGULATION | Facility: CLINIC | Age: 73
End: 2023-01-10

## 2023-01-10 DIAGNOSIS — D68.51 FACTOR V LEIDEN MUTATION (H): Primary | ICD-10-CM

## 2023-01-10 DIAGNOSIS — Z86.718 PERSONAL HISTORY OF DVT (DEEP VEIN THROMBOSIS): ICD-10-CM

## 2023-01-10 LAB — INR (EXTERNAL): 1.9 (ref 0.9–1.1)

## 2023-01-10 NOTE — PROGRESS NOTES
"ANTICOAGULATION MANAGEMENT     Kannan Wolf 72 year old male is on warfarin with subtherapeutic INR result. (Goal INR 2.5-3.5)    Recent labs: (last 7 days)     01/10/23  0000   INR 1.9*       ASSESSMENT       Source(s): Chart Review and Patient/Caregiver Call       Warfarin doses taken: Less warfarin taken than planned which may be affecting INR-patient thinks he took 12.5 mg on Sunday but \"cannot swear to it\".    Diet: Increased greens/vitamin K in diet; ongoing change    New illness, injury, or hospitalization: No    Medication/supplement changes: None noted    Signs or symptoms of bleeding or clotting: No    Previous INR: Subtherapeutic    Additional findings: None       PLAN     Recommended plan for ongoing change(s) affecting INR     Dosing Instructions: booster dose then Increase your warfarin dose (16.7% change) with next INR in 1 week       Summary  As of 1/10/2023    Full warfarin instructions:  1/10: 15 mg; Otherwise 12.5 mg every day   Next INR check:  1/17/2023             Sent Regal[employer] message with dosing and follow up instructions    Patient to recheck with home meter    Education provided:     Please call back if any changes to your diet, medications or how you've been taking warfarin    Taking warfarin: Importance of taking warfarin as instructed    Plan made per [facility] anticoagulation protocol    Carmen Leal, RN  Anticoagulation Clinic  1/10/2023    _______________________________________________________________________     Anticoagulation Episode Summary     Current INR goal:  2.5-3.5   TTR:  58.4 % (10.8 mo)   Target end date:  Indefinite   Send INR reminders to:  ANTICOAG HOME MONITORING    Indications    Factor V Leiden mutation (H) [D68.51]  Personal history of DVT (deep vein thrombosis) [Z86.718]           Comments:  MDINR Home Meter // Manage by exception         Anticoagulation Care Providers     Provider Role Specialty Phone number    David Jenkins MD Referring Family Medicine " [phone]

## 2023-01-11 DIAGNOSIS — D68.51 FACTOR V LEIDEN MUTATION (H): Primary | ICD-10-CM

## 2023-01-11 DIAGNOSIS — Z86.718 H/O DEEP VENOUS THROMBOSIS: ICD-10-CM

## 2023-01-15 ENCOUNTER — TRANSFERRED RECORDS (OUTPATIENT)
Dept: HEALTH INFORMATION MANAGEMENT | Facility: CLINIC | Age: 73
End: 2023-01-15
Payer: COMMERCIAL

## 2023-01-16 ENCOUNTER — MYC MEDICAL ADVICE (OUTPATIENT)
Dept: ANTICOAGULATION | Facility: CLINIC | Age: 73
End: 2023-01-16

## 2023-01-16 ENCOUNTER — ANTICOAGULATION THERAPY VISIT (OUTPATIENT)
Dept: ANTICOAGULATION | Facility: CLINIC | Age: 73
End: 2023-01-16
Payer: COMMERCIAL

## 2023-01-16 DIAGNOSIS — D68.51 FACTOR V LEIDEN MUTATION (H): Primary | ICD-10-CM

## 2023-01-16 DIAGNOSIS — Z86.718 PERSONAL HISTORY OF DVT (DEEP VEIN THROMBOSIS): ICD-10-CM

## 2023-01-16 LAB — INR (EXTERNAL): 1.9 (ref 0.9–1.1)

## 2023-01-16 NOTE — PROGRESS NOTES
ANTICOAGULATION MANAGEMENT     Kannan WEST Wolf 72 year old male is on warfarin with subtherapeutic INR result. (Goal INR 2.5-3.5)    Recent labs: (last 7 days)     01/15/23  0000   INR 1.9*       ASSESSMENT     Per patient, this result was done on 1/10/23  See Bubbles message dated today  That test score was from Tuesday, my INR marija does not work in [facility]. So they will be reporting my results late when they call me. They have changed my dosage and it is in my chart they have me doing 12 1/2 daily.         PLAN     Patient was instructed to check INR on 1/17 with home monitor - per 1/10/23 encounter.  Advised patient to send INR result via Bubbles,

## 2023-01-17 ENCOUNTER — ANTICOAGULATION THERAPY VISIT (OUTPATIENT)
Dept: ANTICOAGULATION | Facility: CLINIC | Age: 73
End: 2023-01-17
Payer: COMMERCIAL

## 2023-01-17 DIAGNOSIS — Z86.718 PERSONAL HISTORY OF DVT (DEEP VEIN THROMBOSIS): ICD-10-CM

## 2023-01-17 DIAGNOSIS — D68.51 FACTOR V LEIDEN MUTATION (H): Primary | ICD-10-CM

## 2023-01-17 LAB — INR (EXTERNAL): 3.1 (ref 0.9–1.1)

## 2023-01-17 NOTE — PROGRESS NOTES
ANTICOAGULATION MANAGEMENT     Kannan Wolf 72 year old male is on warfarin with therapeutic INR result. (Goal INR 2.5-3.5)    Recent labs: (last 7 days)     01/17/23  0000   INR 3.1*       ASSESSMENT       Source(s): Chart Review    Previous INR was Subtherapeutic    Medication, diet, health changes since last INR chart reviewed; none identified    TicketStumbl[employer] message received from patient reporting today's INR dosing. Pt directed to respond to message if any [place]   Will updated encounter if pt responds with changes.        PLAN     Recommended plan for no diet, medication or health factor changes affecting INR     Dosing Instructions: Continue your current warfarin dose with next INR in 1 week       Summary  As of 1/17/2023    Full warfarin instructions:  12.5 mg every day   Next INR check:  1/24/2023             Sent Mydish message with dosing and follow up instructions    Patient to recheck with home meter    Education provided:     Please call back if any changes to your diet, medications or how you've been taking warfarin    Plan made per [facility] anticoagulation protocol    Taurus Rg RN  Anticoagulation Clinic  1/17/2023    _______________________________________________________________________     Anticoagulation Episode Summary     Current INR goal:  2.5-3.5   TTR:  58.3 % (11.1 mo)   Target end date:  Indefinite   Send INR reminders to:  ANTICOAG HOME MONITORING    Indications    Factor V Leiden mutation (H) [D68.51]  Personal history of DVT (deep vein thrombosis) [Z86.718]           Comments:  [employer] Home Meter // Manage by exception         Anticoagulation Care Providers     Provider Role Specialty Phone number    David Jenkins MD Referring Family Medicine [phone]           Call our office in 1 month with an update on how you feel after taking the Adderall.  Try to lose some weight by exercising at least 20 minutes or more daily.  Monitor your diet. Eat frequent smaller meals throughout the day and eat more fruit and vegetables. Decrease your carbohydrates.  Your Levemir has been increased to 50 units twice daily.   He Losartan has been increased to a full tab daily.  Be sure to see an eye doctor for annual diabetic eye exams.

## 2023-01-24 ENCOUNTER — DOCUMENTATION ONLY (OUTPATIENT)
Dept: ANTICOAGULATION | Facility: CLINIC | Age: 73
End: 2023-01-24
Payer: COMMERCIAL

## 2023-01-24 ENCOUNTER — TRANSFERRED RECORDS (OUTPATIENT)
Dept: HEALTH INFORMATION MANAGEMENT | Facility: CLINIC | Age: 73
End: 2023-01-24
Payer: COMMERCIAL

## 2023-01-24 DIAGNOSIS — D68.51 FACTOR V LEIDEN MUTATION (H): Primary | ICD-10-CM

## 2023-01-24 DIAGNOSIS — Z86.718 PERSONAL HISTORY OF DVT (DEEP VEIN THROMBOSIS): ICD-10-CM

## 2023-01-24 LAB — INR (EXTERNAL): 3.1 (ref 0.9–1.1)

## 2023-01-24 NOTE — PROGRESS NOTES
ANTICOAGULATION  MANAGEMENT-Home Monitor Managed by Exception    Kannan Wolf 72 year old male is on warfarin with therapeutic INR result. (Goal INR 2.5-3.5)    Recent labs: (last 7 days)     01/24/23  1429   INR 3.1*         Previous INR was Therapeutic    Medication, diet, health changes since last INR:chart reviewed; none identified    Contacted within the last 12 weeks by phone on 1/17/23      CHRISTINE     Kannan was NOT contacted regarding therapeutic result today per home monitoring policy manage by exception agreement.   Current warfarin dose is to be continued:     Summary  As of 1/24/2023    Full warfarin instructions:  12.5 mg every day   Next INR check:  1/31/2023           ?   Sherice Sams RN  Anticoagulation Clinic  1/24/2023    _______________________________________________________________________     Anticoagulation Episode Summary     Current INR goal:  2.5-3.5   TTR:  59.2 % (11.3 mo)   Target end date:  Indefinite   Send INR reminders to:  ANTICOAG HOME MONITORING    Indications    Factor V Leiden mutation (H) [D68.51]  Personal history of DVT (deep vein thrombosis) [Z86.718]           Comments:  [employer]I Home Meter // Manage by exception         Anticoagulation Care Providers     Provider Role Specialty Phone number    David Jenkins MD Referring Family Medicine [phone]

## 2023-01-31 ENCOUNTER — DOCUMENTATION ONLY (OUTPATIENT)
Dept: ANTICOAGULATION | Facility: CLINIC | Age: 73
End: 2023-01-31
Payer: COMMERCIAL

## 2023-01-31 ENCOUNTER — TRANSFERRED RECORDS (OUTPATIENT)
Dept: HEALTH INFORMATION MANAGEMENT | Facility: CLINIC | Age: 73
End: 2023-01-31
Payer: COMMERCIAL

## 2023-01-31 DIAGNOSIS — D68.51 FACTOR V LEIDEN MUTATION (H): Primary | ICD-10-CM

## 2023-01-31 DIAGNOSIS — Z86.718 PERSONAL HISTORY OF DVT (DEEP VEIN THROMBOSIS): ICD-10-CM

## 2023-01-31 LAB — INR (EXTERNAL): 3.3 (ref 0.9–1.1)

## 2023-01-31 NOTE — PROGRESS NOTES
ANTICOAGULATION  MANAGEMENT-Home Monitor Managed by Exception    Kannan Wolf 72 year old male is on warfarin with therapeutic INR result. (Goal INR 2.5-3.5)    Recent labs: (last 7 days)     01/31/23  1506   INR 3.3*         Previous INR was Therapeutic    Medication, diet, health changes since last INR:chart reviewed; none identified    Contacted within the last 12 weeks by phone on 1/17/23      CHRISTINE     Kannan was NOT contacted regarding therapeutic result today per home monitoring policy manage by exception agreement.   Current warfarin dose is to be continued:     Summary  As of 1/31/2023    Full warfarin instructions:  12.5 mg every day   Next INR check:  2/7/2023           ?   Sherice Sams RN  Anticoagulation Clinic  1/31/2023    _______________________________________________________________________     Anticoagulation Episode Summary     Current INR goal:  2.5-3.5   TTR:  60.0 % (11.6 mo)   Target end date:  Indefinite   Send INR reminders to:  ANTICOAG HOME MONITORING    Indications    Factor V Leiden mutation (H) [D68.51]  Personal history of DVT (deep vein thrombosis) [Z86.718]           Comments:  JODI Home Meter // Manage by exception         Anticoagulation Care Providers     Provider Role Specialty Phone number    David Jenkins MD Referring Family Medicine [phone]

## 2023-02-07 ENCOUNTER — TRANSFERRED RECORDS (OUTPATIENT)
Dept: HEALTH INFORMATION MANAGEMENT | Facility: CLINIC | Age: 73
End: 2023-02-07
Payer: COMMERCIAL

## 2023-02-07 ENCOUNTER — ANTICOAGULATION THERAPY VISIT (OUTPATIENT)
Dept: ANTICOAGULATION | Facility: CLINIC | Age: 73
End: 2023-02-07
Payer: COMMERCIAL

## 2023-02-07 DIAGNOSIS — D68.51 FACTOR V LEIDEN MUTATION (H): Primary | ICD-10-CM

## 2023-02-07 DIAGNOSIS — Z86.718 PERSONAL HISTORY OF DVT (DEEP VEIN THROMBOSIS): ICD-10-CM

## 2023-02-07 LAB — INR (EXTERNAL): 4.8 (ref 0.9–1.1)

## 2023-02-07 NOTE — PROGRESS NOTES
ANTICOAGULATION MANAGEMENT     Kannan DODSON Maryann 72 year old male is on warfarin with supratherapeutic INR result. (Goal INR 2.5-3.5)    Recent labs: (last 7 days)     02/07/23  1250   INR 4.8*       ASSESSMENT       Source(s): Chart Review and Patient/Caregiver Call       Warfarin doses taken: Warfarin taken as instructed    Diet: Change in alcohol intake may be affecting INR. Slight increase; will go back to normal intake      New illness, injury, or hospitalization: No    Medication/supplement changes: None noted    Signs or symptoms of bleeding or clotting: No    Previous INR: Therapeutic last 2(+) visits    Additional findings: In [facility] for the [facility]       PLAN     Recommended plan for temporary change(s) affecting INR     Dosing Instructions: partial hold then continue your current warfarin dose with next INR in 1 week       Summary  As of 2/7/2023    Full warfarin instructions:  2/7: 5 mg; Otherwise 12.5 mg every day   Next INR check:  2/14/2023             Telephone call with Kannan who agrees to plan and repeated back plan correctly    Patient to recheck with home meter    Education provided:     Please call back if any changes to your diet, medications or how you've been taking warfarin    Symptom monitoring: monitoring for bleeding signs and symptoms, monitoring for clotting signs and symptoms, when to seek medical attention/emergency care and if you hit your head or have a bad fall seek emergency care    Plan made per [facility] anticoagulation protocol    Miracle Salgado, RN  Anticoagulation Clinic  2/7/2023    _______________________________________________________________________     Anticoagulation Episode Summary     Current INR goal:  2.5-3.5   TTR:  59.1 % (11.8 mo)   Target end date:  Indefinite   Send INR reminders to:  ANTICOAG HOME MONITORING    Indications    Factor V Leiden mutation (H) [D68.51]  Personal history of DVT (deep vein thrombosis) [Z86.718]           Comments:  JODI Home Meter //  Manage by exception         Anticoagulation Care Providers     Provider Role Specialty Phone number    David Jenkins MD Referring Family Medicine [phone]

## 2023-02-09 ENCOUNTER — DOCUMENTATION ONLY (OUTPATIENT)
Dept: ANTICOAGULATION | Facility: CLINIC | Age: 73
End: 2023-02-09
Payer: COMMERCIAL

## 2023-02-09 DIAGNOSIS — D68.51 FACTOR V LEIDEN MUTATION (H): Primary | ICD-10-CM

## 2023-02-09 DIAGNOSIS — Z86.718 PERSONAL HISTORY OF DVT (DEEP VEIN THROMBOSIS): ICD-10-CM

## 2023-02-09 DIAGNOSIS — Z86.711 HISTORY OF PULMONARY EMBOLISM: ICD-10-CM

## 2023-02-09 DIAGNOSIS — F17.200 TOBACCO DEPENDENCE: ICD-10-CM

## 2023-02-09 DIAGNOSIS — T85.818A: ICD-10-CM

## 2023-02-09 NOTE — PROGRESS NOTES
ANTICOAGULATION CLINIC REFERRAL RENEWAL REQUEST       An annual renewal order is required for all patients referred to [facility].?  Please review and sign the pended referral order for Kannan Wolf.       ANTICOAGULATION SUMMARY      Warfarin indication(s)   Factor V Leiden mutation, History of DVT    Mechanical heart valve present?  NO       Current goal range   INR: 2.5-3.5     Goal appropriate for indication? Goal INR 2.5-3.5 standard for indication(s) above     Time in Therapeutic Range (TTR)  (Goal > 60%) 59.1%       Office visit with referring provider's group within last year yes on 9/27/22       Donna Grey RN  [facility]

## 2023-02-14 ENCOUNTER — TRANSFERRED RECORDS (OUTPATIENT)
Dept: HEALTH INFORMATION MANAGEMENT | Facility: CLINIC | Age: 73
End: 2023-02-14
Payer: COMMERCIAL

## 2023-02-14 ENCOUNTER — ANTICOAGULATION THERAPY VISIT (OUTPATIENT)
Dept: ANTICOAGULATION | Facility: CLINIC | Age: 73
End: 2023-02-14
Payer: COMMERCIAL

## 2023-02-14 DIAGNOSIS — D68.51 FACTOR V LEIDEN MUTATION (H): Primary | ICD-10-CM

## 2023-02-14 DIAGNOSIS — Z86.711 HISTORY OF PULMONARY EMBOLISM: ICD-10-CM

## 2023-02-14 DIAGNOSIS — Z86.718 PERSONAL HISTORY OF DVT (DEEP VEIN THROMBOSIS): ICD-10-CM

## 2023-02-14 LAB — INR (EXTERNAL): 3.5 (ref 0.9–1.1)

## 2023-02-14 NOTE — PROGRESS NOTES
ANTICOAGULATION MANAGEMENT     Kannan WEST Wolf 72 year old male is on warfarin with therapeutic INR result. (Goal INR 2.5-3.5)    Recent labs: (last 7 days)     02/14/23  1148   INR 3.5*       ASSESSMENT       Source(s): Chart Review and Patient/Caregiver Call       Warfarin doses taken: Warfarin taken as instructed    Diet: Alcohol intake over the past week may be affecting INR.    New illness, injury, or hospitalization: No    Medication/supplement changes: None noted    Signs or symptoms of bleeding or clotting: No    Previous INR: Supratherapeutic    Additional findings: None       PLAN     Recommended plan for temporary change(s) affecting INR     Dosing Instructions: Continue your current warfarin dose with next INR in 1 week       Summary  As of 2/14/2023    Full warfarin instructions:  12.5 mg every day   Next INR check:  2/21/2023             Telephone call with Kannan who verbalizes understanding and agrees to plan and who agrees to plan and repeated back plan correctly    Patient to recheck with home meter    Education provided:     Please call back if any changes to your diet, medications or how you've been taking warfarin    Goal range and lab monitoring: goal range and significance of current result and Importance of therapeutic range    Dietary considerations: Impact of alcohol on INR    Plan made per [facility] anticoagulation protocol    India Bautista RN  Anticoagulation Clinic  2/14/2023    _______________________________________________________________________     Anticoagulation Episode Summary     Current INR goal:  2.5-3.5   TTR:  58.0 % (1 y)   Target end date:  Indefinite   Send INR reminders to:  ANTICOAG HOME MONITORING    Indications    Factor V Leiden mutation (H) [D68.51]  Personal history of DVT (deep vein thrombosis) [Z86.718]  History of pulmonary embolism [Z86.711]           Comments:  [employer]I Home Meter // Manage by exception         Anticoagulation Care Providers     Provider Role  Specialty Phone number    David Jenkins MD Referring Family Medicine [phone]

## 2023-02-21 ENCOUNTER — ANTICOAGULATION THERAPY VISIT (OUTPATIENT)
Dept: ANTICOAGULATION | Facility: CLINIC | Age: 73
End: 2023-02-21
Payer: COMMERCIAL

## 2023-02-21 ENCOUNTER — TRANSFERRED RECORDS (OUTPATIENT)
Dept: HEALTH INFORMATION MANAGEMENT | Facility: CLINIC | Age: 73
End: 2023-02-21
Payer: COMMERCIAL

## 2023-02-21 DIAGNOSIS — Z86.718 PERSONAL HISTORY OF DVT (DEEP VEIN THROMBOSIS): ICD-10-CM

## 2023-02-21 DIAGNOSIS — Z86.711 HISTORY OF PULMONARY EMBOLISM: ICD-10-CM

## 2023-02-21 DIAGNOSIS — D68.51 FACTOR V LEIDEN MUTATION (H): Primary | ICD-10-CM

## 2023-02-21 LAB — INR (EXTERNAL): 5.5 (ref 0.9–1.1)

## 2023-02-21 NOTE — PROGRESS NOTES
ANTICOAGULATION MANAGEMENT     Kannan DODSON Maryann 72 year old male is on warfarin with supratherapeutic INR result. (Goal INR 2.5-3.5)    Recent labs: (last 7 days)     02/21/23  1120   INR 5.5*       ASSESSMENT       Source(s): Chart Review and Patient/Caregiver Call       Warfarin doses taken: Warfarin taken as instructed    Diet: Patient denies any change in alcohol intake in the last week. Patient is still in [facility] and states his diet varies.     New illness, injury, or hospitalization: No    Medication/supplement changes: None noted    Signs or symptoms of bleeding or clotting: No    Previous INR: Therapeutic last visit; previously outside of goal range    Additional findings: None       PLAN     Recommended plan for ongoing change(s) affecting INR     Dosing Instructions: hold dose then decrease your warfarin dose (11% change) with next INR in 2 days       Summary  As of 2/21/2023    Full warfarin instructions:  2/21: Hold; Otherwise 12.5 mg every Sun, Tue, Thu; 10 mg all other days   Next INR check:  2/23/2023             Telephone call with Kannan who verbalizes understanding and agrees to plan    Patient to recheck with home meter    Education provided:     Please call back if any changes to your diet, medications or how you've been taking warfarin    Dietary considerations: importance of consistent vitamin K intake    Symptom monitoring: monitoring for bleeding signs and symptoms and when to seek medical attention/emergency care    Importance of notifying anticoagulation clinic for: changes in medications; a sooner lab recheck maybe needed    Contact [phone]  with any changes, questions or concerns.     Plan made per [facility] anticoagulation protocol    Sherice Sams RN  Anticoagulation Clinic  2/21/2023    _______________________________________________________________________     Anticoagulation Episode Summary     Current INR goal:  2.5-3.5   TTR:  56.6 % (1 y)   Target end date:  Indefinite   Send INR  reminders to:  ANTICOAG HOME MONITORING    Indications    Factor V Leiden mutation (H) [D68.51]  Personal history of DVT (deep vein thrombosis) [Z86.718]  History of pulmonary embolism [Z86.711]           Comments:  MDINR Home Meter // Manage by exception         Anticoagulation Care Providers     Provider Role Specialty Phone number    David Jenkins MD Referring Family Medicine [phone]

## 2023-02-23 ENCOUNTER — TRANSFERRED RECORDS (OUTPATIENT)
Dept: HEALTH INFORMATION MANAGEMENT | Facility: CLINIC | Age: 73
End: 2023-02-23
Payer: COMMERCIAL

## 2023-02-23 ENCOUNTER — ANTICOAGULATION THERAPY VISIT (OUTPATIENT)
Dept: ANTICOAGULATION | Facility: CLINIC | Age: 73
End: 2023-02-23
Payer: COMMERCIAL

## 2023-02-23 DIAGNOSIS — Z86.711 HISTORY OF PULMONARY EMBOLISM: ICD-10-CM

## 2023-02-23 DIAGNOSIS — Z86.718 PERSONAL HISTORY OF DVT (DEEP VEIN THROMBOSIS): ICD-10-CM

## 2023-02-23 DIAGNOSIS — D68.51 FACTOR V LEIDEN MUTATION (H): Primary | ICD-10-CM

## 2023-02-23 LAB — INR (EXTERNAL): 2.7 (ref 0.9–1.1)

## 2023-02-23 NOTE — PROGRESS NOTES
ANTICOAGULATION MANAGEMENT     Kannan DODSON Emanueldwayne 72 year old male is on warfarin with therapeutic INR result. (Goal INR 2.5-3.5)    Recent labs: (last 7 days)     02/23/23  1501   INR 2.7*       ASSESSMENT       Source(s): Chart Review and Patient/Caregiver Call       Warfarin doses taken: Warfarin taken as instructed    Diet: No new diet changes identified    New illness, injury, or hospitalization: No    Medication/supplement changes: None noted    Signs or symptoms of bleeding or clotting: No    Previous INR: Supratherapeutic    Additional findings: Patient's birthday is on [birth year]     Dose was already decreased on 2/21/23         PLAN     Recommended plan for no diet, medication or health factor changes affecting INR     Dosing Instructions: Continue your current warfarin dose with next INR in 5 days.  Would like to return to checking on Tuesday's which is his normal day to check    Summary  As of 2/23/2023    Full warfarin instructions:  12.5 mg every Sun, Tue, Thu; 10 mg all other days   Next INR check:  2/28/2023             Telephone call with Kannan who verbalizes understanding and agrees to plan  Sent DailyCred message with dosing and follow up instructions    Patient to recheck with home meter    Education provided:     Healthy lifestyle considerations: potential interaction between warfarin and alcohol and limit alcohol intake to no more than 1 to 2 drinks in 24 hours if you choose to drink alcohol    Resume manage by exception with home monitor. Continue to submit INR results to home monitor company.You will only be called when your result is out of range. Please call and notify [facility] if new medication started, dose missed, signs or symptoms of bleeding or clotting, or a surgery/procedure is scheduled.    Plan made per [facility] anticoagulation protocol    Carmen Leal RN  Anticoagulation Clinic  2/23/2023    _______________________________________________________________________     Anticoagulation Episode  Summary     Current INR goal:  2.5-3.5   TTR:  56.2 % (1 y)   Target end date:  Indefinite   Send INR reminders to:  ANTICOAG HOME MONITORING    Indications    Factor V Leiden mutation (H) [D68.51]  Personal history of DVT (deep vein thrombosis) [Z86.718]  History of pulmonary embolism [Z86.711]           Comments:  MDINR Home Meter // Manage by exception         Anticoagulation Care Providers     Provider Role Specialty Phone number    David Jenkins MD Referring Family Medicine [phone]

## 2023-02-28 ENCOUNTER — ANTICOAGULATION THERAPY VISIT (OUTPATIENT)
Dept: ANTICOAGULATION | Facility: CLINIC | Age: 73
End: 2023-02-28
Payer: COMMERCIAL

## 2023-02-28 ENCOUNTER — TRANSFERRED RECORDS (OUTPATIENT)
Dept: HEALTH INFORMATION MANAGEMENT | Facility: CLINIC | Age: 73
End: 2023-02-28
Payer: COMMERCIAL

## 2023-02-28 DIAGNOSIS — D68.51 FACTOR V LEIDEN MUTATION (H): Primary | ICD-10-CM

## 2023-02-28 DIAGNOSIS — Z86.711 HISTORY OF PULMONARY EMBOLISM: ICD-10-CM

## 2023-02-28 DIAGNOSIS — Z86.718 PERSONAL HISTORY OF DVT (DEEP VEIN THROMBOSIS): ICD-10-CM

## 2023-02-28 LAB — INR (EXTERNAL): 4.6 (ref 0.9–1.1)

## 2023-02-28 NOTE — PROGRESS NOTES
ANTICOAGULATION MANAGEMENT     Kannan Wolf 73 year old male is on warfarin with supratherapeutic INR result. (Goal INR 2.5-3.5)    Recent labs: (last 7 days)     02/28/23  1329   INR 4.6*       ASSESSMENT       Source(s): Chart Review and Patient/Caregiver Call       Warfarin doses taken: Warfarin taken as instructed    Diet: Change in alcohol intake may be affecting INR. more over the weekend for birthday     New illness, injury, or hospitalization: Yes: sore throat for 5 days    Medication/supplement changes: None noted    Signs or symptoms of bleeding or clotting: No    Previous INR: Therapeutic last visit; previously outside of goal range    Additional findings: None     Send [email or web address] with updated dosing.    One test strip left, his brother is checking his mail and will bring them down to him in [facility] on 3/11/2023 if there are any.         PLAN     Recommended plan for temporary change(s) affecting INR     Dosing Instructions: partial hold then continue your current warfarin dose with next INR in 10 days       Summary  As of 2/28/2023    Full warfarin instructions:  2/28: 5 mg; Otherwise 12.5 mg every Sun, Tue, Thu; 10 mg all other days   Next INR check:  3/10/2023             Sent [email or web address] message with dosing and follow up instructions    Patient to recheck with home meter    Education provided:     None required    Plan made with [facility] Pharmacist Unique Martin, RN  Anticoagulation Clinic  2/28/2023    _______________________________________________________________________     Anticoagulation Episode Summary     Current INR goal:  2.5-3.5   TTR:  55.4 % (1 y)   Target end date:  Indefinite   Send INR reminders to:  ANTICOAG HOME MONITORING    Indications    Factor V Leiden mutation (H) [D68.51]  Personal history of DVT (deep vein thrombosis) [Z86.718]  History of pulmonary embolism [Z86.711]           Comments:  MDINR Home Meter // Manage by exception         Anticoagulation Care Providers      Provider Role Specialty Phone number    David Jenkins MD Referring Family Medicine [phone]

## 2023-03-07 ENCOUNTER — TRANSFERRED RECORDS (OUTPATIENT)
Dept: HEALTH INFORMATION MANAGEMENT | Facility: CLINIC | Age: 73
End: 2023-03-07
Payer: COMMERCIAL

## 2023-03-07 ENCOUNTER — ANTICOAGULATION THERAPY VISIT (OUTPATIENT)
Dept: ANTICOAGULATION | Facility: CLINIC | Age: 73
End: 2023-03-07
Payer: COMMERCIAL

## 2023-03-07 DIAGNOSIS — Z86.711 HISTORY OF PULMONARY EMBOLISM: ICD-10-CM

## 2023-03-07 DIAGNOSIS — D68.51 FACTOR V LEIDEN MUTATION (H): Primary | ICD-10-CM

## 2023-03-07 DIAGNOSIS — Z86.718 PERSONAL HISTORY OF DVT (DEEP VEIN THROMBOSIS): ICD-10-CM

## 2023-03-07 LAB — INR (EXTERNAL): 5.3 (ref 0.9–1.1)

## 2023-03-07 NOTE — PROGRESS NOTES
ANTICOAGULATION MANAGEMENT     Kannan Wolf 73 year old male is on warfarin with supratherapeutic INR result. (Goal INR 2.5-3.5)    Recent labs: (last 7 days)     03/07/23  1029   INR 5.3*       ASSESSMENT       Source(s): Chart Review and Patient/Caregiver Call       Warfarin doses taken: Warfarin taken as instructed    Diet: Decreased greens/vitamin K in diet; ongoing change and Change in alcohol intake may be affecting INR. ongoing     New illness, injury, or hospitalization: sore throat symptom  resolved    Medication/supplement changes: None noted    Signs or symptoms of bleeding or clotting: No    Previous INR: Supratherapeutic    Additional findings: In  [facility] until April stated that he has one more test strip left but his brother is bringing more this Saturday    Patient is requesting for dosing instructions sent via [employer]         PLAN     Recommended plan for ongoing change(s) affecting INR     Dosing Instructions: hold dose then decrease your warfarin dose (12.9% change) with next INR in 3 days   Patient was only given instruction for today up to Thursday this week.    Summary  As of 3/7/2023    Full warfarin instructions:  3/7: Hold; Otherwise 7.5 mg every Thu; 10 mg all other days   Next INR check:  3/14/2023             Telephone call with Kannan patient requested to send dosing instructions via [employer]  Sent [employer] message with dosing and follow up instructions    Patient to recheck with home meter    Education provided:     Goal range and lab monitoring: goal range and significance of current result, Importance of therapeutic range and Importance of following up at instructed interval    Dietary considerations: impact of vitamin K foods on INR    Healthy lifestyle considerations: potential interaction between warfarin and alcohol    Symptom monitoring: monitoring for bleeding signs and symptoms    Contact [phone]  with any changes, questions or concerns.     Plan made with [facility] Pharmacist  Zofia Gibson, RN  Anticoagulation Clinic  3/7/2023    _______________________________________________________________________     Anticoagulation Episode Summary     Current INR goal:  2.5-3.5   TTR:  53.2 % (1 y)   Target end date:  Indefinite   Send INR reminders to:  [facility]IT[facility]    Indications    Factor V Leiden mutation (H) [D68.51]  Personal history of DVT (deep vein thrombosis) [Z86.718]  History of pulmonary embolism [Z86.711]           Comments:  [employer] Home Meter // Manage by exception         Anticoagulation Care Providers     Provider Role Specialty Phone number    David Jenkins MD Referring Family Medicine [phone]

## 2023-03-10 ENCOUNTER — ANTICOAGULATION THERAPY VISIT (OUTPATIENT)
Dept: ANTICOAGULATION | Facility: CLINIC | Age: 73
End: 2023-03-10
Payer: COMMERCIAL

## 2023-03-10 ENCOUNTER — TRANSFERRED RECORDS (OUTPATIENT)
Dept: HEALTH INFORMATION MANAGEMENT | Facility: CLINIC | Age: 73
End: 2023-03-10
Payer: COMMERCIAL

## 2023-03-10 ENCOUNTER — MYC MEDICAL ADVICE (OUTPATIENT)
Dept: ANTICOAGULATION | Facility: CLINIC | Age: 73
End: 2023-03-10
Payer: COMMERCIAL

## 2023-03-10 DIAGNOSIS — Z86.718 PERSONAL HISTORY OF DVT (DEEP VEIN THROMBOSIS): ICD-10-CM

## 2023-03-10 DIAGNOSIS — Z86.711 HISTORY OF PULMONARY EMBOLISM: ICD-10-CM

## 2023-03-10 DIAGNOSIS — D68.51 FACTOR V LEIDEN MUTATION (H): Primary | ICD-10-CM

## 2023-03-10 LAB — INR (EXTERNAL): 3.2 (ref 0.9–1.1)

## 2023-03-10 NOTE — PROGRESS NOTES
ANTICOAGULATION MANAGEMENT     Kannan Wolf 73 year old male is on warfarin with therapeutic INR result. (Goal INR 2.5-3.5)    Recent labs: (last 7 days)     03/10/23  1551   INR 3.2*       ASSESSMENT       Source(s): Chart Review    Previous INR was Supratherapeutic    Medication, diet, health changes since last INR chart reviewed; none identified             PLAN     Recommended plan for no diet, medication or health factor changes affecting INR     Dosing Instructions: Continue your current warfarin dose with next INR in 4 days       Summary  As of 3/10/2023    Full warfarin instructions:  7.5 mg every Thu; 10 mg all other days   Next INR check:  3/14/2023             Detailed voice message left for Kannan with dosing instructions and follow up date.   Sent [employer] message with dosing and follow up instructions    Patient to recheck with home meter    Education provided:     Contact [phone]  with any changes, questions or concerns.     Plan made per [facility] anticoagulation protocol    Pat Lott RN  Anticoagulation Clinic  3/10/2023    _______________________________________________________________________     Anticoagulation Episode Summary     Current INR goal:  2.5-3.5   TTR:  52.6 % (1 y)   Target end date:  Indefinite   Send INR reminders to:  [facility]IT[facility]    Indications    Factor V Leiden mutation (H) [D68.51]  Personal history of DVT (deep vein thrombosis) [Z86.718]  History of pulmonary embolism [Z86.711]           Comments:  [employer] Home Meter // Manage by exception         Anticoagulation Care Providers     Provider Role Specialty Phone number    David Jenkins MD Referring Family Medicine [phone]

## 2023-03-14 ENCOUNTER — ANTICOAGULATION THERAPY VISIT (OUTPATIENT)
Dept: ANTICOAGULATION | Facility: CLINIC | Age: 73
End: 2023-03-14
Payer: COMMERCIAL

## 2023-03-14 DIAGNOSIS — D68.51 FACTOR V LEIDEN MUTATION (H): Primary | ICD-10-CM

## 2023-03-14 DIAGNOSIS — Z86.711 HISTORY OF PULMONARY EMBOLISM: ICD-10-CM

## 2023-03-14 DIAGNOSIS — Z86.718 PERSONAL HISTORY OF DVT (DEEP VEIN THROMBOSIS): ICD-10-CM

## 2023-03-14 LAB — INR (EXTERNAL): 4.7 (ref 0.9–1.1)

## 2023-03-14 NOTE — TELEPHONE ENCOUNTER
See anticoag encounter from 03/14/23     Pat Mcgrath, RN, BSN, PHN  Anticoagulation Nurse  [phone]

## 2023-03-14 NOTE — PROGRESS NOTES
ANTICOAGULATION MANAGEMENT     Kannan Wolf 73 year old male is on warfarin with supratherapeutic INR result. (Goal INR 2.5-3.5)    Recent labs: (last 7 days)     03/14/23  1646   INR 4.7*       ASSESSMENT       Source(s): Chart Review and mychart with patient       Warfarin doses taken: Warfarin taken as instructed    Diet: No new diet changes identified    New illness, injury, or hospitalization: No    Medication/supplement changes: None noted    Signs or symptoms of bleeding or clotting: No    Previous INR: Therapeutic last visit; previously outside of goal range    Additional findings: None         PLAN     Recommended plan for no diet, medication or health factor changes affecting INR     Dosing Instructions: hold dose then decrease your warfarin dose (11% change) with next INR in 1 week       Summary  As of 3/14/2023    Full warfarin instructions:  3/14: Hold; Otherwise 10 mg every Mon, Wed, Fri; 7.5 mg all other days   Next INR check:  3/21/2023             Sent [employer] message with dosing and follow up instructions    Patient to recheck with home meter    Education provided:     Symptom monitoring: monitoring for bleeding signs and symptoms and when to seek medical attention/emergency care    Contact [phone]  with any changes, questions or concerns.     Plan made per [facility] anticoagulation protocol    Pat Lott RN  Anticoagulation Clinic  3/14/2023    _______________________________________________________________________     Anticoagulation Episode Summary     Current INR goal:  2.5-3.5   TTR:  51.8 % (1 y)   Target end date:  Indefinite   Send INR reminders to:  [facility]IT[facility]    Indications    Factor V Leiden mutation (H) [D68.51]  Personal history of DVT (deep vein thrombosis) [Z86.718]  History of pulmonary embolism [Z86.711]           Comments:  [employer] Home Meter // Manage by exception         Anticoagulation Care Providers     Provider Role Specialty Phone number    Alice  MD David [facility] Medicine [phone]

## 2023-03-18 ENCOUNTER — TRANSFERRED RECORDS (OUTPATIENT)
Dept: HEALTH INFORMATION MANAGEMENT | Facility: CLINIC | Age: 73
End: 2023-03-18
Payer: COMMERCIAL

## 2023-03-18 LAB — INR (EXTERNAL): 4.7 (ref 0.9–1.1)

## 2023-03-20 ENCOUNTER — ANTICOAGULATION THERAPY VISIT (OUTPATIENT)
Dept: ANTICOAGULATION | Facility: CLINIC | Age: 73
End: 2023-03-20
Payer: COMMERCIAL

## 2023-03-20 ENCOUNTER — MYC MEDICAL ADVICE (OUTPATIENT)
Dept: ANTICOAGULATION | Facility: CLINIC | Age: 73
End: 2023-03-20
Payer: COMMERCIAL

## 2023-03-20 DIAGNOSIS — D68.51 FACTOR V LEIDEN MUTATION (H): Primary | ICD-10-CM

## 2023-03-20 DIAGNOSIS — Z86.711 HISTORY OF PULMONARY EMBOLISM: ICD-10-CM

## 2023-03-20 DIAGNOSIS — Z86.718 PERSONAL HISTORY OF DVT (DEEP VEIN THROMBOSIS): ICD-10-CM

## 2023-03-20 NOTE — PROGRESS NOTES
ANTICOAGULATION MANAGEMENT     Kannan DODSON Maryann 73 year old male is on warfarin with supratherapeutic INR result. (Goal INR 2.5-3.5)    Recent labs: (last 7 days)     03/18/23  0000   INR 4.7*       ASSESSMENT       Source(s): Chart Review and Patient/Caregiver Call       Warfarin doses taken: Warfarin taken as instructed    Diet: Change in alcohol intake may be affecting INR. patient is having about a dozen drinks per day.     New illness, injury, or hospitalization: No    Medication/supplement changes: None noted    Signs or symptoms of bleeding or clotting: No    Previous INR: Supratherapeutic    Additional findings: None         PLAN     Recommended plan for ongoing change(s) affecting INR     Dosing Instructions: decrease your warfarin dose (12.5% change) with next INR in 4 days       Summary  As of 3/20/2023    Full warfarin instructions:  7.5 mg every day   Next INR check:  3/24/2023             Telephone call with Kannan who verbalizes understanding and agrees to plan  Sent [employer] message with dosing and follow up instructions    Patient to recheck with home meter    Education provided:     Healthy lifestyle considerations: potential interaction between warfarin and alcohol and avoid excessive alcohol drinking (binge drinking) while on warfarin due to increased risk of bleeding from effect on INR and fall risk     Advised patient to have a salad with guacamole today.  Patient replied he will go have a big 's salad.    Plan made per [facility] anticoagulation protocol    Carmen Leal RN  Anticoagulation Clinic  3/20/2023    _______________________________________________________________________     Anticoagulation Episode Summary     Current INR goal:  2.5-3.5   TTR:  50.4 % (1 y)   Target end date:  Indefinite   Send INR reminders to:  [facility]IT[facility]    Indications    Factor V Leiden mutation (H) [D68.51]  Personal history of DVT (deep vein thrombosis) [Z86.718]  History of pulmonary embolism  [E54.166]           Comments:  [facility] Home Meter // Manage by exception         Anticoagulation Care Providers     Provider Role Specialty Phone number    David Jenkins MD Referring Family Medicine [phone]

## 2023-03-20 NOTE — PROGRESS NOTES
Incoming fax from [employer] home monitor [employer]    Date of result  3/18/23  14:31 PM    INR result  4.7

## 2023-03-21 NOTE — PROGRESS NOTES
Chart reviewed, delay in follow up due to testing over weekend, unclear if reason regarding rationale for no hold per protocol during discussion.  Would advise sooner recheck to ensure INR adjusting into range.    Zofia Shelby, PharmD BCACP  Anticoagulation Clinical Pharmacist

## 2023-03-23 NOTE — TELEPHONE ENCOUNTER
Called patient to remind him to test his INR.  Patient states he did not receive the second [employer] message and we will plan to check today.

## 2023-03-24 ENCOUNTER — ANTICOAGULATION THERAPY VISIT (OUTPATIENT)
Dept: ANTICOAGULATION | Facility: CLINIC | Age: 73
End: 2023-03-24
Payer: COMMERCIAL

## 2023-03-24 ENCOUNTER — MYC MEDICAL ADVICE (OUTPATIENT)
Dept: ANTICOAGULATION | Facility: CLINIC | Age: 73
End: 2023-03-24
Payer: COMMERCIAL

## 2023-03-24 ENCOUNTER — TRANSFERRED RECORDS (OUTPATIENT)
Dept: HEALTH INFORMATION MANAGEMENT | Facility: CLINIC | Age: 73
End: 2023-03-24
Payer: COMMERCIAL

## 2023-03-24 DIAGNOSIS — Z86.718 PERSONAL HISTORY OF DVT (DEEP VEIN THROMBOSIS): ICD-10-CM

## 2023-03-24 DIAGNOSIS — D68.51 FACTOR V LEIDEN MUTATION (H): Primary | ICD-10-CM

## 2023-03-24 DIAGNOSIS — Z86.711 HISTORY OF PULMONARY EMBOLISM: ICD-10-CM

## 2023-03-24 LAB — INR (EXTERNAL): 1.4 (ref 0.9–1.1)

## 2023-03-24 NOTE — PROGRESS NOTES
ANTICOAGULATION MANAGEMENT     Kannan DODSON Maryann 73 year old male is on warfarin with subtherapeutic INR result. (Goal INR 2.5-3.5)    Recent labs: (last 7 days)     03/24/23  0000   INR 1.4*       ASSESSMENT       Source(s): Chart Review and Patient/Caregiver Call       Warfarin doses taken: Warfarin taken as instructed    Diet: Increased greens/vitamin K in diet; plans to resume previous intake-patient state he had two big salads with guacamole and broccoli    New illness, injury, or hospitalization: Yes: URI, symptoms started last week but he did not see a doctor until this past week    Medication/supplement changes: azithromycin 5 day course (dates: 3/23-3/27) subsequent INRs may increased. Closer INR monitoring recommended.    Signs or symptoms of bleeding or clotting: No    Previous INR: Supratherapeutic    Additional findings: None         PLAN     Recommended plan for temporary change(s) affecting INR     Dosing Instructions: booster dose then continue your current warfarin dose with next INR in 3 days       Summary  As of 3/24/2023    Full warfarin instructions:  3/24: 15 mg; Otherwise 7.5 mg every day   Next INR check:  3/31/2023             Telephone call with Kannan who verbalizes understanding and agrees to plan  Sent [employer] message with dosing and follow up instructions    Patient to recheck with home meter    Education provided:     Symptom monitoring: monitoring for clotting signs and symptoms, monitoring for stroke signs and symptoms and when to seek medical attention/emergency care    Plan made with [facility] Pharmacist Zofia Leal RN  Anticoagulation Clinic  3/24/2023    _______________________________________________________________________     Anticoagulation Episode Summary     Current INR goal:  2.5-3.5   TTR:  49.5 % (1 y)   Target end date:  Indefinite   Send INR reminders to:  [facility] HOME MONITOR[facility]    Indications    Factor V Leiden mutation (H) [D68.51]  Personal  history of DVT (deep vein thrombosis) [Z86.718]  History of pulmonary embolism [Z86.711]           Comments:  [facility] Home Meter // Manage by exception         Anticoagulation Care Providers     Provider Role Specialty Phone number    aDvid Jenkins MD Referring Family Medicine [phone]

## 2023-03-31 ENCOUNTER — ANTICOAGULATION THERAPY VISIT (OUTPATIENT)
Dept: ANTICOAGULATION | Facility: CLINIC | Age: 73
End: 2023-03-31
Payer: COMMERCIAL

## 2023-03-31 ENCOUNTER — NURSE TRIAGE (OUTPATIENT)
Dept: NURSING | Facility: CLINIC | Age: 73
End: 2023-03-31
Payer: COMMERCIAL

## 2023-03-31 ENCOUNTER — TRANSFERRED RECORDS (OUTPATIENT)
Dept: HEALTH INFORMATION MANAGEMENT | Facility: CLINIC | Age: 73
End: 2023-03-31
Payer: COMMERCIAL

## 2023-03-31 ENCOUNTER — TELEPHONE (OUTPATIENT)
Dept: NURSING | Facility: CLINIC | Age: 73
End: 2023-03-31
Payer: COMMERCIAL

## 2023-03-31 DIAGNOSIS — D68.51 FACTOR V LEIDEN MUTATION (H): Primary | ICD-10-CM

## 2023-03-31 DIAGNOSIS — Z86.711 HISTORY OF PULMONARY EMBOLISM: ICD-10-CM

## 2023-03-31 DIAGNOSIS — Z86.718 PERSONAL HISTORY OF DVT (DEEP VEIN THROMBOSIS): ICD-10-CM

## 2023-03-31 DIAGNOSIS — Z86.718 H/O DEEP VENOUS THROMBOSIS: ICD-10-CM

## 2023-03-31 DIAGNOSIS — D68.51 FACTOR V LEIDEN MUTATION (H): ICD-10-CM

## 2023-03-31 LAB — INR (EXTERNAL): 1.2 (ref 2.5–3.5)

## 2023-03-31 NOTE — TELEPHONE ENCOUNTER
Zoey-@ MD INR-calls and says that she has a critical INR on pt. Zoey says that the critical INR = 1.2-drawn at 1744 eastern standard time. Zoey says that she is calling from [facility]. RN then told Zoey that this nurse line does not take lab results on the [facility] patients. RN told Zoey that that Zoey needs to call pt's clinic . RN gave Zoey 2 phone #s to the [facility]. Zoey says that she will call those #s now.COVID 19 Nurse Triage Plan/Patient Instructions    Please be aware that novel coronavirus (COVID-19) may be circulating in the community. If you develop symptoms such as fever, cough, or SOB or if you have concerns about the presence of another infection including coronavirus (COVID-19), please contact your health care provider or visit [email or web address].     Disposition/Instructions    Home care recommended. Follow home care protocol based instructions.    Thank you for taking steps to prevent the spread of this virus.  o Limit your contact with others.  o Wear a simple mask to cover your cough.  o Wash your hands well and often.    Resources    [facility]: About COVID-19: [email or web address]    CDC: What to Do If You're Sick: www.cdc.gov/coronavirus/2019-ncov/about/steps-when-sick.html    CDC: Ending Home Isolation: www.cdc.gov/coronavirus/2019-ncov/hcp/disposition-in-home-patients.html     CDC: Caring for Someone: www.cdc.gov/coronavirus/2019-ncov/if-you-are-sick/care-for-someone.html     [facility]: Interim Guidance for Hospital Discharge to Home: www.health.[facility].[place]/diseases/coronavirus/hcp/hospdischarge.pdf    [facility] clinical trials (COVID-19 research studies): [email or web address].[facility]/umn-clinical-trials     Below are the COVID-19 hotlines at the [place] Department of [facility] ([facility]). Interpreters are available.   o For health questions: Call 505-979-3754 or 1-182.613.7610 (7 a.m. to 7 p.m.)  o For questions  about schools and childcare: Call 908-011-0496 or 1-183.659.1921 (7 a.m. to 7 p.m.)                     Reason for Disposition    [1] Follow-up call to recent contact AND [2] information only call, no triage required    Additional Information    Negative: [1] Caller is not with the adult (patient) AND [2] reporting urgent symptoms    Negative: Lab result questions    Negative: Medication questions    Negative: Caller can't be reached by phone    Negative: Caller has already spoken to PCP or another triager    Negative: RN needs further essential information from caller in order to complete triage    Negative: Requesting regular office appointment    Negative: [1] Caller requesting NON-URGENT health information AND [2] PCP's office is the best resource    Negative: Health Information question, no triage required and triager able to answer question    Negative: General information question, no triage required and triager able to answer question    Negative: Question about upcoming scheduled test, no triage required and triager able to answer question    Negative: [1] Caller is not with the adult (patient) AND [2] probable NON-URGENT symptoms    Protocols used: INFORMATION ONLY CALL - NO TRIAGE-A-

## 2023-03-31 NOTE — PROGRESS NOTES
ANTICOAGULATION MANAGEMENT     Kannan Wolf 73 year old male is on warfarin with subtherapeutic INR result. (Goal INR 2.5-3.5)    Recent labs: (last 7 days)     03/31/23  1655   INR 1.2*       ASSESSMENT       Source(s): Chart Review and Patient/Caregiver Call       Warfarin doses taken: Warfarin taken as instructed; denies missed doses    Diet: decrease in appetite due to recent URI    New illness, injury, or hospitalization: Yes: URI    Medication/supplement changes: pt reports received 3 abx shots (unsure which abx); now on cephalosporin daily    Signs or symptoms of bleeding or clotting: No    Previous INR: Subtherapeutic    Additional findings: suspect a decrease in ETOH possibly; patient flying back to [place] tomorrow 4/1/23         PLAN     Recommended plan for temporary change(s) and ongoing change(s) affecting INR     Dosing Instructions: booster dose then Increase your warfarin dose (66.7% change) with next INR in 3 days       Summary  As of 3/31/2023    Full warfarin instructions:  3/31: 15 mg; Otherwise 12.5 mg every day   Next INR check:  4/3/2023             Telephone call with Kannan beltran also sent  With instructions--per patient request    Patient to recheck with home meter    Education provided:     Please call back if any changes to your diet, medications or how you've been taking warfarin    Plan made with [facility] Pharmacist Mary Crook, RN  Anticoagulation Clinic  3/31/2023    _______________________________________________________________________     Anticoagulation Episode Summary     Current INR goal:  2.5-3.5   TTR:  49.5 % (1 y)   Target end date:  Indefinite   Send INR reminders to:  [facility] HOME [facility]ITOR[facility]    Indications    Factor V Leiden mutation (H) [D68.51]  Personal history of DVT (deep vein thrombosis) [Z86.718]  History of pulmonary embolism [Z86.711]           Comments:  [employer] Home Meter // Manage by exception         Anticoagulation Care Providers      Provider Role Specialty Phone number    David Jenkins MD Referring Family Medicine [phone]

## 2023-04-03 ENCOUNTER — ANTICOAGULATION THERAPY VISIT (OUTPATIENT)
Dept: ANTICOAGULATION | Facility: CLINIC | Age: 73
End: 2023-04-03
Payer: COMMERCIAL

## 2023-04-03 ENCOUNTER — TRANSFERRED RECORDS (OUTPATIENT)
Dept: HEALTH INFORMATION MANAGEMENT | Facility: CLINIC | Age: 73
End: 2023-04-03
Payer: COMMERCIAL

## 2023-04-03 ENCOUNTER — MYC MEDICAL ADVICE (OUTPATIENT)
Dept: ANTICOAGULATION | Facility: CLINIC | Age: 73
End: 2023-04-03
Payer: COMMERCIAL

## 2023-04-03 DIAGNOSIS — D68.51 FACTOR V LEIDEN MUTATION (H): Primary | ICD-10-CM

## 2023-04-03 DIAGNOSIS — D68.51 FACTOR V LEIDEN MUTATION (H): ICD-10-CM

## 2023-04-03 DIAGNOSIS — Z86.718 H/O DEEP VENOUS THROMBOSIS: ICD-10-CM

## 2023-04-03 DIAGNOSIS — Z86.718 PERSONAL HISTORY OF DVT (DEEP VEIN THROMBOSIS): ICD-10-CM

## 2023-04-03 DIAGNOSIS — Z86.711 HISTORY OF PULMONARY EMBOLISM: ICD-10-CM

## 2023-04-03 LAB
INR (EXTERNAL): 1.8 (ref 0.9–1.1)
INR HOME MONITORING: 1.8 RATIO (ref 2.5–3.5)

## 2023-04-03 RX ORDER — WARFARIN SODIUM 5 MG/1
12.5 TABLET ORAL DAILY
Qty: 200 TABLET | Refills: 0 | Status: SHIPPED | OUTPATIENT
Start: 2023-04-03 | End: 2023-06-19

## 2023-04-03 NOTE — TELEPHONE ENCOUNTER
ANTICOAGULATION MANAGEMENT:  Medication Refill    Anticoagulation Summary  As of 3/31/2023    Warfarin maintenance plan:  12.5 mg (5 mg x 2.5) every day   Next INR check:  4/3/2023   Target end date:  Indefinite    Indications    Factor V Leiden mutation (H) [D68.51]  Personal history of DVT (deep vein thrombosis) [Z86.718]  History of pulmonary embolism [Z86.711]             Anticoagulation Care Providers     Provider Role Specialty Phone number    David Jenkins MD Referring Family Medicine [phone]          Visit with referring provider/group within last year: Yes    [facility] referral signed within last year: Yes    Kannan meets all criteria for refill (current [facility] referral, office visit with referring provider/group in last year, lab monitoring up to date or not exceeding 2 weeks overdue). Rx instructions and quantity supplied updated to match patient's current dosing plan. 90 day supply with 0 refills granted per [facility] protocol     Sherice Sams RN  Anticoagulation Clinic

## 2023-04-03 NOTE — PROGRESS NOTES
Received a fax INR result for Иван from [facility]    INR result dated 4/3/23 is 1.8    Carmen BETTENCOURT RN, BSN  Anticoagulation Clinic

## 2023-04-03 NOTE — PROGRESS NOTES
ANTICOAGULATION MANAGEMENT     Kannan Wolf 73 year old male is on warfarin with subtherapeutic INR result. (Goal INR 2.5-3.5)    Recent labs: (last 7 days)     04/03/23  0000   INR 1.8*       ASSESSMENT       Source(s): Chart Review and Patient/Caregiver Call       Warfarin doses taken: Warfarin taken as instructed    Diet: No new diet changes identified    New illness, injury, or hospitalization: No    Medication/supplement changes: None noted    Signs or symptoms of bleeding or clotting: No    Previous INR: Subtherapeutic    Additional findings: Patient just came back to [place] after spending winter in [facility]. Reported that he lost his passport and was told that they found it in [facility] and will be mailing it to him     Patient is requesting to send dosing via [employer].         PLAN     Recommended plan for temporary change(s) affecting INR     Dosing Instructions: Continue your current warfarin dose with next INR in 4 days       Summary  As of 4/3/2023    Full warfarin instructions:  12.5 mg every day   Next INR check:  4/7/2023             Sent [employer] message with dosing and follow up instructions    Patient to recheck with home meter    Education provided:     Goal range and lab monitoring: goal range and significance of current result, Importance of therapeutic range and Importance of following up at instructed interval    Symptom monitoring: monitoring for clotting signs and symptoms, monitoring for stroke signs and symptoms and when to seek medical attention/emergency care    Plan made with [facility] Pharmacist Zofia Gibson RN  Anticoagulation Clinic  4/3/2023    _______________________________________________________________________     Anticoagulation Episode Summary     Current INR goal:  2.5-3.5   TTR:  49.5 % (1 y)   Target end date:  Indefinite   Send INR reminders to:  [facility] HOME MONITORING    Indications    Factor V Leiden mutation (H) [D68.51]  Personal history of DVT (deep vein  thrombosis) [Z86.718]  History of pulmonary embolism [Z86.711]           Comments:  [employer] Home Meter // Manage by exception         Anticoagulation Care Providers     Provider Role Specialty Phone number    David Jenkins MD Referring Family Medicine [phone]

## 2023-04-04 RX ORDER — WARFARIN SODIUM 5 MG/1
TABLET ORAL
Qty: 225 TABLET | OUTPATIENT
Start: 2023-04-04

## 2023-04-07 ENCOUNTER — TRANSFERRED RECORDS (OUTPATIENT)
Dept: HEALTH INFORMATION MANAGEMENT | Facility: CLINIC | Age: 73
End: 2023-04-07
Payer: COMMERCIAL

## 2023-04-07 ENCOUNTER — ANTICOAGULATION THERAPY VISIT (OUTPATIENT)
Dept: ANTICOAGULATION | Facility: CLINIC | Age: 73
End: 2023-04-07
Payer: COMMERCIAL

## 2023-04-07 DIAGNOSIS — D68.51 FACTOR V LEIDEN MUTATION (H): Primary | ICD-10-CM

## 2023-04-07 DIAGNOSIS — Z86.718 PERSONAL HISTORY OF DVT (DEEP VEIN THROMBOSIS): ICD-10-CM

## 2023-04-07 DIAGNOSIS — Z86.711 HISTORY OF PULMONARY EMBOLISM: ICD-10-CM

## 2023-04-07 LAB
INR (EXTERNAL): 2.9 (ref 0.9–1.1)
INR HOME MONITORING: 2.9 RATIO (ref 2.5–3.5)

## 2023-04-07 NOTE — PROGRESS NOTES
ANTICOAGULATION MANAGEMENT     Kannan DODSON Maryann 73 year old male is on warfarin with therapeutic INR result. (Goal INR 2.5-3.5)    Recent labs: (last 7 days)     04/07/23  1241   INR 2.9*       ASSESSMENT       Source(s): Chart Review and Patient/Caregiver Call       Warfarin doses taken: Warfarin taken as instructed - confirmed booster dose last week    Diet: No new diet changes identified    New illness, injury, or hospitalization: No    Medication/supplement changes: None noted    Signs or symptoms of bleeding or clotting: No    Previous INR: Subtherapeutic    Additional findings: Back from [facility], so has returned to his normal intake of vitamin K and alcohol         PLAN     Recommended plan for no diet, medication or health factor changes affecting INR     Dosing Instructions: Continue your current warfarin dose with next INR in 1 week       Summary  As of 4/7/2023    Full warfarin instructions:  12.5 mg every day   Next INR check:  4/14/2023             Telephone call with Kannan who verbalizes understanding and agrees to plan    Patient to recheck with home meter    Education provided:     Please call back if any changes to your diet, medications or how you've been taking warfarin    Resume manage by exception with home monitor. Continue to submit INR results to home monitor company.You will only be called when your result is out of range. Please call and notify [facility] if new medication started, dose missed, signs or symptoms of bleeding or clotting, or a surgery/procedure is scheduled.    Plan made per [facility] anticoagulation protocol    June Shelby RN  Anticoagulation Clinic  4/7/2023    _______________________________________________________________________     Anticoagulation Episode Summary     Current INR goal:  2.5-3.5   TTR:  50.0 % (1 y)   Target end date:  Indefinite   Send INR reminders to:  [facility] HOME MONITORING    Indications    Factor V Leiden mutation (H) [D68.51]  Personal history of  DVT (deep vein thrombosis) [Z86.718]  History of pulmonary embolism [Z86.711]           Comments:  [employer] Home Meter // Manage by exception         Anticoagulation Care Providers     Provider Role Specialty Phone number    David Jenkins MD Referring Family Medicine [phone]

## 2023-04-14 ENCOUNTER — MYC MEDICAL ADVICE (OUTPATIENT)
Dept: ANTICOAGULATION | Facility: CLINIC | Age: 73
End: 2023-04-14
Payer: COMMERCIAL

## 2023-04-14 ENCOUNTER — TRANSFERRED RECORDS (OUTPATIENT)
Dept: HEALTH INFORMATION MANAGEMENT | Facility: CLINIC | Age: 73
End: 2023-04-14
Payer: COMMERCIAL

## 2023-04-14 ENCOUNTER — ANTICOAGULATION THERAPY VISIT (OUTPATIENT)
Dept: ANTICOAGULATION | Facility: CLINIC | Age: 73
End: 2023-04-14
Payer: COMMERCIAL

## 2023-04-14 DIAGNOSIS — Z86.711 HISTORY OF PULMONARY EMBOLISM: ICD-10-CM

## 2023-04-14 DIAGNOSIS — Z86.718 PERSONAL HISTORY OF DVT (DEEP VEIN THROMBOSIS): ICD-10-CM

## 2023-04-14 DIAGNOSIS — D68.51 FACTOR V LEIDEN MUTATION (H): Primary | ICD-10-CM

## 2023-04-14 LAB
INR (EXTERNAL): 4.6 (ref 0.9–1.1)
INR HOME MONITORING: 4.6 RATIO (ref 2.5–3.5)

## 2023-04-14 NOTE — PROGRESS NOTES
ANTICOAGULATION MANAGEMENT     Kannan DODSON [place]udwayne 73 year old male is on warfarin with supratherapeutic INR result. (Goal INR 2.5-3.5)    Recent labs: (last 7 days)     04/14/23  0000   INR 4.6*       ASSESSMENT       Source(s): Chart Review and Patient/Caregiver Call       Warfarin doses taken: Warfarin taken as instructed    Diet: No new diet changes identified    Medication/supplement changes: None noted    New illness, injury, or hospitalization: No    Signs or symptoms of bleeding or clotting: No    Previous INR: Therapeutic last visit; previously outside of goal range    Additional findings: None         PLAN     Recommended plan for no diet, medication or health factor changes affecting INR     Dosing Instructions: partial hold then decrease your warfarin dose (11.4% change) with next INR in 1 week       Summary  As of 4/14/2023    Full warfarin instructions:  4/14: 5 mg; Otherwise 12.5 mg every Sun, Tue, Thu; 10 mg all other days   Next INR check:  4/21/2023             Telephone call with Kannan who verbalizes understanding and agrees to plan  Sent [employer] message with dosing and follow up instructions    Patient to recheck with home meter    Education provided:     Symptom monitoring: monitoring for bleeding signs and symptoms, when to seek medical attention/emergency care and if you hit your head or have a bad fall seek emergency care    Plan made per [facility] anticoagulation protocol    Carmen Leal, RN  Anticoagulation Clinic  4/14/2023    _______________________________________________________________________     Anticoagulation Episode Summary     Current INR goal:  2.5-3.5   TTR:  50.5 % (1 y)   Target end date:  Indefinite   Send INR reminders to:  ANTICOAG HOME MONITORING    Indications    Factor V Leiden mutation (H) [D68.51]  Personal history of DVT (deep vein thrombosis) [Z86.718]  History of pulmonary embolism [Z86.711]           Comments:  [employer] Home Meter // Manage by exception          Anticoagulation Care Providers     Provider Role Specialty Phone number    David Jenkins MD Referring Family Medicine [phone]

## 2023-04-21 ENCOUNTER — ANTICOAGULATION THERAPY VISIT (OUTPATIENT)
Dept: ANTICOAGULATION | Facility: CLINIC | Age: 73
End: 2023-04-21
Payer: COMMERCIAL

## 2023-04-21 ENCOUNTER — TRANSFERRED RECORDS (OUTPATIENT)
Dept: HEALTH INFORMATION MANAGEMENT | Facility: CLINIC | Age: 73
End: 2023-04-21
Payer: COMMERCIAL

## 2023-04-21 ENCOUNTER — MYC MEDICAL ADVICE (OUTPATIENT)
Dept: ANTICOAGULATION | Facility: CLINIC | Age: 73
End: 2023-04-21
Payer: COMMERCIAL

## 2023-04-21 DIAGNOSIS — D68.51 FACTOR V LEIDEN MUTATION (H): Primary | ICD-10-CM

## 2023-04-21 DIAGNOSIS — Z86.711 HISTORY OF PULMONARY EMBOLISM: ICD-10-CM

## 2023-04-21 DIAGNOSIS — Z86.718 PERSONAL HISTORY OF DVT (DEEP VEIN THROMBOSIS): ICD-10-CM

## 2023-04-21 LAB
INR (EXTERNAL): 5.2 (ref 0.9–1.1)
INR HOME MONITORING: 5.2 RATIO (ref 2.5–3.5)

## 2023-04-21 NOTE — PROGRESS NOTES
ANTICOAGULATION MANAGEMENT     Kannan Wolf 73 year old male is on warfarin with supratherapeutic INR result. (Goal INR 2.5-3.5)    Recent labs: (last 7 days)     04/21/23  0000   INR 5.2*       ASSESSMENT       Source(s): Chart Review and Patient/Caregiver Call -My chart message (see my chart message from 4/14/23, patient piggy backed on that result)      Warfarin doses taken: Warfarin taken differently, but did not change total weekly dose    Diet: No new diet changes identified    Medication/supplement changes: None noted    New illness, injury, or hospitalization: No    Signs or symptoms of bleeding or clotting: No, but has noted his skin is tearing very easily    Previous INR: Supratherapeutic    Additional findings: None         PLAN     Recommended plan for no diet, medication or health factor changes affecting INR     Dosing Instructions: hold Friday and Saturday's  doses then decrease your warfarin dose (12.9% change) with next INR in 3 days       Summary  As of 4/21/2023    Full warfarin instructions:  4/21: Hold; 4/22: Hold; Otherwise 7.5 mg every Fri; 10 mg all other days   Next INR check:               Sent [employer] message with dosing and follow up instructions    Patient to recheck with home meter    Education provided:     Symptom monitoring: monitoring for bleeding signs and symptoms, when to seek medical attention/emergency care and if you hit your head or have a bad fall seek emergency care    Plan made per [facility] anticoagulation protocol    Carmen Leal, RN  Anticoagulation Clinic  4/21/2023    _______________________________________________________________________     Anticoagulation Episode Summary     Current INR goal:  2.5-3.5   TTR:  49.4 % (1 y)   Target end date:  Indefinite   Send INR reminders to:  ANT[facility]AG HOME MONITORING    Indications    Factor V Leiden mutation (H) [D68.51]  Personal history of DVT (deep vein thrombosis) [Z86.718]  History of pulmonary embolism [Z86.711]            Comments:  MD[facility] Home Meter // Manage by exception         Anticoagulation Care Providers     Provider Role Specialty Phone number    David Jenkins MD Referring Family Medicine [phone]

## 2023-04-21 NOTE — TELEPHONE ENCOUNTER
Closing encounter. See anticoagulation encounter from today.    DEION MendozaN, RN  Anticoagulation Clinic

## 2023-04-24 ENCOUNTER — TRANSFERRED RECORDS (OUTPATIENT)
Dept: HEALTH INFORMATION MANAGEMENT | Facility: CLINIC | Age: 73
End: 2023-04-24
Payer: COMMERCIAL

## 2023-04-24 ENCOUNTER — TELEPHONE (OUTPATIENT)
Dept: ANTICOAGULATION | Facility: CLINIC | Age: 73
End: 2023-04-24
Payer: COMMERCIAL

## 2023-04-24 DIAGNOSIS — Z86.711 HISTORY OF PULMONARY EMBOLISM: ICD-10-CM

## 2023-04-24 DIAGNOSIS — D68.51 FACTOR V LEIDEN MUTATION (H): Primary | ICD-10-CM

## 2023-04-24 DIAGNOSIS — Z86.718 PERSONAL HISTORY OF DVT (DEEP VEIN THROMBOSIS): ICD-10-CM

## 2023-04-24 LAB
INR (EXTERNAL): 1.2 (ref 0.9–1.1)
INR HOME MONITORING: 1.2 RATIO (ref 2.5–3.5)

## 2023-04-24 NOTE — TELEPHONE ENCOUNTER
ANTICOAGULATION MANAGEMENT     Kannan DODSON Cudd 73 year old male is on warfarin with supratherapeutic INR result. (Goal INR )    Recent labs: (last 7 days)     04/24/23  0000   INR 1.2*       ASSESSMENT       Source(s): Chart Review and Patient/Caregiver Call       Warfarin doses taken: Held for supratherapeutic INR  recently which may be affecting INR    Diet: No new diet changes identified-denies any changes in diet    Medication/supplement changes: None noted    New illness, injury, or hospitalization: No    Signs or symptoms of bleeding or clotting: No    Previous result: Supratherapeutic    Additional findings: None         PLAN     Recommended plan for temporary change(s) affecting INR     Dosing Instructions: booster dose then continue your current warfarin dose with next INR in 4 days       Summary  As of 4/24/2023    Full warfarin instructions:  4/24: 17.5 mg; Otherwise 7.5 mg every Fri; 10 mg all other days   Next INR check:  4/28/2023             Telephone call with Kannan who verbalizes understanding and agrees to plan  Sent [employer] message with dosing and follow up instructions    Patient to recheck with home meter    Education provided:     Dietary considerations: importance of consistent vitamin K intake    Plan made with [facility] Pharmacist Unique Leal RN  Anticoagulation Clinic  4/24/2023    _______________________________________________________________________     Anticoagulation Episode Summary     Current INR goal:  2.5-3.5   TTR:  48.8 % (1 y)   Target end date:  Indefinite   Send INR reminders to:  ANTICOAG HOME MONITORING    Indications    Factor V Leiden mutation (H) [D68.51]  Personal history of DVT (deep vein thrombosis) [Z86.718]  History of pulmonary embolism [Z86.711]           Comments:  MDINGRETA Home Meter // Manage by exception         Anticoagulation Care Providers     Provider Role Specialty Phone number    David Jenkins MD Referring Family Medicine  [phone]

## 2023-04-24 NOTE — TELEPHONE ENCOUNTER
MD INR called to report out of range INR of 1.2 today, I was told the result has been faxed.    Danna Jerome RN  Anticoagulation Clinic

## 2023-04-24 NOTE — TELEPHONE ENCOUNTER
---------------------  From: Samira Adler RN   To: WENDI BOWSER    Sent: 9/17/2021 11:26:31 AM CDT  Subject: INR     Rajeev Lama INR today at 3.5. Please stay on 10mg warfarin daily and recheck your INR in 1 week. Contact us if you have any concerns.    Thank you,    Samira MARIN RN   Rhomboid Transposition Flap Text: The defect edges were debeveled with a #15 scalpel blade.  Given the location of the defect and the proximity to free margins a rhomboid transposition flap was deemed most appropriate.  Using a sterile surgical marker, an appropriate rhomboid flap was drawn incorporating the defect.    The area thus outlined was incised deep to adipose tissue with a #15 scalpel blade.  The skin margins were undermined to an appropriate distance in all directions utilizing iris scissors.

## 2023-04-28 ENCOUNTER — MYC MEDICAL ADVICE (OUTPATIENT)
Dept: ANTICOAGULATION | Facility: CLINIC | Age: 73
End: 2023-04-28
Payer: COMMERCIAL

## 2023-04-28 ENCOUNTER — TRANSFERRED RECORDS (OUTPATIENT)
Dept: HEALTH INFORMATION MANAGEMENT | Facility: CLINIC | Age: 73
End: 2023-04-28
Payer: COMMERCIAL

## 2023-04-28 ENCOUNTER — ANTICOAGULATION THERAPY VISIT (OUTPATIENT)
Dept: ANTICOAGULATION | Facility: CLINIC | Age: 73
End: 2023-04-28
Payer: COMMERCIAL

## 2023-04-28 DIAGNOSIS — Z86.718 PERSONAL HISTORY OF DVT (DEEP VEIN THROMBOSIS): ICD-10-CM

## 2023-04-28 DIAGNOSIS — D68.51 FACTOR V LEIDEN MUTATION (H): Primary | ICD-10-CM

## 2023-04-28 DIAGNOSIS — Z86.711 HISTORY OF PULMONARY EMBOLISM: ICD-10-CM

## 2023-04-28 LAB
INR (EXTERNAL): 1.8 (ref 0.9–1.1)
INR HOME MONITORING: 1.8 RATIO (ref 2.5–3.5)

## 2023-04-28 NOTE — PROGRESS NOTES
ANTICOAGULATION MANAGEMENT     Kannan DODSON Cudd 73 year old male is on warfarin with subtherapeutic INR result. (Goal INR 2.5-3.5)    Recent labs: (last 7 days)     04/28/23  1525   INR 1.8*       ASSESSMENT       Source(s): Chart Review and Patient/Caregiver Call       Warfarin doses taken: INR still coming back up after two day hold last week.    Diet: No new diet changes identified    Medication/supplement changes: None noted    New illness, injury, or hospitalization: No    Signs or symptoms of bleeding or clotting: No    Previous result: Subtherapeutic    Additional findings: None         PLAN     Recommended plan for temporary change(s) affecting INR     Dosing Instructions: booster dose then continue your current warfarin dose with next INR in 3 days       Summary  As of 4/28/2023    Full warfarin instructions:  4/28: 15 mg; Otherwise 7.5 mg every Fri; 10 mg all other days   Next INR check:  5/1/2023             Telephone call with Kannan for assessment. Dosing sent via [employer]    Patient to recheck with home meter    Education provided:     Goal range and lab monitoring: goal range and significance of current result    Plan made per [facility] anticoagulation protocol    Wilfred Nickerson RN  Anticoagulation Clinic  4/28/2023    _______________________________________________________________________     Anticoagulation Episode Summary     Current INR goal:  2.5-3.5   TTR:  48.7 % (1 y)   Target end date:  Indefinite   Send INR reminders to:  ANTICOAG HOME MONITORING    Indications    Factor V Leiden mutation (H) [D68.51]  Personal history of DVT (deep vein thrombosis) [Z86.718]  History of pulmonary embolism [Z86.711]           Comments:  [employer] Home Meter // Manage by exception         Anticoagulation Care Providers     Provider Role Specialty Phone number    David Jenkins MD Referring Family Medicine [phone]

## 2023-05-01 ENCOUNTER — ANTICOAGULATION THERAPY VISIT (OUTPATIENT)
Dept: ANTICOAGULATION | Facility: CLINIC | Age: 73
End: 2023-05-01
Payer: COMMERCIAL

## 2023-05-01 ENCOUNTER — TRANSFERRED RECORDS (OUTPATIENT)
Dept: HEALTH INFORMATION MANAGEMENT | Facility: CLINIC | Age: 73
End: 2023-05-01
Payer: COMMERCIAL

## 2023-05-01 DIAGNOSIS — Z86.711 HISTORY OF PULMONARY EMBOLISM: ICD-10-CM

## 2023-05-01 DIAGNOSIS — D68.51 FACTOR V LEIDEN MUTATION (H): Primary | ICD-10-CM

## 2023-05-01 DIAGNOSIS — Z86.718 PERSONAL HISTORY OF DVT (DEEP VEIN THROMBOSIS): ICD-10-CM

## 2023-05-01 LAB
INR (EXTERNAL): 1.6 (ref 0.9–1.1)
INR HOME MONITORING: 1.6 RATIO (ref 2.5–3.5)

## 2023-05-01 NOTE — PROGRESS NOTES
ANTICOAGULATION MANAGEMENT     Kannan Wolf 73 year old male is on warfarin with subtherapeutic INR result. (Goal INR 2.5-3.5)    Recent labs: (last 7 days)     05/01/23  0000   INR 1.6*       ASSESSMENT       Source(s): Chart Review    Previous INR was Subtherapeutic    Medication, diet, health changes since last INR chart reviewed; none identified    Consulted with [facility], tentative plan to have booster dose warfarin today and increase maintenance to 75mg/week pending patient assessment.          PLAN     Unable to reach Kannan today.    Left message to take a booster dose of warfarin,  15 mg tonight. Request call back for assessment.   [employer] message sent     Follow up required to confirm warfarin dose taken and assess for changes    Taurus Rg RN  Anticoagulation Clinic  5/1/2023

## 2023-05-01 NOTE — PROGRESS NOTES
ANTICOAGULATION MANAGEMENT     Kannan DODSON [place]ud[place] 73 year old male is on warfarin with subtherapeutic INR result. (Goal INR 2.5-3.5)    Recent labs: (last 7 days)     05/01/23  0000   INR 1.6*       ASSESSMENT       Source(s): Chart Review and Patient/Caregiver Call       Warfarin doses taken: Warfarin taken as instructed    Diet: No new diet changes identified    Medication/supplement changes: None noted    New illness, injury, or hospitalization: No    Signs or symptoms of bleeding or clotting: No    Previous result: Subtherapeutic    Additional findings: None         PLAN     Recommended plan for no diet, medication or health factor changes affecting INR     Dosing Instructions: booster dose then Increase your warfarin dose (11.1% change) with next INR in 1 week       Summary  As of 5/1/2023    Full warfarin instructions:  5/1: 15 mg; Otherwise 12.5 mg every Tue, Fri; 10 mg all other days   Next INR check:  5/8/2023             Telephone call with Kannan who verbalizes understanding and agrees to plan  [employer] message sent with warfarin dosing and INR recheck date.     Patient to recheck with home meter    Education provided:     Goal range and lab monitoring: goal range and significance of current result and Importance of therapeutic range    Dietary considerations: importance of consistent vitamin K intake and importance of notifying [facility] to changes in diet/alcohol intake changes    Plan made with [facility] Pharmacist Zofia Rg, RN  Anticoagulation Clinic  5/1/2023    _______________________________________________________________________     Anticoagulation Episode Summary     Current INR goal:  2.5-3.5   TTR:  48.6 % (1 y)   Target end date:  Indefinite   Send INR reminders to:  ANTICOAG HOME MONITORING    Indications    Factor V Leiden mutation (H) [D68.51]  Personal history of DVT (deep vein thrombosis) [Z86.718]  History of pulmonary embolism [Z86.711]           Comments:  [employer] Home Meter //  Manage by exception         Anticoagulation Care Providers     Provider Role Specialty Phone number    David Jenkins MD Referring Family Medicine [phone]

## 2023-05-01 NOTE — PROGRESS NOTES
Received a faxed INR result for Kannan Wolf    From: MD INR     Result 1.6     Date and time of collection: 5/1/2023   @ 1640

## 2023-05-08 ENCOUNTER — TRANSFERRED RECORDS (OUTPATIENT)
Dept: HEALTH INFORMATION MANAGEMENT | Facility: CLINIC | Age: 73
End: 2023-05-08
Payer: COMMERCIAL

## 2023-05-08 ENCOUNTER — ANTICOAGULATION THERAPY VISIT (OUTPATIENT)
Dept: ANTICOAGULATION | Facility: CLINIC | Age: 73
End: 2023-05-08
Payer: COMMERCIAL

## 2023-05-08 DIAGNOSIS — D68.51 FACTOR V LEIDEN MUTATION (H): Primary | ICD-10-CM

## 2023-05-08 DIAGNOSIS — Z86.711 HISTORY OF PULMONARY EMBOLISM: ICD-10-CM

## 2023-05-08 DIAGNOSIS — Z86.718 PERSONAL HISTORY OF DVT (DEEP VEIN THROMBOSIS): ICD-10-CM

## 2023-05-08 LAB — INR (EXTERNAL): 3.4 (ref 0.9–1.1)

## 2023-05-08 NOTE — PROGRESS NOTES
ANTICOAGULATION MANAGEMENT     Kannan DODSON Maryann 73 year old male is on warfarin with therapeutic INR result. (Goal INR 2.5-3.5)    Recent labs: (last 7 days)     05/08/23  0000   INR 3.4*       ASSESSMENT       Source(s): Chart Review and Patient/Caregiver Call       Warfarin doses taken: Warfarin taken as instructed    Diet: No new diet changes identified    Medication/supplement changes: None noted    New illness, injury, or hospitalization: No    Signs or symptoms of bleeding or clotting: No    Previous result: Subtherapeutic    Additional findings: None         PLAN       Dosing Instructions: Continue your current warfarin dose with next INR in 1 week       Summary  As of 5/8/2023    Full warfarin instructions:  12.5 mg every Tue, Fri; 10 mg all other days   Next INR check:  5/15/2023             Telephone call with Kannan who verbalizes understanding and agrees to plan ([employer] message sent as well)    Patient to recheck with home meter    Education provided:     Contact [phone]  with any changes, questions or concerns.     Plan made per [facility] anticoagulation protocol    Ekta Tello RN  Anticoagulation Clinic  5/8/2023    _______________________________________________________________________     Anticoagulation Episode Summary     Current INR goal:  2.5-3.5   TTR:  48.4 % (1 y)   Target end date:  Indefinite   Send INR reminders to:  ANTICOAG HOME MONITORING    Indications    Factor V Leiden mutation (H) [D68.51]  Personal history of DVT (deep vein thrombosis) [Z86.718]  History of pulmonary embolism [Z86.711]           Comments:  MDINGRETA Home Meter // Manage by exception         Anticoagulation Care Providers     Provider Role Specialty Phone number    David Jenkins MD Referring Family Medicine [phone]

## 2023-05-15 ENCOUNTER — TRANSFERRED RECORDS (OUTPATIENT)
Dept: HEALTH INFORMATION MANAGEMENT | Facility: CLINIC | Age: 73
End: 2023-05-15
Payer: COMMERCIAL

## 2023-05-16 ENCOUNTER — ANTICOAGULATION THERAPY VISIT (OUTPATIENT)
Dept: ANTICOAGULATION | Facility: CLINIC | Age: 73
End: 2023-05-16
Payer: COMMERCIAL

## 2023-05-16 DIAGNOSIS — Z86.718 PERSONAL HISTORY OF DVT (DEEP VEIN THROMBOSIS): ICD-10-CM

## 2023-05-16 DIAGNOSIS — D68.51 FACTOR V LEIDEN MUTATION (H): Primary | ICD-10-CM

## 2023-05-16 DIAGNOSIS — Z86.711 HISTORY OF PULMONARY EMBOLISM: ICD-10-CM

## 2023-05-16 LAB — INR (EXTERNAL): 2.6 (ref 0.9–1.1)

## 2023-05-16 NOTE — PROGRESS NOTES
ANTICOAGULATION MANAGEMENT     Kannan Wolf 73 year old male is on warfarin with therapeutic INR result. (Goal INR 2.5-3.5)    Recent labs: (last 7 days)     05/15/23  1456   INR 2.6*       ASSESSMENT       Source(s): Chart Review    Previous INR was Therapeutic last visit; previously outside of goal range    Medication, diet, health changes since last INR chart reviewed; none identified             PLAN     Recommended plan for no diet, medication or health factor changes affecting INR     Dosing Instructions: Continue your current warfarin dose with next INR in 1 week       Summary  As of 5/16/2023    Full warfarin instructions:  12.5 mg every Tue, Fri; 10 mg all other days   Next INR check:  5/22/2023             Detailed voice message left for Kannan with dosing instructions and follow up date.     Patient to recheck with home meter    Education provided:     Please call back if any changes to your diet, medications or how you've been taking warfarin    Resume manage by exception with home monitor. Continue to submit INR results to home monitor company.You will only be called when your result is out of range. Please call and notify [facility] if new medication started, dose missed, signs or symptoms of bleeding or clotting, or a surgery/procedure is scheduled.    Contact [phone]  with any changes, questions or concerns.     Plan made per [facility] anticoagulation protocol    Sherice Sams RN  Anticoagulation Clinic  5/16/2023    _______________________________________________________________________     Anticoagulation Episode Summary     Current INR goal:  2.5-3.5   TTR:  49.5 % (1 y)   Target end date:  Indefinite   Send INR reminders to:  ANTICOAG HOME MONITORING    Indications    Factor V Leiden mutation (H) [D68.51]  Personal history of DVT (deep vein thrombosis) [Z86.718]  History of pulmonary embolism [Z86.711]           Comments:  [employer] Home Meter // Manage by exception         Anticoagulation Care Providers      Provider Role Specialty Phone number    David Jenkins MD Referring Family Medicine [phone]

## 2023-05-18 ENCOUNTER — TRANSFERRED RECORDS (OUTPATIENT)
Dept: HEALTH INFORMATION MANAGEMENT | Facility: CLINIC | Age: 73
End: 2023-05-18
Payer: COMMERCIAL

## 2023-05-24 ENCOUNTER — TRANSFERRED RECORDS (OUTPATIENT)
Dept: HEALTH INFORMATION MANAGEMENT | Facility: CLINIC | Age: 73
End: 2023-05-24
Payer: COMMERCIAL

## 2023-05-24 ENCOUNTER — DOCUMENTATION ONLY (OUTPATIENT)
Dept: ANTICOAGULATION | Facility: CLINIC | Age: 73
End: 2023-05-24
Payer: COMMERCIAL

## 2023-05-24 DIAGNOSIS — D68.51 FACTOR V LEIDEN MUTATION (H): Primary | ICD-10-CM

## 2023-05-24 DIAGNOSIS — Z86.718 PERSONAL HISTORY OF DVT (DEEP VEIN THROMBOSIS): ICD-10-CM

## 2023-05-24 DIAGNOSIS — Z86.711 HISTORY OF PULMONARY EMBOLISM: ICD-10-CM

## 2023-05-24 LAB — INR (EXTERNAL): 2.9 (ref 0.9–1.1)

## 2023-05-24 NOTE — PROGRESS NOTES
ANTICOAGULATION  MANAGEMENT-Home Monitor Managed by Exception    Kannanluis e Wolf 73 year old male is on warfarin with therapeutic INR result. (Goal INR 2.5-3.5)    Recent labs: (last 7 days)     05/24/23  0845   INR 2.9*         Previous INR was Therapeutic    Medication, diet, health changes since last INR:chart reviewed; none identified    Contacted within the last 12 weeks by phone on 5/16/23      CHRISTINE     Kannan was NOT contacted regarding therapeutic result today per home monitoring policy manage by exception agreement.   Current warfarin dose is to be continued:     Summary  As of 5/24/2023    Full warfarin instructions:  12.5 mg every Tue, Fri; 10 mg all other days   Next INR check:  5/31/2023           ?   Miracle Salgado RN  Anticoagulation Clinic  5/24/2023    _______________________________________________________________________     Anticoagulation Episode Summary     Current INR goal:  2.5-3.5   TTR:  50.4 % (1 y)   Target end date:  Indefinite   Send INR reminders to:  ANTICOAG HOME MONITORING    Indications    Factor V Leiden mutation (H) [D68.51]  Personal history of DVT (deep vein thrombosis) [Z86.718]  History of pulmonary embolism [Z86.711]           Comments:  [employer] Home Meter // Manage by exception         Anticoagulation Care Providers     Provider Role Specialty Phone number    David Jenkins MD Referring Family Medicine [phone]

## 2023-05-30 ENCOUNTER — TELEPHONE (OUTPATIENT)
Dept: FAMILY MEDICINE | Facility: CLINIC | Age: 73
End: 2023-05-30
Payer: COMMERCIAL

## 2023-05-30 NOTE — TELEPHONE ENCOUNTER
New Medication Request    Contacts       Type Contact Phone/Fax    05/30/2023 02:36 PM CDT Phone (Incoming) Kannan Wolf (Self) [phone] (H)          What medication are you requesting?: Eloquis or something similar    Reason for medication request: replace Warfarin    Have you taken this medication before?: No    Controlled Substance Agreement on file:   CSA -- Patient Level:    CSA: None found at the patient level.         Patient offered an appointment? No    Preferred Pharmacy:   [id number] [address]  [address] [address]  Phone: [phone] Fax: [phone]    Could we send this information to you in [employer] or would you prefer to receive a phone call?:   Patient would prefer a phone call   Okay to leave a detailed message?: Yes at Home number on file [phone] (home)

## 2023-05-31 ENCOUNTER — TRANSFERRED RECORDS (OUTPATIENT)
Dept: HEALTH INFORMATION MANAGEMENT | Facility: CLINIC | Age: 73
End: 2023-05-31
Payer: COMMERCIAL

## 2023-05-31 ENCOUNTER — ANTICOAGULATION THERAPY VISIT (OUTPATIENT)
Dept: ANTICOAGULATION | Facility: CLINIC | Age: 73
End: 2023-05-31
Payer: COMMERCIAL

## 2023-05-31 DIAGNOSIS — Z86.718 PERSONAL HISTORY OF DVT (DEEP VEIN THROMBOSIS): ICD-10-CM

## 2023-05-31 DIAGNOSIS — D68.51 FACTOR V LEIDEN MUTATION (H): Primary | ICD-10-CM

## 2023-05-31 DIAGNOSIS — Z86.711 HISTORY OF PULMONARY EMBOLISM: ICD-10-CM

## 2023-05-31 LAB — INR (EXTERNAL): 3.4 (ref 0.9–1.1)

## 2023-05-31 NOTE — PROGRESS NOTES
ANTICOAGULATION  MANAGEMENT-Home Monitor Managed by Exception    Kannan DODSON Maryann 73 year old male is on warfarin with therapeutic INR result. (Goal INR 2.5-3.5)    Recent labs: (last 7 days)     05/31/23  1040   INR 3.4*         Previous INR was Therapeutic    Medication, diet, health changes since last INR:chart reviewed; none identified    Contacted within the last 12 weeks by phone on 5/16/23      CHRISTINE     Kannan was NOT contacted regarding therapeutic result today per home monitoring policy manage by exception agreement.   Current warfarin dose is to be continued:     Summary  As of 5/31/2023    Full warfarin instructions:  12.5 mg every Tue, Fri; 10 mg all other days   Next INR check:  6/7/2023           ?   Miracle Salgado RN  Anticoagulation Clinic  5/31/2023    _______________________________________________________________________     Anticoagulation Episode Summary     Current INR goal:  2.5-3.5   TTR:  50.3 % (1 y)   Target end date:  Indefinite   Send INR reminders to:  ANTICOAG HOME MONITORING    Indications    Factor V Leiden mutation (H) [D68.51]  Personal history of DVT (deep vein thrombosis) [Z86.718]  History of pulmonary embolism [Z86.711]           Comments:  [employer] Home Meter // Manage by exception         Anticoagulation Care Providers     Provider Role Specialty Phone number    David Jenkins MD Referring Family Medicine [phone]

## 2023-06-07 ENCOUNTER — TRANSFERRED RECORDS (OUTPATIENT)
Dept: HEALTH INFORMATION MANAGEMENT | Facility: CLINIC | Age: 73
End: 2023-06-07
Payer: COMMERCIAL

## 2023-06-07 ENCOUNTER — ANTICOAGULATION THERAPY VISIT (OUTPATIENT)
Dept: ANTICOAGULATION | Facility: CLINIC | Age: 73
End: 2023-06-07
Payer: COMMERCIAL

## 2023-06-07 DIAGNOSIS — Z86.718 PERSONAL HISTORY OF DVT (DEEP VEIN THROMBOSIS): ICD-10-CM

## 2023-06-07 DIAGNOSIS — Z86.711 HISTORY OF PULMONARY EMBOLISM: ICD-10-CM

## 2023-06-07 DIAGNOSIS — D68.51 FACTOR V LEIDEN MUTATION (H): Primary | ICD-10-CM

## 2023-06-07 LAB — INR (EXTERNAL): 4.1 (ref 0.9–1.1)

## 2023-06-07 NOTE — PROGRESS NOTES
ANTICOAGULATION MANAGEMENT     Kannan DODSON Cudwayne 73 year old male is on warfarin with supratherapeutic INR result. (Goal INR 2.5-3.5)    Recent labs: (last 7 days)     06/07/23  0000   INR 4.1*       ASSESSMENT       Source(s): Chart Review and Patient/Caregiver Call       Warfarin doses taken: Warfarin taken as instructed    Diet: Change in alcohol intake may be affecting INR. more alcohol , [place] greens [place]    Medication/supplement changes: None noted    New illness, injury, or hospitalization: No    Signs or symptoms of bleeding or clotting: Yes: bruising a lot    Previous result: Therapeutic last 2(+) visits    Additional findings: None         PLAN     Recommended plan for temporary change(s) affecting INR     Dosing Instructions: partial hold then continue your current warfarin dose with next INR in 1 week       Summary  As of 6/7/2023    Full warfarin instructions:  6/7: 7.5 mg; Otherwise 12.5 mg every Tue, Fri; 10 mg all other days   Next INR check:  6/14/2023             Telephone call with Kannan who agrees to plan and repeated back plan correctly    Patient to recheck with home meter    Education provided:     discussed alcohol    Plan made per [facility] anticoagulation protocol    Juliane Martin, RN  Anticoagulation Clinic  6/7/2023    _______________________________________________________________________     Anticoagulation Episode Summary     Current INR goal:  2.5-3.5   TTR:  49.6 % (1 y)   Target end date:  Indefinite   Send INR reminders to:  ANTICOAG HOME MONITORING    Indications    Factor V Leiden mutation (H) [D68.51]  Personal history of DVT (deep vein thrombosis) [Z86.718]  History of pulmonary embolism [Z86.711]           Comments:  [employer] Home Meter // Manage by exception         Anticoagulation Care Providers     Provider Role Specialty Phone number    David Jenkins MD Referring Family Medicine [phone]

## 2023-06-14 ENCOUNTER — ANTICOAGULATION THERAPY VISIT (OUTPATIENT)
Dept: ANTICOAGULATION | Facility: CLINIC | Age: 73
End: 2023-06-14
Payer: COMMERCIAL

## 2023-06-14 ENCOUNTER — TRANSFERRED RECORDS (OUTPATIENT)
Dept: HEALTH INFORMATION MANAGEMENT | Facility: CLINIC | Age: 73
End: 2023-06-14
Payer: COMMERCIAL

## 2023-06-14 DIAGNOSIS — Z86.711 HISTORY OF PULMONARY EMBOLISM: ICD-10-CM

## 2023-06-14 DIAGNOSIS — Z86.718 PERSONAL HISTORY OF DVT (DEEP VEIN THROMBOSIS): ICD-10-CM

## 2023-06-14 DIAGNOSIS — D68.51 FACTOR V LEIDEN MUTATION (H): Primary | ICD-10-CM

## 2023-06-14 LAB — INR (EXTERNAL): 3.4 (ref 0.9–1.1)

## 2023-06-14 NOTE — PROGRESS NOTES
ANTICOAGULATION MANAGEMENT     Kannan Wolf 73 year old male is on warfarin with therapeutic INR result. (Goal INR 2.5-3.5)    Recent labs: (last 7 days)     06/14/23  1642   INR 3.4*       ASSESSMENT       Source(s): Chart Review and Patient/Caregiver Call       Warfarin doses taken: Warfarin taken as instructed    Diet: [place] and will not be eating greens.    Drinking 6-7 [place]/day.    Medication/supplement changes: None noted    New illness, injury, or hospitalization: No    Signs or symptoms of bleeding or clotting: No    Previous result: Supratherapeutic    Additional findings: None         PLAN     Recommended plan for ongoing change(s) affecting INR     Dosing Instructions: decrease your warfarin dose (3% change) with next INR in 1 week       Summary  As of 6/14/2023    Full warfarin instructions:  12.5 mg every Tue; 10 mg all other days   Next INR check:  6/21/2023             Telephone call with Kannan who verbalizes understanding and agrees to plan    Patient to recheck with home meter    Education provided:     Goal range and lab monitoring: goal range and significance of current result    Resume manage by exception with home monitor. Continue to submit INR results to home monitor company.You will only be called when your result is out of range. Please call and notify [facility] if new medication started, dose missed, signs or symptoms of bleeding or clotting, or a surgery/procedure is scheduled.    Plan made with [facility] Pharmacist Zofia Nickerson, RN  Anticoagulation Clinic  6/14/2023    _______________________________________________________________________     Anticoagulation Episode Summary     Current INR goal:  2.5-3.5   TTR:  48.4 % (1 y)   Target end date:  Indefinite   Send INR reminders to:  [facility]    Indications    Factor V Leiden mutation (H) [D68.51]  Personal history of DVT (deep vein thrombosis) [Z86.718]  History of pulmonary  embolism [Z86.711]           Comments:  [facility] Home Meter // Manage by exception         Anticoagulation Care Providers     Provider Role Specialty Phone number    David Jenkins MD Referring Family Medicine [phone]

## 2023-06-19 DIAGNOSIS — Z86.718 H/O DEEP VENOUS THROMBOSIS: ICD-10-CM

## 2023-06-19 DIAGNOSIS — D68.51 FACTOR V LEIDEN MUTATION (H): ICD-10-CM

## 2023-06-19 RX ORDER — WARFARIN SODIUM 5 MG/1
TABLET ORAL
Qty: 186 TABLET | Refills: 1 | Status: SHIPPED | OUTPATIENT
Start: 2023-06-19 | End: 2023-06-23

## 2023-06-19 NOTE — TELEPHONE ENCOUNTER
ANTICOAGULATION MANAGEMENT:  Medication Refill    Anticoagulation Summary  As of 6/14/2023    Warfarin maintenance plan:  12.5 mg (5 mg x 2.5) every Tue; 10 mg (5 mg x 2) all other days   Next INR check:  6/21/2023   Target end date:  Indefinite    Indications    Factor V Leiden mutation (H) [D68.51]  Personal history of DVT (deep vein thrombosis) [Z86.718]  History of pulmonary embolism [Z86.711]             Anticoagulation Care Providers     Provider Role Specialty Phone number    David Jenkins MD Referring Family Medicine [phone]          Visit with referring provider/group within last year: Yes    [facility] referral signed last signed: 02/09/2023; within last year: Yes    Kannan meets all criteria for refill (current [facility] referral, office visit with referring provider/group in last year, lab monitoring up to date or not exceeding 2 weeks overdue). Rx instructions and quantity supplied updated to match patient's current dosing plan. Warfarin 90 day supply with 1 refill granted per [facility] protocol     Wilfred Nickerson RN  Anticoagulation Clinic

## 2023-06-22 ENCOUNTER — TRANSFERRED RECORDS (OUTPATIENT)
Dept: HEALTH INFORMATION MANAGEMENT | Facility: CLINIC | Age: 73
End: 2023-06-22
Payer: COMMERCIAL

## 2023-06-22 ENCOUNTER — DOCUMENTATION ONLY (OUTPATIENT)
Dept: ANTICOAGULATION | Facility: CLINIC | Age: 73
End: 2023-06-22
Payer: COMMERCIAL

## 2023-06-22 DIAGNOSIS — D68.51 FACTOR V LEIDEN MUTATION (H): Primary | ICD-10-CM

## 2023-06-22 DIAGNOSIS — Z86.711 HISTORY OF PULMONARY EMBOLISM: ICD-10-CM

## 2023-06-22 DIAGNOSIS — Z86.718 PERSONAL HISTORY OF DVT (DEEP VEIN THROMBOSIS): ICD-10-CM

## 2023-06-22 LAB — INR (EXTERNAL): 3 (ref 0.9–1.1)

## 2023-06-22 NOTE — PROGRESS NOTES
ANTICOAGULATION  MANAGEMENT-Home Monitor Managed by Exception    Kannanluis e Wolf 73 year old male is on warfarin with therapeutic INR result. (Goal INR 2.5-3.5)    Recent labs: (last 7 days)     06/22/23  1401   INR 3.0*         Previous INR was Therapeutic    Medication, diet, health changes since last INR:chart reviewed; none identified    Contacted within the last 12 weeks by phone on 06/14/2023      CHRISTINE     Kannan was NOT contacted regarding therapeutic result today per home monitoring policy manage by exception agreement.   Current warfarin dose is to be continued:     Summary  As of 6/22/2023    Full warfarin instructions:  12.5 mg every Tue; 10 mg all other days   Next INR check:  6/29/2023           ?   Wilfred Nickerson RN  Anticoagulation Clinic  6/22/2023    _______________________________________________________________________     Anticoagulation Episode Summary     Current INR goal:  2.5-3.5   TTR:  48.9 % (1 y)   Target end date:  Indefinite   Send INR reminders to:  [facility]    Indications    Factor V Leiden mutation (H) [D68.51]  Personal history of DVT (deep vein thrombosis) [Z86.718]  History of pulmonary embolism [Z86.711]           Comments:  [employer] Home Meter // Manage by exception         Anticoagulation Care Providers     Provider Role Specialty Phone number    David Jenkins MD Referring Family Medicine [phone]

## 2023-06-23 ENCOUNTER — OFFICE VISIT (OUTPATIENT)
Dept: FAMILY MEDICINE | Facility: CLINIC | Age: 73
End: 2023-06-23
Payer: COMMERCIAL

## 2023-06-23 ENCOUNTER — ANTICOAGULATION THERAPY VISIT (OUTPATIENT)
Dept: ANTICOAGULATION | Facility: CLINIC | Age: 73
End: 2023-06-23

## 2023-06-23 VITALS
TEMPERATURE: 97.1 F | RESPIRATION RATE: 16 BRPM | HEART RATE: 74 BPM | BODY MASS INDEX: 23.84 KG/M2 | WEIGHT: 176 LBS | OXYGEN SATURATION: 97 % | DIASTOLIC BLOOD PRESSURE: 70 MMHG | SYSTOLIC BLOOD PRESSURE: 115 MMHG | HEIGHT: 72 IN

## 2023-06-23 DIAGNOSIS — I73.9 PVD (PERIPHERAL VASCULAR DISEASE) (H): ICD-10-CM

## 2023-06-23 DIAGNOSIS — Z86.718 PERSONAL HISTORY OF DVT (DEEP VEIN THROMBOSIS): ICD-10-CM

## 2023-06-23 DIAGNOSIS — D68.51 FACTOR V LEIDEN MUTATION (H): Primary | ICD-10-CM

## 2023-06-23 DIAGNOSIS — D68.51 FACTOR V LEIDEN MUTATION (H): ICD-10-CM

## 2023-06-23 DIAGNOSIS — J44.9 CHRONIC OBSTRUCTIVE PULMONARY DISEASE, UNSPECIFIED COPD TYPE (H): Primary | ICD-10-CM

## 2023-06-23 DIAGNOSIS — Z86.711 HISTORY OF PULMONARY EMBOLISM: ICD-10-CM

## 2023-06-23 PROCEDURE — 99213 OFFICE O/P EST LOW 20 MIN: CPT | Performed by: INTERNAL MEDICINE

## 2023-06-23 NOTE — PROGRESS NOTES
From: Taqueria Box MD   Sent: 6/23/2023   1:55 PM CDT   To: Anticoag [facility]     I met with patient today he is stopping his warfarin and will be transitioning over to Xarelto therapy, 20mg daily.     Okay to disenroll patient from anticoagulation clinic

## 2023-06-23 NOTE — PROGRESS NOTES
Assessment & Plan     Factor V Leiden mutation (H)  Discussed available alternative oral anticoagulants for chronic venous thromboembolism beyond warfarin.  Discussed DOACs in particular.  Would be open willing to transition over to Xarelto.  Historically has avoided due to insurance not covering as preferred formulary.  Anticipates he will be paying out-of-pocket which he is okay with at this point.  Discussed continued increased bleeding risk with any degree of anticoagulant.  Unclear if the transition to an alternative agent would have any significant impact on his bleeding risk.  Did share we have more liberty of not having to worry about drug drug interactions and INR checking.    Communication sent to  anticoagulation clinic to disenroll pt from warfarin monitoring    - rivaroxaban ANTICOAGULANT (XARELTO) 20 MG TABS tablet; Take 1 tablet (20 mg) by mouth daily (with dinner)    Chronic obstructive pulmonary disease, unspecified COPD type (H)      PVD (peripheral vascular disease) (H)    - rivaroxaban ANTICOAGULANT (XARELTO) 20 MG TABS tablet; Take 1 tablet (20 mg) by mouth daily (with dinner)         Nicotine/Tobacco Cessation:  He reports that he has been smoking cigarettes. He has a 50.00 pack-year smoking history. He has never used smokeless tobacco.  Nicotine/Tobacco Cessation Plan:   Information offered: Patient not interested at this time        Taqueria Box MD  [facility]    Ijeoma Brunner is a 73 year old, presenting for the following health issues: Wants discuss stopping warfarin.  History of recurrent venous thromboembolism.  Pulmonary embolism history approximately 15 years ago.  Most recent VTE involving what sounds like both arterial and venous occlusion of his left lower extremity.  He has been maintained on warfarin ever since original pulmonary embolism.  Blood clot in the leg was a perioperative complication after knee replacement.  Does have a history  "of factor V Leiden mutation, not clear on his homo versus heterozygosity status.  Chronic tobacco user.  Has had frequent lability in INR's with home monitoring.  Describes easy bruisability there were no other major bleeding complications.  No issues with medication compliance  Consult (Would like to stop warfarin.)        6/23/2023    12:06 PM   Additional Questions   Roomed by Flori MARROQUIN     History of Present Illness       Reason for visit:  Change from warfarin to different drug    He eats 0-1 servings of fruits and vegetables daily.He consumes 1 sweetened beverage(s) daily.He exercises with enough effort to increase his heart rate 10 to 19 minutes per day.  He exercises with enough effort to increase his heart rate 3 or less days per week.   He is taking medications regularly.         Review of Systems   + easy bruisability      Objective    /70   Pulse 74   Temp 97.1  F (36.2  C)   Resp 16   Ht 1.816 m (5' 11.5\")   Wt 79.8 kg (176 lb)   SpO2 97%   BMI 24.20 kg/m    Body mass index is 24.2 kg/m .  Physical Exam                   "

## 2023-06-24 ENCOUNTER — MYC MEDICAL ADVICE (OUTPATIENT)
Dept: FAMILY MEDICINE | Facility: CLINIC | Age: 73
End: 2023-06-24
Payer: COMMERCIAL

## 2023-06-26 NOTE — TELEPHONE ENCOUNTER
Team,  Please see Viewabill message.      Kindly,  Marley Pritchard RN   [Post Hospitalization] : a post hospitalization visit

## 2023-06-26 NOTE — TELEPHONE ENCOUNTER
Per OV on 6/23, pt will transition to Xeralto. Per Mychart message, he plans to continue Coumadin until he runs out. I did advise that he still have his INR rechecked on 6/29, as previously advised  Chel Oswald RN

## 2023-06-26 NOTE — TELEPHONE ENCOUNTER
---------------------  From: Mary Michelle RN   Sent: 7/1/2019 11:26:12 AM CDT  Subject: Monthly INR report     The patients monthly home INR testing report from [facility] was received today. All abnormal results from the month of June 2019 were received and addressed previously; mostly from [facility]. Report is being sent to [facility] for scanning.   Detail Level: Simple Instructions: This plan will send the code FBSE to the PM system.  DO NOT or CHANGE the price. Price (Do Not Change): 0.00

## 2023-06-30 ENCOUNTER — ANTICOAGULATION THERAPY VISIT (OUTPATIENT)
Dept: ANTICOAGULATION | Facility: CLINIC | Age: 73
End: 2023-06-30
Payer: COMMERCIAL

## 2023-06-30 ENCOUNTER — TRANSFERRED RECORDS (OUTPATIENT)
Dept: HEALTH INFORMATION MANAGEMENT | Facility: CLINIC | Age: 73
End: 2023-06-30
Payer: COMMERCIAL

## 2023-06-30 DIAGNOSIS — Z86.711 HISTORY OF PULMONARY EMBOLISM: ICD-10-CM

## 2023-06-30 DIAGNOSIS — Z86.718 PERSONAL HISTORY OF DVT (DEEP VEIN THROMBOSIS): ICD-10-CM

## 2023-06-30 DIAGNOSIS — D68.51 FACTOR V LEIDEN MUTATION (H): Primary | ICD-10-CM

## 2023-06-30 LAB — INR HOME MONITORING: 2.7 RATIO (ref 2.5–3.5)

## 2023-06-30 NOTE — PROGRESS NOTES
ANTICOAGULATION MANAGEMENT     Kannan DODSON Maryann 73 year old male is on warfarin with therapeutic INR result. (Goal INR 2.5-3.5)    Recent labs: (last 7 days)     06/30/23  1251   INR 2.7       ASSESSMENT       Source(s): Chart Review and Patient/Caregiver Call       Warfarin doses taken: Warfarin taken as instructed    Diet: No new diet changes identified    Medication/supplement changes: None noted    New illness, injury, or hospitalization: No    Signs or symptoms of bleeding or clotting: No    Previous result: Therapeutic last 2(+) visits    Additional findings: Patient is planning to transition from warfarin to xarelto and is currently finishing warfarin supply. He has a full bottle of  warfarin left- patient will need to test INR prior to starting first xarelto dose to ensure that INR is below 3.0    He confirmed that he already picked up xarelto prescription. And is aware to call [facility] once he has one week of warfarin supply left.           PLAN     Recommended plan for no diet, medication or health factor changes affecting INR     Dosing Instructions: Continue your current warfarin dose with next INR in 1 week       Summary  As of 6/30/2023    Full warfarin instructions:  12.5 mg every Tue; 10 mg all other days   Next INR check:  7/7/2023             Telephone call with Kannan who verbalizes understanding and agrees to plan    Patient to recheck with home meter    Education provided:     Resume manage by exception with home monitor. Continue to submit INR results to home monitor company.You will only be called when your result is out of range. Please call and notify [facility] if new medication started, dose missed, signs or symptoms of bleeding or clotting, or a surgery/procedure is scheduled.    Contact [facility] once he was 1 week warfarin supply left     Plan made per [facility] anticoagulation protocol    Sherice Gibson RN  Anticoagulation  Clinic  6/30/2023    _______________________________________________________________________     Anticoagulation Episode Summary     Current INR goal:  2.5-3.5   TTR:  49.5 % (1 y)   Target end date:  Indefinite   Send INR reminders to:  [facility]    Indications    Factor V Leiden mutation (H) [D68.51]  Personal history of DVT (deep vein thrombosis) [Z86.718]  History of pulmonary embolism [Z86.711]           Comments:  MDINGRETA Home Meter // Manage by exception         Anticoagulation Care Providers     Provider Role Specialty Phone number    David Jenkins MD Referring Family Medicine [phone]

## 2023-06-30 NOTE — TELEPHONE ENCOUNTER
Anticoagulation Episode unresolved,can not dishcarge form [facility] until completes course of warfarin and transitions to Xarelto    Zofia Shelby, PharmD BCACP  Anticoagulation Clinical Pharmacist

## 2023-07-01 ENCOUNTER — NURSE TRIAGE (OUTPATIENT)
Dept: NURSING | Facility: CLINIC | Age: 73
End: 2023-07-01
Payer: COMMERCIAL

## 2023-07-01 ENCOUNTER — OFFICE VISIT (OUTPATIENT)
Dept: URGENT CARE | Facility: URGENT CARE | Age: 73
End: 2023-07-01
Payer: COMMERCIAL

## 2023-07-01 VITALS
HEART RATE: 89 BPM | DIASTOLIC BLOOD PRESSURE: 69 MMHG | TEMPERATURE: 98 F | WEIGHT: 180 LBS | BODY MASS INDEX: 24.76 KG/M2 | OXYGEN SATURATION: 96 % | SYSTOLIC BLOOD PRESSURE: 117 MMHG | RESPIRATION RATE: 18 BRPM

## 2023-07-01 DIAGNOSIS — Z79.01 CHRONIC ANTICOAGULATION: ICD-10-CM

## 2023-07-01 DIAGNOSIS — R22.0 SWELLING OF LEFT SIDE OF FACE: Primary | ICD-10-CM

## 2023-07-01 PROCEDURE — 99213 OFFICE O/P EST LOW 20 MIN: CPT | Performed by: FAMILY MEDICINE

## 2023-07-01 NOTE — PROGRESS NOTES
Clinical Decision Making:    At the end of the encounter, I discussed results, diagnosis, medications. Discussed red flags for immediate return to clinic/ER, as well as indications for follow up if no improvement. Patient understood and agreed to plan. Patient was stable for discharge.      ICD-10-CM    1. Swelling of left side of face  R22.0       2. Chronic anticoagulation  Z79.01         Sudden onset swelling only on the left side of the face  Concerning for possible bleed with his warfarin  Patient is stable and sent to the emergency room for further evaluation  I discussed his case with the ER physician at [facility]      There are no Patient Instructions on file for this visit.   No follow-ups on file.      chief complaint    HPI:  Kannan Wolf is a 73 year old male who presents today complaining of sudden onset left-sided facial swelling.  Patient was just chewing his lunch just prior to presenting to the urgent care and he had the sudden onset of swelling only on the left side of his face.  He is not having any trouble breathing or swallowing  He has no headache  No lightheadedness or dizziness  No change in his vision  It is not painful but it is a little tender if he touches it  He is on chronic warfarin therapy for blood clots    History obtained from chart review and the patient.    Problem List:  2023-06: Chronic obstructive pulmonary disease, unspecified COPD type   (H)  2022-05: Anticoagulant effect  2022-05: Obesity  2022-05: Acromioclavicular joint arthritis  2022-05: History of arterial thrombosis  2022-05: Seasonal allergic rhinitis  2022-02: Factor V Leiden mutation (H)  2022-02: Personal history of DVT (deep vein thrombosis)  2020-11: S/P shoulder surgery  2019-06: Lower limb ischemia  2019-06: History of pulmonary embolism  2019-06: H/O deep venous thrombosis  2019-06: Sinus tachycardia  2019-06: Status post total left knee replacement  2019-06: Tobacco dependence  2019-06:  Blood clot due to device, implant, or graft  2016-11: NAFLD (nonalcoholic fatty liver disease)  2015-12: Lung nodule  2013-04: Prediabetes  2012-10: Lipids abnormal  2012-07: Arterial occlusive disease  2012-07: PVD (peripheral vascular disease) (H)  2012-04: Adenomatous polyp of colon  2012-02: Colon polyps      Past Medical History:   Diagnosis Date     ACL tear      Arthritis     osteoarthritis     Blood clot in the legs     +PE 2009  also on vein from intestine     Blood clotting disorder (H) 2009     Chronic diarrhea     result of total colectomy     Factor V Leiden (H)      Head injury 1960    multiple     Hearing problem 2000     IBS (irritable bowel syndrome)      Liver disease 2013    Fatty liver     Migraines 1989    none for a few years     Personal history of colonic polyps 2009     Polyps, colonic      Pulmonary emboli (H) 2009     Skin disease 1970    eczema     Sleep apnea        Social History     Tobacco Use     Smoking status: Every Day     Packs/day: 1.00     Years: 50.00     Pack years: 50.00     Types: Cigarettes     Smokeless tobacco: Never   Substance Use Topics     Alcohol use: Yes     Alcohol/week: 0.0 standard drinks of alcohol     Comment: 3-4 light beers per day       Review of systems  negative except listed in HPI    Vitals:    07/01/23 1214   BP: 117/69   Pulse: 89   Resp: 18   Temp: 98  F (36.7  C)   TempSrc: Tympanic   SpO2: 96%   Weight: 81.6 kg (180 lb)       Physical Exam  Vitals noted and within normal limits  In general he is alert, oriented, and in no acute distress  Right ear canal patent, TM intact with no erythema no bulging  Left ear canal swollen with no erythema  Left side of his face is swollen at the jawline with a fluid swelling inferior to the jawline.  There is no erythema or rash.  Carotid pulses normal  Mouth mucous members are pink and moist and pharynx is not erythematous.  There is no swelling in the mouth cavity  Heart has a regular rate and rhythm with no  murmurs  Lungs are clear to auscultation bilaterally

## 2023-07-01 NOTE — TELEPHONE ENCOUNTER
"Nurse Triage SBAR    Is this a 2nd Level Triage? NO    Situation:     Patient calling reporting sudden onset of swelling behind his left ear while eating.    Background:     Denies previous history.    Assessment:     Patient reporting symptoms started in the past 10 minutes while eating.  Swelling developed below back of left ear.  \"I don't think it is an allergic reaction.\"   Reporting swelling is \"the size of a papaya.\" \"6 inches.\"   Denies pain, tenderness or redness.  Patient denies any difficulty breathing or swallowing.      Protocol Recommended Disposition:   See provider with in 24 hours.    Recommendation:     Patient agrees to go to [facility] now.    Reason for Disposition    [1] Single large node AND [2] size > 1 inch (2.5 cm) AND [3] no fever    Additional Information    Negative: SEVERE difficulty breathing (e.g., struggling for each breath, speaks in single words)    Negative: Known hernia is main concern (i.e., soft lump or swelling in the groin that goes away when you push on it)    Negative: Swelling of scrotum is main symptom    Negative: Swelling of face is main symptom    Negative: [1] Swollen node is in the neck AND [2] < 1 inch (2.5 cm) in size AND [3] sore throat is main symptom    Negative: [1] Node is in the neck AND [2] causes difficulty breathing    Negative: [1] Node is in the neck AND [2] can't swallow fluids    Negative: Fever > 103 F (39.4 C)    Negative: [1] Lump or swelling in groin AND [2] pulsating (like heartbeat)    Negative: Patient sounds very sick or weak to the triager    Negative: [1] Single large node AND [2] size > 1 inch (2.5 cm) AND [3] fever    Negative: [1] Overlying skin is red AND [2] fever    Protocols used: LYMPH NODES - [id number]      "

## 2023-07-07 ENCOUNTER — TRANSFERRED RECORDS (OUTPATIENT)
Dept: HEALTH INFORMATION MANAGEMENT | Facility: CLINIC | Age: 73
End: 2023-07-07
Payer: COMMERCIAL

## 2023-07-07 ENCOUNTER — ANTICOAGULATION THERAPY VISIT (OUTPATIENT)
Dept: ANTICOAGULATION | Facility: CLINIC | Age: 73
End: 2023-07-07
Payer: COMMERCIAL

## 2023-07-07 DIAGNOSIS — D68.51 FACTOR V LEIDEN MUTATION (H): Primary | ICD-10-CM

## 2023-07-07 DIAGNOSIS — Z86.711 HISTORY OF PULMONARY EMBOLISM: ICD-10-CM

## 2023-07-07 DIAGNOSIS — Z86.718 PERSONAL HISTORY OF DVT (DEEP VEIN THROMBOSIS): ICD-10-CM

## 2023-07-07 LAB — INR HOME MONITORING: 5 RATIO (ref 2.5–3.5)

## 2023-07-07 NOTE — PROGRESS NOTES
ANTICOAGULATION MANAGEMENT     Kannan WEST Wolf 73 year old male is on warfarin with supratherapeutic INR result. (Goal INR 2.5-3.5)    Recent labs: (last 7 days)     07/07/23  1108   INR 5*       ASSESSMENT       Source(s): Chart Review and Patient/Caregiver Call       Warfarin doses taken: Per patient he might have taken double dose last Tuesday- mabe be affecting INR today    Diet: No new diet changes identified    Medication/supplement changes: None noted    New illness, injury, or hospitalization: No    Signs or symptoms of bleeding or clotting: No    Previous result: Therapeutic last 2(+) visits    Additional findings: is finishing  warfarin supply at this time and will call [facility] when one week left of warfarin and will make sure that his INR is 3 or lower prior to starting first dose of xarelto    Will update PCP of high INR today per protocol.         PLAN     Recommended plan for temporary change(s) affecting INR     Dosing Instructions: hold dose then continue your current warfarin dose with next INR in 3 days       Summary  As of 7/7/2023    Full warfarin instructions:  7/7: Hold; Otherwise 12.5 mg every Tue; 10 mg all other days   Next INR check:  7/10/2023             Telephone call with Kannan who verbalizes understanding and agrees to plan    Patient to recheck with home meter    Education provided:     Symptom monitoring: monitoring for bleeding signs and symptoms, when to seek medical attention/emergency care and if you hit your head or have a bad fall seek emergency care    Plan made per [facility] anticoagulation protocol    Sherice Gibson RN  Anticoagulation Clinic  7/7/2023    _______________________________________________________________________     Anticoagulation Episode Summary     Current INR goal:  2.5-3.5   TTR:  49.0 % (1 y)   Target end date:  Indefinite   Send INR reminders to:  [facility]    Indications    Factor V Leiden mutation (H) [D68.51]  Personal history of DVT (deep vein  thrombosis) [Z86.718]  History of pulmonary embolism [Z86.711]           Comments:  MDINR Home Meter // Manage by exception         Anticoagulation Care Providers     Provider Role Specialty Phone number    David Jenkins MD Referring Family Medicine [phone]

## 2023-07-10 ENCOUNTER — TRANSFERRED RECORDS (OUTPATIENT)
Dept: HEALTH INFORMATION MANAGEMENT | Facility: CLINIC | Age: 73
End: 2023-07-10
Payer: COMMERCIAL

## 2023-07-10 ENCOUNTER — ANTICOAGULATION THERAPY VISIT (OUTPATIENT)
Dept: ANTICOAGULATION | Facility: CLINIC | Age: 73
End: 2023-07-10
Payer: COMMERCIAL

## 2023-07-10 DIAGNOSIS — Z86.711 HISTORY OF PULMONARY EMBOLISM: ICD-10-CM

## 2023-07-10 DIAGNOSIS — D68.51 FACTOR V LEIDEN MUTATION (H): Primary | ICD-10-CM

## 2023-07-10 DIAGNOSIS — Z86.718 PERSONAL HISTORY OF DVT (DEEP VEIN THROMBOSIS): ICD-10-CM

## 2023-07-10 LAB — INR HOME MONITORING: 2 RATIO (ref 2.5–3.5)

## 2023-07-10 NOTE — PROGRESS NOTES
ANTICOAGULATION MANAGEMENT     Kannan DODSON Cudwayne 73 year old male is on warfarin with subtherapeutic INR result. (Goal INR 2.5-3.5)    Recent labs: (last 7 days)     07/10/23  1301   INR 2*       ASSESSMENT       Source(s): Chart Review and Patient/Caregiver Call       Warfarin doses taken: Held for Supra  recently which may be affecting INR    Diet: No new diet changes identified    Medication/supplement changes: None noted    New illness, injury, or hospitalization: No    Signs or symptoms of bleeding or clotting: No    Previous result: Supratherapeutic    Additional findings: None         PLAN     Recommended plan for temporary change(s) affecting INR     Dosing Instructions: Continue your current warfarin dose with next INR in 4 days       Summary  As of 7/10/2023    Full warfarin instructions:  12.5 mg every Tue; 10 mg all other days   Next INR check:  7/14/2023             Telephone call with Kannan who agrees to plan and repeated back plan correctly    Patient to recheck with home meter    Education provided:     Please call back if any changes to your diet, medications or how you've been taking warfarin    Plan made per [facility] anticoagulation protocol    Miracle Salgado, RN  Anticoagulation Clinic  7/10/2023    _______________________________________________________________________     Anticoagulation Episode Summary     Current INR goal:  2.5-3.5   TTR:  48.4 % (1 y)   Target end date:  Indefinite   Send INR reminders to:  [facility]IT[facility]    Indications    Factor V Leiden mutation (H) [D68.51]  Personal history of DVT (deep vein thrombosis) [Z86.718]  History of pulmonary embolism [Z86.711]           Comments:  [facility] Home Meter // Manage by exception         Anticoagulation Care Providers     Provider Role Specialty Phone number    David Jenkins MD Referring Family Medicine [phone]

## 2023-07-18 ENCOUNTER — ANTICOAGULATION THERAPY VISIT (OUTPATIENT)
Dept: ANTICOAGULATION | Facility: CLINIC | Age: 73
End: 2023-07-18
Payer: COMMERCIAL

## 2023-07-18 ENCOUNTER — TRANSFERRED RECORDS (OUTPATIENT)
Dept: HEALTH INFORMATION MANAGEMENT | Facility: CLINIC | Age: 73
End: 2023-07-18
Payer: COMMERCIAL

## 2023-07-18 DIAGNOSIS — Z86.711 HISTORY OF PULMONARY EMBOLISM: ICD-10-CM

## 2023-07-18 DIAGNOSIS — D68.51 FACTOR V LEIDEN MUTATION (H): Primary | ICD-10-CM

## 2023-07-18 DIAGNOSIS — Z86.718 PERSONAL HISTORY OF DVT (DEEP VEIN THROMBOSIS): ICD-10-CM

## 2023-07-18 LAB — INR HOME MONITORING: 3.6 RATIO (ref 2.5–3.5)

## 2023-07-18 NOTE — PROGRESS NOTES
ANTICOAGULATION MANAGEMENT     Kannan DODSON Maryann 73 year old male is on warfarin with therapeutic INR result. (Goal INR 2.5-3.5)    Recent labs: (last 7 days)     07/18/23  1420   INR 3.6*       ASSESSMENT       Source(s): Chart Review and Patient/Caregiver Call       Warfarin doses taken: Warfarin taken as instructed    Diet: No new diet changes identified    Medication/supplement changes: None noted    New illness, injury, or hospitalization: No    Signs or symptoms of bleeding or clotting: No    Previous result: Subtherapeutic    Additional findings: None     Patient stated he just spent $80 for is warfarin refill so he would like to finish the bottle before transitioning to Xarelto    Shared clinical decision to not lower warfarin dose, this is a reasonable request.  Patient will have a melanie salad tonight         PLAN     Recommended plan for no diet, medication or health factor changes affecting INR     Dosing Instructions: Continue your current warfarin dose with next INR in 1 week       Summary  As of 7/18/2023    Full warfarin instructions:  12.5 mg every Tue; 10 mg all other days   Next INR check:  7/25/2023             Telephone call with Kannan who verbalizes understanding and agrees to plan    Orders given to  Homecare nurse/facility to recheck    Education provided:     Please call back if any changes to your diet, medications or how you've been taking warfarin    Plan made per [facility] anticoagulation protocol    Carmen Leal, RN  Anticoagulation Clinic  7/18/2023    _______________________________________________________________________     Anticoagulation Episode Summary     Current INR goal:  2.5-3.5   TTR:  47.6 % (1 y)   Target end date:  Indefinite   Send INR reminders to:  [facility]IT[facility]    Indications    Factor V Leiden mutation (H) [D68.51]  Personal history of DVT (deep vein thrombosis) [Z86.718]  History of pulmonary embolism [Z86.711]           Comments:  [facility] Home Meter //  Manage by exception         Anticoagulation Care Providers     Provider Role Specialty Phone number    David Jenkins MD Referring Family Medicine [phone]

## 2023-07-26 ENCOUNTER — TRANSFERRED RECORDS (OUTPATIENT)
Dept: HEALTH INFORMATION MANAGEMENT | Facility: CLINIC | Age: 73
End: 2023-07-26
Payer: COMMERCIAL

## 2023-07-26 ENCOUNTER — ANTICOAGULATION THERAPY VISIT (OUTPATIENT)
Dept: ANTICOAGULATION | Facility: CLINIC | Age: 73
End: 2023-07-26
Payer: COMMERCIAL

## 2023-07-26 LAB — INR HOME MONITORING: 3.3 RATIO (ref 2.5–3.5)

## 2023-07-26 NOTE — PROGRESS NOTES
ANTICOAGULATION MANAGEMENT     Kannan DODSON [place]ud[place]ne 73 year old male is on warfarin with therapeutic INR result. (Goal INR 2.5-3.5)    Recent labs: (last 7 days)     07/26/23  1247   INR 3.3       ASSESSMENT     Source(s): Chart Review and Patient/Caregiver Call     Warfarin doses taken: Warfarin taken as instructed  Diet: No new diet changes identified  Medication/supplement changes: None noted  New illness, injury, or hospitalization: No  Signs or symptoms of bleeding or clotting: No  Previous result: Supratherapeutic  Additional findings: None       PLAN     Recommended plan for no diet, medication or health factor changes affecting INR     Dosing Instructions: Continue your current warfarin dose with next INR in 1 week       Summary  As of 7/26/2023      Full warfarin instructions:  12.5 mg every Tue; 10 mg all other days   Next INR check:  8/2/2023               Telephone call with Kannan who agrees to plan and repeated back plan correctly    Patient to recheck with home meter    Education provided:   Resume manage by exception with home monitor. Continue to submit INR results to home monitor company.You will only be called when your result is out of range. Please call and notify [facility] if new medication started, dose missed, signs or symptoms of bleeding or clotting, or a surgery/procedure is scheduled.    Plan made per [facility] anticoagulation protocol    India Bautista RN  Anticoagulation Clinic  7/26/2023    _______________________________________________________________________     Anticoagulation Episode Summary       Current INR goal:  2.5-3.5   TTR:  47.4 % (1 y)   Target end date:  Indefinite   Send INR reminders to:  [facility]    Indications    Factor V Leiden mutation (H) [D68.51]  Personal history of DVT (deep vein thrombosis) [Z86.718]  History of pulmonary embolism [Z86.711]             Comments:  [employer] Home [employer]eter // Manage by exception             Anticoagulation Care Providers       Provider  Role Specialty Phone number    David Jenkins MD Referring Family Medicine [phone]

## 2023-07-27 DIAGNOSIS — E78.89 LIPIDS ABNORMAL: ICD-10-CM

## 2023-07-28 RX ORDER — ATORVASTATIN CALCIUM 20 MG/1
TABLET, FILM COATED ORAL
Qty: 90 TABLET | Refills: 1 | Status: SHIPPED | OUTPATIENT
Start: 2023-07-28 | End: 2023-12-13

## 2023-07-28 NOTE — TELEPHONE ENCOUNTER
"Routing refill request to provider for review/approval because:  Labs not current:  LDL    Last Written Prescription Date:  12/26/22  Last Fill Quantity: 90,  # refills: 1   Last office visit provider:  6/23/23 (telephone visit - Dr. Box)    Requested Prescriptions   Pending Prescriptions Disp Refills    atorvastatin (LIPITOR) 20 MG tablet [Pharmacy Med Name: ATORVASTATIN 20MG TABLETS] 90 tablet 1     Sig: TAKE 1 TABLET(20 MG) BY MOUTH DAILY       Statins Protocol Failed - 7/27/2023 12:59 PM        Failed - LDL on file in past 12 months     Recent Labs   Lab Test 05/31/22  1208   LDL 55             Passed - No abnormal creatine kinase in past 12 months     No lab results found.             Passed - Recent (12 mo) or future (30 days) visit within the authorizing provider's specialty     Patient has had an office visit with the authorizing provider or a provider within the authorizing providers department within the previous 12 mos or has a future within next 30 days. See \"Patient Info\" tab in inbasket, or \"Choose Columns\" in Meds & Orders section of the refill encounter.              Passed - Medication is active on med list        Passed - Patient is age 18 or older             Phoebe Lieberman RN 07/28/23 8:15 AM  "

## 2023-08-02 ENCOUNTER — TRANSFERRED RECORDS (OUTPATIENT)
Dept: HEALTH INFORMATION MANAGEMENT | Facility: CLINIC | Age: 73
End: 2023-08-02
Payer: COMMERCIAL

## 2023-08-02 ENCOUNTER — ANTICOAGULATION THERAPY VISIT (OUTPATIENT)
Dept: ANTICOAGULATION | Facility: CLINIC | Age: 73
End: 2023-08-02
Payer: COMMERCIAL

## 2023-08-02 DIAGNOSIS — Z86.718 PERSONAL HISTORY OF DVT (DEEP VEIN THROMBOSIS): ICD-10-CM

## 2023-08-02 DIAGNOSIS — D68.51 FACTOR V LEIDEN MUTATION (H): Primary | ICD-10-CM

## 2023-08-02 DIAGNOSIS — Z86.711 HISTORY OF PULMONARY EMBOLISM: ICD-10-CM

## 2023-08-02 LAB — INR HOME MONITORING: 4 RATIO (ref 2.5–3.5)

## 2023-08-02 NOTE — PROGRESS NOTES
ANTICOAGULATION MANAGEMENT     Kannan DODSON [place]udway[place] 73 year old male is on warfarin with supratherapeutic INR result. (Goal INR 2.5-3.5)    Recent labs: (last 7 days)     08/02/23  1613   INR 4*       ASSESSMENT     Source(s): Chart Review and Patient/Caregiver Call     Warfarin doses taken: Warfarin taken as instructed  Diet: No new diet changes identified  Medication/supplement changes: None noted  New illness, injury, or hospitalization: No  Signs or symptoms of bleeding or clotting: No  Previous result: Therapeutic last visit; previously outside of goal range  Additional findings: None       PLAN     Recommended plan for no diet, medication or health factor changes affecting INR     Dosing Instructions: decrease your warfarin dose (7% change) with next INR in 1 week       Summary  As of 8/2/2023      Full warfarin instructions:  7.5 mg every Wed; 10 mg all other days   Next INR check:  8/9/2023               Telephone call with Kannan who verbalizes understanding and agrees to plan and who agrees to plan and repeated back plan correctly  Will send copy of instructions via gantto    Patient to recheck with home meter    Education provided:   Goal range and lab monitoring: goal range and significance of current result  Contact [phone]  with any changes, questions or concerns.     Plan made per [facility] anticoagulation protocol    Sherice Gibson RN  Anticoagulation Clinic  8/2/23  _______________________________________________________________________     Anticoagulation Episode Summary       Current INR goal:  2.5-3.5   TTR:  47.9 % (1 y)   Target end date:  Indefinite   Send INR reminders to:  [facility]    Indications    Factor V Leiden mutation (H) [D68.51]  Personal history of DVT (deep vein thrombosis) [Z86.718]  History of pulmonary embolism [Z86.711]             Comments:  [employer] Home Meter // Manage by exception             Anticoagulation Care Providers       Provider Role Specialty Phone number     David Jenkins MD [facility] [phone]

## 2023-08-09 ENCOUNTER — ANTICOAGULATION THERAPY VISIT (OUTPATIENT)
Dept: ANTICOAGULATION | Facility: CLINIC | Age: 73
End: 2023-08-09
Payer: COMMERCIAL

## 2023-08-09 DIAGNOSIS — Z86.711 HISTORY OF PULMONARY EMBOLISM: ICD-10-CM

## 2023-08-09 DIAGNOSIS — D68.51 FACTOR V LEIDEN MUTATION (H): Primary | ICD-10-CM

## 2023-08-09 DIAGNOSIS — Z86.718 PERSONAL HISTORY OF DVT (DEEP VEIN THROMBOSIS): ICD-10-CM

## 2023-08-09 LAB
INR (EXTERNAL): 3.5 (ref 0.9–1.1)
INR HOME MONITORING: 3.5 RATIO (ref 2.5–3.5)

## 2023-08-09 NOTE — PROGRESS NOTES
ANTICOAGULATION MANAGEMENT     Kannan Wolf 73 year old male is on warfarin with therapeutic INR result. (Goal INR 2.5-3.5)    Recent labs: (last 7 days)     08/09/23  0840   INR 3.5*       ASSESSMENT     Source(s): Chart Review and Patient/Caregiver Call          PLAN     Recommended plan for no diet, medication or health factor changes affecting INR     Dosing Instructions: Continue your current warfarin dose with next INR in 1 week       Summary  As of 8/9/2023      Full warfarin instructions:  7.5 mg every Wed; 10 mg all other days   Next INR check:  8/16/2023               Detailed voice message left for Kannan with dosing instructions and follow up date.     Patient to recheck with home meter    Education provided:   Please call back if any changes to your diet, medications or how you've been taking warfarin  Resume manage by exception with home monitor. Continue to submit INR results to home monitor company.You will only be called when your result is out of range. Please call and notify [facility] if new medication started, dose missed, signs or symptoms of bleeding or clotting, or a surgery/procedure is scheduled.    Plan made per [facility] anticoagulation protocol    Donna Grey RN  Anticoagulation Clinic  8/9/2023    _______________________________________________________________________     Anticoagulation Episode Summary       Current INR goal:  2.5-3.5   TTR:  46.3 % (1 y)   Target end date:  Indefinite   Send INR reminders to:  [facility]    Indications    Factor V Leiden mutation (H) [D68.51]  Personal history of DVT (deep vein thrombosis) [Z86.718]  History of pulmonary embolism [Z86.711]             Comments:  [employer] Home Meter // Manage by exception             Anticoagulation Care Providers       Provider Role Specialty Phone number    David Jenkins MD Referring Family Medicine [phone]

## 2023-08-13 ENCOUNTER — HEALTH MAINTENANCE LETTER (OUTPATIENT)
Age: 73
End: 2023-08-13

## 2023-08-16 ENCOUNTER — ANTICOAGULATION THERAPY VISIT (OUTPATIENT)
Dept: ANTICOAGULATION | Facility: CLINIC | Age: 73
End: 2023-08-16
Payer: COMMERCIAL

## 2023-08-16 ENCOUNTER — TRANSFERRED RECORDS (OUTPATIENT)
Dept: HEALTH INFORMATION MANAGEMENT | Facility: CLINIC | Age: 73
End: 2023-08-16
Payer: COMMERCIAL

## 2023-08-16 DIAGNOSIS — D68.51 FACTOR V LEIDEN MUTATION (H): Primary | ICD-10-CM

## 2023-08-16 DIAGNOSIS — Z86.718 PERSONAL HISTORY OF DVT (DEEP VEIN THROMBOSIS): ICD-10-CM

## 2023-08-16 DIAGNOSIS — Z86.711 HISTORY OF PULMONARY EMBOLISM: ICD-10-CM

## 2023-08-16 LAB — INR HOME MONITORING: 2.4 RATIO (ref 2.5–3.5)

## 2023-08-16 NOTE — PROGRESS NOTES
ANTICOAGULATION MANAGEMENT     Kannan DODSON Maryann 73 year old male is on warfarin with subtherapeutic INR result. (Goal INR 2.5-3.5)    Recent labs: (last 7 days)     08/16/23  1846   INR 2.4*       ASSESSMENT     Source(s): Chart Review and Patient/Caregiver Call     Warfarin doses taken: More warfarin taken than planned which may be affecting INR. Pt has taken 10mg daily for the last 13 days.  Diet: No new diet changes identified  Medication/supplement changes: None noted  New illness, injury, or hospitalization: No  Signs or symptoms of bleeding or clotting: No  Previous result: Therapeutic last visit; previously outside of goal range  Additional findings: None       PLAN     Recommended plan for ongoing change(s) affecting INR     Dosing Instructions: Increase your warfarin dose 7% (when compared to what pt has been taking). Calendar will appear as 11% dose increase.    Summary  As of 8/16/2023      Full warfarin instructions:  12.5 mg every Wed, Sat; 10 mg all other days   Next INR check:  8/23/2023               Telephone call with Kannan who verbalizes understanding and agrees to plan    Patient to recheck with home meter    Education provided:   Goal range and lab monitoring: goal range and significance of current result    Plan made per [facility] anticoagulation protocol    Wilfred Nickerson RN  Anticoagulation Clinic  8/16/2023    _______________________________________________________________________     Anticoagulation Episode Summary       Current INR goal:  2.5-3.5   TTR:  46.2 % (1 y)   Target end date:  Indefinite   Send INR reminders to:  [facility]    Indications    Factor V Leiden mutation (H) [D68.51]  Personal history of DVT (deep vein thrombosis) [Z86.718]  History of pulmonary embolism [Z86.711]             Comments:  [employer] Home Meter // Manage by exception             Anticoagulation Care Providers       Provider Role Specialty Phone number    David Jenkins MD Referring Family  Medicine [phone]

## 2023-08-23 ENCOUNTER — ANTICOAGULATION THERAPY VISIT (OUTPATIENT)
Dept: ANTICOAGULATION | Facility: CLINIC | Age: 73
End: 2023-08-23
Payer: COMMERCIAL

## 2023-08-23 ENCOUNTER — TRANSFERRED RECORDS (OUTPATIENT)
Dept: HEALTH INFORMATION MANAGEMENT | Facility: CLINIC | Age: 73
End: 2023-08-23
Payer: COMMERCIAL

## 2023-08-23 DIAGNOSIS — Z86.711 HISTORY OF PULMONARY EMBOLISM: ICD-10-CM

## 2023-08-23 DIAGNOSIS — Z86.718 PERSONAL HISTORY OF DVT (DEEP VEIN THROMBOSIS): ICD-10-CM

## 2023-08-23 DIAGNOSIS — D68.51 FACTOR V LEIDEN MUTATION (H): Primary | ICD-10-CM

## 2023-08-23 LAB — INR HOME MONITORING: 4.5 RATIO (ref 2.5–3.5)

## 2023-08-23 NOTE — PROGRESS NOTES
ANTICOAGULATION MANAGEMENT     Kannan DODSON [place]ud[place] 73 year old male is on warfarin with supratherapeutic INR result. (Goal INR 2.5-3.5)    Recent labs: (last 7 days)     08/23/23  1328   INR 4.5*       ASSESSMENT     Source(s): Chart Review and Patient/Caregiver Call     Warfarin doses taken: Warfarin taken as instructed  Diet: No new diet changes identified  Medication/supplement changes: None noted  New illness, injury, or hospitalization: No  Signs or symptoms of bleeding or clotting: No  Previous result: Subtherapeutic  Additional findings: No overt findings to explain elevated INR.       PLAN     Recommended plan for no diet, medication or health factor changes affecting INR     Dosing Instructions: Take 7.5mg today then decrease your warfarin dose (7% change) with next INR in 1 week       Summary  As of 8/23/2023      Full warfarin instructions:  8/23: 7.5 mg; Otherwise 10 mg every day   Next INR check:  8/30/2023               Telephone call with Kannan who verbalizes understanding and agrees to plan    Patient to recheck with home meter    Education provided:   Goal range and lab monitoring: goal range and significance of current result    Plan made with [facility] Pharmacist Zofia Nickerson RN  Anticoagulation Clinic  8/23/2023    _______________________________________________________________________     Anticoagulation Episode Summary       Current INR goal:  2.5-3.5   TTR:  46.4 % (1 y)   Target end date:  Indefinite   Send INR reminders to:  [facility]IT[facility]    Indications    Factor V Leiden mutation (H) [D68.51]  Personal history of DVT (deep vein thrombosis) [Z86.718]  History of pulmonary embolism [Z86.711]             Comments:  [employer] Home Meter // Manage by exception             Anticoagulation Care Providers       Provider Role Specialty Phone number    David Jenkins MD Referring Family Medicine [phone]

## 2023-08-30 ENCOUNTER — TRANSFERRED RECORDS (OUTPATIENT)
Dept: HEALTH INFORMATION MANAGEMENT | Facility: CLINIC | Age: 73
End: 2023-08-30
Payer: COMMERCIAL

## 2023-08-30 ENCOUNTER — TELEPHONE (OUTPATIENT)
Dept: FAMILY MEDICINE | Facility: CLINIC | Age: 73
End: 2023-08-30
Payer: COMMERCIAL

## 2023-08-30 ENCOUNTER — ANTICOAGULATION THERAPY VISIT (OUTPATIENT)
Dept: ANTICOAGULATION | Facility: CLINIC | Age: 73
End: 2023-08-30
Payer: COMMERCIAL

## 2023-08-30 DIAGNOSIS — D68.51 FACTOR V LEIDEN MUTATION (H): Primary | ICD-10-CM

## 2023-08-30 DIAGNOSIS — Z86.718 PERSONAL HISTORY OF DVT (DEEP VEIN THROMBOSIS): ICD-10-CM

## 2023-08-30 DIAGNOSIS — Z86.711 HISTORY OF PULMONARY EMBOLISM: ICD-10-CM

## 2023-08-30 LAB — INR HOME MONITORING: 1.5 RATIO (ref 2.5–3.5)

## 2023-08-30 NOTE — PROGRESS NOTES
ANTICOAGULATION MANAGEMENT     Kannan WEST Wolf 73 year old male is on warfarin with subtherapeutic INR result. (Goal INR 2.5-3.5)    Recent labs: (last 7 days)     08/30/23  1026   INR 1.5*       ASSESSMENT     Source(s): Chart Review and Patient/Caregiver Call     Warfarin doses taken: Missed dose(s) may be affecting INR  Diet: No new diet changes identified  Medication/supplement changes: None noted  New illness, injury, or hospitalization: No  Signs or symptoms of bleeding or clotting: No  Previous result: Supratherapeutic  Additional findings: None       PLAN     Recommended plan for temporary change(s) affecting INR     Dosing Instructions: booster dose then continue your current warfarin dose with next INR in 6 days       Summary  As of 8/30/2023      Full warfarin instructions:  8/30: 17.5 mg; Otherwise 10 mg every day   Next INR check:  9/5/2023               Telephone call with Kannan who verbalizes understanding and agrees to plan    Patient to recheck with home meter    Education provided:   Please call back if any changes to your diet, medications or how you've been taking warfarin  Goal range and lab monitoring: goal range and significance of current result, Importance of therapeutic range, and Importance of following up at instructed interval  Symptom monitoring: monitoring for clotting signs and symptoms, monitoring for stroke signs and symptoms, and when to seek medical attention/emergency care    Plan made per [facility] anticoagulation protocol    Chel Oswald, RN  Anticoagulation Clinic  8/30/2023    _______________________________________________________________________     Anticoagulation Episode Summary       Current INR goal:  2.5-3.5   TTR:  46.4 % (1 y)   Target end date:  Indefinite   Send INR reminders to:  [facility]IT[facility]    Indications    Factor V Leiden mutation (H) [D68.51]  Personal history of DVT (deep vein thrombosis) [Z86.718]  History of pulmonary embolism [Z86.711]              Comments:  [employer] Home Meter // Manage by exception             Anticoagulation Care Providers       Provider Role Specialty Phone number    David Jenkins MD Referring Family Medicine [phone]

## 2023-08-30 NOTE — TELEPHONE ENCOUNTER
Patient Returning Call    Reason for call:  INR    Information relayed to patient:  N/A    Patient has additional questions:  Yes    What are your questions/concerns:  Returning call     Who does the patient want to speak with:  RN    Is an  needed?:  No      Could we send this information to you in [facility] [place] or would you prefer to receive a phone call?:   Patient would prefer a phone call   Okay to leave a detailed message?: Yes at Home number on file [phone] (home)

## 2023-08-30 NOTE — PROGRESS NOTES
ANTICOAGULATION MANAGEMENT     Kannan DODSON Maryann 73 year old male is on warfarin with subtherapeutic INR result. (Goal INR 2.5-3.5)    Recent labs: (last 7 days)     08/30/23  1026   INR 1.5*       ASSESSMENT     Source(s): Chart Review  Previous INR was Supratherapeutic  Medication, diet, health changes since last INR chart reviewed; none identified         PLAN     Unable to reach pt today.    Left message to take a booster dose of warfarin,  15 mg tonight. Request call back for assessment. Will try again later.     Follow up required to confirm warfarin dose taken and assess for changes    Chel Oswald, RN  Anticoagulation Clinic  8/30/2023

## 2023-09-05 ENCOUNTER — TRANSFERRED RECORDS (OUTPATIENT)
Dept: HEALTH INFORMATION MANAGEMENT | Facility: CLINIC | Age: 73
End: 2023-09-05
Payer: COMMERCIAL

## 2023-09-05 ENCOUNTER — ANTICOAGULATION THERAPY VISIT (OUTPATIENT)
Dept: ANTICOAGULATION | Facility: CLINIC | Age: 73
End: 2023-09-05
Payer: COMMERCIAL

## 2023-09-05 LAB — INR HOME MONITORING: 3 RATIO (ref 2.5–3.5)

## 2023-09-05 NOTE — PROGRESS NOTES
ANTICOAGULATION MANAGEMENT     Kannan DODSON Maryann 73 year old male is on warfarin with therapeutic INR result. (Goal INR 2.5-3.5)    Recent labs: (last 7 days)     09/05/23  1315   INR 3       ASSESSMENT     Source(s): Chart Review and Patient/Caregiver Call     Warfarin doses taken: Warfarin taken as instructed  Diet: No new diet changes identified  Medication/supplement changes: None noted  New illness, injury, or hospitalization: No  Signs or symptoms of bleeding or clotting: No  Previous result: Subtherapeutic  Additional findings:  Dental appointment today, no complications, no concerns       PLAN     Recommended plan for no diet, medication or health factor changes affecting INR     Dosing Instructions: Continue your current warfarin dose with next INR in 1 week       Summary  As of 9/5/2023      Full warfarin instructions:  10 mg every day   Next INR check:  9/13/2023               Telephone call with Kannan who agrees to plan and repeated back plan correctly  My Chart Message sent.    Patient to recheck with home meter    Education provided:   Please call back if any changes to your diet, medications or how you've been taking warfarin  Goal range and lab monitoring: goal range and significance of current result    Plan made per [facility] anticoagulation protocol    India Bautista RN  Anticoagulation Clinic  9/5/2023    _______________________________________________________________________     Anticoagulation Episode Summary       Current INR goal:  2.5-3.5   TTR:  45.3 % (1 y)   Target end date:  Indefinite   Send INR reminders to:  [facility]IT[facility]    Indications    Factor V Leiden mutation (H) [D68.51]  Personal history of DVT (deep vein thrombosis) [Z86.718]  History of pulmonary embolism [Z86.711]             Comments:  [employer] Home Meter // Manage by exception             Anticoagulation Care Providers       Provider Role Specialty Phone number    David Jenkins MD Referring Family Medicine  [phone]

## 2023-09-14 ENCOUNTER — TRANSFERRED RECORDS (OUTPATIENT)
Dept: HEALTH INFORMATION MANAGEMENT | Facility: CLINIC | Age: 73
End: 2023-09-14
Payer: COMMERCIAL

## 2023-09-14 ENCOUNTER — ANTICOAGULATION THERAPY VISIT (OUTPATIENT)
Dept: ANTICOAGULATION | Facility: CLINIC | Age: 73
End: 2023-09-14
Payer: COMMERCIAL

## 2023-09-14 DIAGNOSIS — Z86.718 PERSONAL HISTORY OF DVT (DEEP VEIN THROMBOSIS): ICD-10-CM

## 2023-09-14 DIAGNOSIS — Z86.711 HISTORY OF PULMONARY EMBOLISM: ICD-10-CM

## 2023-09-14 DIAGNOSIS — D68.51 FACTOR V LEIDEN MUTATION (H): Primary | ICD-10-CM

## 2023-09-14 LAB — INR HOME MONITORING: 3.8 RATIO (ref 2.5–3.5)

## 2023-09-14 NOTE — PROGRESS NOTES
ANTICOAGULATION MANAGEMENT     Kannan DODSON Cudway[place] 73 year old male is on warfarin with supratherapeutic INR result. (Goal INR 2.5-3.5)    Recent labs: (last 7 days)     09/14/23  1211   INR 3.8*       ASSESSMENT     Source(s): Chart Review and Patient/Caregiver Call     Warfarin doses taken: Warfarin taken as instructed  Diet: No new diet changes identified  Medication/supplement changes: None noted  New illness, injury, or hospitalization: No  Signs or symptoms of bleeding or clotting: No  Previous result: Therapeutic last visit; previously outside of goal range  Additional findings: will be transitioning to Xarelto once current bottle of warfarin completed.        PLAN     Recommended plan for no diet, medication or health factor changes affecting INR     Dosing Instructions: partial hold then continue your current warfarin dose with next INR in 1 week       Summary  As of 9/14/2023      Full warfarin instructions:  9/14: 7.5 mg; Otherwise 10 mg every day   Next INR check:  9/21/2023               Telephone call with Kannan who agrees to plan and repeated back plan correctly    Patient to recheck with home meter    Education provided:   Please call back if any changes to your diet, medications or how you've been taking warfarin    Plan made per [facility] anticoagulation protocol    Miracle Salgado, RN  Anticoagulation Clinic  9/14/2023    _______________________________________________________________________     Anticoagulation Episode Summary       Current INR goal:  2.5-3.5   TTR:  44.3 % (1 y)   Target end date:  Indefinite   Send INR reminders to:  [facility]IT[facility]    Indications    Factor V Leiden mutation (H) [D68.51]  Personal history of DVT (deep vein thrombosis) [Z86.718]  History of pulmonary embolism [Z86.711]             Comments:  [employer] Home Meter // Manage by exception             Anticoagulation Care Providers       Provider Role Specialty Phone number    David Jenkins MD Referring Family  Medicine [phone]

## 2023-09-21 ENCOUNTER — ANTICOAGULATION THERAPY VISIT (OUTPATIENT)
Dept: ANTICOAGULATION | Facility: CLINIC | Age: 73
End: 2023-09-21
Payer: COMMERCIAL

## 2023-09-21 DIAGNOSIS — D68.51 FACTOR V LEIDEN MUTATION (H): Primary | ICD-10-CM

## 2023-09-21 DIAGNOSIS — Z86.711 HISTORY OF PULMONARY EMBOLISM: ICD-10-CM

## 2023-09-21 DIAGNOSIS — Z86.718 PERSONAL HISTORY OF DVT (DEEP VEIN THROMBOSIS): ICD-10-CM

## 2023-09-21 LAB — INR HOME MONITORING: 3.5 RATIO (ref 2.5–3.5)

## 2023-09-21 NOTE — PROGRESS NOTES
ANTICOAGULATION MANAGEMENT     Kannan DODSON Maryann 73 year old male is on warfarin with therapeutic INR result. (Goal INR 2.5-3.5)    Recent labs: (last 7 days)     09/21/23  1101   INR 3.5       ASSESSMENT     Source(s): Chart Review and Patient/Caregiver Call     Warfarin doses taken: Warfarin taken as instructed  Diet: No new diet changes identified  Medication/supplement changes: None noted  New illness, injury, or hospitalization: No  Signs or symptoms of bleeding or clotting: No  Previous result: Supratherapeutic  Additional findings:  Patient has one bottle left  of warfarin and then will be switching over to xarelto, he reports he has enough for another week.       PLAN     Recommended plan for no diet, medication or health factor changes affecting INR     Dosing Instructions: Continue your current warfarin dose with next INR in 1 week       Summary  As of 9/21/2023      Full warfarin instructions:  10 mg every day   Next INR check:  9/28/2023               Telephone call with Kannan who verbalizes understanding and agrees to plan    Patient to recheck with home meter    Education provided:   Please call back if any changes to your diet, medications or how you've been taking warfarin    Plan made per [facility] anticoagulation protocol    Donna Grey RN  Anticoagulation Clinic  9/21/2023    _______________________________________________________________________     Anticoagulation Episode Summary       Current INR goal:  2.5-3.5   TTR:  42.6 % (1 y)   Target end date:  Indefinite   Send INR reminders to:  [facility]ITOR[facility]    Indications    Factor V Leiden mutation (H) [D68.51]  Personal history of DVT (deep vein thrombosis) [Z86.718]  History of pulmonary embolism [Z86.711]             Comments:  [employer] Home Meter // Manage by exception             Anticoagulation Care Providers       Provider Role Specialty Phone number    David Jenkins MD Referring Family Medicine [phone]

## 2023-09-27 ENCOUNTER — DOCUMENTATION ONLY (OUTPATIENT)
Dept: ANTICOAGULATION | Facility: CLINIC | Age: 73
End: 2023-09-27
Payer: COMMERCIAL

## 2023-09-27 DIAGNOSIS — Z86.718 PERSONAL HISTORY OF DVT (DEEP VEIN THROMBOSIS): ICD-10-CM

## 2023-09-27 DIAGNOSIS — D68.51 FACTOR V LEIDEN MUTATION (H): Primary | ICD-10-CM

## 2023-09-27 DIAGNOSIS — Z86.711 HISTORY OF PULMONARY EMBOLISM: ICD-10-CM

## 2023-09-27 LAB — INR HOME MONITORING: 2.8 RATIO (ref 2.5–3.5)

## 2023-09-27 NOTE — PROGRESS NOTES
ANTICOAGULATION  MANAGEMENT-Home Monitor Managed by Exception    Kannan Wolf 73 year old male is on warfarin with therapeutic INR result. (Goal INR 2.5-3.5)    Recent labs: (last 7 days)     09/27/23  1030   INR 2.8       Previous INR was Therapeutic  Medication, diet, health changes since last INR:chart reviewed; none identified  Contacted within the last 12 weeks by phone on 9/21/23      CHRISTINE     Kannan was NOT contacted regarding therapeutic result today per home monitoring policy manage by exception agreement.   Current warfarin dose is to be continued:     Summary  As of 9/27/2023      Full warfarin instructions:  10 mg every day   Next INR check:  10/4/2023             ?   Donna Grey RN  Anticoagulation Clinic  9/27/2023    _______________________________________________________________________     Anticoagulation Episode Summary       Current INR goal:  2.5-3.5   TTR:  44.3 % (1 y)   Target end date:  Indefinite   Send INR reminders to:  [facility] HOME MONITORING    Indications    Factor V Leiden mutation (H) [D68.51]  Personal history of DVT (deep vein thrombosis) [Z86.718]  History of pulmonary embolism [Z86.711]             Comments:  [employer] Home Meter // Manage by exception             Anticoagulation Care Providers       Provider Role Specialty Phone number    David Jenkins MD Referring Family Medicine [phone]

## 2023-10-04 ENCOUNTER — DOCUMENTATION ONLY (OUTPATIENT)
Dept: ANTICOAGULATION | Facility: CLINIC | Age: 73
End: 2023-10-04
Payer: COMMERCIAL

## 2023-10-04 DIAGNOSIS — Z86.718 PERSONAL HISTORY OF DVT (DEEP VEIN THROMBOSIS): ICD-10-CM

## 2023-10-04 DIAGNOSIS — Z86.711 HISTORY OF PULMONARY EMBOLISM: ICD-10-CM

## 2023-10-04 DIAGNOSIS — D68.51 FACTOR V LEIDEN MUTATION (H): Primary | ICD-10-CM

## 2023-10-04 LAB — INR HOME MONITORING: 3.3 RATIO (ref 2.5–3.5)

## 2023-10-04 NOTE — PROGRESS NOTES
ANTICOAGULATION  MANAGEMENT-Home Monitor Managed by Exception    Kannan Wolf 73 year old male is on warfarin with therapeutic INR result. (Goal INR 2.5-3.5)    Recent labs: (last 7 days)     10/04/23  1616   INR 3.3       Previous INR was Therapeutic  Medication, diet, health changes since last INR:chart reviewed; none identified  Contacted within the last 12 weeks by phone on 9/21/23      CHRISTINE     Kannan was NOT contacted regarding therapeutic result today per home monitoring policy manage by exception agreement.   Current warfarin dose is to be continued:     Summary  As of 10/4/2023      Full warfarin instructions:  10 mg every day   Next INR check:  10/18/2023             ?   Donna Grey RN  Anticoagulation Clinic  10/4/2023    _______________________________________________________________________     Anticoagulation Episode Summary       Current INR goal:  2.5-3.5   TTR:  44.9 % (1 y)   Target end date:  Indefinite   Send INR reminders to:  [facility]AG HOME MONITORING    Indications    Factor V Leiden mutation (H24) [D68.51]  Personal history of DVT (deep vein thrombosis) [Z86.718]  History of pulmonary embolism [Z86.711]             Comments:  [employer] Home Meter // Manage by exception             Anticoagulation Care Providers       Provider Role Specialty Phone number    David Jenkins MD Referring Family Medicine [phone]

## 2023-10-06 DIAGNOSIS — R91.8 LUNG NODULES: Primary | ICD-10-CM

## 2023-10-11 ENCOUNTER — TRANSFERRED RECORDS (OUTPATIENT)
Dept: HEALTH INFORMATION MANAGEMENT | Facility: CLINIC | Age: 73
End: 2023-10-11
Payer: COMMERCIAL

## 2023-10-11 ENCOUNTER — ANTICOAGULATION THERAPY VISIT (OUTPATIENT)
Dept: ANTICOAGULATION | Facility: CLINIC | Age: 73
End: 2023-10-11
Payer: COMMERCIAL

## 2023-10-11 DIAGNOSIS — D68.51 FACTOR V LEIDEN MUTATION (H): Primary | ICD-10-CM

## 2023-10-11 DIAGNOSIS — Z86.711 HISTORY OF PULMONARY EMBOLISM: ICD-10-CM

## 2023-10-11 DIAGNOSIS — Z86.718 PERSONAL HISTORY OF DVT (DEEP VEIN THROMBOSIS): ICD-10-CM

## 2023-10-11 LAB — INR HOME MONITORING: 2.4 RATIO (ref 2.5–3.5)

## 2023-10-11 NOTE — PROGRESS NOTES
ANTICOAGULATION MANAGEMENT     Kannan Wolf 73 year old male is on warfarin with therapeutic INR result. (Goal INR 2.5-3.5)    Recent labs: (last 7 days)     10/11/23  1440   INR 2.4*       ASSESSMENT     Source(s): Chart Review and Patient/Caregiver Call     Warfarin doses taken: Warfarin taken as instructed  Diet: No new diet changes identified  Medication/supplement changes: None noted  New illness, injury, or hospitalization: No  Signs or symptoms of bleeding or clotting: No  Previous result: Therapeutic last 2(+) visits  Additional findings: None       PLAN     Recommended plan for no diet, medication or health factor changes affecting INR     Dosing Instructions: Continue your current warfarin dose with next INR in 1 week       Summary  As of 10/11/2023      Full warfarin instructions:  10 mg every day   Next INR check:  10/18/2023               Detailed voice message left for Kannan with dosing instructions and follow up date.   Sent [employer] message with dosing and follow up instructions    Patient to recheck with home meter    Education provided:   Please call back if any changes to your diet, medications or how you've been taking warfarin    Plan made per [facility] anticoagulation protocol    Miracle Salgado, RN  Anticoagulation Clinic  10/11/2023    _______________________________________________________________________     Anticoagulation Episode Summary       Current INR goal:  2.5-3.5   TTR:  46.6% (1 y)   Target end date:  Indefinite   Send INR reminders to:  ANTICOAG HOME [facility]IT[facility]    Indications    Factor V Leiden mutation (H24) [D68.51]  Personal history of DVT (deep vein thrombosis) [Z86.718]  History of pulmonary embolism [Z86.711]             Comments:  MDINGRETA Home Meter // Manage by exception             Anticoagulation Care Providers       Provider Role Specialty Phone number    David Jenkins MD Referring Family Medicine [phone]

## 2023-10-18 ENCOUNTER — ANTICOAGULATION THERAPY VISIT (OUTPATIENT)
Dept: ANTICOAGULATION | Facility: CLINIC | Age: 73
End: 2023-10-18
Payer: COMMERCIAL

## 2023-10-18 DIAGNOSIS — Z86.718 PERSONAL HISTORY OF DVT (DEEP VEIN THROMBOSIS): ICD-10-CM

## 2023-10-18 DIAGNOSIS — D68.51 FACTOR V LEIDEN MUTATION (H): Primary | ICD-10-CM

## 2023-10-18 DIAGNOSIS — Z86.711 HISTORY OF PULMONARY EMBOLISM: ICD-10-CM

## 2023-10-18 LAB — INR HOME MONITORING: 2.2 RATIO (ref 2.5–3.5)

## 2023-10-18 NOTE — PROGRESS NOTES
ANTICOAGULATION MANAGEMENT     Kannan DODSON Maryann 73 year old male is on warfarin with subtherapeutic INR result. (Goal INR 2.5-3.5)    Recent labs: (last 7 days)     10/18/23  1831   INR 2.2*       ASSESSMENT     Source(s): Chart Review and Patient/Caregiver Call     Warfarin doses taken: Warfarin taken as instructed  Diet: Increased greens/vitamin K in diet; ongoing change and Change in alcohol intake may be affecting INR. More alcohol    Medication/supplement changes: None noted  New illness, injury, or hospitalization: No  Signs or symptoms of bleeding or clotting: No  Previous result: Subtherapeutic  Additional findings: None       PLAN     Recommended plan for temporary change(s) affecting INR     Dosing Instructions: booster dose then continue your current warfarin dose with next INR in 1 week       Summary  As of 10/18/2023      Full warfarin instructions:  10/18: 15 mg; Otherwise 10 mg every day   Next INR check:  10/25/2023               Telephone call with Kannan who verbalizes understanding and agrees to plan    Patient to recheck with home meter    Education provided:   Dietary considerations: importance of consistent vitamin K intake and difference between cooked and raw spinach.    Plan made per [facility] anticoagulation protocol    Juliane Martin, RN  Anticoagulation Clinic  10/18/2023    _______________________________________________________________________     Anticoagulation Episode Summary       Current INR goal:  2.5-3.5   TTR:  45.5% (1 y)   Target end date:  Indefinite   Send INR reminders to:  [facility]AG HOME [facility]IT[facility]    Indications    Factor V Leiden mutation (H24) [D68.51]  Personal history of DVT (deep vein thrombosis) [Z86.718]  History of pulmonary embolism [Z86.711]             Comments:  JODI Home Meter // Manage by exception             Anticoagulation Care Providers       Provider Role Specialty Phone number    David Jenkins MD Referring Family Medicine [phone]

## 2023-10-25 ENCOUNTER — TRANSFERRED RECORDS (OUTPATIENT)
Dept: HEALTH INFORMATION MANAGEMENT | Facility: CLINIC | Age: 73
End: 2023-10-25
Payer: COMMERCIAL

## 2023-10-25 ENCOUNTER — NURSE TRIAGE (OUTPATIENT)
Dept: NURSING | Facility: CLINIC | Age: 73
End: 2023-10-25
Payer: COMMERCIAL

## 2023-10-25 LAB — INR HOME MONITORING: 1.3 RATIO (ref 2.5–3.5)

## 2023-10-26 ENCOUNTER — OFFICE VISIT (OUTPATIENT)
Dept: PULMONOLOGY | Facility: CLINIC | Age: 73
End: 2023-10-26
Payer: COMMERCIAL

## 2023-10-26 ENCOUNTER — HOSPITAL ENCOUNTER (OUTPATIENT)
Dept: CT IMAGING | Facility: HOSPITAL | Age: 73
Discharge: HOME | End: 2023-10-26
Attending: CLINICAL NURSE SPECIALIST | Admitting: CLINICAL NURSE SPECIALIST
Payer: MEDICARE

## 2023-10-26 ENCOUNTER — ANTICOAGULATION THERAPY VISIT (OUTPATIENT)
Dept: ANTICOAGULATION | Facility: CLINIC | Age: 73
End: 2023-10-26

## 2023-10-26 VITALS
DIASTOLIC BLOOD PRESSURE: 76 MMHG | SYSTOLIC BLOOD PRESSURE: 118 MMHG | WEIGHT: 186 LBS | OXYGEN SATURATION: 95 % | HEART RATE: 110 BPM | BODY MASS INDEX: 25.58 KG/M2

## 2023-10-26 DIAGNOSIS — R91.8 LUNG NODULES: ICD-10-CM

## 2023-10-26 DIAGNOSIS — D68.51 FACTOR V LEIDEN MUTATION (H): Primary | ICD-10-CM

## 2023-10-26 DIAGNOSIS — Z86.711 HISTORY OF PULMONARY EMBOLISM: ICD-10-CM

## 2023-10-26 DIAGNOSIS — R91.1 LUNG NODULE: Primary | ICD-10-CM

## 2023-10-26 DIAGNOSIS — Z86.718 PERSONAL HISTORY OF DVT (DEEP VEIN THROMBOSIS): ICD-10-CM

## 2023-10-26 PROCEDURE — 99202 OFFICE O/P NEW SF 15 MIN: CPT | Performed by: CLINICAL NURSE SPECIALIST

## 2023-10-26 PROCEDURE — 71250 CT THORAX DX C-: CPT

## 2023-10-26 NOTE — PROGRESS NOTES
ANTICOAGULATION MANAGEMENT     Kannan Wolf 73 year old male is on warfarin with subtherapeutic INR result. (Goal INR 2.5-3.5)    Recent labs: (last 7 days)     10/25/23  2214   INR 1.3*       ASSESSMENT     Source(s): Chart Review and Patient/Caregiver Call     Warfarin doses taken: Warfarin taken as instructed and Monday and Tuesday still had warfarin in the pillbox, so notes he could have missed them.He does not think that he did.  Diet: No new diet changes identified  Medication/supplement changes: None noted  New illness, injury, or hospitalization: No  Signs or symptoms of bleeding or clotting: No  Previous result: Subtherapeutic  Additional findings: None and about one and half weeks of warfarin left and will change to Xarelto.       PLAN     Recommended plan for temporary change(s) affecting INR     Dosing Instructions: booster dose then continue your current warfarin dose with next INR in 1 week       Summary  As of 10/26/2023      Full warfarin instructions:  10/26: 15 mg; Otherwise 10 mg every day   Next INR check:  11/1/2023               Sent [employer] message with dosing and follow up instructions    Patient to recheck with home meter    Education provided:   None required    Plan made per [facility] anticoagulation protocol    Juliane Martin, RN  Anticoagulation Clinic  10/26/2023    _______________________________________________________________________     Anticoagulation Episode Summary       Current INR goal:  2.5-3.5   TTR:  45.5% (1 y)   Target end date:  Indefinite   Send INR reminders to:  ANTICOAG HOME MONITORING    Indications    Factor V Leiden mutation (H24) [D68.51]  Personal history of DVT (deep vein thrombosis) [Z86.718]  History of pulmonary embolism [Z86.711]             Comments:  JODI Home Meter // Manage by exception             Anticoagulation Care Providers       Provider Role Specialty Phone number    David Jenkins MD Referring Family Medicine [phone]

## 2023-10-26 NOTE — PROGRESS NOTES
THORACIC SURGERY FOLLOW UP VISIT    I saw Mr. Kannan Wolf in follow-up today. The clinical summary follows:     CHIEF COMPLAINT   Lung nodules  INTERVAL STUDIES  CT chest: stable pulmonary nodules, no new nodules    Past Medical History:   Diagnosis Date    ACL tear     Arthritis     osteoarthritis    Blood clot in the legs     +PE 2009  also on vein from intestine    Blood clotting disorder (H) 2009    Chronic diarrhea     result of total colectomy    Factor V Leiden (H)     Head injury 1960    multiple    Hearing problem 2000    IBS (irritable bowel syndrome)     Liver disease 2013    Fatty liver    Migraines 1989    none for a few years    Personal history of colonic polyps 2009    Polyps, colonic     Pulmonary emboli (H) 2009    Skin disease 1970    eczema    Sleep apnea       Past Surgical History:   Procedure Laterality Date    ARTHROSCOPY SHOULDER ROTATOR CUFF REPAIR, BICEP TENODESIS REPAIR Right 10/8/2020    Procedure: right shoulder arthroscopic rotator cuff repair, arthroscopic subacromial decompression, biceps tenotomy;  Surgeon: Ryan Good MD;  [place] [facility] [place]OTAL KNEE ARTHROPLASTY Left 06/2019    @ [facility]    IR LOWER EXTREMITY ANGIOGRAM LEFT  6/18/2019    IR LOWER EXTREMITY ANGIOGRAM LEFT  6/17/2019    IR LOWER EXTREMITY ANGIOGRAM LEFT  6/16/2019    LAPAROSCOPIC ASSISTED COLECTOMY  5/9/2012    Procedure:LAPAROSCOPIC ASSISTED COLECTOMY; Hand Assisted Laparoscopic Total Abdominal Colectomy Ileorectal anastomosis. ; Surgeon:COLLINS JAVIER; Location: OR    SIGMOIDOSCOPY FLEXIBLE  5/21/2012    Procedure:SIGMOIDOSCOPY FLEXIBLE; Surgeon:EMMANUEL LEDBETTER; Location: GI    THROMBECTOMY LOWER EXTREMITY Left 06/2019    @ [facility] following knee arthroplasty @ [facility]    VASCULAR SURGERY      Has multiple femoral arterial stents    ZZC REPAIR CRUCIATE LIGAMENT,KNEE       bilateral knees     Social History     Socioeconomic History    Marital status:      Spouse name: Not on file     Number of children: Not on file    Years of education: Not on file    Highest education level: Not on file   Occupational History    Not on file   Tobacco Use    Smoking status: Every Day     Packs/day: 1.00     Years: 50.00     Additional pack years: 0.00     Total pack years: 50.00     Types: Cigarettes    Smokeless tobacco: Never   Vaping Use    Vaping Use: Never used   Substance and Sexual Activity    Alcohol use: Yes     Alcohol/week: 0.0 standard drinks of alcohol     Comment: 3-4 light beers per day    Drug use: No    Sexual activity: Not Currently     Partners: Female     Birth control/protection: Male Surgical   Other Topics Concern    Parent/sibling w/ CABG, MI or angioplasty before 65F 55M? Not Asked   Social History Narrative    The patient has a 50 pk yr tobacco hx.  He has ongoing active use.  Alcohol use is 21 alcoholic drinks per week.  He has marijuana use.         He previously worked as .          The patient is .  Has 3 children.     Social Determinants of Health     Financial Resource Strain: Not on file   Food Insecurity: Not on file   Transportation Needs: Not on file   Physical Activity: Not on file   Stress: Not on file   Social Connections: Not on file   Interpersonal Safety: Not on file   Housing Stability: Not on file       SUBJECTIVE  Иван is doing well. He has no complaints or concerns. He denies recent illnesses. He is not interested in smoking cessation.    OBJECTIVE  /76 (BP Location: Right arm, Patient Position: Chair, Cuff Size: Adult Regular)   Pulse 110   Wt 84.4 kg (186 lb)   SpO2 95%   BMI 25.58 kg/m       From a personal perspective, he will be spending winter in [facility]. He leaves after [place] and comes back in April.    IMPRESSION   73 year-old male with lung nodules here with a chest CT for lung nodule follow up.    PLAN  I spent 10 min on the date of the encounter in chart review, patient visit, review of tests, documentation and/or  discussion with other providers about the issues documented above. I reviewed the plan as follows:  Follow up with me in 1 year with a chest CT prior  Necessary Tests & Appointments: chest CT  All questions were answered and the patient and present family were in agreement with the plan.  I appreciate the opportunity to participate in the care of your patient and will keep you updated.  Sincerely,

## 2023-10-26 NOTE — TELEPHONE ENCOUNTER
Lab calling with critical INR results, 1.3, Drawn tonight at 1014 pm.  Taking warfarin for history of DVT, pulmonary embolism, Factor V Leiden mutation  Ordering provider David Jenkins    Page sent out to on call, Dr Renetta Jeter at 933pm. Recommends 15mg tonight, will have INR team follow up with pt tomorrow.    Provider Recommendation Follow Up:   Reached patient/caregiver. Informed of provider's recommendations. Patient verbalized understanding and agrees with the plan. Pt states he just submitted his results and took 10 mg, will take additional 5mg tonight. Pt denies having any symptoms.    Bettye Darden RN, BSN  10/25/2023 at 9:44 PM  [facility]        Reason for Disposition   Lab or radiology calling with CRITICAL test results    Protocols used: PCP Call - No Triage-A-

## 2023-11-01 ENCOUNTER — MYC MEDICAL ADVICE (OUTPATIENT)
Dept: ANTICOAGULATION | Facility: CLINIC | Age: 73
End: 2023-11-01
Payer: COMMERCIAL

## 2023-11-01 ENCOUNTER — TRANSFERRED RECORDS (OUTPATIENT)
Dept: HEALTH INFORMATION MANAGEMENT | Facility: CLINIC | Age: 73
End: 2023-11-01
Payer: COMMERCIAL

## 2023-11-01 ENCOUNTER — ANTICOAGULATION THERAPY VISIT (OUTPATIENT)
Dept: ANTICOAGULATION | Facility: CLINIC | Age: 73
End: 2023-11-01
Payer: COMMERCIAL

## 2023-11-01 DIAGNOSIS — Z86.718 PERSONAL HISTORY OF DVT (DEEP VEIN THROMBOSIS): ICD-10-CM

## 2023-11-01 DIAGNOSIS — Z86.711 HISTORY OF PULMONARY EMBOLISM: ICD-10-CM

## 2023-11-01 DIAGNOSIS — D68.51 FACTOR V LEIDEN MUTATION (H): Primary | ICD-10-CM

## 2023-11-01 LAB — INR HOME MONITORING: 2.3 RATIO (ref 2.5–3.5)

## 2023-11-01 NOTE — PROGRESS NOTES
ANTICOAGULATION MANAGEMENT     Kannan DODSON Cudd 73 year old male is on warfarin with subtherapeutic INR result. (Goal INR 2.5-3.5)    Recent labs: (last 7 days)     11/01/23  1247   INR 2.3*       ASSESSMENT     Source(s): Chart Review and Patient/Caregiver Call     Warfarin doses taken: Warfarin taken as instructed  Diet: No new diet changes identified  Medication/supplement changes:  QTY:43, 5 mg tablets of warfarin left before he starts xeralto  New illness, injury, or hospitalization: No  Signs or symptoms of bleeding or clotting: No  Previous result: Subtherapeutic  Additional findings: None       PLAN     Recommended plan for no diet, medication or health factor changes affecting INR     Dosing Instructions: Increase your warfarin dose (7.1% change) with next INR in 1 week       Summary  As of 11/1/2023      Full warfarin instructions:  12.5 mg every Wed, Sat; 10 mg all other days   Next INR check:  11/8/2023               Telephone call with Kannan who verbalizes understanding and agrees to plan and who agrees to plan and repeated back plan correctly    Patient to recheck with home meter    Education provided:   None required    Plan made per [facility] anticoagulation protocol    Pat Lott RN  Anticoagulation Clinic  11/1/2023    _______________________________________________________________________     Anticoagulation Episode Summary       Current INR goal:  2.5-3.5   TTR:  44.8% (1 y)   Target end date:  Indefinite   Send INR reminders to:  ANTICOAG HOME MONITORING    Indications    Factor V Leiden mutation (H24) [D68.51]  Personal history of DVT (deep vein thrombosis) [Z86.718]  History of pulmonary embolism [Z86.711]             Comments:  MDINGRETA Home Meter // Manage by exception             Anticoagulation Care Providers       Provider Role Specialty Phone number    David Jenkins MD Referring Family Medicine [phone]

## 2023-11-08 ENCOUNTER — TRANSFERRED RECORDS (OUTPATIENT)
Dept: HEALTH INFORMATION MANAGEMENT | Facility: CLINIC | Age: 73
End: 2023-11-08
Payer: COMMERCIAL

## 2023-11-08 ENCOUNTER — ANTICOAGULATION THERAPY VISIT (OUTPATIENT)
Dept: ANTICOAGULATION | Facility: CLINIC | Age: 73
End: 2023-11-08
Payer: COMMERCIAL

## 2023-11-08 DIAGNOSIS — Z86.718 PERSONAL HISTORY OF DVT (DEEP VEIN THROMBOSIS): ICD-10-CM

## 2023-11-08 DIAGNOSIS — D68.51 FACTOR V LEIDEN MUTATION (H): Primary | ICD-10-CM

## 2023-11-08 DIAGNOSIS — Z86.711 HISTORY OF PULMONARY EMBOLISM: ICD-10-CM

## 2023-11-08 LAB — INR HOME MONITORING: 3.9 RATIO (ref 2.5–3.5)

## 2023-11-08 NOTE — PROGRESS NOTES
ANTICOAGULATION MANAGEMENT     Kannan WEST Cudd 73 year old male is on warfarin with supratherapeutic INR result. (Goal INR 2.5-3.5)    Recent labs: (last 7 days)     11/08/23  1522   INR 3.9*       ASSESSMENT     Source(s): Chart Review and Patient/Caregiver Call     Warfarin doses taken: Warfarin taken as instructed  Diet: No new diet changes identified  Medication/supplement changes: None noted  New illness, injury, or hospitalization: No  Signs or symptoms of bleeding or clotting: No  Previous result: Subtherapeutic  Additional findings: None       PLAN     Recommended plan for no diet, medication or health factor changes affecting INR     Dosing Instructions: decrease your warfarin dose (3.3% change) with next INR in 1 week       Summary  As of 11/8/2023      Full warfarin instructions:  12.5 mg every Sat; 10 mg all other days   Next INR check:  11/15/2023               Telephone call with Kannan who verbalizes understanding and agrees to plan  Sent [employer] message with dosing and follow up instructions    Patient to recheck with home meter    Education provided:   Please call back if any changes to your diet, medications or how you've been taking warfarin    Plan made with [facility] Pharmacist Zofia Leal, RN  Anticoagulation Clinic  11/8/2023    _______________________________________________________________________     Anticoagulation Episode Summary       Current INR goal:  2.5-3.5   TTR:  45.2% (1 y)   Target end date:  Indefinite   Send INR reminders to:  ANTICOAG HOME MONITORING    Indications    Factor V Leiden mutation (H24) [D68.51]  Personal history of DVT (deep vein thrombosis) [Z86.718]  History of pulmonary embolism [Z86.711]             Comments:  J[employer]I Home Meter // Manage by exception             Anticoagulation Care Providers       Provider Role Specialty Phone number    David Jenkins MD Referring Family Medicine [phone]

## 2023-11-13 ENCOUNTER — PATIENT OUTREACH (OUTPATIENT)
Dept: GASTROENTEROLOGY | Facility: CLINIC | Age: 73
End: 2023-11-13
Payer: COMMERCIAL

## 2023-11-15 ENCOUNTER — TRANSFERRED RECORDS (OUTPATIENT)
Dept: HEALTH INFORMATION MANAGEMENT | Facility: CLINIC | Age: 73
End: 2023-11-15
Payer: COMMERCIAL

## 2023-11-15 ENCOUNTER — ANTICOAGULATION THERAPY VISIT (OUTPATIENT)
Dept: ANTICOAGULATION | Facility: CLINIC | Age: 73
End: 2023-11-15
Payer: COMMERCIAL

## 2023-11-15 ENCOUNTER — DOCUMENTATION ONLY (OUTPATIENT)
Dept: ANTICOAGULATION | Facility: CLINIC | Age: 73
End: 2023-11-15
Payer: COMMERCIAL

## 2023-11-15 DIAGNOSIS — D68.51 FACTOR V LEIDEN MUTATION (H): Primary | ICD-10-CM

## 2023-11-15 DIAGNOSIS — Z86.711 HISTORY OF PULMONARY EMBOLISM: ICD-10-CM

## 2023-11-15 DIAGNOSIS — Z86.718 PERSONAL HISTORY OF DVT (DEEP VEIN THROMBOSIS): ICD-10-CM

## 2023-11-15 LAB — INR HOME MONITORING: 4.5 RATIO (ref 2.5–3.5)

## 2023-11-15 NOTE — PROGRESS NOTES
ANTICOAGULATION MANAGEMENT     Kannan DODSON Maryann 73 year old male is on warfarin with supratherapeutic INR result. (Goal INR 2.5-3.5)    Recent labs: (last 7 days)     11/15/23  1448   INR 4.5*       ASSESSMENT     Source(s): Chart Review and Patient/Caregiver Call     Warfarin doses taken: Warfarin taken as instructed  Diet: Change in alcohol intake may be affecting INR. Pt reports that he increased his alcohol intake lately   Medication/supplement changes: None noted  New illness, injury, or hospitalization: No  Signs or symptoms of bleeding or clotting: No  Previous result: Supratherapeutic  Additional findings: Pt planning to switch from warfarin to Xarelto in 10-14 days. Sent a request for advisement on transition to [facility]       PLAN     Recommended plan for ongoing change(s) affecting INR     Dosing Instructions: partial hold then decrease your warfarin dose (3.4% change) with next INR in 1 week       Summary  As of 11/15/2023      Full warfarin instructions:  11/15: 2.5 mg; Otherwise 10 mg every day   Next INR check:  11/22/2023               Telephone call with Kannan who verbalizes understanding and agrees to plan (Sent a mychart to pt as requested)    Patient to recheck with home meter    Education provided:   Contact [phone]  with any changes, questions or concerns.     Plan made per [facility] anticoagulation protocol    Ekta Tello RN  Anticoagulation Clinic  11/15/2023    _______________________________________________________________________     Anticoagulation Episode Summary       Current INR goal:  2.5-3.5   TTR:  44.4% (1 y)   Target end date:  Indefinite   Send INR reminders to:  ANTICOAG HOME MONITORING    Indications    Factor V Leiden mutation (H24) [D68.51]  Personal history of DVT (deep vein thrombosis) [Z86.718]  History of pulmonary embolism [Z86.711]             Comments:  MDINR Home Meter // Manage by exception             Anticoagulation Care Providers       Provider Role Specialty Phone  number    David Jenkins MD [facility] Medicine [phone]

## 2023-11-15 NOTE — PROGRESS NOTES
Pt plans to switch from warfarin to Xarelto once his current bottle of warfarin is finished. Pt will most likely finish warfarin in the next 10-14 days. Will need a plan for transition. Please review and advise.

## 2023-11-16 ENCOUNTER — TRANSFERRED RECORDS (OUTPATIENT)
Dept: HEALTH INFORMATION MANAGEMENT | Facility: CLINIC | Age: 73
End: 2023-11-16
Payer: COMMERCIAL

## 2023-11-17 ENCOUNTER — TRANSFERRED RECORDS (OUTPATIENT)
Dept: HEALTH INFORMATION MANAGEMENT | Facility: CLINIC | Age: 73
End: 2023-11-17
Payer: COMMERCIAL

## 2023-11-22 ENCOUNTER — ANTICOAGULATION THERAPY VISIT (OUTPATIENT)
Dept: ANTICOAGULATION | Facility: CLINIC | Age: 73
End: 2023-11-22
Payer: COMMERCIAL

## 2023-11-22 ENCOUNTER — TRANSFERRED RECORDS (OUTPATIENT)
Dept: HEALTH INFORMATION MANAGEMENT | Facility: CLINIC | Age: 73
End: 2023-11-22
Payer: COMMERCIAL

## 2023-11-22 DIAGNOSIS — Z86.718 PERSONAL HISTORY OF DVT (DEEP VEIN THROMBOSIS): ICD-10-CM

## 2023-11-22 DIAGNOSIS — D68.51 FACTOR V LEIDEN MUTATION (H): Primary | ICD-10-CM

## 2023-11-22 DIAGNOSIS — Z86.711 HISTORY OF PULMONARY EMBOLISM: ICD-10-CM

## 2023-11-22 LAB — INR HOME MONITORING: 3.9 RATIO (ref 2.5–3.5)

## 2023-11-22 NOTE — PROGRESS NOTES
Will need to check once depletes warfarin supply, when INR <2.5 can start Xarelto.    Zofia Shelby, [facility]D [facility]  Anticoagulation Clinical Pharmacist

## 2023-11-22 NOTE — PROGRESS NOTES
Talked with Kannan who has enough warfarin for sat 11/25. Will check inr  today as it is due and report. He does have xarelto on hand.

## 2023-11-22 NOTE — PROGRESS NOTES
ANTICOAGULATION MANAGEMENT     Kannan Wolf 73 year old male is on warfarin with supratherapeutic INR result. (Goal INR 2.5-3.5)    Recent labs: (last 7 days)     11/22/23  1349   INR 3.9*       ASSESSMENT     Source(s): Chart Review and Patient/Caregiver Call     Warfarin doses taken: Warfarin taken as instructed  Diet: No new diet changes identified  Medication/supplement changes: None noted  New illness, injury, or hospitalization: No  Signs or symptoms of bleeding or clotting: No  Previous result: Supratherapeutic  Additional findings:  runs out of warfarin on Sunday and is switching to xarelto which he has on hand.  Will check inr Monday . Can switch over when inr is less than 2.5  Will have cranberries and alcohol tomorrow which influenced my decision to hold today       PLAN     Recommended plan for no diet, medication or health factor changes affecting INR     Dosing Instructions: hold dose then continue your current warfarin dose with next INR in 5 days       Summary  As of 11/22/2023      Full warfarin instructions:  11/22: Hold; 11/26: Hold; Otherwise 10 mg every day   Next INR check:  11/27/2023               Telephone call with Kannan who verbalizes understanding and agrees to plan    Patient to recheck with home meter    Education provided:   Please call back if any changes to your diet, medications or how you've been taking warfarin    Plan made per [facility] anticoagulation protocol    Kevin Razo RN  Anticoagulation Clinic  11/22/2023    _______________________________________________________________________     Anticoagulation Episode Summary       Current INR goal:  2.5-3.5   TTR:  43.6% (1 y)   Target end date:  Indefinite   Send INR reminders to:  ANTICOAG HOME MONITORING    Indications    Factor V Leiden mutation (H24) [D68.51]  Personal history of DVT (deep vein thrombosis) [Z86.718]  History of pulmonary embolism [Z86.711]             Comments:  [employer] Home Meter // Manage by exception              Anticoagulation Care Providers       Provider Role Specialty Phone number    David Jenkins MD Referring Family Medicine [phone]

## 2023-11-27 ENCOUNTER — TRANSFERRED RECORDS (OUTPATIENT)
Dept: HEALTH INFORMATION MANAGEMENT | Facility: CLINIC | Age: 73
End: 2023-11-27
Payer: COMMERCIAL

## 2023-11-27 ENCOUNTER — ANTICOAGULATION THERAPY VISIT (OUTPATIENT)
Dept: ANTICOAGULATION | Facility: CLINIC | Age: 73
End: 2023-11-27
Payer: COMMERCIAL

## 2023-11-27 DIAGNOSIS — D68.51 FACTOR V LEIDEN MUTATION (H): Primary | ICD-10-CM

## 2023-11-27 DIAGNOSIS — Z86.711 HISTORY OF PULMONARY EMBOLISM: ICD-10-CM

## 2023-11-27 DIAGNOSIS — Z86.718 PERSONAL HISTORY OF DVT (DEEP VEIN THROMBOSIS): ICD-10-CM

## 2023-11-27 LAB — INR HOME MONITORING: 2.1 RATIO (ref 2.5–3.5)

## 2023-11-27 NOTE — PROGRESS NOTES
Patient has now switched to Xarelto. His INR is low enough to begin tonight. Patient advised, states understanding, and agrees with plan.    [facility] episode resolved.  HM jane's updated.     Miracle Salgado RN  Anticoagulation Nurse - [facility] [place]

## 2023-11-28 ENCOUNTER — TELEPHONE (OUTPATIENT)
Dept: FAMILY MEDICINE | Facility: CLINIC | Age: 73
End: 2023-11-28

## 2023-11-28 DIAGNOSIS — M25.519 SHOULDER PAIN, UNSPECIFIED CHRONICITY, UNSPECIFIED LATERALITY: Primary | ICD-10-CM

## 2023-11-28 NOTE — TELEPHONE ENCOUNTER
PT came into the clinic requesting that Dr. Box would fax over a letter stating he no longer needs INR testing.  Fax number is [phone].  PT also requesting that he be seen for past right shoulder surgery and wanting shot.  Not sure if he needs to be seen first or if he can just get a referral to [facility].  KE

## 2023-11-28 NOTE — LETTER
To Whom It May Concern,    Иван Wolf has now transitioned off of warfarin and is taking rivaroxaban (Xarelto) daily for long term anticoagulation.  He no longer requires continued INR monitoring.      Sincerely,    Taqueria Box MD

## 2023-12-13 DIAGNOSIS — E78.89 LIPIDS ABNORMAL: ICD-10-CM

## 2023-12-13 NOTE — TELEPHONE ENCOUNTER
Medication Question or Refill    Contacts         Type Contact Phone/Fax    12/13/2023 04:17 PM CST Phone (Incoming) MaryannKannan WEST (Self) [phone] (H)            What medication are you calling about (include dose and sig)?:     atorvastatin (LIPITOR) 20 MG tablet       Preferred Pharmacy:   [id number] [place] [address]  [address] [facility] [address]  Phone: [phone] Fax: [phone]      Controlled Substance Agreement on file:   CSA -- Patient Level:    CSA: None found at the patient level.       Who prescribed the medication?: Dr. Jenkins    Do you need a refill? Yes-[place] is leaving [place] and needs this refilled BEFORE Friday, 12.14.23    When did you use the medication last? 12.12.23    Patient offered an appointment? No    Do you have any questions or concerns?  No      Could we send this information to you in [facility] or would you prefer to receive a phone call?:   Patient would prefer a phone call   Okay to leave a detailed message?: Yes at Home number on file [phone] (home)

## 2023-12-14 ENCOUNTER — OFFICE VISIT (OUTPATIENT)
Dept: ORTHOPEDICS | Facility: CLINIC | Age: 73
End: 2023-12-14
Payer: COMMERCIAL

## 2023-12-14 ENCOUNTER — ANCILLARY PROCEDURE (OUTPATIENT)
Dept: GENERAL RADIOLOGY | Facility: CLINIC | Age: 73
End: 2023-12-14
Attending: STUDENT IN AN ORGANIZED HEALTH CARE EDUCATION/TRAINING PROGRAM
Payer: COMMERCIAL

## 2023-12-14 DIAGNOSIS — M25.519 SHOULDER PAIN, UNSPECIFIED CHRONICITY, UNSPECIFIED LATERALITY: ICD-10-CM

## 2023-12-14 DIAGNOSIS — M65.341 TRIGGER RING FINGER OF RIGHT HAND: ICD-10-CM

## 2023-12-14 DIAGNOSIS — M25.519 SHOULDER PAIN, UNSPECIFIED CHRONICITY, UNSPECIFIED LATERALITY: Primary | ICD-10-CM

## 2023-12-14 PROCEDURE — 99207 PR DROP WITH A PROCEDURE: CPT | Performed by: STUDENT IN AN ORGANIZED HEALTH CARE EDUCATION/TRAINING PROGRAM

## 2023-12-14 PROCEDURE — 99203 OFFICE O/P NEW LOW 30 MIN: CPT | Performed by: STUDENT IN AN ORGANIZED HEALTH CARE EDUCATION/TRAINING PROGRAM

## 2023-12-14 PROCEDURE — 76942 ECHO GUIDE FOR BIOPSY: CPT | Mod: 59 | Performed by: STUDENT IN AN ORGANIZED HEALTH CARE EDUCATION/TRAINING PROGRAM

## 2023-12-14 PROCEDURE — 20611 DRAIN/INJ JOINT/BURSA W/US: CPT | Mod: 50 | Performed by: STUDENT IN AN ORGANIZED HEALTH CARE EDUCATION/TRAINING PROGRAM

## 2023-12-14 PROCEDURE — 20550 NJX 1 TENDON SHEATH/LIGAMENT: CPT | Mod: F8 | Performed by: STUDENT IN AN ORGANIZED HEALTH CARE EDUCATION/TRAINING PROGRAM

## 2023-12-14 PROCEDURE — 73030 X-RAY EXAM OF SHOULDER: CPT | Mod: TC | Performed by: RADIOLOGY

## 2023-12-14 RX ORDER — TRIAMCINOLONE ACETONIDE 40 MG/ML
40 INJECTION, SUSPENSION INTRA-ARTICULAR; INTRAMUSCULAR
Status: DISCONTINUED | OUTPATIENT
Start: 2023-12-14 | End: 2024-08-23

## 2023-12-14 RX ORDER — LIDOCAINE HYDROCHLORIDE 10 MG/ML
1 INJECTION, SOLUTION INFILTRATION; PERINEURAL
Status: DISCONTINUED | OUTPATIENT
Start: 2023-12-14 | End: 2024-08-23

## 2023-12-14 RX ORDER — ATORVASTATIN CALCIUM 20 MG/1
20 TABLET, FILM COATED ORAL DAILY
Qty: 90 TABLET | Refills: 1 | Status: SHIPPED | OUTPATIENT
Start: 2023-12-14 | End: 2024-07-17

## 2023-12-14 RX ORDER — LIDOCAINE HYDROCHLORIDE 10 MG/ML
3 INJECTION, SOLUTION INFILTRATION; PERINEURAL
Status: DISCONTINUED | OUTPATIENT
Start: 2023-12-14 | End: 2024-08-23

## 2023-12-14 RX ADMIN — TRIAMCINOLONE ACETONIDE 40 MG: 40 INJECTION, SUSPENSION INTRA-ARTICULAR; INTRAMUSCULAR at 14:36

## 2023-12-14 RX ADMIN — LIDOCAINE HYDROCHLORIDE 1 ML: 10 INJECTION, SOLUTION INFILTRATION; PERINEURAL at 14:34

## 2023-12-14 RX ADMIN — TRIAMCINOLONE ACETONIDE 40 MG: 40 INJECTION, SUSPENSION INTRA-ARTICULAR; INTRAMUSCULAR at 14:34

## 2023-12-14 RX ADMIN — LIDOCAINE HYDROCHLORIDE 3 ML: 10 INJECTION, SOLUTION INFILTRATION; PERINEURAL at 14:34

## 2023-12-14 RX ADMIN — LIDOCAINE HYDROCHLORIDE 1 ML: 10 INJECTION, SOLUTION INFILTRATION; PERINEURAL at 14:36

## 2023-12-14 NOTE — PROGRESS NOTES
CC: B/L Shoulder Pain    HPI: Patient is a 73 year old, dominant male seen here today for bilateral shoulder pain.  He states right is worse than left.  He does have a history of a right shoulder rotator cuff repair by Dr Good in 2020.  He states he has seen improved strength in the shoulder since surgery.  But he is always had pain over the lateral aspect of the shoulder when doing overhead activities and lifting objects away from his body.  He notes no acute new injury.  He localized the pain to the lateral aspect of the shoulder.  He notes he is also starting to get similar pain in the left shoulder over his lateral shoulder.  He still has good strength and range of motion of the left shoulder.  He is leaving for [facility] on Saturday for 4 months he wanted to get a shoulders evaluated before he left.  He takes Tylenol as needed for the pain.  He has not done any recent physical therapy.  He has not had any injections in his shoulder since his rotator cuff repair.  Other than his rotator cuff repair, no other shoulder surgeries    He also has a symptomatic trigger finger on the right ring finger.  He has a history of factor V Leiden and is on Xarelto.  He has had unprovoked DVTs and PEs.  He is retired.  He smokes cigarettes.  He enjoys hiking and fishing         Patient Active Problem List   Diagnosis    Colon polyps    Arterial occlusive disease    PVD (peripheral vascular disease) (H24)    Lung nodule    NAFLD (nonalcoholic fatty liver disease)    Blood clot due to device, implant, or graft    History of pulmonary embolism    H/O deep venous thrombosis    Lower limb ischemia    Status post total left knee replacement    Tobacco dependence    S/P shoulder surgery    Factor V Leiden mutation (H24)    Personal history of DVT (deep vein thrombosis)    Adenomatous polyp of colon    Anticoagulant effect    Lipids abnormal    Obesity    Prediabetes    Acromioclavicular joint arthritis    History of arterial thrombosis     Seasonal allergic rhinitis    Chronic obstructive pulmonary disease, unspecified COPD type (H)          Past Medical History:   Diagnosis Date    ACL tear     Arthritis     osteoarthritis    Blood clot in the legs     +PE 2009  also on vein from intestine    Blood clotting disorder (H24) 2009    Chronic diarrhea     result of total colectomy    Factor V Leiden (H24)     Head injury 1960    multiple    Hearing problem 2000    IBS (irritable bowel syndrome)     Liver disease 2013    Fatty liver    Migraines 1989    none for a few years    Personal history of colonic polyps 2009    Polyps, colonic     Pulmonary emboli (H) 2009    Skin disease 1970    eczema    Sleep apnea           Past Surgical History:   Procedure Laterality Date    ARTHROSCOPY SHOULDER ROTATOR CUFF REPAIR, BICEP TENODESIS REPAIR Right 10/8/2020    Procedure: right shoulder arthroscopic rotator cuff repair, arthroscopic subacromial decompression, biceps tenotomy;  Surgeon: Ryan Good MD;  [place] [facility] [place]    AS TOTAL KNEE ARTHROPLASTY Left 06/2019    @ [facility]    IR LOWER EXTREMITY ANGIOGRAM LEFT  6/18/2019    IR LOWER EXTREMITY ANGIOGRAM LEFT  6/17/2019    IR LOWER EXTREMITY ANGIOGRAM LEFT  6/16/2019    LAPAROSCOPIC ASSISTED COLECTOMY  5/9/2012    Procedure:LAPAROSCOPIC ASSISTED COLECTOMY; Hand Assisted Laparoscopic Total Abdominal Colectomy Ileorectal anastomosis. ; Surgeon:COLLINS JAVIER; Location: OR    SIGMOIDOSCOPY FLEXIBLE  5/21/2012    Procedure:SIGMOIDOSCOPY FLEXIBLE; Surgeon:EMMANUEL LEDBETTER; Location: GI    THROMBECTOMY LOWER EXTREMITY Left 06/2019    @ [facility] following knee arthroplasty @ [facility]    VASCULAR SURGERY      Has multiple femoral arterial stents    ZZC REPAIR CRUCIATE LIGAMENT,KNEE       bilateral knees          Current Outpatient Medications   Medication Sig Dispense Refill    albuterol (PROAIR HFA/PROVENTIL HFA/VENTOLIN HFA) 108 (90 Base) MCG/ACT inhaler Inhale 2 puffs into the lungs every 6 hours as  needed for shortness of breath / dyspnea or wheezing 18 g 4    atorvastatin (LIPITOR) 20 MG tablet TAKE 1 TABLET(20 MG) BY MOUTH DAILY 90 tablet 1    fluticasone (FLONASE) 50 MCG/ACT nasal spray Spray 1 spray into both nostrils daily as needed for rhinitis or allergies 1 g 11    rivaroxaban ANTICOAGULANT (XARELTO) 20 MG TABS tablet Take 1 tablet (20 mg) by mouth daily (with dinner) (Patient not taking: Reported on 10/26/2023) 90 tablet 3    sildenafil (REVATIO) 20 MG tablet See Instructions, Instructions: 1 tab(s) Oral up to 5 tabs daily as needed before sexual activity, # 100 EA, 0 Refill(s), Type: Maintenance, Pharmacy: [id number], 1 tab(s) Oral up to 5 tabs daily as needed before sexual activity, 73,... 50 tablet 11        No Known Allergies       Family History   Problem Relation Age of Onset    Colon Polyps Other         no hx    Crohn's Disease Other         no hx    Ulcerative Colitis Other         no hx    Cancer - colorectal Other         no hx    Cancer Other         no hx    Cerebrovascular Disease Mother         multiple    Colon Cancer Other         uncle    Substance Abuse Sister     Substance Abuse Brother     Substance Abuse Sister     Myocardial Infarction Maternal Uncle     Anesthesia Reaction No family hx of     Deep Vein Thrombosis No family hx of           Social History     Tobacco Use    Smoking status: Every Day     Packs/day: 1.00     Years: 50.00     Additional pack years: 0.00     Total pack years: 50.00     Types: Cigarettes    Smokeless tobacco: Never   Substance Use Topics    Alcohol use: Yes     Alcohol/week: 0.0 standard drinks of alcohol     Comment: 3-4 light beers per day            Objective:  Physical Exam:  RUE: No gross deformity of the shoulder.  No open wounds or lacerations.  Well healed surgical incisions.  No erythema or ecchymosis.  No pain to palpation over the clavicle, AC joint, acromion, or scapular spine.  Mild pain to palpation over the posterior  joint line.  Pain to palpation over the lateral aspect of the shoulder.  No significant pain to palpation over the long of the biceps in the bicipital groove.  Passive range of motion 170 degrees forward flexion, 160 degrees abduction, 30 degrees external rotation, T12 internal rotation.  Active range of motion is equivalent to passive range of motion.  5/5 strength in flexion, abduction, internal and external rotation.  Negative cross body for AC joint pain.  Negative O'Kofi's for pain over the AC joint or deep anterior shoulder.   Positive Jobes for pain. Sensation intact to axillary, radial, median, and ulnar nerve distribution.  In the right hand there is pain over the A1 pulley of the ring finger with palpable click through range of motion of the ring finger    LUE: No gross deformity of the shoulder.  No open wounds or lacerations.  No surgical incisions.  No erythema or ecchymosis.  No pain to palpation over the clavicle, AC joint, acromion, or scapular spine.  No pain to palpation over the posterior joint line.  Mild pain to palpation over the long head of the biceps.  Pain to palpation over the lateral aspect of the shoulder.  Passive range of motion 170 degrees forward flexion, 160 degrees abduction, 60 degrees external rotation, T12 internal rotation.  Active range of motion is equivalent to passive range of motion.  5/5 strength in flexion, abduction, internal and external rotation.  Negative cross body for AC joint pain.  Positive Jobes for pain.  Sensation intact to axillary, radial, median, and ulnar nerve distribution.    Imagin view x-rays of bilateral shoulders from today was reviewed.  This shows no fracture or acute bony pathology.  Moderate AC joint arthrosis with superior clavicular fascicle noted on both shoulders.  Well-maintained glenohumeral joint space in both shoulders.  No high riding humeral head.  No subluxation or dislocation noted of either shoulder on axillary view.   Well-maintained glenohumeral joint space on axillary view of both shoulders.  The right shoulder does show signs of prior anchor placement in the greater tuberosity of the humerus.    Assessment and Plan: Patient is a 73-year-old male presents here today with bilateral shoulder pain as well as symptomatic right hand ring finger trigger finger.  No glenohumeral arthritis noted on bilateral x-ray of the shoulder.  He has full active range of motion and 5 out of 5 strength.  I suspect he has tendinosis of his rotator cuff bilaterally given his imaging and clinical exam findings.  I do not see any red flag symptoms of a acute massive rotator cuff tear.  I discussed with him diagnostic and treatment options moving forward including physical therapy, subacromial corticosteroid injection, and MRI.  Given that he is leaving the country in 2 days for a 4-month period, physical therapy is not feasible at this time.  It is unlikely to get an MRI in time, and we would be unable to act on any results.  He is interested in bilateral subacromial corticosteroid injections for pain relief during his trip.  I think that is very reasonable.  We discussed risks of this including not limited to bleeding, infection, failure to cure pain, allergic reaction.  We also discussed treatment of his trigger finger.  The ultimate treatment is a trigger finger release.  He will not build to get that prior to his trip.  He is also interested in a corticosteroid injection into the A1 pulley symptomatic relief during his trip.  I think this is reasonable as well.  I will refer him to my sports medicine colleague, Dr Jesus, for ultrasound-guided bilateral subacromial injections and right ring finger trigger finger injection.  He can follow-up with me when he returns to [place] from his trip      Torey Durand MD    [facility]   Department of Orthopedic Surgery

## 2023-12-14 NOTE — PROGRESS NOTES
Sports Medicine Procedure Note    Diagnoses and all orders for this visit:    Shoulder pain, unspecified chronicity, unspecified laterality  -     Large Joint Injection: bilateral subacromial bursa    Trigger ring finger of right hand  -     Hand / Upper Extremity Injection: R ring A1        Kannan Wolf is a/an 73 year old male who is seen for Corticosteroid injection of bilateral shoulders and right trigger finger  Last injection: unknown date, tolerated well in hands and shoulders.       -Post-injection instructions were provided to the patient  -Return sooner if develops new or worsening symptoms.    Options for treatment and/or follow-up care were reviewed with the patient was actively involved in the decision making process. Patient verbalized understanding and was in agreement with the plan.    Agreeable to CSI injection injection. Discussed risks and benefits. they are aware of risks such as skin hypopigmentation, hyperglycemia, bleeding, and infection.     Pt endorses taking xarelto. Discussed bleeding and red flag risks. Hemodynamically stable after injection.       Will follow-up with Dr. Durand after trip to [facility].       Large Joint Injection: bilateral subacromial bursa    Date/Time: 12/14/2023 2:34 PM    Performed by: Rama Jesus MD  Authorized by: Rama Jesus MD    Indications:  Pain  Needle Size:  25 G  Guidance: ultrasound    Approach:  Posterior  Location:  Shoulder  Laterality:  Bilateral      Site:  Bilateral subacromial bursa  Medications (Right):  40 mg triamcinolone 40 MG/ML; 1 mL lidocaine 1 %; 3 mL lidocaine 1 %  Medications (Left):  40 mg triamcinolone 40 MG/ML; 1 mL lidocaine 1 %; 3 mL lidocaine 1 %  Outcome:  Tolerated well, no immediate complications  Procedure discussed: discussed risks, benefits, and alternatives    Consent Given by:  Patient  Timeout: timeout called immediately prior to procedure    Prep: patient was prepped and draped in usual sterile fashion      Ultrasound was used to ensure safe and accurate needle placement and injection. Ultrasound images of the procedure were permanently stored.    Hand / Upper Extremity Injection: R ring A1    Date/Time: 12/14/2023 2:36 PM    Performed by: Rama Jesus MD  Authorized by: Rama Jesus MD    Indications:  Pain, tendon swelling and therapeutic  Needle Size:  25 G  Guidance: ultrasound    Approach:  Volar  Condition: trigger finger    Location:  Ring finger    Site:  R ring A1  Medications:  40 mg triamcinolone 40 MG/ML; 1 mL lidocaine 1 %  Outcome:  Tolerated well, no immediate complications  Procedure discussed: discussed risks, benefits, and alternatives    Consent Given by:  Patient  Timeout: timeout called immediately prior to procedure    Prep: patient was prepped and draped in usual sterile fashion     Ultrasound was used to ensure safe and accurate needle placement and injection. Ultrasound images of the procedure were permanently stored.        Rama MORELOS [facility]        Post-Injection Discharge Instructions    You may shower, however avoid swimming, tub baths or hot tubs for 24 hours following your procedure  You may have a mild to moderate increase in pain for a few days following the injection.  The lidocaine (local numbing medicine) will wear off in several hours. It usually takes 3-5 days for the steroid medication to start working although it may take up to 14 days for full effect.   You may use ice packs for 10-15 minutes, 3 to 4 times a day at the injection site for comfort if needed  You may use extra strength Tylenol for pain control if necessary   If you were fasting, you may resume your normal diet and medications after the procedure  If you have diabetes, your blood sugar may be higher than normal for 10-14 days following a steroid injection. Contact your doctor who manages your diabetes if your blood sugar is significantly higher  than usual    If you experience any of the following, call Sports Medicine @ [phone] or [phone]  -Fever over 100 degrees F  -Swelling, bleeding, redness, drainage, warmth at the injection site  -New or significant worsening pain        Dr. Rama Jesus,   [facility]  Sports Medicine

## 2023-12-14 NOTE — LETTER
12/14/2023         RE: Kannan Wolf  [address]  [facility] [address]        Dear Colleague,    Thank you for referring your patient, Kannan Wolf, to the [facility]. Please see a copy of my visit note below.    CC: B/L Shoulder Pain    HPI: Patient is a 73 year old, dominant male seen here today for bilateral shoulder pain.  He states right is worse than left.  He does have a history of a right shoulder rotator cuff repair by Dr Good in 2020.  He states he has seen improved strength in the shoulder since surgery.  But he is always had pain over the lateral aspect of the shoulder when doing overhead activities and lifting objects away from his body.  He notes no acute new injury.  He localized the pain to the lateral aspect of the shoulder.  He notes he is also starting to get similar pain in the left shoulder over his lateral shoulder.  He still has good strength and range of motion of the left shoulder.  He is leaving for [facility] on Saturday for 4 months he wanted to get a shoulders evaluated before he left.  He takes Tylenol as needed for the pain.  He has not done any recent physical therapy.  He has not had any injections in his shoulder since his rotator cuff repair.  Other than his rotator cuff repair, no other shoulder surgeries    He also has a symptomatic trigger finger on the right ring finger.  He has a history of factor V Leiden and is on Xarelto.  He has had unprovoked DVTs and PEs.  He is retired.  He smokes cigarettes.  He enjoys hiking and fishing         Patient Active Problem List   Diagnosis     Colon polyps     Arterial occlusive disease     PVD (peripheral vascular disease) (H24)     Lung nodule     NAFLD (nonalcoholic fatty liver disease)     Blood clot due to device, implant, or graft     History of pulmonary embolism     H/O deep venous thrombosis     Lower limb ischemia     Status post total left knee replacement     Tobacco dependence      S/P shoulder surgery     Factor V Leiden mutation (H24)     Personal history of DVT (deep vein thrombosis)     Adenomatous polyp of colon     Anticoagulant effect     Lipids abnormal     Obesity     Prediabetes     Acromioclavicular joint arthritis     History of arterial thrombosis     Seasonal allergic rhinitis     Chronic obstructive pulmonary disease, unspecified COPD type (H)          Past Medical History:   Diagnosis Date     ACL tear      Arthritis     osteoarthritis     Blood clot in the legs     +PE 2009  also on vein from intestine     Blood clotting disorder (H24) 2009     Chronic diarrhea     result of total colectomy     Factor V Leiden (H24)      Head injury 1960    multiple     Hearing problem 2000     IBS (irritable bowel syndrome)      Liver disease 2013    Fatty liver     Migraines 1989    none for a few years     Personal history of colonic polyps 2009     Polyps, colonic      Pulmonary emboli (H) 2009     Skin disease 1970    eczema     Sleep apnea           Past Surgical History:   Procedure Laterality Date     ARTHROSCOPY SHOULDER ROTATOR CUFF REPAIR, BICEP TENODESIS REPAIR Right 10/8/2020    Procedure: right shoulder arthroscopic rotator cuff repair, arthroscopic subacromial decompression, biceps tenotomy;  Surgeon: Ryan Good MD;  Location: [facility] OR     AS TOTAL KNEE ARTHROPLASTY Left 06/2019    @ [facility]     IR LOWER EXTREMITY ANGIOGRAM LEFT  6/18/2019     IR LOWER EXTREMITY ANGIOGRAM LEFT  6/17/2019     IR LOWER EXTREMITY ANGIOGRAM LEFT  6/16/2019     LAPAROSCOPIC ASSISTED COLECTOMY  5/9/2012    Procedure:LAPAROSCOPIC ASSISTED COLECTOMY; Hand Assisted Laparoscopic Total Abdominal Colectomy Ileorectal anastomosis. ; Surgeon:COLLINS JAVIER; Location: OR     SIGMOIDOSCOPY FLEXIBLE  5/21/2012    Procedure:SIGMOIDOSCOPY FLEXIBLE; Surgeon:EMMANUEL LEDBETTER; Location: GI     THROMBECTOMY LOWER EXTREMITY Left 06/2019    @ [facility] following knee arthroplasty @ [facility]     VASCULAR  SURGERY      Has multiple femoral arterial stents     ZZC REPAIR CRUCIATE LIGAMENT,KNEE       bilateral knees          Current Outpatient Medications   Medication Sig Dispense Refill     albuterol (PROAIR HFA/PROVENTIL HFA/VENTOLIN HFA) 108 (90 Base) MCG/ACT inhaler Inhale 2 puffs into the lungs every 6 hours as needed for shortness of breath / dyspnea or wheezing 18 g 4     atorvastatin (LIPITOR) 20 MG tablet TAKE 1 TABLET(20 MG) BY MOUTH DAILY 90 tablet 1     fluticasone (FLONASE) 50 MCG/ACT nasal spray Spray 1 spray into both nostrils daily as needed for rhinitis or allergies 1 g 11     rivaroxaban ANTICOAGULANT (XARELTO) 20 MG TABS tablet Take 1 tablet (20 mg) by mouth daily (with dinner) (Patient not taking: Reported on 10/26/2023) 90 tablet 3     sildenafil (REVATIO) 20 MG tablet See Instructions, Instructions: 1 tab(s) Oral up to 5 tabs daily as needed before sexual activity, # 100 EA, 0 Refill(s), Type: Maintenance, Pharmacy: [facility] #[id number], 1 tab(s) Oral up to 5 tabs daily as needed before sexual activity, 72,... 50 tablet 11        No Known Allergies       Family History   Problem Relation Age of Onset     Colon Polyps Other         no hx     Crohn's Disease Other         no hx     Ulcerative Colitis Other         no hx     Cancer - colorectal Other         no hx     Cancer Other         no hx     Cerebrovascular Disease Mother         multiple     Colon Cancer Other         uncle     Substance Abuse Sister      Substance Abuse Brother      Substance Abuse Sister      Myocardial Infarction Maternal Uncle      Anesthesia Reaction No family hx of      Deep Vein Thrombosis No family hx of           Social History     Tobacco Use     Smoking status: Every Day     Packs/day: 1.00     Years: 50.00     Additional pack years: 0.00     Total pack years: 50.00     Types: Cigarettes     Smokeless tobacco: Never   Substance Use Topics     Alcohol use: Yes     Alcohol/week: 0.0 standard drinks of alcohol      Comment: 3-4 light beers per day            Objective:  Physical Exam:  RUE: No gross deformity of the shoulder.  No open wounds or lacerations.  Well healed surgical incisions.  No erythema or ecchymosis.  No pain to palpation over the clavicle, AC joint, acromion, or scapular spine.  Mild pain to palpation over the posterior joint line.  Pain to palpation over the lateral aspect of the shoulder.  No significant pain to palpation over the long of the biceps in the bicipital groove.  Passive range of motion 170 degrees forward flexion, 160 degrees abduction, 30 degrees external rotation, T12 internal rotation.  Active range of motion is equivalent to passive range of motion.  5/5 strength in flexion, abduction, internal and external rotation.  Negative cross body for AC joint pain.  Negative O'Kofi's for pain over the AC joint or deep anterior shoulder.   Positive Jobes for pain. Sensation intact to axillary, radial, median, and ulnar nerve distribution.  In the right hand there is pain over the A1 pulley of the ring finger with palpable click through range of motion of the ring finger    LUE: No gross deformity of the shoulder.  No open wounds or lacerations.  No surgical incisions.  No erythema or ecchymosis.  No pain to palpation over the clavicle, AC joint, acromion, or scapular spine.  No pain to palpation over the posterior joint line.  Mild pain to palpation over the long head of the biceps.  Pain to palpation over the lateral aspect of the shoulder.  Passive range of motion 170 degrees forward flexion, 160 degrees abduction, 60 degrees external rotation, T12 internal rotation.  Active range of motion is equivalent to passive range of motion.  5/5 strength in flexion, abduction, internal and external rotation.  Negative cross body for AC joint pain.  Positive Jobes for pain.  Sensation intact to axillary, radial, median, and ulnar nerve distribution.    Imagin view x-rays of bilateral shoulders from  today was reviewed.  This shows no fracture or acute bony pathology.  Moderate AC joint arthrosis with superior clavicular fascicle noted on both shoulders.  Well-maintained glenohumeral joint space in both shoulders.  No high riding humeral head.  No subluxation or dislocation noted of either shoulder on axillary view.  Well-maintained glenohumeral joint space on axillary view of both shoulders.  The right shoulder does show signs of prior anchor placement in the greater tuberosity of the humerus.    Assessment and Plan: Patient is a 73-year-old male presents here today with bilateral shoulder pain as well as symptomatic right hand ring finger trigger finger.  No glenohumeral arthritis noted on bilateral x-ray of the shoulder.  He has full active range of motion and 5 out of 5 strength.  I suspect he has tendinosis of his rotator cuff bilaterally given his imaging and clinical exam findings.  I do not see any red flag symptoms of a acute massive rotator cuff tear.  I discussed with him diagnostic and treatment options moving forward including physical therapy, subacromial corticosteroid injection, and MRI.  Given that he is leaving the country in 2 days for a 4-month period, physical therapy is not feasible at this time.  It is unlikely to get an MRI in time, and we would be unable to act on any results.  He is interested in bilateral subacromial corticosteroid injections for pain relief during his trip.  I think that is very reasonable.  We discussed risks of this including not limited to bleeding, infection, failure to cure pain, allergic reaction.  We also discussed treatment of his trigger finger.  The ultimate treatment is a trigger finger release.  He will not build to get that prior to his trip.  He is also interested in a corticosteroid injection into the A1 pulley symptomatic relief during his trip.  I think this is reasonable as well.  I will refer him to my sports medicine colleague, Dr Jesus, for  ultrasound-guided bilateral subacromial injections and right ring finger trigger finger injection.  He can follow-up with me when he returns to [place] from his trip      Torey Durand MD    [facility]   Department of Orthopedic Surgery      Again, thank you for allowing me to participate in the care of your patient.        Sincerely,        Torey Durand MD

## 2023-12-14 NOTE — PATIENT INSTRUCTIONS
1. Shoulder pain, unspecified chronicity, unspecified laterality    2. Trigger ring finger of right hand      -  Injection Discharge Instructions    You may shower, however avoid swimming, tub baths or hot tubs for 24 hours following your procedure  You may have a mild to moderate increase in pain for several days following the injection.  It may take up to 14 days for the steroid medication to start working although you may feel the effect as early as a few days after the procedure.  You may use ice packs for 10-15 minutes, 3 to 4 times a day at the injection site for comfort  You may use anti-inflammatory medications (such as Ibuprofen or Aleve or Advil) or Tylenol for pain control if necessary  If you were fasting, you may resume your normal diet and medications after the procedure  If you have diabetes, check your blood sugar more frequently than usual as your blood sugar may be higher than normal for 10-14 days following a steroid injection. Contact your doctor who manages your diabetes if your blood sugar is higher than usual    If you experience any of the following, call  @ [phone] or [phone]  -Fever over 100 degree F  -Swelling, bleeding, redness, drainage, warmth at the injection site  - New or worsening pain      Please call [phone]  Ask for my team if you have any questions or concerns    Rama Jesus DO  [facility]      Thank you for choosing [facility]!    CLINIC LOCATIONS:     [place]  TRIAGE LINE: [phone] [address] [facility] APPOINTMENTS: [phone]   [place] [address] RADIOLOGY: [phone]   (Thursday & Friday) PHYSICAL THERAPY: [phone]    BILLING QUESTIONS: [phone]   [place] FAX: [phone] [address]    [place] [address]    (Monday & Wednesday

## 2023-12-14 NOTE — LETTER
12/14/2023         RE: Kannan Wolf  [address]  [facility] [address]        Dear Colleague,    Thank you for referring your patient, Kannan Wolf, to the [facility]. Please see a copy of my visit note below.    Sports Medicine Procedure Note    Diagnoses and all orders for this visit:    Shoulder pain, unspecified chronicity, unspecified laterality  -     Large Joint Injection: bilateral subacromial bursa    Trigger ring finger of right hand  -     Hand / Upper Extremity Injection: R ring A1        Kannan Wolf is a/an 73 year old male who is seen for Corticosteroid injection of bilateral shoulders and right trigger finger  Last injection: unknown date, tolerated well in hands and shoulders.       -Post-injection instructions were provided to the patient  -Return sooner if develops new or worsening symptoms.    Options for treatment and/or follow-up care were reviewed with the patient was actively involved in the decision making process. Patient verbalized understanding and was in agreement with the plan.    Agreeable to CSI injection injection. Discussed risks and benefits. they are aware of risks such as skin hypopigmentation, hyperglycemia, bleeding, and infection.     Pt endorses taking xarelto. Discussed bleeding and red flag risks. Hemodynamically stable after injection.       Will follow-up with Dr. Durand after trip to [facility].       Large Joint Injection: bilateral subacromial bursa    Date/Time: 12/14/2023 2:34 PM    Performed by: Rama Jesus MD  Authorized by: Rama Jesus MD    Indications:  Pain  Needle Size:  25 G  Guidance: ultrasound    Approach:  Posterior  Location:  Shoulder  Laterality:  Bilateral      Site:  Bilateral subacromial bursa  Medications (Right):  40 mg triamcinolone 40 MG/ML; 1 mL lidocaine 1 %; 3 mL lidocaine 1 %  Medications (Left):  40 mg triamcinolone 40 MG/ML; 1 mL lidocaine 1 %; 3 mL lidocaine 1  %  Outcome:  Tolerated well, no immediate complications  Procedure discussed: discussed risks, benefits, and alternatives    Consent Given by:  Patient  Timeout: timeout called immediately prior to procedure    Prep: patient was prepped and draped in usual sterile fashion     Ultrasound was used to ensure safe and accurate needle placement and injection. Ultrasound images of the procedure were permanently stored.    Hand / Upper Extremity Injection: R ring A1    Date/Time: 12/14/2023 2:36 PM    Performed by: Rama Jesus MD  Authorized by: Rama Jesus MD    Indications:  Pain, tendon swelling and therapeutic  Needle Size:  25 G  Guidance: ultrasound    Approach:  Volar  Condition: trigger finger    Location:  Ring finger    Site:  R ring A1  Medications:  40 mg triamcinolone 40 MG/ML; 1 mL lidocaine 1 %  Outcome:  Tolerated well, no immediate complications  Procedure discussed: discussed risks, benefits, and alternatives    Consent Given by:  Patient  Timeout: timeout called immediately prior to procedure    Prep: patient was prepped and draped in usual sterile fashion     Ultrasound was used to ensure safe and accurate needle placement and injection. Ultrasound images of the procedure were permanently stored.        Rama Jesus [facility] SPORTS MEDICINE [facility]        Post-Injection Discharge Instructions    You may shower, however avoid swimming, tub baths or hot tubs for 24 hours following your procedure  You may have a mild to moderate increase in pain for a few days following the injection.  The lidocaine (local numbing medicine) will wear off in several hours. It usually takes 3-5 days for the steroid medication to start working although it may take up to 14 days for full effect.   You may use ice packs for 10-15 minutes, 3 to 4 times a day at the injection site for comfort if needed  You may use extra strength Tylenol for pain control if necessary   If you were  fasting, you may resume your normal diet and medications after the procedure  If you have diabetes, your blood sugar may be higher than normal for 10-14 days following a steroid injection. Contact your doctor who manages your diabetes if your blood sugar is significantly higher than usual    If you experience any of the following, call Sports Medicine @ [phone] or [phone]  -Fever over 100 degrees F  -Swelling, bleeding, redness, drainage, warmth at the injection site  -New or significant worsening pain        Dr. Rama Jesus,   [facility]  Sports Medicine       Again, thank you for allowing me to participate in the care of your patient.        Sincerely,        Rama Jesus MD

## 2024-03-21 ENCOUNTER — TELEPHONE (OUTPATIENT)
Dept: FAMILY MEDICINE | Facility: CLINIC | Age: 74
End: 2024-03-21
Payer: COMMERCIAL

## 2024-03-21 NOTE — TELEPHONE ENCOUNTER
Left voicemail for Иван, when he returns call please help him schedule an Annual Wellness Visit with Dr Jenkins.

## 2024-06-05 ENCOUNTER — MYC MEDICAL ADVICE (OUTPATIENT)
Dept: PULMONOLOGY | Facility: CLINIC | Age: 74
End: 2024-06-05
Payer: COMMERCIAL

## 2024-06-27 ENCOUNTER — TELEPHONE (OUTPATIENT)
Dept: FAMILY MEDICINE | Facility: CLINIC | Age: 74
End: 2024-06-27
Payer: COMMERCIAL

## 2024-06-27 NOTE — TELEPHONE ENCOUNTER
Patient Quality Outreach    Patient is due for the following:   Physical Annual Wellness Visit    Next Steps:   Schedule a Annual Wellness Visit    Type of outreach:    Sent Pic[employer] message.      Questions for provider review:    None           Flori Sanchez MA

## 2024-07-17 DIAGNOSIS — E78.89 LIPIDS ABNORMAL: ICD-10-CM

## 2024-07-17 RX ORDER — ATORVASTATIN CALCIUM 20 MG/1
20 TABLET, FILM COATED ORAL DAILY
Qty: 90 TABLET | Refills: 1 | Status: SHIPPED | OUTPATIENT
Start: 2024-07-17 | End: 2024-08-23

## 2024-07-29 DIAGNOSIS — D68.51 FACTOR V LEIDEN MUTATION (H): ICD-10-CM

## 2024-07-29 DIAGNOSIS — I73.9 PVD (PERIPHERAL VASCULAR DISEASE) (H): ICD-10-CM

## 2024-07-29 NOTE — TELEPHONE ENCOUNTER
Medication Question or Refill    Contacts       Contact Date/Time Type Contact Phone/Fax    07/29/2024 11:16 AM CDT Phone (Incoming) Kannan Wolf [facility] (Self) [phone] (H)        Patient is requesting a renewal prescription for the following medication.  Patient has a couple left.  Patient said he will be out before his appointment.    What medication are you calling about (include dose and sig)?:   rivaroxaban ANTICOAGULANT (XARELTO) 20 MG TABS tablet 90 tablet     Preferred Pharmacy[facility]   [id number] [address] [facility]  [address] [facility] [address]  Phone: [phone] Fax: [phone]    Controlled Substance Agreement on file:   CSA -- Patient Level:    CSA: None found at the patient level.       Who prescribed the medication?: Dr. Jenkins    Do you need a refill? Yes    When did you use the medication last?  today    Patient offered an appointment? Yes: Patient has an appointment on 8/23/2024; first available appointment.    Do you have any questions or concerns?  No    Could we send this information to you in [employer] or would you prefer to receive a phone call?:   Patient would prefer a phone call   Okay to leave a detailed message?: Yes at Cell number on file:    Telephone Information:   Mobile [phone]

## 2024-08-18 SDOH — HEALTH STABILITY: PHYSICAL HEALTH: ON AVERAGE, HOW MANY DAYS PER WEEK DO YOU ENGAGE IN MODERATE TO STRENUOUS EXERCISE (LIKE A BRISK WALK)?: 7 DAYS

## 2024-08-18 SDOH — HEALTH STABILITY: PHYSICAL HEALTH: ON AVERAGE, HOW MANY MINUTES DO YOU ENGAGE IN EXERCISE AT THIS LEVEL?: 20 MIN

## 2024-08-18 ASSESSMENT — SOCIAL DETERMINANTS OF HEALTH (SDOH): HOW OFTEN DO YOU GET TOGETHER WITH FRIENDS OR RELATIVES?: THREE TIMES A WEEK

## 2024-08-23 ENCOUNTER — OFFICE VISIT (OUTPATIENT)
Dept: FAMILY MEDICINE | Facility: CLINIC | Age: 74
End: 2024-08-23
Payer: COMMERCIAL

## 2024-08-23 VITALS
HEART RATE: 94 BPM | HEIGHT: 72 IN | OXYGEN SATURATION: 98 % | BODY MASS INDEX: 24.65 KG/M2 | SYSTOLIC BLOOD PRESSURE: 120 MMHG | DIASTOLIC BLOOD PRESSURE: 78 MMHG | TEMPERATURE: 99.6 F | WEIGHT: 182 LBS | RESPIRATION RATE: 10 BRPM

## 2024-08-23 DIAGNOSIS — D68.51 FACTOR V LEIDEN MUTATION (H): ICD-10-CM

## 2024-08-23 DIAGNOSIS — I73.9 PVD (PERIPHERAL VASCULAR DISEASE) (H): ICD-10-CM

## 2024-08-23 DIAGNOSIS — R91.1 LUNG NODULE: ICD-10-CM

## 2024-08-23 DIAGNOSIS — F17.200 TOBACCO DEPENDENCE: ICD-10-CM

## 2024-08-23 DIAGNOSIS — Z00.00 HEALTHCARE MAINTENANCE: ICD-10-CM

## 2024-08-23 DIAGNOSIS — E78.89 LIPIDS ABNORMAL: ICD-10-CM

## 2024-08-23 DIAGNOSIS — Z00.00 HEALTH CARE MAINTENANCE: Primary | ICD-10-CM

## 2024-08-23 DIAGNOSIS — D12.6 ADENOMATOUS POLYP OF COLON, UNSPECIFIED PART OF COLON: ICD-10-CM

## 2024-08-23 DIAGNOSIS — Z79.01 LONG TERM CURRENT USE OF ANTICOAGULANT THERAPY: ICD-10-CM

## 2024-08-23 DIAGNOSIS — J44.9 CHRONIC OBSTRUCTIVE PULMONARY DISEASE, UNSPECIFIED COPD TYPE (H): ICD-10-CM

## 2024-08-23 DIAGNOSIS — N52.9 ERECTILE DYSFUNCTION, UNSPECIFIED ERECTILE DYSFUNCTION TYPE: ICD-10-CM

## 2024-08-23 PROBLEM — I99.8 LOWER LIMB ISCHEMIA: Status: RESOLVED | Noted: 2019-06-20 | Resolved: 2024-08-23

## 2024-08-23 LAB
BASOPHILS # BLD AUTO: 0.1 10E3/UL (ref 0–0.2)
BASOPHILS NFR BLD AUTO: 1 %
CHOLEST SERPL-MCNC: 118 MG/DL
EOSINOPHIL # BLD AUTO: 0 10E3/UL (ref 0–0.7)
EOSINOPHIL NFR BLD AUTO: 1 %
ERYTHROCYTE [DISTWIDTH] IN BLOOD BY AUTOMATED COUNT: 14.1 % (ref 10–15)
FASTING STATUS PATIENT QL REPORTED: YES
HCT VFR BLD AUTO: 43.3 % (ref 40–53)
HDLC SERPL-MCNC: 61 MG/DL
HGB BLD-MCNC: 14.4 G/DL (ref 13.3–17.7)
IMM GRANULOCYTES # BLD: 0 10E3/UL
IMM GRANULOCYTES NFR BLD: 0 %
LDLC SERPL CALC-MCNC: 45 MG/DL
LYMPHOCYTES # BLD AUTO: 1.9 10E3/UL (ref 0.8–5.3)
LYMPHOCYTES NFR BLD AUTO: 25 %
MCH RBC QN AUTO: 33.3 PG (ref 26.5–33)
MCHC RBC AUTO-ENTMCNC: 33.3 G/DL (ref 31.5–36.5)
MCV RBC AUTO: 100 FL (ref 78–100)
MONOCYTES # BLD AUTO: 0.5 10E3/UL (ref 0–1.3)
MONOCYTES NFR BLD AUTO: 7 %
NEUTROPHILS # BLD AUTO: 5.1 10E3/UL (ref 1.6–8.3)
NEUTROPHILS NFR BLD AUTO: 66 %
NONHDLC SERPL-MCNC: 57 MG/DL
PLATELET # BLD AUTO: 214 10E3/UL (ref 150–450)
RBC # BLD AUTO: 4.33 10E6/UL (ref 4.4–5.9)
TRIGL SERPL-MCNC: 59 MG/DL
WBC # BLD AUTO: 7.7 10E3/UL (ref 4–11)

## 2024-08-23 PROCEDURE — 99213 OFFICE O/P EST LOW 20 MIN: CPT | Mod: 25 | Performed by: INTERNAL MEDICINE

## 2024-08-23 PROCEDURE — 80061 LIPID PANEL: CPT | Performed by: INTERNAL MEDICINE

## 2024-08-23 PROCEDURE — 36415 COLL VENOUS BLD VENIPUNCTURE: CPT | Performed by: INTERNAL MEDICINE

## 2024-08-23 PROCEDURE — G0439 PPPS, SUBSEQ VISIT: HCPCS | Performed by: INTERNAL MEDICINE

## 2024-08-23 PROCEDURE — 85025 COMPLETE CBC W/AUTO DIFF WBC: CPT | Mod: QW | Performed by: INTERNAL MEDICINE

## 2024-08-23 RX ORDER — ATORVASTATIN CALCIUM 20 MG/1
20 TABLET, FILM COATED ORAL DAILY
Qty: 90 TABLET | Refills: 3 | Status: SHIPPED | OUTPATIENT
Start: 2024-08-23

## 2024-08-23 NOTE — PROGRESS NOTES
Preventive Care Visit  [facility]  Taqueria Box MD, Internal Medicine  Aug 23, 2024      Assessment & Plan   Problem List Items Addressed This Visit          Respiratory    Lung nodule     Follows with thoracic surgery for surveillance imaging, scheduled to see in October, 2024         Chronic obstructive pulmonary disease, unspecified COPD type (H)       Digestive    Adenomatous polyp of colon     Innumerable polyps identified in colon; underwent total colectomy            Circulatory    PVD (peripheral vascular disease) (H24)     Remote history of prior superior femoral artery stenting  '19 bilateral SFA stent occlusion -underwent IR directed thrombectomy in left leg  - not maintained follow-up through vascular clinic  -Arrange for lower extremity arterial ultrasound and NYDIA with exercise         Relevant Orders    US NYDIA Doppler with Exercise Bilateral    US Lower Extremity Arterial Duplex Bilateral       Hematologic    Factor V Leiden mutation (H24)     History of arterial and venous thromboembolism  Historically maintained on warfarin; subsequently transitioned to Xarelto and tolerating well            Other    Tobacco dependence    Healthcare maintenance     CRC: Prior prophylactic total colectomy due to innumerable polyps  Adult vaccinations discussed and updated accordingly  Chronic tobacco use  [place] in [place]         RESOLVED: Lipids abnormal    Relevant Medications    atorvastatin (LIPITOR) 20 MG tablet    Other Relevant Orders    Lipid panel reflex to direct LDL Non-fasting     Other Visit Diagnoses       Health care maintenance    -  Primary    Relevant Orders    REVIEW OF HEALTH MAINTENANCE PROTOCOL ORDERS (Completed)    Erectile dysfunction, unspecified erectile dysfunction type        Long term current use of anticoagulant therapy        Relevant Orders    CBC with Platelets & Differential                Nicotine/Tobacco Cessation  He reports that he  "has been smoking cigarettes. He has a 50 pack-year smoking history. He has never been exposed to tobacco smoke. He has never used smokeless tobacco.  Nicotine/Tobacco Cessation Plan  Information offered: Patient not interested at this time      BMI  Estimated body mass index is 25.03 kg/m  as calculated from the following:    Height as of this encounter: 1.816 m (5' 11.5\").    Weight as of this encounter: 82.6 kg (182 lb).       Counseling  Appropriate preventive services were addressed with this patient via screening, questionnaire, or discussion as appropriate for fall prevention, nutrition, physical activity, Tobacco-use cessation, social engagement, weight loss and cognition.  Checklist reviewing preventive services available has been given to the patient.  Reviewed patient's diet, addressing concerns and/or questions.   He is at risk for psychosocial distress and has been provided with information to reduce risk.   Discussed possible causes of fatigue. The patient reports drinking more than 3 alcoholic drinks per day and/or more than 7 drhnks per week. The patient was counseled and given information about possible harmful effects of excessive alcohol intake.The patient was provided with written information regarding signs of hearing loss.   Information on urinary incontinence and treatment options given to patient.     FUTURE APPOINTMENTS:       - Follow-up visit in 12 months      Ijeoma Brunner is a 74 year old, presenting for the following: Returns for Medicare follow-up and general follow-up overall.  Feels generally okay.  Had had a fall earlier in the summer that had led to some residual right sided leg weakness has had a series of falls since that time.  Describes some baseline claudication symptoms, generally feels like he is able to walk through it without any significant limitations.  Remains on Xarelto without any significant bleeding complications.  Follows with thoracic surgery for " surveillance of pulmonary nodule.  Not seeing any vascular clinic  Medicare Visit        8/23/2024    12:51 PM   Additional Questions   Roomed by Flori PEPE         8/23/2024   Forms   Any forms needing to be completed Yes          Health Care Directive  Patient does not have a Health Care Directive or Living Will: Discussed advance care planning with patient; however, patient declined at this time.    HPI        8/18/2024   General Health   How would you rate your overall physical health? Good   Feel stress (tense, anxious, or unable to sleep) Only a little      (!) STRESS CONCERN      8/18/2024   Nutrition   Diet: Regular (no restrictions)    Low fat/cholesterol       Multiple values from one day are sorted in reverse-chronological order         8/18/2024   Exercise   Days per week of moderate/strenous exercise 7 days   Average minutes spent exercising at this level 20 min            8/18/2024   Social Factors   Frequency of gathering with friends or relatives Three times a week   Worry food won't last until get money to buy more No   Food not last or not have enough money for food? No   Do you have housing? (Housing is defined as stable permanent housing and does not include staying ouside in a car, in a tent, in an abandoned building, in an overnight shelter, or couch-surfing.) Yes   Are you worried about losing your housing? No   Lack of transportation? No   Unable to get utilities (heat,electricity)? No            8/23/2024   Fall Risk   Gait Speed Test Interpretation Less than or equal to 5.00 seconds - PASS              8/18/2024   Activities of Daily Living- Home Safety   Needs help with the following daily activites None of the above   Safety concerns in the home None of the above            8/18/2024   Dental   Dentist two times every year? Yes            8/18/2024   Hearing Screening   Hearing concerns? (!) IT'S HARD TO FOLLOW A CONVERSATION IN A NOISY RESTAURANT OR CROWDED ROOM.    (!) TROUBLE  FOLLOWING DIALOGUE IN THE THEATHER.       Multiple values from one day are sorted in reverse-chronological order         8/18/2024   Driving Risk Screening   Patient/family members have concerns about driving No            8/18/2024   General Alertness/Fatigue Screening   Have you been more tired than usual lately? (!) YES            8/18/2024   Urinary Incontinence Screening   Bothered by leaking urine in past 6 months Yes            8/18/2024   TB Screening   Were you born outside of the [place]? No            Today's PHQ-2 Score:       8/23/2024    12:46 PM   PHQ-2 ( 1999 [employer])   Q1: Little interest or pleasure in doing things 0   Q2: Feeling down, depressed or hopeless 0   PHQ-2 Score 0   Q1: Little interest or pleasure in doing things Not at all   Q2: Feeling down, depressed or hopeless Not at all   PHQ-2 Score 0           8/18/2024   Substance Use   Alcohol more than 3/day or more than 7/wk Yes   How often do you have a drink containing alcohol 4 or more times a week   How many alcohol drinks on typical day 3 or 4   How often do you have 5+ drinks at one occasion Never   Audit 2/3 Score 1   How often not able to stop drinking once started Never   How often failed to do what normally expected Never   How often needed first drink in am after a heavy drinking session Never   How often feeling of guilt or remorse after drinking Never   How often unable to remember what happened the night before Never   Have you or someone else been injured because of your drinking No   Has anyone been concerned or suggested you cut down on drinking No   TOTAL SCORE - AUDIT 5   Do you have a current opioid prescription? No   How severe/bad is pain from 1 to 10? 5/10   Do you use any other substances recreationally? (!) CANNABIS PRODUCTS        Social History     Tobacco Use    Smoking status: Every Day     Current packs/day: 1.00     Average packs/day: 1 pack/day for 50.0 years (50.0 ttl pk-yrs)     Types: Cigarettes     Passive  exposure: Never    Smokeless tobacco: Never   Vaping Use    Vaping status: Never Used   Substance Use Topics    Alcohol use: Yes     Comment: 3-4 light beers per day    Drug use: Not Currently     Types: Marijuana       ASCVD Risk   The 10-year ASCVD risk score (Marvin SADLER, et al., 2019) is: 19.4%    Values used to calculate the score:      Age: 74 years      Sex: Male      Is Non- : No      Diabetic: No      Tobacco smoker: Yes      Systolic Blood Pressure: 120 mmHg      Is BP treated: No      HDL Cholesterol: 49 mg/dL      Total Cholesterol: 134 mg/dL          Reviewed and updated as needed this visit by Provider                      Current providers sharing in care for this patient include:  Patient Care Team:  Taqueria Box MD as PCP - General (Internal Medicine)  Agapito Watson MD as MD (Colon and Rectal Surgery)  Rama Jesus DO as Assigned Musculoskeletal Provider  Taqueria Box MD as Assigned PCP    The following health maintenance items are reviewed in Epic and correct as of today:  Health Maintenance   Topic Date Due    COPD ACTION PLAN  Never done    RSV VACCINE (Pregnancy & 60+) (1 - 1-dose 60+ series) Never done    ZOSTER IMMUNIZATION (3 of 3) 11/04/2019    COLORECTAL CANCER SCREENING  07/18/2022    MEDICARE ANNUAL WELLNESS VISIT  05/31/2023    LIPID  05/31/2023    COVID-19 Vaccine (5 - 2023-24 season) 09/01/2023    NICOTINE/TOBACCO CESSATION COUNSELING Q 1 YR  06/23/2024    ANNUAL REVIEW OF HM ORDERS  06/23/2024    INFLUENZA VACCINE (1) 09/01/2024    LUNG CANCER SCREENING  10/26/2024    GLUCOSE  05/31/2025    FALL RISK ASSESSMENT  08/23/2025    ADVANCE CARE PLANNING  05/31/2027    DTAP/TDAP/TD IMMUNIZATION (8 - Td or Tdap) 07/31/2030    SPIROMETRY  Completed    HEPATITIS C SCREENING  Completed    PHQ-2 (once per calendar year)  Completed    Pneumococcal Vaccine: 65+ Years  Completed    AORTIC ANEURYSM SCREENING (SYSTEM ASSIGNED)  Completed    HPV  "IMMUNIZATION  Aged Out    MENINGITIS IMMUNIZATION  Aged Out    RSV MONOCLONAL ANTIBODY  Aged Out         Review of Systems  Constitutional, HEENT, cardiovascular, pulmonary, gi and gu systems are negative, except as otherwise noted.     Objective    Exam  /78   Pulse 94   Temp 99.6  F (37.6  C)   Resp 10   Ht 1.816 m (5' 11.5\")   Wt 82.6 kg (182 lb)   SpO2 98%   BMI 25.03 kg/m     Estimated body mass index is 25.03 kg/m  as calculated from the following:    Height as of this encounter: 1.816 m (5' 11.5\").    Weight as of this encounter: 82.6 kg (182 lb).    Physical Exam  GENERAL: alert and no distress  NECK: no adenopathy, no asymmetry, masses, or scars  RESP: lungs clear to auscultation - no rales, rhonchi or wheezes  CV: regular rate and rhythm, normal S1 S2, no S3 or S4, no murmur, click or rub, no peripheral edema  ABDOMEN: soft, nontender, no hepatosplenomegaly, no masses and bowel sounds normal  No femoral or iliac bruits auscultated  MS: no gross musculoskeletal defects noted, no edema         8/23/2024   Mini Cog   Clock Draw Score 2 Normal   3 Item Recall 3 objects recalled   Mini Cog Total Score 5                 Signed Electronically by: Taqueria Box MD    "

## 2024-08-23 NOTE — ASSESSMENT & PLAN NOTE
Remote history of prior superior femoral artery stenting  '19 bilateral SFA stent occlusion -underwent IR directed thrombectomy in left leg  - not maintained follow-up through vascular clinic  -Arrange for lower extremity arterial ultrasound and NYDIA with exercise

## 2024-08-23 NOTE — ASSESSMENT & PLAN NOTE
History of arterial and venous thromboembolism  Historically maintained on warfarin; subsequently transitioned to Xarelto and tolerating well

## 2024-09-30 NOTE — TELEPHONE ENCOUNTER
Patient sent a my chart message with today's INR result 4/21/23.  Sent a new my chart message regarding today's result to avoid any confusion.      Closing this encounter.    DEION MendozaN, RN  Anticoagulation Clinic    
decreased balance during turns/decreased safety awareness/losing balance

## 2024-10-17 ENCOUNTER — HOSPITAL ENCOUNTER (OUTPATIENT)
Dept: CT IMAGING | Facility: HOSPITAL | Age: 74
Discharge: HOME | End: 2024-10-17
Attending: CLINICAL NURSE SPECIALIST | Admitting: CLINICAL NURSE SPECIALIST
Payer: MEDICARE

## 2024-10-17 ENCOUNTER — OFFICE VISIT (OUTPATIENT)
Dept: PULMONOLOGY | Facility: CLINIC | Age: 74
End: 2024-10-17
Payer: COMMERCIAL

## 2024-10-17 VITALS
DIASTOLIC BLOOD PRESSURE: 60 MMHG | BODY MASS INDEX: 25.17 KG/M2 | OXYGEN SATURATION: 96 % | HEART RATE: 90 BPM | WEIGHT: 183 LBS | SYSTOLIC BLOOD PRESSURE: 122 MMHG

## 2024-10-17 DIAGNOSIS — R91.1 LUNG NODULE: ICD-10-CM

## 2024-10-17 DIAGNOSIS — Z87.891 PERSONAL HISTORY OF TOBACCO USE: Primary | ICD-10-CM

## 2024-10-17 PROCEDURE — G0296 VISIT TO DETERM LDCT ELIG: HCPCS | Performed by: CLINICAL NURSE SPECIALIST

## 2024-10-17 PROCEDURE — 71250 CT THORAX DX C-: CPT | Mod: ME

## 2024-10-17 PROCEDURE — 99214 OFFICE O/P EST MOD 30 MIN: CPT | Performed by: CLINICAL NURSE SPECIALIST

## 2024-10-17 NOTE — PATIENT INSTRUCTIONS
Lung Cancer Screening   Frequently Asked Questions  If you are at high-risk for lung cancer, getting screened with low-dose computed tomography (LDCT) every year can help save your life. This handout offers answers to some of the most common questions about lung cancer screening. If you have other questions, please call [phone] ([phone]).     What is it?  Lung cancer screening uses special X-ray technology to create an image of your lung tissue. The exam is quick and easy and takes less than 10 seconds. We don t give you any medicine or use any needles. You can eat before and after the exam. You don t need to change your clothes as long as the clothing on your chest doesn t contain metal. But, you do need to be able to hold your breath for at least 6 seconds during the exam.    What is the goal of lung cancer screening?  The goal of lung cancer screening is to save lives. Many times, lung cancer is not found until a person starts having physical symptoms. Lung cancer screening can help detect lung cancer in the earliest stages when it may be easier to treat.    Who should be screened for lung cancer?  We suggest lung cancer screening for anyone who is at high-risk for lung cancer. You are in the high-risk group if you:      are between the ages of 55 and 79, and    have smoked at least 1 pack of cigarettes a day for 20 or more years, and    still smoke or have quit within the past 15 years.    However, if you have a new cough or shortness of breath, you should talk to your doctor before being screened.    Why does it matter if I have symptoms?  Certain symptoms can be a sign that you have a condition in your lungs that should be checked and treated by your doctor. These symptoms include fever, chest pain, a new or changing cough, shortness of breath that you have never felt before, coughing up blood or unexplained weight loss. Having any of these symptoms can greatly affect the results of lung  cancer screening.       Should all smokers get an LDCT lung cancer screening exam?  It depends. Lung cancer screening is for a very specific group of men and women who have a history of heavy smoking over a long period of time (see  Who should be screened for lung cancer  above).  I am in the high-risk group, but have been diagnosed with cancer in the past. Is LDCT lung cancer screening right for me?  In some cases, you should not have LDCT lung screening, such as when your doctor is already following your cancer with CT scan studies. Your doctor will help you decide if LDCT lung screening is right for you.  Do I need to have a screening exam every year?  Yes. If you are in the high-risk group described earlier, you should get an LDCT lung cancer screening exam every year until you are 79, or are no longer willing or able to undergo screening and possible procedures to diagnose and treat lung cancer.  How effective is LDCT at preventing death from lung cancer?  Studies have shown that LDCT lung cancer screening can lower the risk of death from lung cancer by 20 percent in people who are at high-risk.  What are the risks?  There are some risks and limitations of LDCT lung cancer screening. We want to make sure you understand the risks and benefits, so please let us know if you have any questions. Your doctor may want to talk with you more about these risks.    Radiation exposure: As with any exam that uses radiation, there is a very small increased risk of cancer. The amount of radiation in LDCT is small--about the same amount a person would get from a mammogram. Your doctor orders the exam when he or she feels the potential benefits outweigh the risks.    False negatives: No test is perfect, including LDCT. It is possible that you may have a medical condition, including lung cancer, that is not found during your exam. This is called a false negative result.    False positives and more testing: LDCT very often finds  something in the lung that could be cancer, but in fact is not. This is called a false positive result. False positive tests often cause anxiety. To make sure these findings are not cancer, you may need to have more tests. These tests will be done only if you give us permission. Sometimes patients need a treatment that can have side effects, such as a biopsy. For more information on false positives, see  What can I expect from the results?     Findings not related to lung cancer: Your LDCT exam also takes pictures of areas of your body next to your lungs. In a very small number of cases, the CT scan will show an abnormal finding in one of these areas, such as your kidneys, adrenal glands, liver or thyroid. This finding may not be serious, but you may need more tests. Your doctor can help you decide what other tests you may need, if any.  What can I expect from the results?  About 1 out of 4 LDCT exams will find something that may need more tests. Most of the time, these findings are lung nodules. Lung nodules are very small collections of tissue in the lung. These nodules are very common, and the vast majority--more than 97 percent--are not cancer (benign). Most are normal lymph nodes or small areas of scarring from past infections.  But, if a small lung nodule is found to be cancer, the cancer can be cured more than 90 percent of the time. To know if the nodule is cancer, we may need to get more images before your next yearly screening exam. If the nodule has suspicious features (for example, it is large, has an odd shape or grows over time), we will refer you to a specialist for further testing.  Will my doctor also get the results?  Yes. Your doctor will get a copy of your results.  Is it okay to keep smoking now that there s a cancer screening exam?  No. Tobacco is one of the strongest cancer-causing agents. It causes not only lung cancer, but other cancers and cardiovascular (heart) diseases as well. The damage  caused by smoking builds over time. This means that the longer you smoke, the higher your risk of disease. While it is never too late to quit, the sooner you quit, the better.  Where can I find help to quit smoking?  The best way to prevent lung cancer is to stop smoking. If you have already quit smoking, congratulations and keep it up! For help on quitting smoking, please call [employer] at 8-279-QUITNOW (1-768.587.1768) or the [employer] at 1-199.534.9483 to find local resources near you.  One-on-one health coaching:  If you d prefer to work individually with a health care provider on tobacco cessation, we offer:      Medication Therapy Management:  Our specially trained pharmacists work closely with you and your doctor to help you quit smoking.  Call [phone] or [phone] (toll free).

## 2024-10-17 NOTE — PROGRESS NOTES
THORACIC SURGERY FOLLOW UP VISIT      I saw Mr. Kannan Wolf in follow-up today. The clinical summary follows:     CHIEF COMPLAINT   Lung nodules  INTERVAL STUDIES  CT chest 10/17/2024:  1.  Stable pulmonary nodules.     2.  Several apical and peripheral nodular densities are suggestive of incidental scarring.  Past Medical History:   Diagnosis Date    ACL tear     Arthritis     osteoarthritis    Blood clot in the legs     +PE 2009  also on vein from intestine    Blood clotting disorder (H) 2009    Chronic diarrhea     result of total colectomy    Factor V Leiden (H)     Head injury 1960    multiple    Hearing problem 2000    IBS (irritable bowel syndrome)     Liver disease 2013    Fatty liver    Migraines 1989    none for a few years    Personal history of colonic polyps 2009    Polyps, colonic     Pulmonary emboli (H) 2009    Skin disease 1970    eczema    Sleep apnea       Past Surgical History:   Procedure Laterality Date    ARTHROSCOPY SHOULDER ROTATOR CUFF REPAIR, BICEP TENODESIS REPAIR Right 10/08/2020    Procedure: right shoulder arthroscopic rotator cuff repair, arthroscopic subacromial decompression, biceps tenotomy;  Surgeon: Ryan Good MD;  [place] [facility] [place] KNEE ARTHROPLASTY Left 06/2019    @ [facility]    BIOPSY  2022    [facility] cell on back    COLECTOMY TOTAL      numerous polyposis (negative genetic testing for FAP)    COLONOSCOPY      EYE SURGERY      IR LOWER EXTREMITY ANGIOGRAM LEFT  06/18/2019    IR LOWER EXTREMITY ANGIOGRAM LEFT  06/17/2019    IR LOWER EXTREMITY ANGIOGRAM LEFT  06/16/2019    LAPAROSCOPIC ASSISTED COLECTOMY  05/09/2012    Procedure:LAPAROSCOPIC ASSISTED COLECTOMY; Hand Assisted Laparoscopic Total Abdominal Colectomy Ileorectal anastomosis. ; Surgeon:COLLINS JAVIER; Location: OR    SIGMOIDOSCOPY FLEXIBLE  05/21/2012    Procedure:SIGMOIDOSCOPY FLEXIBLE; Surgeon:EMMANUEL LEDBETTER; Location: GI    THROMBECTOMY LOWER EXTREMITY Left 06/2019    @ [facility]  following knee arthroplasty @ [facility]    VASCULAR SURGERY      Has multiple femoral arterial stents    ZZC REPAIR CRUCIATE LIGAMENT,KNEE       bilateral knees      Social History     Socioeconomic History    Marital status:      Spouse name: Not on file    Number of children: Not on file    Years of education: Not on file    Highest education level: Not on file   Occupational History    Not on file   Tobacco Use    Smoking status: Every Day     Current packs/day: 1.00     Average packs/day: 1 pack/day for 50.0 years (50.0 ttl pk-yrs)     Types: Cigarettes     Passive exposure: Never    Smokeless tobacco: Never   Vaping Use    Vaping status: Never Used   Substance and Sexual Activity    Alcohol use: Yes     Comment: 3-4 light beers per day    Drug use: Not Currently     Types: Marijuana    Sexual activity: Not Currently     Partners: Female     Birth control/protection: Condom, Male Surgical   Other Topics Concern    Parent/sibling w/ CABG, MI or angioplasty before 65F 55M? No   Social History Narrative    The patient has a 50 pk yr tobacco hx.  He has ongoing active use.  Alcohol use is 21 alcoholic drinks per week.  He has marijuana use.         He previously worked as .          The patient is .  Has 3 children.     Social Determinants of Health     Financial Resource Strain: Low Risk  (8/18/2024)    Financial Resource Strain     Within the past 12 months, have you or your family members you live with been unable to get utilities (heat, electricity) when it was really needed?: No   Food Insecurity: Low Risk  (8/18/2024)    Food Insecurity     Within the past 12 months, did you worry that your food would run out before you got money to buy more?: No     Within the past 12 months, did the food you bought just not last and you didn t have money to get more?: No   Transportation Needs: Low Risk  (8/18/2024)    Transportation Needs     Within the past 12 months, has lack of transportation kept  you from medical appointments, getting your medicines, non-medical meetings or appointments, work, or from getting things that you need?: No   Physical Activity: Insufficiently Active (8/18/2024)    Exercise Vital Sign     Days of Exercise per Week: 7 days     Minutes of Exercise per Session: 20 min   Stress: No Stress Concern Present (8/18/2024)    [employer] - Occupational Stress Questionnaire     Feeling of Stress : Only a little   Social Connections: Unknown (8/18/2024)    Social Connection and Isolation Panel [NHANES]     Frequency of Communication with Friends and Family: Not on file     Frequency of Social Gatherings with Friends and Family: Three times a week     Attends Mandaen Services: Not on file     Active Member of Clubs or Organizations: Not on file     Attends Club or Organization Meetings: Not on file     Marital Status: Not on file   Interpersonal Safety: Low Risk  (8/23/2024)    Interpersonal Safety     Do you feel physically and emotionally safe where you currently live?: Yes     Within the past 12 months, have you been hit, slapped, kicked or otherwise physically hurt by someone?: No     Within the past 12 months, have you been humiliated or emotionally abused in other ways by your partner or ex-partner?: No   Housing Stability: Low Risk  (8/18/2024)    Housing Stability     Do you have housing? : Yes     Are you worried about losing your housing?: No      SUBJECTIVE  Иван is doing well. He denies cough or shortness of breath. He is still smoking and has no interest in quitting.    OBJECTIVE  /60   Pulse 90   Wt 83 kg (183 lb)   SpO2 96%   BMI 25.17 kg/m       From a personal perspective, he has been helping take care of his mother. She fell on Mother's Day and broke her hip.     IMPRESSION   Kannan is a 74 year-old male with lung nodules. He is here today with a new chest CT for lung nodule follow up.    PLAN  I spent 25 min on the date of the  encounter in chart review, patient visit, review of tests, documentation and/or discussion with other providers about the issues documented above. I reviewed the plan as follows:  Follow up with me in 1 year with Lung Cancer Screening chest CT  All questions were answered and the patient and present family were in agreement with the plan.  I appreciate the opportunity to participate in the care of your patient and will keep you updated.  Sincerely,  Lung Cancer Screening Shared Decision Making Visit     Kannan Wolf, a 74 year old male, is eligible for lung cancer screening    History   Smoking Status    Every Day    Types: Cigarettes   Smokeless Tobacco    Never       I have discussed with patient the risks and benefits of screening for lung cancer with low-dose CT.     The risks include:    radiation exposure: one low dose chest CT has as much ionizing radiation as about 15 chest x-rays, or 6 months of background radiation living in [place]      false positives: most findings/nodules are NOT cancer, but some might still require additional diagnostic evaluation, including biopsy    over-diagnosis: some slow growing cancers that might never have been clinically significant will be detected and treated unnecessarily     The benefit of early detection of lung cancer is contingent upon adherence to annual screening or more frequent follow up if indicated.     Furthermore, to benefit from screening, Kannan must be willing and able to undergo diagnostic procedures, if indicated. Although no specific guide is available for determining severity of comorbidities, it is reasonable to withhold screening in patients who have greater mortality risk from other diseases.     We did discuss that the best way to prevent lung cancer is to not smoke.    Some patients may value a numeric estimation of lung cancer risk when evaluating if lung cancer screening is right for them, here is one calculator:    ShouldIScreen

## 2024-11-26 ENCOUNTER — HOSPITAL ENCOUNTER (OUTPATIENT)
Dept: ULTRASOUND IMAGING | Facility: CLINIC | Age: 74
Discharge: HOME | End: 2024-11-26
Attending: INTERNAL MEDICINE
Payer: MEDICARE

## 2024-11-26 DIAGNOSIS — I73.9 PVD (PERIPHERAL VASCULAR DISEASE) (H): ICD-10-CM

## 2024-11-26 PROCEDURE — 93924 LWR XTR VASC STDY BILAT: CPT

## 2024-12-02 NOTE — NURSING NOTE
Problem: Respiratory - Adult  Goal: Achieves optimal ventilation and oxygenation  Outcome: Progressing  Flowsheets (Taken 12/1/2024 0837 by Ban Ellsworth RN)  Achieves optimal ventilation and oxygenation: Position to facilitate oxygenation and minimize respiratory effort     Problem: Respiratory - Adult  Goal: Clear lung sounds  12/1/2024 2112 by Tad Schwarz RCP  Outcome: Progressing      Anticoagulation Therapy Entered On:  12/16/2019 2:53 PM CST    Performed On:  12/16/2019 2:50 PM CST by Samira Adler RN               Warfarin Management   Week 1 Baldemar Dose :   10    Week 1 Monday Dose :   12.5    Week 1 Tuesday Dose :   10    Week 1 Wednesday Dose :   10    Week 1 Thursday Dose :   12.5    Week 1 Friday Dose :   10    Week 1 Saturday Dose :   10    Week 1 Dose Total :   75    Planned Duration :   Indefinite   Indication :   DVT, Other: FVLeiden   International Normalization Ratio :   2.1    INR Range :   2.5 - 3.5   INR Therapeutic Range :   No   Anticoagulation Recheck :   1 week   Warfarin Physician :   David Jenkins MD Special Instructions :   INR = 2.1 per MDINR on 12/14/19 Patient is to increase warfarin to 12.5mg warfarin on Tues and thurs and 10 mg the rest of the days of the week. recheck INR in 1 week.   Samira Adler RN - 12/16/2019 2:50 PM CST

## 2025-04-23 ENCOUNTER — LAB (OUTPATIENT)
Dept: LAB | Facility: CLINIC | Age: 75
End: 2025-04-23
Payer: COMMERCIAL

## 2025-04-23 ENCOUNTER — OFFICE VISIT (OUTPATIENT)
Dept: NEUROLOGY | Facility: CLINIC | Age: 75
End: 2025-04-23
Attending: PHYSICIAN ASSISTANT
Payer: COMMERCIAL

## 2025-04-23 DIAGNOSIS — G57.31 NEUROPATHY OF RIGHT PERONEAL NERVE: ICD-10-CM

## 2025-04-23 DIAGNOSIS — M63.861: ICD-10-CM

## 2025-04-23 DIAGNOSIS — M54.50 ACUTE LEFT-SIDED LOW BACK PAIN WITHOUT SCIATICA: ICD-10-CM

## 2025-04-23 DIAGNOSIS — M21.371 RIGHT FOOT DROP: ICD-10-CM

## 2025-04-23 DIAGNOSIS — M21.371 RIGHT FOOT DROP: Primary | ICD-10-CM

## 2025-04-23 LAB
ERYTHROCYTE [DISTWIDTH] IN BLOOD BY AUTOMATED COUNT: 13.2 % (ref 10–15)
ERYTHROCYTE [SEDIMENTATION RATE] IN BLOOD BY WESTERGREN METHOD: 3 MM/HR (ref 0–20)
HCT VFR BLD AUTO: 40.8 % (ref 40–53)
HGB BLD-MCNC: 14.3 G/DL (ref 13.3–17.7)
MCH RBC QN AUTO: 34.6 PG (ref 26.5–33)
MCHC RBC AUTO-ENTMCNC: 35 G/DL (ref 31.5–36.5)
MCV RBC AUTO: 99 FL (ref 78–100)
PLATELET # BLD AUTO: 191 10E3/UL (ref 150–450)
RBC # BLD AUTO: 4.13 10E6/UL (ref 4.4–5.9)
WBC # BLD AUTO: 7.2 10E3/UL (ref 4–11)

## 2025-04-23 PROCEDURE — 36415 COLL VENOUS BLD VENIPUNCTURE: CPT

## 2025-04-23 PROCEDURE — 84443 ASSAY THYROID STIM HORMONE: CPT | Performed by: PHYSICIAN ASSISTANT

## 2025-04-23 PROCEDURE — 85027 COMPLETE CBC AUTOMATED: CPT

## 2025-04-23 PROCEDURE — 82607 VITAMIN B-12: CPT | Performed by: PHYSICIAN ASSISTANT

## 2025-04-23 PROCEDURE — 80053 COMPREHEN METABOLIC PANEL: CPT | Performed by: PHYSICIAN ASSISTANT

## 2025-04-23 PROCEDURE — 86140 C-REACTIVE PROTEIN: CPT | Performed by: PHYSICIAN ASSISTANT

## 2025-04-23 PROCEDURE — 85652 RBC SED RATE AUTOMATED: CPT

## 2025-04-23 NOTE — PROGRESS NOTES
I called him with his EMG results.  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  I will get some blood work on him.  He has a follow-up appointment scheduled with Dr. Bxo on the 30th.  I will also place a neurology referral for him.    KAH

## 2025-04-23 NOTE — LETTER
4/23/2025      Kannan Wolf  [address]  [facility] [address]      Dear Colleague,    Thank you for referring your patient, Kannan Wolf, to the [facility]. Please see a copy of my visit note below.    See Procedure Note      Again, thank you for allowing me to participate in the care of your patient.        Sincerely,        Breann Marshall MD    Electronically signed

## 2025-04-23 NOTE — PROCEDURES
[facility]  Electrodiagnostic Laboratory                 Department of Neurology                                                                                                         Test Date:  2025    Patient: Иван Wolf : 1950 Physician: Breann Marshall MD   Sex: Male AGE: 75 year Ref Phys: SETH Burkett   ID#: [id number]   Technician: Kristy Behling     History and Examination:  Иван Wolf is a 75-year-old gentleman who is seen for EMG to assess a right foot drop.  The patient reports that he recently noticed this while he was spending the winter down in [facility].  No injury triggered the symptoms as far as he is aware.  He denies any back pain.    Techniques:  Motor conduction studies were done with surface recording electrodes. Sensory conduction studies were performed with surface electrodes, unless indicated otherwise by (n), designating the use of subdermal recording electrodes. Temperature was monitored and recorded throughout the study. Upper extremities were maintained at a temperature of 32 degrees Centigrade or higher and Lower extremities were maintained at a temperature of 31 degrees Centigrade or higher.  EMG was done with a concentric needle electrode.     Results  Motor nerve studies:  Left fibular motor recording over EDB reveals normal distal latency, low amplitude and slowed conduction velocity  Right fibular motor elicits no response  Fibular motor study recording over tibialis anterior elicits a small amplitude response on the right side compared to the left.  There is a prolonged distal latency with stimulation of the popliteal fossa on the right compared to the fibular head  Left tibial motor reveals normal distal latency, low amplitude  Right tibial motor reveals normal distal latency, low amplitude and borderline conduction velocity  Right median motor study reveals prolonged distal latency and low amplitude with normal conduction  velocity  Right ulnar motor reveals normal distal latency, normal amplitude with a slowed conduction velocity across the elbow    Sensory nerve studies:  No response with sural nerve stimulation bilaterally  Antidromic median sensory elicits no response  Right radial response is within normal limits  Right ulnar antidromic responses low amplitude with normal conduction velocity    Needle examination:  Right lower extremity needle examination reveals increased insertional activity in the tibialis anterior and fibularis longus.  Fibrillation potentials are present in both of these muscles.  With activation no significant units could be examined in the fibularis longus.  In the tibialis anterior the patient had severe reduction in firing with only 2 different motor units seen.  Both motor units were large in amplitude and duration with significant polyphasic appearance.  In the remainder of the needle examination chronic neurogenic changes were seen in the right medial gastrocnemius and right tibialis posterior but to a much lesser degree than tibialis anterior.    Interpretation:  This is an abnormal EMG.  The findings are consistent with the followin.  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  An overlying right sided fibular (peroneal) neuropathy that was localized between the popliteal fossa and fibular head.  Presence of active denervation suggests this is an axonal injury and not a demyelinating injury      ___________________________  Breann Marshall MD        Nerve Conduction Studies  Motor Sites      Latency Neg. Amp Neg. Amp Diff Segment Distance Velocity Neg. Dur Neg Area Diff Temperature Comment   Site (ms) Norm (mV) Norm (%)  cm m/s Norm (ms) (%) ( C)    Left Dp Branch Fibular (TA) Motor   Fibular Head 5.0  < 6.0 2.5 -      8.6  32.6    Pop Fossa 6.9  < 5.7 2.5 - 0 Pop Fossa-Fibular Head 10 53 - 8.8 1 32.6    Right Dp  Branch Fibular (TA) Motor   Fibular Head 4.8  < 6.0 0.58 -      10.0  32.1    Pop Fossa 7.4  < 5.7 0.69 - 19 Pop Fossa-Fibular Head 12 46 - 11.4 54 32.2    Left Fibular (EDB) Motor   Ankle 5.3  < 6.0 0.75 -  Ankle-EDB 8   9.5  32.6    Bel Fibular Head 17.3 - 0.45 - -40 Bel Fibular Head-Ankle 38 32  > 38 12.9 -29 32.6    Pop Fossa 20.7 - 0.45 - 0 Pop Fossa-Bel Fibular Head 10 29  > 38 10.4 -3 32.6    Right Fibular (EDB) Motor   Ankle NR  < 6.0 NR -  Ankle-EDB 8   -  31.1    Right Median (APB) Motor   Wrist 6.7  < 4.4 4.8  > 5.0  Wrist-APB 8   5.8  33.4    Elbow 12.0 - 4.4  > 5.0 -8 Elbow-Wrist 26 49  > 48 6.5 -5 33.4    Left Tibial (AHB) Motor   Ankle 5.1  < 6.5 1.90  > 5.0  Ankle-AH 8   8.4  32.5    Right Tibial (AHB) Motor   Ankle 6.0  < 6.5 2.2  > 5.0  Ankle-AH 8   7.8  31.5    Knee 17.5 - 1.10 - -50 Knee-Ankle 44 38  > 38 9.3 -36 31.7    Right Ulnar (ADM) Motor   Wrist 3.4  < 3.5 7.2  > 5.0  Wrist-ADM 8   5.3  33.1    Below Elbow 7.6 - 6.3 - -13 Below Elbow-Wrist 23 55  > 48 5.7 -2 33.1    Above Elbow 9.7 - 5.6 - -11 Above Elbow-Below Elbow 9 43  > 48 5.6 -9 33      Sensory Sites      Onset Lat Peak Lat Amp (O-P) Amp (P-P) Segment Distance Velocity Temperature Comment   Site ms (ms)  V Norm ( V)  cm m/s Norm ( C)    Right Median Sensory   Wrist-Dig II NR NR NR  > 10 NR Wrist-Dig II 14 NR  > 48 33.3    Right Radial Sensory   Forearm-Wrist 2.3 2.8 18  > 15 16 Forearm-Wrist 12.5 54 - 33.4    Left Sural Sensory   Calf-Lat Malleolus NR NR NR  > 5 NR Calf-Lat Malleolus 14 NR  > 38 32.5    Right Sural Sensory   Calf-Lat Malleolus NR NR NR  > 5 NR Calf-Lat Malleolus 14 NR  > 38 30.6    Right Ulnar Sensory   Wrist-Dig V 2.5 3.1 4  > 8 42 Wrist-Dig V 12.5 50  > 48 33.3        Electromyography     Side Muscle Ins Act Fibs/PSW Fasc HF Amp Dur Poly Recrt Int Pat   Right Tib ant Incr 2+ Nml 0 1+ 1+ 3+ SevRed Nml   Right Gastroc, M Nml None Nml 0 1+ Nml 0 Nml Nml   Right Fib longus Incr 1+ Nml 0        Right Vastus lat Nml None  Nml 0 Nml Nml 0 Nml Nml   Right Tens fasc lat Nml None Nml 0 Nml Nml 0 Nml Nml   Right Tib post Nml None Nml 0 1+ Nml 0 Nml Nml         Waveforms / Images:       Motor                           Sensory

## 2025-04-24 LAB
ALBUMIN SERPL BCG-MCNC: 4 G/DL (ref 3.5–5.2)
ALP SERPL-CCNC: 66 U/L (ref 40–150)
ALT SERPL W P-5'-P-CCNC: 47 U/L (ref 0–70)
ANION GAP SERPL CALCULATED.3IONS-SCNC: 9 MMOL/L (ref 7–15)
AST SERPL W P-5'-P-CCNC: 46 U/L (ref 0–45)
BILIRUB SERPL-MCNC: 0.3 MG/DL
BUN SERPL-MCNC: 15.8 MG/DL (ref 8–23)
CALCIUM SERPL-MCNC: 9.3 MG/DL (ref 8.8–10.4)
CHLORIDE SERPL-SCNC: 102 MMOL/L (ref 98–107)
CREAT SERPL-MCNC: 1.01 MG/DL (ref 0.67–1.17)
CRP SERPL-MCNC: <3 MG/L
EGFRCR SERPLBLD CKD-EPI 2021: 78 ML/MIN/1.73M2
GLUCOSE SERPL-MCNC: 135 MG/DL (ref 70–99)
HCO3 SERPL-SCNC: 26 MMOL/L (ref 22–29)
POTASSIUM SERPL-SCNC: 5 MMOL/L (ref 3.4–5.3)
PROT SERPL-MCNC: 6.6 G/DL (ref 6.4–8.3)
SODIUM SERPL-SCNC: 137 MMOL/L (ref 135–145)
TSH SERPL DL<=0.005 MIU/L-ACNC: 1.14 UIU/ML (ref 0.3–4.2)
VIT B12 SERPL-MCNC: 487 PG/ML (ref 232–1245)

## 2025-04-30 ENCOUNTER — OFFICE VISIT (OUTPATIENT)
Dept: FAMILY MEDICINE | Facility: CLINIC | Age: 75
End: 2025-04-30
Payer: COMMERCIAL

## 2025-04-30 VITALS
BODY MASS INDEX: 24.92 KG/M2 | HEART RATE: 88 BPM | SYSTOLIC BLOOD PRESSURE: 147 MMHG | DIASTOLIC BLOOD PRESSURE: 87 MMHG | RESPIRATION RATE: 20 BRPM | HEIGHT: 72 IN | WEIGHT: 184 LBS | TEMPERATURE: 99 F | OXYGEN SATURATION: 98 %

## 2025-04-30 DIAGNOSIS — Z00.00 HEALTH CARE MAINTENANCE: Primary | ICD-10-CM

## 2025-04-30 DIAGNOSIS — G62.9 PERIPHERAL POLYNEUROPATHY: ICD-10-CM

## 2025-04-30 DIAGNOSIS — J44.9 CHRONIC OBSTRUCTIVE PULMONARY DISEASE, UNSPECIFIED COPD TYPE (H): ICD-10-CM

## 2025-04-30 DIAGNOSIS — D68.51 FACTOR V LEIDEN MUTATION: ICD-10-CM

## 2025-04-30 DIAGNOSIS — I73.9 PVD (PERIPHERAL VASCULAR DISEASE): ICD-10-CM

## 2025-04-30 DIAGNOSIS — M21.371 RIGHT FOOT DROP: ICD-10-CM

## 2025-04-30 DIAGNOSIS — N52.9 ERECTILE DYSFUNCTION, UNSPECIFIED ERECTILE DYSFUNCTION TYPE: ICD-10-CM

## 2025-04-30 PROCEDURE — 3079F DIAST BP 80-89 MM HG: CPT | Performed by: INTERNAL MEDICINE

## 2025-04-30 PROCEDURE — 99214 OFFICE O/P EST MOD 30 MIN: CPT | Performed by: INTERNAL MEDICINE

## 2025-04-30 PROCEDURE — 3077F SYST BP >= 140 MM HG: CPT | Performed by: INTERNAL MEDICINE

## 2025-04-30 RX ORDER — SILDENAFIL CITRATE 20 MG/1
TABLET ORAL
Qty: 50 TABLET | Refills: 11 | Status: SHIPPED | OUTPATIENT
Start: 2025-04-30

## 2025-04-30 NOTE — PROGRESS NOTES
Assessment & Plan   Problem List Items Addressed This Visit          Nervous and Auditory    Peripheral polyneuropathy     Progressively worsening both sensory and motor neuropathy features  4/2025: TSH, B12, sed rates all normal  -EMG study: Axonal degenerative changes consistent with peripheral neuropathy with both motor component.  Consistent with right sided peroneal neuropathy  -Referring to physical therapy to help with balance and ambulation  -Using walking poles  - Some neuropathic pain component -consensus to hold off on medication introduction for now  -Plans for AFO brace -he is exploring through his son who is a physical therapy director  - Schedule outpatient neurology referral in July, 2025         Relevant Orders    Physical Therapy  Referral       Respiratory    Chronic obstructive pulmonary disease, unspecified COPD type (H)       Circulatory    PVD (peripheral vascular disease)     Remote history of prior superior femoral artery stenting  '19 bilateral SFA stent occlusion -underwent IR directed thrombectomy in left leg  - not maintained follow-up through vascular clinic  -lower extremity arterial ultrasound and NYDIA with exercise ordered - not completd         Relevant Medications    rivaroxaban ANTICOAGULANT (XARELTO) 20 MG TABS tablet       Musculoskeletal and Integumentary    Right foot drop     Right-sided peroneal neuropathy  -Plan AFO brace fitting         Relevant Orders    Physical Therapy  Referral       Hematologic    Factor V Leiden mutation     History of arterial and venous thromboembolism  Historically maintained on warfarin; subsequently transitioned to Xarelto and tolerating well         Relevant Medications    rivaroxaban ANTICOAGULANT (XARELTO) 20 MG TABS tablet     Other Visit Diagnoses       Health care maintenance    -  Primary    Relevant Orders    REVIEW OF HEALTH MAINTENANCE PROTOCOL ORDERS (Completed)    Erectile dysfunction, unspecified erectile  "dysfunction type        Relevant Medications    sildenafil (REVATIO) 20 MG tablet                  BMI  Estimated body mass index is 25.31 kg/m  as calculated from the following:    Height as of this encounter: 1.816 m (5' 11.5\").    Weight as of this encounter: 83.5 kg (184 lb).         Ijeoam Oates is a 75 year old, presenting for the following health issues: Presents for follow-up related to recent EMG study completed related to persistent right-sided foot drop.  Had been in [facility] this past winter.  It was a rough winter in terms of losing longtime friend who traveled with him to cancer.  Also had a fishing friend who recently passed away as well.  Is felt much more functionally limited related to worsening neuropathy.  Since last fall has had progressively worsening right sided foot drop.  Describes multiple falling occasions this winter related to ice.  Lives out remotely in a cabin; significantly difficult to maneuver with landscaping in nature.  Using walking poles.  Has a son whose physical therapy director through [facility] -has been working with him on AFO brace.  Would be interested in physical therapy related to his neuropathy  Results (EMG)        4/30/2025     8:42 AM   Additional Questions   Roomed by Flori PEPE     History of Present Illness       Reason for visit:  Drop foot  Symptom onset:  More than a month  Symptom intensity:  Severe  Symptom progression:  Staying the same    He eats 0-1 servings of fruits and vegetables daily.He consumes 0 sweetened beverage(s) daily.He exercises with enough effort to increase his heart rate 10 to 19 minutes per day.  He exercises with enough effort to increase his heart rate 3 or less days per week.   He is taking medications regularly.            Review of Systems  Constitutional, HEENT, cardiovascular, pulmonary, gi and gu systems are negative, except as otherwise noted.      Objective    BP (!) 158/100   Pulse 88   Temp 99  F (37.2  C)   Resp 20   Ht " "1.816 m (5' 11.5\")   Wt 83.5 kg (184 lb)   SpO2 98%   BMI 25.31 kg/m    Body mass index is 25.31 kg/m .  Physical Exam   GENERAL: alert and no distress  NECK: no adenopathy, no asymmetry, masses, or scars  RESP: lungs clear to auscultation - no rales, rhonchi or wheezes  CV: regular rate and rhythm, normal S1 S2, no S3 or S4, no murmur, click or rub, no peripheral edema  ABDOMEN: soft, nontender, no hepatosplenomegaly, no masses and bowel sounds normal  MS: no gross musculoskeletal defects noted, no edema  Neuro: Gross reduction sensory input in both lower extremities below knees.  Right sided foot drop present    Lab on 04/23/2025   Component Date Value Ref Range Status    WBC Count 04/23/2025 7.2  4.0 - 11.0 10e3/uL Final    RBC Count 04/23/2025 4.13 (L)  4.40 - 5.90 10e6/uL Final    Hemoglobin 04/23/2025 14.3  13.3 - 17.7 g/dL Final    Hematocrit 04/23/2025 40.8  40.0 - 53.0 % Final    MCV 04/23/2025 99  78 - 100 fL Final    MCH 04/23/2025 34.6 (H)  26.5 - 33.0 pg Final    MCHC 04/23/2025 35.0  31.5 - 36.5 g/dL Final    RDW 04/23/2025 13.2  10.0 - 15.0 % Final    Platelet Count 04/23/2025 191  150 - 450 10e3/uL Final    Vitamin B12 04/23/2025 487  232 - 1,245 pg/mL Final    Sodium 04/23/2025 137  135 - 145 mmol/L Final    Potassium 04/23/2025 5.0  3.4 - 5.3 mmol/L Final    Carbon Dioxide (CO2) 04/23/2025 26  22 - 29 mmol/L Final    Anion Gap 04/23/2025 9  7 - 15 mmol/L Final    Urea Nitrogen 04/23/2025 15.8  8.0 - 23.0 mg/dL Final    Creatinine 04/23/2025 1.01  0.67 - 1.17 mg/dL Final    GFR Estimate 04/23/2025 78  >60 mL/min/1.73m2 Final    eGFR calculated using 2021 CKD-EPI equation.    Calcium 04/23/2025 9.3  8.8 - 10.4 mg/dL Final    Chloride 04/23/2025 102  98 - 107 mmol/L Final    Glucose 04/23/2025 135 (H)  70 - 99 mg/dL Final    Alkaline Phosphatase 04/23/2025 66  40 - 150 U/L Final    AST 04/23/2025 46 (H)  0 - 45 U/L Final    ALT 04/23/2025 47  0 - 70 U/L Final    Protein Total 04/23/2025 6.6  6.4 - " 8.3 g/dL Final    Albumin 04/23/2025 4.0  3.5 - 5.2 g/dL Final    Bilirubin Total 04/23/2025 0.3  <=1.2 mg/dL Final    TSH 04/23/2025 1.14  0.30 - 4.20 uIU/mL Final    Erythrocyte Sedimentation Rate 04/23/2025 3  0 - 20 mm/hr Final    CRP Inflammation 04/23/2025 <3.00  <5.00 mg/L Final           Signed Electronically by: Taqueria Box MD

## 2025-04-30 NOTE — ASSESSMENT & PLAN NOTE
Remote history of prior superior femoral artery stenting  '19 bilateral SFA stent occlusion -underwent IR directed thrombectomy in left leg  - not maintained follow-up through vascular clinic  -lower extremity arterial ultrasound and NYDIA with exercise ordered - not completd

## 2025-04-30 NOTE — ASSESSMENT & PLAN NOTE
Progressively worsening both sensory and motor neuropathy features  4/2025: TSH, B12, sed rates all normal  -EMG study: Axonal degenerative changes consistent with peripheral neuropathy with both motor component.  Consistent with right sided peroneal neuropathy  -Referring to physical therapy to help with balance and ambulation  -Using walking poles  - Some neuropathic pain component -consensus to hold off on medication introduction for now  -Plans for AFO brace -he is exploring through his son who is a physical therapy director  - Schedule outpatient neurology referral in July, 2025

## 2025-07-24 ENCOUNTER — PATIENT OUTREACH (OUTPATIENT)
Dept: CARE COORDINATION | Facility: CLINIC | Age: 75
End: 2025-07-24
Payer: COMMERCIAL

## 2025-08-11 ENCOUNTER — OFFICE VISIT (OUTPATIENT)
Dept: FAMILY MEDICINE | Facility: CLINIC | Age: 75
End: 2025-08-11
Payer: COMMERCIAL

## 2025-08-11 VITALS
OXYGEN SATURATION: 96 % | BODY MASS INDEX: 23.31 KG/M2 | TEMPERATURE: 97.9 F | HEIGHT: 72 IN | DIASTOLIC BLOOD PRESSURE: 80 MMHG | HEART RATE: 117 BPM | WEIGHT: 172.1 LBS | SYSTOLIC BLOOD PRESSURE: 138 MMHG | RESPIRATION RATE: 18 BRPM

## 2025-08-11 DIAGNOSIS — R11.0 NAUSEA: ICD-10-CM

## 2025-08-11 DIAGNOSIS — M79.18 MYALGIA, MULTIPLE SITES: Primary | ICD-10-CM

## 2025-08-11 PROCEDURE — 3079F DIAST BP 80-89 MM HG: CPT | Performed by: PHYSICIAN ASSISTANT

## 2025-08-11 PROCEDURE — 3075F SYST BP GE 130 - 139MM HG: CPT | Performed by: PHYSICIAN ASSISTANT

## 2025-08-11 PROCEDURE — 99213 OFFICE O/P EST LOW 20 MIN: CPT | Performed by: PHYSICIAN ASSISTANT

## 2025-08-11 RX ORDER — ONDANSETRON 4 MG/1
4 TABLET, ORALLY DISINTEGRATING ORAL EVERY 8 HOURS PRN
COMMUNITY
Start: 2025-08-06

## 2025-08-11 RX ORDER — DOXYCYCLINE 100 MG/1
100 CAPSULE ORAL 2 TIMES DAILY
COMMUNITY
Start: 2025-08-07 | End: 2025-08-11

## 2025-08-11 RX ORDER — DOXYCYCLINE 100 MG/1
100 CAPSULE ORAL 2 TIMES DAILY
COMMUNITY

## 2025-09-03 ENCOUNTER — TELEPHONE (OUTPATIENT)
Dept: FAMILY MEDICINE | Facility: CLINIC | Age: 75
End: 2025-09-03
Payer: COMMERCIAL

## (undated) DEVICE — PREP DURAPREP REMOVER 4OZ 8611

## (undated) DEVICE — ABLATOR ARTHREX APOLLO RF MP90 ASPIRATING 90DEG AR-9811

## (undated) DEVICE — SOL HYDROGEN PEROXIDE 3% 4OZ BOTTLE F0010

## (undated) DEVICE — DRAPE U-POUCH 34X29" 1067

## (undated) DEVICE — SU FIBERLINK #2 BRAIDED PB BLUE W/1.5" CLSD LOOP  AR-7235

## (undated) DEVICE — DRAPE SHOULDER PACK SPLITS 29365

## (undated) DEVICE — NDL ARTHREX MULTIFIRE/FASTPASS SCORPION AR-13995N

## (undated) DEVICE — PREP DURAPREP 26ML APL 8630

## (undated) DEVICE — DRAPE STERI U 1015

## (undated) DEVICE — PACK SHOULDER CUSTOM ASC SOP15ASUMA

## (undated) DEVICE — SU PDS II 0 CTX 36" Z370T

## (undated) DEVICE — DRAPE MAYO STAND 23X54 8337

## (undated) DEVICE — SU FIBERWIRE 2 38" 2-STRAND WHITE&BLUE W/O NDL AR-7240

## (undated) DEVICE — IMM KIT SHOULDER TMAX MASK FACE 7210559

## (undated) DEVICE — LINEN ORTHO PACK 5446

## (undated) DEVICE — BRUSH SURGICAL SCRUB W/4% CHG SOL 25ML 371073

## (undated) DEVICE — SU MONOCRYL 3-0 PS-1 27" Y936H

## (undated) DEVICE — IMM KIT SHOULDER STABILIZATION 7210573

## (undated) DEVICE — TUBING SYSTEM ARTHREX PATIENT REDEUCE AR-6421

## (undated) DEVICE — GLOVE PROTEXIS POWDER FREE 8.5 ORTHOPEDIC 2D73ET85

## (undated) DEVICE — GLOVE PROTEXIS POWDER FREE SMT 8.5 2D72PT85X

## (undated) DEVICE — STRAP STIRRUP W/SLIP 30187-030

## (undated) DEVICE — DRSG STERI STRIP 1/2X4" R1547

## (undated) DEVICE — SU LOOP #2 TIGERLOOP AR-7234T

## (undated) DEVICE — SUCTION MANIFOLD NEPTUNE 2 SYS 4 PORT 0702-020-000

## (undated) RX ORDER — PROPOFOL 10 MG/ML
INJECTION, EMULSION INTRAVENOUS
Status: DISPENSED
Start: 2020-10-08

## (undated) RX ORDER — EPINEPHRINE NASAL SOLUTION 1 MG/ML
SOLUTION NASAL
Status: DISPENSED
Start: 2020-10-08

## (undated) RX ORDER — GABAPENTIN 300 MG/1
CAPSULE ORAL
Status: DISPENSED
Start: 2020-10-08

## (undated) RX ORDER — LIDOCAINE HYDROCHLORIDE 20 MG/ML
INJECTION, SOLUTION EPIDURAL; INFILTRATION; INTRACAUDAL; PERINEURAL
Status: DISPENSED
Start: 2020-10-08

## (undated) RX ORDER — BUPIVACAINE HYDROCHLORIDE 2.5 MG/ML
INJECTION, SOLUTION EPIDURAL; INFILTRATION; INTRACAUDAL
Status: DISPENSED
Start: 2020-10-08

## (undated) RX ORDER — ONDANSETRON 2 MG/ML
INJECTION INTRAMUSCULAR; INTRAVENOUS
Status: DISPENSED
Start: 2020-10-08

## (undated) RX ORDER — DEXAMETHASONE SODIUM PHOSPHATE 4 MG/ML
INJECTION, SOLUTION INTRA-ARTICULAR; INTRALESIONAL; INTRAMUSCULAR; INTRAVENOUS; SOFT TISSUE
Status: DISPENSED
Start: 2020-10-08

## (undated) RX ORDER — FENTANYL CITRATE 50 UG/ML
INJECTION, SOLUTION INTRAMUSCULAR; INTRAVENOUS
Status: DISPENSED
Start: 2020-10-08

## (undated) RX ORDER — CEFAZOLIN SODIUM 1 G/3ML
INJECTION, POWDER, FOR SOLUTION INTRAMUSCULAR; INTRAVENOUS
Status: DISPENSED
Start: 2020-10-08

## (undated) RX ORDER — DEXAMETHASONE SODIUM PHOSPHATE 10 MG/ML
INJECTION, SOLUTION INTRAMUSCULAR; INTRAVENOUS
Status: DISPENSED
Start: 2020-10-08

## (undated) RX ORDER — ACETAMINOPHEN 325 MG/1
TABLET ORAL
Status: DISPENSED
Start: 2020-10-08

## (undated) RX ORDER — GLYCOPYRROLATE 0.2 MG/ML
INJECTION INTRAMUSCULAR; INTRAVENOUS
Status: DISPENSED
Start: 2020-10-08